# Patient Record
Sex: FEMALE | Race: WHITE | NOT HISPANIC OR LATINO | Employment: FULL TIME | URBAN - METROPOLITAN AREA
[De-identification: names, ages, dates, MRNs, and addresses within clinical notes are randomized per-mention and may not be internally consistent; named-entity substitution may affect disease eponyms.]

---

## 2017-10-23 ENCOUNTER — HOSPITAL ENCOUNTER (EMERGENCY)
Facility: CLINIC | Age: 37
Discharge: HOME | End: 2017-10-23
Attending: EMERGENCY MEDICINE | Admitting: EMERGENCY MEDICINE
Payer: COMMERCIAL

## 2017-10-23 ENCOUNTER — APPOINTMENT (OUTPATIENT)
Dept: GENERAL RADIOLOGY | Facility: CLINIC | Age: 37
End: 2017-10-23
Attending: EMERGENCY MEDICINE
Payer: COMMERCIAL

## 2017-10-23 VITALS
DIASTOLIC BLOOD PRESSURE: 98 MMHG | HEART RATE: 79 BPM | TEMPERATURE: 98.1 F | RESPIRATION RATE: 20 BRPM | OXYGEN SATURATION: 97 % | SYSTOLIC BLOOD PRESSURE: 131 MMHG

## 2017-10-23 DIAGNOSIS — R07.9 CHEST PAIN, UNSPECIFIED TYPE: ICD-10-CM

## 2017-10-23 DIAGNOSIS — I10 ESSENTIAL HYPERTENSION: ICD-10-CM

## 2017-10-23 DIAGNOSIS — R42 DIZZINESS: ICD-10-CM

## 2017-10-23 LAB
ANION GAP SERPL CALCULATED.3IONS-SCNC: 8 MMOL/L (ref 3–14)
BASOPHILS # BLD AUTO: 0.1 10E9/L (ref 0–0.2)
BASOPHILS NFR BLD AUTO: 1 %
BUN SERPL-MCNC: 17 MG/DL (ref 7–30)
CALCIUM SERPL-MCNC: 9.2 MG/DL (ref 8.5–10.1)
CHLORIDE SERPL-SCNC: 103 MMOL/L (ref 94–109)
CO2 SERPL-SCNC: 27 MMOL/L (ref 20–32)
CREAT SERPL-MCNC: 0.61 MG/DL (ref 0.52–1.04)
D DIMER PPP FEU-MCNC: <0.3 UG/ML FEU (ref 0–0.5)
DIFFERENTIAL METHOD BLD: NORMAL
EOSINOPHIL # BLD AUTO: 0.1 10E9/L (ref 0–0.7)
EOSINOPHIL NFR BLD AUTO: 1 %
ERYTHROCYTE [DISTWIDTH] IN BLOOD BY AUTOMATED COUNT: 12.5 % (ref 10–15)
GFR SERPL CREATININE-BSD FRML MDRD: >90 ML/MIN/1.7M2
GLUCOSE SERPL-MCNC: 92 MG/DL (ref 70–99)
HCT VFR BLD AUTO: 44 % (ref 35–47)
HGB BLD-MCNC: 14.7 G/DL (ref 11.7–15.7)
INTERPRETATION ECG - MUSE: NORMAL
LYMPHOCYTES # BLD AUTO: 3 10E9/L (ref 0.8–5.3)
LYMPHOCYTES NFR BLD AUTO: 31 %
MCH RBC QN AUTO: 30.1 PG (ref 26.5–33)
MCHC RBC AUTO-ENTMCNC: 33.4 G/DL (ref 31.5–36.5)
MCV RBC AUTO: 90 FL (ref 78–100)
MONOCYTES # BLD AUTO: 0.6 10E9/L (ref 0–1.3)
MONOCYTES NFR BLD AUTO: 6 %
NEUTROPHILS # BLD AUTO: 5.8 10E9/L (ref 1.6–8.3)
NEUTROPHILS NFR BLD AUTO: 61 %
NT-PROBNP SERPL-MCNC: 14 PG/ML (ref 0–450)
PLATELET # BLD AUTO: 355 10E9/L (ref 150–450)
POTASSIUM SERPL-SCNC: 4 MMOL/L (ref 3.4–5.3)
RBC # BLD AUTO: 4.88 10E12/L (ref 3.8–5.2)
SODIUM SERPL-SCNC: 138 MMOL/L (ref 133–144)
TROPONIN I SERPL-MCNC: <0.015 UG/L (ref 0–0.04)
WBC # BLD AUTO: 9.6 10E9/L (ref 4–11)

## 2017-10-23 PROCEDURE — 84484 ASSAY OF TROPONIN QUANT: CPT | Performed by: EMERGENCY MEDICINE

## 2017-10-23 PROCEDURE — 80048 BASIC METABOLIC PNL TOTAL CA: CPT | Performed by: EMERGENCY MEDICINE

## 2017-10-23 PROCEDURE — 96360 HYDRATION IV INFUSION INIT: CPT

## 2017-10-23 PROCEDURE — 25000132 ZZH RX MED GY IP 250 OP 250 PS 637: Performed by: EMERGENCY MEDICINE

## 2017-10-23 PROCEDURE — 25000128 H RX IP 250 OP 636: Performed by: EMERGENCY MEDICINE

## 2017-10-23 PROCEDURE — 71020 XR CHEST 2 VW: CPT

## 2017-10-23 PROCEDURE — 93005 ELECTROCARDIOGRAM TRACING: CPT

## 2017-10-23 PROCEDURE — 85025 COMPLETE CBC W/AUTO DIFF WBC: CPT | Performed by: EMERGENCY MEDICINE

## 2017-10-23 PROCEDURE — 96361 HYDRATE IV INFUSION ADD-ON: CPT

## 2017-10-23 PROCEDURE — 83880 ASSAY OF NATRIURETIC PEPTIDE: CPT | Performed by: EMERGENCY MEDICINE

## 2017-10-23 PROCEDURE — 85379 FIBRIN DEGRADATION QUANT: CPT | Performed by: EMERGENCY MEDICINE

## 2017-10-23 PROCEDURE — 99285 EMERGENCY DEPT VISIT HI MDM: CPT | Mod: 25

## 2017-10-23 RX ORDER — ASPIRIN 81 MG/1
324 TABLET, CHEWABLE ORAL ONCE
Status: COMPLETED | OUTPATIENT
Start: 2017-10-23 | End: 2017-10-23

## 2017-10-23 RX ORDER — NITROGLYCERIN 0.4 MG/1
0.4 TABLET SUBLINGUAL EVERY 5 MIN PRN
Status: DISCONTINUED | OUTPATIENT
Start: 2017-10-23 | End: 2017-10-23 | Stop reason: HOSPADM

## 2017-10-23 RX ORDER — SODIUM CHLORIDE 9 MG/ML
1000 INJECTION, SOLUTION INTRAVENOUS CONTINUOUS
Status: DISCONTINUED | OUTPATIENT
Start: 2017-10-23 | End: 2017-10-23 | Stop reason: HOSPADM

## 2017-10-23 RX ADMIN — SODIUM CHLORIDE 500 ML: 9 INJECTION, SOLUTION INTRAVENOUS at 15:09

## 2017-10-23 RX ADMIN — ASPIRIN 81 MG 324 MG: 81 TABLET ORAL at 15:07

## 2017-10-23 ASSESSMENT — ENCOUNTER SYMPTOMS
ABDOMINAL PAIN: 0
MYALGIAS: 0
DIZZINESS: 1

## 2017-10-23 NOTE — ED PROVIDER NOTES
History     Chief Complaint:  Chest Pain and Shortness of Breath    HPI   Tresa Riley is a 37 year old female who presents to the emergency department today for evaluation of chest pain and shortness of breath. The patient reports sitting at work around 1300 (2 hours prior to arrival) and experiencing sudden onset of chest pain and dizziness. She describes the chest pain as sharp in nature and has been present constantly since onset, though it is less severe currently. This dizziness is worse with movement. The patient denies any current shortness or breath, leg swelling and leg pain. Additionally, the patient states she's had her menstrual period for a month now, and has an appointment with her OB soon to have this evaluated. This has not changed. She denies abdominal pain or possibility of pregnancy.     Cardiac/PE/DVT Risk Factors:  History of hypertension - Positive   History of hyperlipidemia - Positive   History of diabetes - Negative   History of smoking - Negative   Personal history of PE/DVT - Negative   Family history of PE/DVT - Negative   Family history of heart complications - Negative   Recent travel - Negative   Recent surgery - Negative   Other immobilizations - Negative   Cancer - Negative      Allergies:  No Known Drug Allergies      Medications:    Antihypertensives  Hypercholesterolemia medications     Past Medical History:    No past medical history on file.    Past Surgical History:    No past surgical history on file.    Family History:    No family history on file.    Social History:  The patient was accompanied to the ED by a family member.  Smoking Status: Unknown  Smokeless Tobacco: Unknown  Alcohol Use: Unknown    Marital Status:        Review of Systems   Cardiovascular: Positive for chest pain. Negative for leg swelling.   Gastrointestinal: Negative for abdominal pain.   Musculoskeletal: Negative for myalgias (leg pain).   Neurological: Positive for dizziness.   All other  systems reviewed and are negative.    Physical Exam     Patient Vitals for the past 24 hrs:   BP Temp Temp src Pulse Heart Rate Resp SpO2   10/23/17 1600 (!) 138/99 - - - - - -   10/23/17 1545 (!) 148/108 - - - 72 - 99 %   10/23/17 1530 (!) 137/99 - - - 78 - 99 %   10/23/17 1500 (!) 157/104 - - - 82 - 97 %   10/23/17 1445 (!) 156/122 - - - 80 - 100 %   10/23/17 1439 (!) 148/115 - - 79 79 - 100 %   10/23/17 1402 - 98.1  F (36.7  C) Oral - - - 98 %   10/23/17 1358 (!) 131/96 - - 88 - 20 -      Physical Exam  General: Adult female supine in bed.   Eyes: PERRL, Conjunctive within normal limits  ENT: Moist mucous membranes, oropharynx clear.   CV: Normal S1S2, no murmur, rub or gallop. Regular rate and rhythm. Intact and equal radial pulses bilaterally  Resp: Clear to auscultation bilaterally, no wheezes, rales or rhonchi. Normal respiratory effort.  GI: Abdomen is soft, nontender and nondistended. No palpable masses. No rebound or guarding.  MSK: No edema. Nontender. Normal active range of motion. No calf asymmetry or tenderness to palpation. No chest wall tenderness to palpation.   Skin: Warm and dry. No rashes or lesions or ecchymoses on visible skin.  Neuro: Alert and oriented. Responds appropriately to all questions and commands. No focal findings appreciated. Normal muscle tone.  Psych: Normal mood and affect. Pleasant.  Emergency Department Course     ECG:  Indication: Chest pain and shortness of breath   Completed at 1354.  Read at 1457.   Normal sinus rhythm. Normal ECG.   Rate 85 bpm. TN interval 152. QRS duration 82. QT/QTc 386/459. P-R-T axes  41 30 29..     Imaging:  Radiology findings were communicated with the patient who voiced understanding of the findings.    XR Chest 2 Views:  IMPRESSION: No active infiltrate. No change.   Report per radiology      Laboratory:  Laboratory findings were communicated with the patient who voiced understanding of the findings.    CBC: WBC 9.6, HGB 14.7,   BMP: WNL.  (Creatinine 0.61)   Troponin (Collected 1452): <0.015   BNP: 14   D Dimer (Collected 1452): <0.3     Interventions:  1507 Aspirin 324mg PO   1509 NS Bolus 500mL IV      Emergency Department Course:  Nursing notes and vitals reviewed.  1458 I performed an exam of the patient as documented above.   EKG obtained in the ED, see results above.    IV was inserted and blood was drawn for laboratory testing, results above.   The patient was sent for a chest x-ray while in the emergency department, results above.    1637 I rechecked the patient and she feels comfortable going. She denies any new concerns and is overall feeling improved.   I personally reviewed the laboratory and imaging results with the Patient and answered all related questions prior to sign out.   Impression & Plan      Medical Decision Making:  Tresa Riley is a 37 year old female with history of hypertension, hypercholesterolemia who presents to the emergency department with concerns for chest pain that started while sitting at work. She had associated dizziness but was not orthostatic here. She had no neurologic symptoms or headache and stroke is not likely. This is somewhat atypical for ACS however she does have some risk factors and therefore troponins was obtained. EKG did not show any evidence of acute ischemia. Initial troponin was normal. D-dimer also obtained given her body habitus, however there is no elevation. She had no abnormalities on chest x-ray that were concerning. She has a low heart score and appropriate for discharge home. I did however schedule an outpatient stress test for her. Multiple etiologies considered including GERD, musculoskeletal pain, esophageal spasm, but these can be further evaluated as an outpatient. At this time no current evidence to suggest life threatening pathologies. The patient's care at this time was recommended secondary troponin and EKG. She declined this and acknowledged the risks associated with this.  All questions answered prior to discharge.      Diagnosis:    ICD-10-CM    1. Chest pain, unspecified type R07.9 Exercise Stress Echocardiogram   2. Dizziness R42    3. Essential hypertension I10 Exercise Stress Echocardiogram       Disposition:  Discharged to home.     Reza Rodriguez  10/23/2017   [facility] EMERGENCY DEPARTMENT  Scribe Disclosure:  I, Reza Rodriguez, am serving as a scribe at 2:39 PM on 10/23/2017 to document services personally performed by Candida Wadsworth MD based on my observations and the provider's statements to me.       Candida Wadsworth MD  10/27/17 0709

## 2017-10-23 NOTE — ED NOTES
Patient presents with chest pain and shortness of breath that started while she was sitting at her desk at work prior to arrival. She is alert and oriented, denies pain at this time, ABCs intact.

## 2017-10-23 NOTE — ED AVS SNAPSHOT
[facility] Emergency Department    [address]    [facility] [address]    Phone:  [phone]    Fax:  [phone]                                       Tresa Riley   MRN: [id number]    Department:  [facility] Emergency Department   Date of Visit:  10/23/2017           After Visit Summary Signature Page     I have received my discharge instructions, and my questions have been answered. I have discussed any challenges I see with this plan with the nurse or doctor.    ..........................................................................................................................................  Patient/Patient Representative Signature      ..........................................................................................................................................  Patient Representative Print Name and Relationship to Patient    ..................................................               ................................................  Date                                            Time    ..........................................................................................................................................  Reviewed by Signature/Title    ...................................................              ..............................................  Date                                                            Time

## 2017-10-23 NOTE — DISCHARGE INSTRUCTIONS
Discharge Instructions  Chest Pain    You have been seen today for chest pain or discomfort.  At this time, your provider has found no signs that your chest pain is due to a serious or life-threatening condition, (or you have declined more testing and/or admission to the hospital). However, sometimes there is a serious problem that does not show up right away. Your evaluation today may not be complete and you may need further testing and evaluation.     Generally, every Emergency Department visit should have a follow-up clinic visit with either a primary or a specialty clinic/provider. Please follow-up as instructed by your emergency provider today.  Return to the Emergency Department if:    Your chest pain changes, gets worse, starts to happen more often, or comes with less activity.    You are newly short of breath.    You get very weak or tired.    You pass out or faint.    You have any new symptoms, like fever, cough, numb legs, or you cough up blood.    You have anything else that worries you.    Until you follow-up with your regular provider, please do the following:    Take one aspirin daily unless you have an allergy or are told not to by your provider.    If a stress test appointment has been made, go to the appointment.    If you have questions, contact your regular provider.    Follow-up with your regular provider/clinic as directed; this is very important.    If you were given a prescription for medicine here today, be sure to read all of the information (including the package insert) that comes with your prescription.  This will include important information about the medicine, its side effects, and any warnings that you need to know about.  The pharmacist who fills the prescription can provide more information and answer questions you may have about the medicine.  If you have questions or concerns that the pharmacist cannot address, please call or return to the Emergency Department.       Remember that  you can always come back to the Emergency Department if you are not able to see your regular provider in the amount of time listed above, if you get any new symptoms, or if there is anything that worries you.    Discharge Instructions  Dizziness (Lightheaded)  Today you were seen for dizziness.  Dizziness can be caused by many things and it can be very difficult to determine the cause of dizziness.  At this time, your provider has found no signs that your dizziness is due to a serious or life-threatening condition. However, sometimes there is a serious problem that does not show up right away, and it is important for you to follow up with your regular provider as instructed.  Generally, every Emergency Department visit should have a follow-up clinic visit with either a primary or a specialty clinic/provider. Please follow-up as instructed by your emergency provider today.      Return to the Emergency Department if:      You pass out (fainting or falling out), especially during exercise.      You develop chest pain, chest pressure or difficulty breathing.    Your feel an irregular heartbeat.    You have excessive vaginal bleeding, or blood in your stool or vomit (throw up).    You have a high fever.    Your symptoms get worse or more frequent.    If when you begin to feel dizzy or lightheaded, it is important to sit down or lay down immediately to prevent injury from falling.  If you were given a prescription for medicine here today, be sure to read all of the information (including the package insert) that comes with your prescription.  This will include important information about the medicine, its side effects, and any warnings that you need to know about.  The pharmacist who fills the prescription can provide more information and answer questions you may have about the medicine.  If you have questions or concerns that the pharmacist cannot address, please call or return to the Emergency Department.   Remember  that you can always come back to the Emergency Department if you are not able to see your regular provider in the amount of time listed above, if you get any new symptoms, or if there is anything that worries you.

## 2017-10-23 NOTE — ED AVS SNAPSHOT
[facility] Emergency Department    [address]    [facility] [address]    Phone:  [phone]    Fax:  [phone]                                       Tresa Riley   MRN: [id number]    Department:  [facility] Emergency Department   Date of Visit:  10/23/2017           Patient Information     Date Of Birth          1980        Your diagnoses for this visit were:     Chest pain, unspecified type     Dizziness     Essential hypertension        You were seen by Candida Wadsworth MD.      Follow-up Information     Follow up with Primary clinic.    Why:  within 3 days        Follow up with [facility] Emergency Department.    Specialty:  EMERGENCY MEDICINE    Why:  As needed, If symptoms worsen    Contact information:    [address]  [facility] [address] [phone]        Discharge Instructions       Discharge Instructions  Chest Pain    You have been seen today for chest pain or discomfort.  At this time, your provider has found no signs that your chest pain is due to a serious or life-threatening condition, (or you have declined more testing and/or admission to the hospital). However, sometimes there is a serious problem that does not show up right away. Your evaluation today may not be complete and you may need further testing and evaluation.     Generally, every Emergency Department visit should have a follow-up clinic visit with either a primary or a specialty clinic/provider. Please follow-up as instructed by your emergency provider today.  Return to the Emergency Department if:    Your chest pain changes, gets worse, starts to happen more often, or comes with less activity.    You are newly short of breath.    You get very weak or tired.    You pass out or faint.    You have any new symptoms, like fever, cough, numb legs, or you cough up blood.    You have anything else that worries you.    Until you follow-up with your  regular provider, please do the following:    Take one aspirin daily unless you have an allergy or are told not to by your provider.    If a stress test appointment has been made, go to the appointment.    If you have questions, contact your regular provider.    Follow-up with your regular provider/clinic as directed; this is very important.    If you were given a prescription for medicine here today, be sure to read all of the information (including the package insert) that comes with your prescription.  This will include important information about the medicine, its side effects, and any warnings that you need to know about.  The pharmacist who fills the prescription can provide more information and answer questions you may have about the medicine.  If you have questions or concerns that the pharmacist cannot address, please call or return to the Emergency Department.       Remember that you can always come back to the Emergency Department if you are not able to see your regular provider in the amount of time listed above, if you get any new symptoms, or if there is anything that worries you.    Discharge Instructions  Dizziness (Lightheaded)  Today you were seen for dizziness.  Dizziness can be caused by many things and it can be very difficult to determine the cause of dizziness.  At this time, your provider has found no signs that your dizziness is due to a serious or life-threatening condition. However, sometimes there is a serious problem that does not show up right away, and it is important for you to follow up with your regular provider as instructed.  Generally, every Emergency Department visit should have a follow-up clinic visit with either a primary or a specialty clinic/provider. Please follow-up as instructed by your emergency provider today.      Return to the Emergency Department if:      You pass out (fainting or falling out), especially during exercise.      You develop chest pain, chest pressure  or difficulty breathing.    Your feel an irregular heartbeat.    You have excessive vaginal bleeding, or blood in your stool or vomit (throw up).    You have a high fever.    Your symptoms get worse or more frequent.    If when you begin to feel dizzy or lightheaded, it is important to sit down or lay down immediately to prevent injury from falling.  If you were given a prescription for medicine here today, be sure to read all of the information (including the package insert) that comes with your prescription.  This will include important information about the medicine, its side effects, and any warnings that you need to know about.  The pharmacist who fills the prescription can provide more information and answer questions you may have about the medicine.  If you have questions or concerns that the pharmacist cannot address, please call or return to the Emergency Department.   Remember that you can always come back to the Emergency Department if you are not able to see your regular provider in the amount of time listed above, if you get any new symptoms, or if there is anything that worries you.      24 Hour Appointment Hotline       To make an appointment at any [facility], call [phone] ([phone]). If you don't have a family doctor or clinic, we will help you find one. [place] [facility] clinics are conveniently located to serve the needs of you and your family.          ED Discharge Orders     Exercise Stress Echocardiogram       Administration of IV contrast will be tailored to this examination per the appropriate written protocol listed in the Echocardiography department Protocol Book, or by the supervising Cardiologist. This may result in an order change.    Use of contrast is at the discretion of the supervising Cardiologist.                     Review of your medicines      Notice     You have not been prescribed any medications.            Procedures and tests performed during your visit     Basic  metabolic panel    CBC with platelets differential    Cardiac Continuous Monitoring    D dimer quantitative    EKG 12 lead    EKG 12-lead, tracing only    ISTAT troponin nursing POCT    Nt probnp inpatient (BNP)    Peripheral IV: Standard    Pulse oximetry nursing    Troponin I    XR Chest 2 Views      Orders Needing Specimen Collection     None      Pending Results     Date and Time Order Name Status Description    10/23/2017 1500 XR Chest 2 Views Preliminary             Pending Culture Results     No orders found from 10/21/2017 to 10/24/2017.            Pending Results Instructions     If you had any lab results that were not finalized at the time of your Discharge, you can call the ED Lab Result RN at [phone]. You will be contacted by this team for any positive Lab results or changes in treatment. The nurses are available 7 days a week from 10A to 6:30P.  You can leave a message 24 hours per day and they will return your call.        Test Results From Your Hospital Stay        10/23/2017  3:46 PM      Component Results     Component Value Ref Range & Units Status    WBC 9.6 4.0 - 11.0 10e9/L Final    RBC Count 4.88 3.8 - 5.2 10e12/L Final    Hemoglobin 14.7 11.7 - 15.7 g/dL Final    Hematocrit 44.0 35.0 - 47.0 % Final    MCV 90 78 - 100 fl Final    MCH 30.1 26.5 - 33.0 pg Final    MCHC 33.4 31.5 - 36.5 g/dL Final    RDW 12.5 10.0 - 15.0 % Final    Platelet Count 355 150 - 450 10e9/L Final    % Neutrophils 61.0 % Final    % Lymphocytes 31.0 % Final    % Monocytes 6.0 % Final    % Eosinophils 1.0 % Final    % Basophils 1.0 % Final    Absolute Neutrophil 5.8 1.6 - 8.3 10e9/L Final    Absolute Lymphocytes 3.0 0.8 - 5.3 10e9/L Final    Absolute Monocytes 0.6 0.0 - 1.3 10e9/L Final    Absolute Eosinophils 0.1 0.0 - 0.7 10e9/L Final    Absolute Basophils 0.1 0.0 - 0.2 10e9/L Final    Diff Method Automated Method  Final         10/23/2017  3:32 PM      Component Results     Component Value Ref Range & Units Status     Sodium 138 133 - 144 mmol/L Final    Potassium 4.0 3.4 - 5.3 mmol/L Final    Chloride 103 94 - 109 mmol/L Final    Carbon Dioxide 27 20 - 32 mmol/L Final    Anion Gap 8 3 - 14 mmol/L Final    Glucose 92 70 - 99 mg/dL Final    Urea Nitrogen 17 7 - 30 mg/dL Final    Creatinine 0.61 0.52 - 1.04 mg/dL Final    GFR Estimate >90 >60 mL/min/1.7m2 Final    Non  GFR Calc    GFR Estimate If Black >90 >60 mL/min/1.7m2 Final    African American GFR Calc    Calcium 9.2 8.5 - 10.1 mg/dL Final         10/23/2017  3:32 PM      Component Results     Component Value Ref Range & Units Status    Troponin I ES <0.015 0.000 - 0.045 ug/L Final    The 99th percentile for upper reference range is 0.045 ug/L.  Troponin values   in the range of 0.045 - 0.120 ug/L may be associated with risks of adverse   clinical events.           10/23/2017  3:32 PM      Component Results     Component Value Ref Range & Units Status    N-Terminal Pro BNP Inpatient 14 0 - 450 pg/mL Final       Reference range shown and results flagged as abnormal are suggested inpatient   cut points for confirming diagnosis if CHF in an acute setting. Establishing a   baseline value for each individual patient is useful for follow-up. An   inpatient or emergency department NT-proPBNP <300 pg/mL effectively rules out   acute CHF, with 99% negative predictive value.  The outpatient non-acute reference range for ruling out CHF is:   0-125 pg/mL (age 18 to less than 75)   0-450 pg/mL (age 75 yrs and older)           10/23/2017  4:33 PM      Narrative     XR CHEST 2 VW   10/23/2017 4:22 PM     HISTORY: Chest pain    COMPARISON: Film dated 5/25/2011    FINDINGS: The heart is negative.  The lungs are clear. The pulmonary  vasculature is normal.  The bones and soft tissues are unremarkable.        Impression     IMPRESSION: No active infiltrate. No change.             10/23/2017  3:28 PM      Component Results     Component Value Ref Range & Units Status    D Dimer  <0.3 0.0 - 0.50 ug/ml FEU Final    This D-dimer assay is intended for use in conjunction with a clinical pretest   probability assessment model to exclude pulmonary embolism (PE) and deep   venous thrombosis (DVT) in outpatients suspected of PE or DVT. The cut-off   value is 0.5 ug/mL FEU.                  Clinical Quality Measure: Blood Pressure Screening     Your blood pressure was checked while you were in the emergency department today. The last reading we obtained was  BP: 124/90 . Please read the guidelines below about what these numbers mean and what you should do about them.  If your systolic blood pressure (the top number) is less than 120 and your diastolic blood pressure (the bottom number) is less than 80, then your blood pressure is normal. There is nothing more that you need to do about it.  If your systolic blood pressure (the top number) is 120-139 or your diastolic blood pressure (the bottom number) is 80-89, your blood pressure may be higher than it should be. You should have your blood pressure rechecked within a year by a primary care provider.  If your systolic blood pressure (the top number) is 140 or greater or your diastolic blood pressure (the bottom number) is 90 or greater, you may have high blood pressure. High blood pressure is treatable, but if left untreated over time it can put you at risk for heart attack, stroke, or kidney failure. You should have your blood pressure rechecked by a primary care provider within the next 4 weeks.  If your provider in the emergency department today gave you specific instructions to follow-up with your doctor or provider even sooner than that, you should follow that instruction and not wait for up to 4 weeks for your follow-up visit.        Thank you for choosing [facility]       Thank you for choosing [facility] for your care. Our goal is always to provide you with excellent care. Hearing back from our patients is one way we can continue to improve our  "services. Please take a few minutes to complete the written survey that you may receive in the mail after you visit with us. Thank you!        [employer] Information     [employer] lets you send messages to your doctor, view your test results, renew your prescriptions, schedule appointments and more. To sign up, go to [email or web address] . Click on \"Log in\" on the left side of the screen, which will take you to the Welcome page. Then click on \"Sign up Now\" on the right side of the page.     You will be asked to enter the access code listed below, as well as some personal information. Please follow the directions to create your username and password.     Your access code is: [email or web address]  Expires: 2018  4:42 PM     Your access code will  in 90 days. If you need help or a new code, please call your [facility] clinic or [phone].        Care EveryWhere ID     This is your Care EveryWhere ID. This could be used by other organizations to access your [facility] medical records  [id number]        Equal Access to Services     [facility] CenterEL[facility]IRA : Hadii ephraim Grey, waaxda natalia, qaybmarcela kaalakilah lynn, rain jiménez . So [facility] [phone].    ATENCIÓN: Si habla español, tiene a alvarez disposición servicios gratuitos de asistencia lingüística. Dede al [phone].    We comply with applicable federal civil rights laws and [place] laws. We do not discriminate on the basis of race, color, national origin, age, disability, sex, sexual orientation, or gender identity.            After Visit Summary       This is your record. Keep this with you and show to your community pharmacist(s) and doctor(s) at your next visit.                  "

## 2017-10-24 ENCOUNTER — HOSPITAL ENCOUNTER (OUTPATIENT)
Dept: CARDIOLOGY | Facility: CLINIC | Age: 37
Discharge: HOME | End: 2017-10-24
Attending: EMERGENCY MEDICINE | Admitting: EMERGENCY MEDICINE
Payer: COMMERCIAL

## 2017-10-24 DIAGNOSIS — R07.9 CHEST PAIN, UNSPECIFIED TYPE: ICD-10-CM

## 2017-10-24 DIAGNOSIS — I10 ESSENTIAL HYPERTENSION: ICD-10-CM

## 2017-10-24 PROCEDURE — 93350 STRESS TTE ONLY: CPT | Mod: 26 | Performed by: INTERNAL MEDICINE

## 2017-10-24 PROCEDURE — 93325 DOPPLER ECHO COLOR FLOW MAPG: CPT | Mod: 26 | Performed by: INTERNAL MEDICINE

## 2017-10-24 PROCEDURE — 25500064 ZZH RX 255 OP 636: Performed by: EMERGENCY MEDICINE

## 2017-10-24 PROCEDURE — 93321 DOPPLER ECHO F-UP/LMTD STD: CPT | Mod: 26 | Performed by: INTERNAL MEDICINE

## 2017-10-24 PROCEDURE — 93018 CV STRESS TEST I&R ONLY: CPT | Performed by: INTERNAL MEDICINE

## 2017-10-24 PROCEDURE — 40000264 ECHO STRESS TEST WITH LUMASON

## 2017-10-24 PROCEDURE — 93016 CV STRESS TEST SUPVJ ONLY: CPT | Performed by: INTERNAL MEDICINE

## 2017-10-24 RX ADMIN — SULFUR HEXAFLUORIDE 3 ML: KIT at 14:30

## 2018-10-09 ENCOUNTER — VIRTUAL VISIT (OUTPATIENT)
Dept: FAMILY MEDICINE | Facility: OTHER | Age: 38
End: 2018-10-09

## 2018-10-10 NOTE — PROGRESS NOTES
"Date:   Clinician: Lorenzo Waldron  Clinician NPI: [id number]  Patient: ceferino phelps  Patient : 1980  Patient Address: [address] [place] [address]  Patient Phone: [phone]  Visit Protocol: URI  Patient Summary:  ceferino is a 38 year old ( : 1980 ) female who initiated a Visit for cold, sinus infection, or influenza. When asked the question \"Please sign me up to receive news, health information and promotions from [employer] [employer].\", ceferino responded \"No\".    ceferino states her symptoms started gradually 3-6 days ago. After her symptoms started, they improved and then got worse again.   Her symptoms consist of a headache, nasal congestion, malaise, facial pain or pressure, myalgia, and chills. ceferino also feels feverish but was unable to measure her temperature.   Symptom details     Nasal secretions: The color of her mucus is yellow.    Facial pain or pressure: The facial pain or pressure feels worse when bending over or leaning forward.     Headache: She states the headache is severe (7-9 on a 10 point pain scale).      ceferino denies having sore throat, ear pain, dyspnea, wheezing, teeth pain, rhinitis, and cough. She also denies taking antibiotic medication for the symptoms and having recent facial or sinus surgery in the past 60 days.   She has not recently been exposed to someone with influenza. ceferino has been in close contact with the following high risk individuals: adults 65 or older.   ceferino had 3 sinus infections within the past year.   Weight: 216 lbs   ceferino does not smoke or use smokeless tobacco.   She denies pregnancy and denies breastfeeding. She has menstruated in the past month.   Additional information as reported by the patient (free text): feeling nauseous and tired and dizzy   MEDICATIONS: atorvastatin oral, amlodipine oral, ALLERGIES: NKDA  Clinician Response:  Dear ceferino,  Based on the information provided, you have viral sinusitis, also known " as a sinus infection. Sinus infections are caused by bacteria or a virus and symptoms are almost always identical. The difference between the 2 types of infections is timing.  Sinus infections start as viral infections and symptoms improve on their own in about 7 days. If symptoms have not improved after 7 days or have even worsened, a bacterial infection may have developed.  Medication information  Because you have a viral infection, antibiotics will not help you get better. Treating a viral infection with antibiotics could actually make you feel worse.  I am prescribing:     Fluticasone 50 mcg/actuation nasal spray. Inhale 2 sprays in each nostril 1 time per day; after 1 week, may adjust to 1 - 2 sprays in each nostril 1 time per day. This medication takes several days to start working, so keep taking it even if it doesn't help right away. There are no refills with this prescription.   Unless you are allergic to the over-the-counter medication(s) below, I recommend using:     A decongestant such as Sudafed PE or store brand.    A sinus irrigation kit such as Sinus Rinse, Neti Pot, SinuCleanse, or store brand. Be sure to use sterile or previously boiled water to prevent unwanted infections.     Over-the-counter medications do not require a prescription. Ask the pharmacist if you have any questions.  Self care  The following tips will keep you as comfortable as possible while you recover:     Rest    Drink plenty of water and other liquids    Take a hot shower to loosen congestion     When to seek care  Please be seen in a clinic or urgent care if new symptoms develop, or symptoms become worse.   Diagnosis: Viral sinusitis  Diagnosis ICD: J01.90  Prescription: fluticasone 50 mcg/actuation nasal spray,suspension 1 120 spray aerosol with adapter (grams), 30 days supply. Inhale 2 sprays in each nostril 1 time per day; after 1 week, may adjust to 1 - 2 sprays in each nostril 1 time per day.. Refills: 0, Refill as  needed: no, Allow substitutions: yes  Pharmacy: [id number] - [phone] - [id number], [place] [address]

## 2019-01-26 ENCOUNTER — OFFICE VISIT (OUTPATIENT)
Dept: FAMILY MEDICINE | Facility: CLINIC | Age: 39
End: 2019-01-26
Payer: COMMERCIAL

## 2019-01-26 VITALS
HEIGHT: 61 IN | WEIGHT: 225 LBS | SYSTOLIC BLOOD PRESSURE: 130 MMHG | BODY MASS INDEX: 42.48 KG/M2 | OXYGEN SATURATION: 99 % | DIASTOLIC BLOOD PRESSURE: 80 MMHG | RESPIRATION RATE: 14 BRPM | TEMPERATURE: 98.8 F | HEART RATE: 78 BPM

## 2019-01-26 DIAGNOSIS — E66.01 MORBID OBESITY (H): ICD-10-CM

## 2019-01-26 DIAGNOSIS — I10 ESSENTIAL HYPERTENSION: ICD-10-CM

## 2019-01-26 DIAGNOSIS — E78.5 HYPERLIPIDEMIA LDL GOAL <160: ICD-10-CM

## 2019-01-26 DIAGNOSIS — R39.89 URINARY PROBLEM: Primary | ICD-10-CM

## 2019-01-26 DIAGNOSIS — J02.9 SORE THROAT: ICD-10-CM

## 2019-01-26 LAB
ALBUMIN UR-MCNC: NEGATIVE MG/DL
AMORPH CRY #/AREA URNS HPF: ABNORMAL /HPF
ANION GAP SERPL CALCULATED.3IONS-SCNC: 5 MMOL/L (ref 3–14)
APPEARANCE UR: ABNORMAL
BACTERIA #/AREA URNS HPF: ABNORMAL /HPF
BILIRUB UR QL STRIP: NEGATIVE
BUN SERPL-MCNC: 14 MG/DL (ref 7–30)
CALCIUM SERPL-MCNC: 9.2 MG/DL (ref 8.5–10.1)
CHLORIDE SERPL-SCNC: 103 MMOL/L (ref 94–109)
CHOLEST SERPL-MCNC: 155 MG/DL
CO2 SERPL-SCNC: 29 MMOL/L (ref 20–32)
COLOR UR AUTO: YELLOW
CREAT SERPL-MCNC: 0.58 MG/DL (ref 0.52–1.04)
GFR SERPL CREATININE-BSD FRML MDRD: >90 ML/MIN/{1.73_M2}
GLUCOSE SERPL-MCNC: 85 MG/DL (ref 70–99)
GLUCOSE UR STRIP-MCNC: NEGATIVE MG/DL
HDLC SERPL-MCNC: 73 MG/DL
HGB UR QL STRIP: NEGATIVE
KETONES UR STRIP-MCNC: NEGATIVE MG/DL
LDLC SERPL CALC-MCNC: 66 MG/DL
LEUKOCYTE ESTERASE UR QL STRIP: ABNORMAL
NITRATE UR QL: NEGATIVE
NON-SQ EPI CELLS #/AREA URNS LPF: ABNORMAL /LPF
NONHDLC SERPL-MCNC: 82 MG/DL
PH UR STRIP: 7 PH (ref 5–7)
POTASSIUM SERPL-SCNC: 3.8 MMOL/L (ref 3.4–5.3)
RBC #/AREA URNS AUTO: ABNORMAL /HPF
SODIUM SERPL-SCNC: 137 MMOL/L (ref 133–144)
SOURCE: ABNORMAL
SP GR UR STRIP: 1.02 (ref 1–1.03)
SPECIMEN SOURCE: NORMAL
TRIGL SERPL-MCNC: 78 MG/DL
UROBILINOGEN UR STRIP-ACNC: 0.2 EU/DL (ref 0.2–1)
WBC #/AREA URNS AUTO: ABNORMAL /HPF
WET PREP SPEC: NORMAL

## 2019-01-26 PROCEDURE — 80048 BASIC METABOLIC PNL TOTAL CA: CPT | Performed by: FAMILY MEDICINE

## 2019-01-26 PROCEDURE — 80061 LIPID PANEL: CPT | Performed by: FAMILY MEDICINE

## 2019-01-26 PROCEDURE — 99214 OFFICE O/P EST MOD 30 MIN: CPT | Performed by: FAMILY MEDICINE

## 2019-01-26 PROCEDURE — 87086 URINE CULTURE/COLONY COUNT: CPT | Performed by: FAMILY MEDICINE

## 2019-01-26 PROCEDURE — 81001 URINALYSIS AUTO W/SCOPE: CPT | Performed by: FAMILY MEDICINE

## 2019-01-26 PROCEDURE — 36415 COLL VENOUS BLD VENIPUNCTURE: CPT | Performed by: FAMILY MEDICINE

## 2019-01-26 PROCEDURE — 87210 SMEAR WET MOUNT SALINE/INK: CPT | Performed by: FAMILY MEDICINE

## 2019-01-26 RX ORDER — ATORVASTATIN CALCIUM 20 MG/1
20 TABLET, FILM COATED ORAL
COMMUNITY
Start: 2018-08-07 | End: 2019-02-09

## 2019-01-26 RX ORDER — AMLODIPINE BESYLATE 5 MG/1
5 TABLET ORAL
COMMUNITY
Start: 2018-08-07 | End: 2019-06-11

## 2019-01-26 RX ORDER — MELATONIN 10 MG
TABLET, SUBLINGUAL SUBLINGUAL
COMMUNITY
End: 2019-05-28

## 2019-01-26 SDOH — HEALTH STABILITY: MENTAL HEALTH: HOW OFTEN DO YOU HAVE A DRINK CONTAINING ALCOHOL?: NEVER

## 2019-01-26 ASSESSMENT — PATIENT HEALTH QUESTIONNAIRE - PHQ9
SUM OF ALL RESPONSES TO PHQ QUESTIONS 1-9: 9
SUM OF ALL RESPONSES TO PHQ QUESTIONS 1-9: 9
10. IF YOU CHECKED OFF ANY PROBLEMS, HOW DIFFICULT HAVE THESE PROBLEMS MADE IT FOR YOU TO DO YOUR WORK, TAKE CARE OF THINGS AT HOME, OR GET ALONG WITH OTHER PEOPLE: SOMEWHAT DIFFICULT

## 2019-01-26 ASSESSMENT — MIFFLIN-ST. JEOR: SCORE: 1647.97

## 2019-01-26 NOTE — PROGRESS NOTES
SUBJECTIVE:   Tresa Chin is a 38 year old female who presents to clinic today for the following health issues:      History of Present Illness     Hyperlipidemia:     Low fat/chol diet rating::  Not monitoring fat    Taking Statins::  YES    Lipid Medications or Supplements::  None    Hypertension:     Outpatient blood pressures:  Are not being checked    Dietary sodium intake::  Not monitoring salt intake    Migraines:     Headache Symptoms are:  Worsening    Migraine frequency::  5 per week    Migraine Duration::  >3 hours    Ability to perform ADL's::  Yes    Migraine Rescue/Relief Medications::  Ibuprofen (Advil, Motrin) and Tylenol    Effectiveness of rescue/relief medications::  Intermittent relief    Migraine Preventative Medications::  None    Neurological symptoms::  Weakness    ER or UC Visits::  None    Diet:  Regular (no restrictions)  Taking medications regularly:  Yes  Additional concerns today:  Yes      Problem list and histories reviewed & adjusted, as indicated.  Additional history: as documented    RESPIRATORY SYMPTOMS      Duration: 1 day ago    Description  Sore throat.     Severity: mild    Accompanying signs and symptoms: hx of recurrent sinus infection that requires abx.    History (predisposing factors):  Frequent sinutisi.    Precipitating or alleviating factors: None    Therapies tried and outcome:  none    URINARY TRACT SYMPTOMS      Duration: 1 month ago.    Description  Pain in the Rt side, and burning when she urinates on and off.    Intensity:  mild    Accompanying signs and symptoms:  Fever/chills: no   Flank pain YES  Nausea and vomiting: no   Vaginal symptoms: none  Abdominal/Pelvic Pain: no     History  History of frequent UTI's: no   History of kidney stones: no   Sexually Active: no   Possibility of pregnancy: No    Precipitating or alleviating factors: None    Therapies tried and outcome: none   Outcome:       There is no problem list on file for this patient.     "History reviewed. No pertinent surgical history.    Social History     Tobacco Use     Smoking status: Never Smoker     Smokeless tobacco: Never Used   Substance Use Topics     Alcohol use: Yes     Frequency: Never     History reviewed. No pertinent family history.      Current Outpatient Medications   Medication Sig Dispense Refill     amLODIPine (NORVASC) 5 MG tablet Take 5 mg by mouth       atorvastatin (LIPITOR) 20 MG tablet Take 20 mg by mouth       Cholecalciferol (VITAMIN D3) 01740 units TABS        Multiple Vitamins-Minerals (MULTIVITAMIN ADULTS PO)        Allergies   Allergen Reactions     Latex      PN: Converted from LW Latex Sensitivity Flag     No Clinical Screening - See Comments      PN: LW Other1: -environmental allergies     Recent Labs   Lab Test 10/23/17  1452 05/25/11  1815   CR 0.61 0.66   GFRESTIMATED >90 >90   GFRESTBLACK >90 >90   POTASSIUM 4.0 3.9      BP Readings from Last 3 Encounters:   01/26/19 130/80   10/23/17 (!) 131/98    Wt Readings from Last 3 Encounters:   01/26/19 102.1 kg (225 lb)                    ROS:  CONSTITUTIONAL: NEGATIVE for fever, chills, change in weight  INTEGUMENTARY/SKIN: NEGATIVE for worrisome rashes, moles or lesions  ENT/MOUTH: as above.  RESP: NEGATIVE for significant cough or SOB  CV: NEGATIVE for chest pain, palpitations or peripheral edema  GI: lower abdominal pain.  MUSCULOSKELETAL: NEGATIVE for significant arthralgias or myalgia  NEURO: headaches daily.  ENDOCRINE: NEGATIVE for temperature intolerance, skin/hair changes  HEME/ALLERGY/IMMUNE: NEGATIVE for bleeding problems  PSYCHIATRIC: anxiety.    OBJECTIVE:     /80 (BP Location: Right arm, Patient Position: Chair, Cuff Size: Adult Large)   Pulse 78   Temp 98.8  F (37.1  C) (Oral)   Resp 14   Ht 1.549 m (5' 1\")   Wt 102.1 kg (225 lb)   SpO2 99%   BMI 42.51 kg/m    Body mass index is 42.51 kg/m .  Head: Normocephalic, atraumatic.  Eyes: Conjunctiva clear, non icteric. PERRLA.  Ears: External " ears nl, TM is nl   Nose: Septum midline, nasal mucosa congested. No discharge.  There is no tenderness over the maxillary sinuses, there is no tenderness over the frontal sinuses  Mouth / Throat: Normal dentition.  No oral lesions. Pharynx mild erythematous, tonsils no exudate/hypertrophy.  Neck: Supple, no enlarged LN, trachea midline.  LUNGS:  CTA B/L, no wheezing or crackles.  CVS : RRR, no murmur, no rub.  Abdomen: soft, mild tenderness in the lower abdomen.    Diagnostic Test Results:  Results for orders placed or performed in visit on 01/26/19 (from the past 24 hour(s))   UA reflex to Microscopic and Culture   Result Value Ref Range    Color Urine Yellow     Appearance Urine Cloudy     Glucose Urine Negative NEG^Negative mg/dL    Bilirubin Urine Negative NEG^Negative    Ketones Urine Negative NEG^Negative mg/dL    Specific Gravity Urine 1.020 1.003 - 1.035    Blood Urine Negative NEG^Negative    pH Urine 7.0 5.0 - 7.0 pH    Protein Albumin Urine Negative NEG^Negative mg/dL    Urobilinogen Urine 0.2 0.2 - 1.0 EU/dL    Nitrite Urine Negative NEG^Negative    Leukocyte Esterase Urine Moderate (A) NEG^Negative    Source Midstream Urine    Urine Microscopic   Result Value Ref Range    WBC Urine 5-10 (A) OTO5^0 - 5 /HPF    RBC Urine O - 2 OTO2^O - 2 /HPF    Squamous Epithelial /LPF Urine Many (A) FEW^Few /LPF    Bacteria Urine Many (A) NEG^Negative /HPF    Amorphous Crystals Many (A) NEG^Negative /HPF   Wet prep   Result Value Ref Range    Specimen Description Vagina     Wet Prep No Trichomonas seen     Wet Prep No clue cells seen     Wet Prep No yeast seen        ASSESSMENT/PLAN:             1. Urinary problem  Will follow up urine culture, so far there is no signs of UTI.  - UA reflex to Microscopic and Culture  - Wet prep  - Urine Microscopic  - Urine Culture Aerobic Bacterial    2. Essential hypertension  Controlled, continue on same meds.  - Basic metabolic panel    3. Hyperlipidemia LDL goal <160  Controlled,  continue on same meds.   - Lipid panel reflex to direct LDL Fasting    4. Morbid obesity (H)  Today I counseled the patient about diet, regular exercise and weight loss planning.    5. Nonspecific finding on examination of urine    - Urine Culture Aerobic Bacterial    Sore throat : mostly viral. Advised about rest, OTC medications for symptoms, drink warm liquids, and may use humidifier at night time to improve the cough.      Melissa Franco MD  [facility]

## 2019-01-27 LAB
BACTERIA SPEC CULT: NORMAL
SPECIMEN SOURCE: NORMAL

## 2019-01-27 ASSESSMENT — PATIENT HEALTH QUESTIONNAIRE - PHQ9: SUM OF ALL RESPONSES TO PHQ QUESTIONS 1-9: 9

## 2019-02-09 ENCOUNTER — ANCILLARY PROCEDURE (OUTPATIENT)
Dept: GENERAL RADIOLOGY | Facility: CLINIC | Age: 39
End: 2019-02-09
Attending: FAMILY MEDICINE
Payer: COMMERCIAL

## 2019-02-09 ENCOUNTER — OFFICE VISIT (OUTPATIENT)
Dept: FAMILY MEDICINE | Facility: CLINIC | Age: 39
End: 2019-02-09
Payer: COMMERCIAL

## 2019-02-09 VITALS
OXYGEN SATURATION: 97 % | SYSTOLIC BLOOD PRESSURE: 122 MMHG | DIASTOLIC BLOOD PRESSURE: 84 MMHG | BODY MASS INDEX: 41.91 KG/M2 | WEIGHT: 221.8 LBS | HEART RATE: 79 BPM | TEMPERATURE: 98.5 F

## 2019-02-09 DIAGNOSIS — F41.0 PANIC ATTACK: ICD-10-CM

## 2019-02-09 DIAGNOSIS — M54.50 BILATERAL LOW BACK PAIN WITHOUT SCIATICA, UNSPECIFIED CHRONICITY: Primary | ICD-10-CM

## 2019-02-09 DIAGNOSIS — M54.50 BILATERAL LOW BACK PAIN WITHOUT SCIATICA, UNSPECIFIED CHRONICITY: ICD-10-CM

## 2019-02-09 DIAGNOSIS — E66.01 MORBID OBESITY (H): ICD-10-CM

## 2019-02-09 PROCEDURE — 99214 OFFICE O/P EST MOD 30 MIN: CPT | Performed by: FAMILY MEDICINE

## 2019-02-09 PROCEDURE — 72100 X-RAY EXAM L-S SPINE 2/3 VWS: CPT

## 2019-02-09 NOTE — PROGRESS NOTES
SUBJECTIVE:   Tresa Chin is a 38 year old female who presents to clinic today for the following health issues:      Shortness of breath, dizzy, vision goes dark episodes:  Pt has been episodes of shortness of breath, it is sudden, she feels a black cloud over the eyes, she feels very dizzy and shaky, last for for 10 minute to 30 minutes.  She gets very scared, and feels like she is going through a heart attacks.    Amount of exercise or physical activity: None    Problems taking medications regularly: No    Medication side effects: none    Diet: regular (no restrictions)        Back Pain Follow Up      Description:   Location of pain:  Right low back worse but whole low back hurts  Character of pain: sharp, cramping and constant - sitting is worse  Pain radiation:  radiates into the right buttocks  Since last visit, pain is:  worsened  New numbness or weakness in legs, not attributed to pain:  no     Intensity: Currently 4/10, At its worst 9/10    History:   Pain interferes with job: YES  Therapies tried without relief: acetaminophen (Tylenol)  Therapies tried with relief: Ibuprofen and stretching  Pt remembered when she was in [place] when she fell down and landed on her back, since then she has been having low back pain, that is consistent.           Accompanying Signs & Symptoms:  Risk of Fracture:  None  Risk of Cauda Equina:  None  Risk of Infection:  None  Risk of Cancer:  None        Problem list and histories reviewed & adjusted, as indicated.  Additional history: as documented    Patient Active Problem List   Diagnosis     Morbid obesity (H)     Essential hypertension     Hyperlipidemia LDL goal <160     Anxiety state     Fatty liver     TB lung, latent     Hyperlipidemia     History reviewed. No pertinent surgical history.    Social History     Tobacco Use     Smoking status: Never Smoker     Smokeless tobacco: Never Used   Substance Use Topics     Alcohol use: Yes     Frequency: Never     History  reviewed. No pertinent family history.      Current Outpatient Medications   Medication Sig Dispense Refill     amLODIPine (NORVASC) 5 MG tablet Take 5 mg by mouth       Cholecalciferol (VITAMIN D3) 10198 units TABS        Multiple Vitamins-Minerals (MULTIVITAMIN ADULTS PO)        Allergies   Allergen Reactions     Latex      PN: Converted from LW Latex Sensitivity Flag     Recent Labs   Lab Test 01/26/19  1103 10/23/17  1452   LDL 66  --    HDL 73  --    TRIG 78  --    CR 0.58 0.61   GFRESTIMATED >90 >90   GFRESTBLACK >90 >90   POTASSIUM 3.8 4.0      BP Readings from Last 3 Encounters:   02/09/19 122/84   01/26/19 130/80   10/23/17 (!) 131/98    Wt Readings from Last 3 Encounters:   02/09/19 100.6 kg (221 lb 12.8 oz)   01/26/19 102.1 kg (225 lb)                    Reviewed and updated as needed this visit by clinical staff  Tobacco  Allergies  Meds  Med Hx  Surg Hx  Fam Hx  Soc Hx      Reviewed and updated as needed this visit by Provider         ROS:  CONSTITUTIONAL: NEGATIVE for fever, chills, change in weight  NEURO: NEGATIVE for weakness, dizziness or paresthesias    OBJECTIVE:     /84 (BP Location: Right arm, Patient Position: Chair, Cuff Size: Adult Large)   Pulse 79   Temp 98.5  F (36.9  C) (Oral)   Wt 100.6 kg (221 lb 12.8 oz)   SpO2 97%   BMI 41.91 kg/m    Body mass index is 41.91 kg/m .  GENERAL: healthy, alert and no distress  RESP: lungs clear to auscultation - no rales, rhonchi or wheezes  CV: regular rate and rhythm, normal S1 S2, no S3 or S4, no murmur, click or rub, no peripheral edema and peripheral pulses strong  NEURO: Patient appears to be in mild pain, no antalgic gait noted.   Lumbosacral spine area reveals positive lower local tenderness or mass.    ROM of the LS spine showed painful.extension nl flexion painful   Straight leg raise is negative at 60 degrees on bilateral.  L4 :toe walk nlpatellar reflux nl medial foot sensation nl  L5: dorsiflexion of the big toe nl,mid foot  sensation nl  S1: heel walk nl, Sheela reflex not done, lateral sensation of the foot nl     Peripheral pulses are palpable.           ASSESSMENT/PLAN:             1. Bilateral low back pain without sciatica, unspecified chronicity  Refer to PT, given the chronicity of the pain, and follow up in 1 month  - XR Lumbar Spine 2/3 Views; Future  - [facility] HAND, AND CHIROPRACTIC REFERRAL; Future    2. Panic attack  The reason for her symptoms above, discussed management pt does not wish to take any medicine given that her symptoms are very sporadic.    3. Morbid obesity (H)  Today I counseled the patient about diet, regular exercise and weight loss planning.  Also refer to   - BARIATRIC ADULT REFERRAL    Follow up in 1 month.    Melissa Franco MD  [facility]

## 2019-02-15 ENCOUNTER — HEALTH MAINTENANCE LETTER (OUTPATIENT)
Age: 39
End: 2019-02-15

## 2019-02-17 ENCOUNTER — HOSPITAL ENCOUNTER (EMERGENCY)
Facility: CLINIC | Age: 39
Discharge: HOME | End: 2019-02-17
Attending: EMERGENCY MEDICINE | Admitting: EMERGENCY MEDICINE
Payer: COMMERCIAL

## 2019-02-17 VITALS
TEMPERATURE: 97.8 F | SYSTOLIC BLOOD PRESSURE: 138 MMHG | DIASTOLIC BLOOD PRESSURE: 106 MMHG | OXYGEN SATURATION: 99 % | RESPIRATION RATE: 18 BRPM | BODY MASS INDEX: 41.57 KG/M2 | WEIGHT: 220 LBS

## 2019-02-17 DIAGNOSIS — M54.9 ACUTE BACK PAIN, UNSPECIFIED BACK LOCATION, UNSPECIFIED BACK PAIN LATERALITY: ICD-10-CM

## 2019-02-17 PROCEDURE — 25000132 ZZH RX MED GY IP 250 OP 250 PS 637: Performed by: EMERGENCY MEDICINE

## 2019-02-17 PROCEDURE — 99283 EMERGENCY DEPT VISIT LOW MDM: CPT

## 2019-02-17 RX ORDER — METHYLPREDNISOLONE 4 MG
4 TABLET, DOSE PACK ORAL SEE ADMIN INSTRUCTIONS
Qty: 21 TABLET | Refills: 0 | Status: SHIPPED | OUTPATIENT
Start: 2019-02-17 | End: 2019-04-25

## 2019-02-17 RX ORDER — CYCLOBENZAPRINE HCL 10 MG
10 TABLET ORAL 3 TIMES DAILY PRN
Qty: 20 TABLET | Refills: 0 | Status: SHIPPED | OUTPATIENT
Start: 2019-02-17 | End: 2019-05-28

## 2019-02-17 RX ORDER — DIAZEPAM 5 MG
5 TABLET ORAL ONCE
Status: COMPLETED | OUTPATIENT
Start: 2019-02-17 | End: 2019-02-17

## 2019-02-17 RX ORDER — OXYCODONE AND ACETAMINOPHEN 5; 325 MG/1; MG/1
1 TABLET ORAL ONCE
Status: COMPLETED | OUTPATIENT
Start: 2019-02-17 | End: 2019-02-17

## 2019-02-17 RX ADMIN — OXYCODONE HYDROCHLORIDE AND ACETAMINOPHEN 1 TABLET: 5; 325 TABLET ORAL at 02:54

## 2019-02-17 RX ADMIN — DIAZEPAM 5 MG: 5 TABLET ORAL at 02:54

## 2019-02-17 ASSESSMENT — ENCOUNTER SYMPTOMS
ROS GI COMMENTS: NEGATIVE FOR BOWEL INCONTINENCE.
BACK PAIN: 1
NUMBNESS: 0

## 2019-02-17 NOTE — ED PROVIDER NOTES
History     Chief Complaint:  Back Pain    The history is provided by the patient.      Tresa Chin is a 38 year old female with a history of hypertension, hyperlipidemia, and bilateral low back pain who presents to the emergency department with her brother for evaluation of back pain. For the past month, the patient has had ongoing lower back pain without previous trauma, fall, or heavy lifting. Tonight, however, this chronic back pain has been worse than normal, with pain being sharp and pinching in the right lower back and is radiating to her upper right thigh. Sitting makes the pain worse and she does get some relief from walking around and the use of ibuprofen, last dose around 4770-2768, but still is finding the pain too uncomfortable. This prompted her to seek evaluation in the emergency room today.     Here, the patient  Presents standing, swaying next to the bed, complaining of the right sided lower back pain. She has seen a chiropractor in the past for this pain. She denies any numbness or tingling. No bowel or bladder incontinence.      Allergies:  Latex  Environmental allergies     Medications:    Norvasc  Colace  Lipitor  Prilosec    Past Medical History:   Bilateral low back pain without sciatica, unspecified chronicity  Panic attack  Morbid obesity  Hypertension  Hyperlipidemia  Fatty liver  TB lung  Pre-diabetes  Absence of menstruation  Plantar fascitis  Pes planus  Neck pain  Anxiety state     Past Surgical History:    Ankle surgery    Family History:    Cancer  Arthritis  Chronic back pain  Depression  Diabetes  Heart Disease - Mother  Stroke  Breast cancer    Social History:  Negative for tobacco use.  Negative for drug use.  Positive for alcohol use.  Marital Status:        Review of Systems   Gastrointestinal:        Negative for bowel incontinence.    Genitourinary:        Negative for bladder incontinence.    Musculoskeletal: Positive for back pain.   Neurological: Negative for  numbness.   All other systems reviewed and are negative.    Physical Exam     Patient Vitals for the past 24 hrs:   BP Temp Temp src Heart Rate Resp SpO2 Weight   02/17/19 0233 (!) 138/106 97.8  F (36.6  C) Temporal 102 18 99 % 99.8 kg (220 lb)       Physical Exam  Constitutional:  Oriented to person, place, and time. Standing in minor distress secondary to pain.   HENT:   Head:    Normocephalic.   Mouth/Throat:   Oropharynx is clear and moist.   Eyes:    EOM are normal. Pupils are equal, round, and reactive to light.   Neck:    Neck supple.   Abdominal:   Soft. No distension. No tenderness. No rebound and no guarding. No pain with percussion of her flanks.  Musculoskeletal:  Normal range of motion. No midline spinal tenderness. Mild tenderness to the right sacral region. Negative straight leg raise. 2+ distal pulses.  Neurological:   Alert and oriented to person, place, and time.           5/5 strength in all 4 extremities.  Sensation intact to light touch in all 4 extremities.  Skin:    No rash noted. No pallor.  Emergency Department Course   Interventions:  0254 Percocet 5-325 mg per tablet, 1 tablet PO  0254 Valium 5 mg tablet PO    Emergency Department Course:  0242 Nursing notes and vitals reviewed.  I performed an exam of the patient as documented above.     Medicine administered as documented above.     0300 I rechecked the patient and discussed the results of her workup thus far.     Findings and plan explained to the Patient and brother. Patient discharged home with instructions regarding supportive care, medications, and reasons to return. The importance of close follow-up was reviewed. The patient was prescribed Cyclobenzaprine and Methylprednisolone.  Impression & Plan    Medical Decision Making:  Tresa Chni is a 38 year old female who presents for evaluation of back pain that started a month ago that got significantly worse tonight.  She has a history of back pain in the past.  Pain has improved  with interventions in the emergency department. The patient did not sustain any trauma, therefore x-rays are not necessary due to the low likelihood of fracture or subluxation.  No red flag symptoms to suggest CT and/or MRI is indicated at this point.  There is no clinical evidence of cauda equina syndrome, discitis, spinal/epidural space hematoma or epidural abscess or other worrisome etiology. The neurological exam is normal and the patient's symptoms seem consistent with musculoskeletal issues and significant muscle spasm.   The patient will be discharged with pain medications to use as directed. Ice or heat to the back and stretching exercises. No heavy lifting, bending or twisting. Return if increasing pain, numbness, weakness, or bowel or bladder dysfunction. She was advised to schedule follow-up with her primary doctor within 3 days to re-assess symptoms.  Critical Care time:  none    Diagnosis:    ICD-10-CM    1. Acute back pain, unspecified back location, unspecified back pain laterality M54.9        Disposition:  discharged to home with brother    Discharge Medications:     Medication List      Started    cyclobenzaprine 10 MG tablet  Commonly known as:  FLEXERIL  10 mg, Oral, 3 TIMES DAILY PRN     methylPREDNISolone 4 MG tablet therapy pack  Commonly known as:  MEDROL DOSEPAK  4 mg, Oral, SEE ADMIN INSTRUCTIONS          Scribe Disclosure:  I, Heike Villegas, am serving as a scribe on 2/17/2019 at 2:49 AM to personally document services performed by Juan Manuel Galan MD based on my observations and the provider's statements to me.     Heike Villegas  2/17/2019   [facility] EMERGENCY DEPARTMENT       Juan Manuel Galan MD  02/17/19 0452

## 2019-02-17 NOTE — ED AVS SNAPSHOT
[facility] Emergency Department  [address]  [facility] [address]  Phone:  [phone]  Fax:  [phone]                                    Tresa Chin   MRN: [id number]    Department:  [facility] Emergency Department   Date of Visit:  2/17/2019           After Visit Summary Signature Page    I have received my discharge instructions, and my questions have been answered. I have discussed any challenges I see with this plan with the nurse or doctor.    ..........................................................................................................................................  Patient/Patient Representative Signature      ..........................................................................................................................................  Patient Representative Print Name and Relationship to Patient    ..................................................               ................................................  Date                                   Time    ..........................................................................................................................................  Reviewed by Signature/Title    ...................................................              ..............................................  Date                                               Time          22EPIC Rev 08/18

## 2019-02-19 ENCOUNTER — THERAPY VISIT (OUTPATIENT)
Dept: PHYSICAL THERAPY | Facility: CLINIC | Age: 39
End: 2019-02-19
Payer: COMMERCIAL

## 2019-02-19 DIAGNOSIS — M54.50 BILATERAL LOW BACK PAIN WITHOUT SCIATICA, UNSPECIFIED CHRONICITY: ICD-10-CM

## 2019-02-19 DIAGNOSIS — M54.50 LUMBAGO: ICD-10-CM

## 2019-02-19 PROCEDURE — 97110 THERAPEUTIC EXERCISES: CPT | Mod: GP | Performed by: PHYSICAL THERAPIST

## 2019-02-19 PROCEDURE — 97161 PT EVAL LOW COMPLEX 20 MIN: CPT | Mod: GP | Performed by: PHYSICAL THERAPIST

## 2019-02-19 NOTE — PROGRESS NOTES
[facility] Initial Evaluation  Subjective:  The history is provided by the patient. No  was used.   Tresa Chin is a 38 year old female with a lumbar condition.  Condition occurred with:  Insidious onset.  Condition occurred: for unknown reasons.  This is a chronic and recurrent condition  Patient reports a flare-up of chronic, recurrent low back pain beginning about a month ago.  She was seen in the ED 2/17/2019.  Patient c/o pain with sitting, standing, bending, prolong walking, and dressing.    Patient reports pain:  Lumbar spine right.  Radiates to:  Gluteals right and thigh right (patient denies numbness/tingling).  Pain is described as sharp and aching (piching) and is constant and reported as 8/10.  Associated symptoms:  Loss of motion/stiffness and loss of strength. Pain is the same all the time.  Symptoms are exacerbated by bending, sitting, standing and walking and relieved by muscle relaxants, analgesics and activity/movement.  Since onset symptoms are gradually improving.  Special tests:  X-ray.  Previous treatment includes physical therapy (4-5 years ago).  There was significant improvement following previous treatment.  General health as reported by patient is good.  Pertinent medical history includes:  High blood pressure, overweight, sleep disorder/apnea and migraines/headaches.  Medical allergies: no.  Other surgeries include:  No.  Current medications:  Muscle relaxants, steroids, high blood pressure medication and pain medication.  Current occupation is .  Patient is currently not working due to present treatment problem.  Primary job tasks include:  Prolonged sitting.    Barriers include:  None as reported by the patient.    Red flags:  None as reported by the patient.                        Objective:  Standing Alignment:        Lumbar:  Normal (no shift)            Gait:    Gait Type:  Antalgic   Assistive Devices:   None          Transitional movements are very slow and labored        Lumbar/SI Evaluation  ROM:    AROM Lumbar:   Flexion:          Major Loss  Ext:                    Major Loss   Side Bend:        Left:     Right:   Rotation:           Left:     Right:   Side Glide:        Left:  Mod Loss    Right:  Mod Loss        Strength: Poor/Fair core strength (estimated)  Lumbar Myotomes:      L2-4 (Quads):  Left:  5    Right:  5  L4 (Ankle DF):  Left:  5    Right:  5  L5 (Great Toe Ext): Left: 5    Right: 5   S1 (Toe Raise):  Left: 5    Right: 5        Neural Tension/Mobility:      Left side:SLR  negative.     Right side:   SLR  negative.                                                        General     ROS    Assessment/Plan:    Patient is a 38 year old female with lumbar complaints.    Patient has the following significant findings with corresponding treatment plan.                Diagnosis 1:  Lumbar derangement  Pain -  self management, education and home program  Decreased ROM/flexibility - therapeutic exercise, therapeutic activity and home program  Decreased strength - therapeutic exercise, therapeutic activities and home program  Impaired gait - gait training and home program  Impaired muscle performance - neuro re-education and home program  Decreased function - therapeutic activities and home program  Impaired posture - neuro re-education and home program    Therapy Evaluation Codes:   1) History comprised of:   Personal factors that impact the plan of care:      None.    Comorbidity factors that impact the plan of care are:      High blood pressure, Migraines/headaches, Overweight and Sleep disorder/apnea.     Medications impacting care: High blood pressure, Muscle relaxant, Pain and Steroids.  2) Examination of Body Systems comprised of:   Body structures and functions that impact the plan of care:      Lumbar spine.   Activity limitations that impact the plan of care are:      Bending, Driving, Dressing,  Lifting, Sitting, Stairs, Standing, Walking, Working, Sleeping and Transfers.  3) Clinical presentation characteristics are:   Stable/Uncomplicated.  4) Decision-Making    Low complexity using standardized patient assessment instrument and/or measureable assessment of functional outcome.  Cumulative Therapy Evaluation is: Low complexity.    Previous and current functional limitations:  (See Goal Flow Sheet for this information)    Short term and Long term goals: (See Goal Flow Sheet for this information)     Communication ability:  Patient appears to be able to clearly communicate and understand verbal and written communication and follow directions correctly.  Treatment Explanation - The following has been discussed with the patient:   RX ordered/plan of care  Anticipated outcomes  Possible risks and side effects  This patient would benefit from PT intervention to resume normal activities.   Rehab potential is good.    Frequency:  2 X week, once daily  Duration:  for 2 weeks tapering to 1 X a week over 4 weeks  Discharge Plan:  Achieve all LTG.  Independent in home treatment program.  Reach maximal therapeutic benefit.    Please refer to the daily flowsheet for treatment today, total treatment time and time spent performing 1:1 timed codes.

## 2019-02-21 ENCOUNTER — THERAPY VISIT (OUTPATIENT)
Dept: PHYSICAL THERAPY | Facility: CLINIC | Age: 39
End: 2019-02-21
Payer: COMMERCIAL

## 2019-02-21 DIAGNOSIS — M54.50 LUMBAGO: ICD-10-CM

## 2019-02-21 PROCEDURE — 97010 HOT OR COLD PACKS THERAPY: CPT | Mod: GP | Performed by: PHYSICAL THERAPIST

## 2019-02-21 PROCEDURE — 97110 THERAPEUTIC EXERCISES: CPT | Mod: GP | Performed by: PHYSICAL THERAPIST

## 2019-02-26 ENCOUNTER — THERAPY VISIT (OUTPATIENT)
Dept: PHYSICAL THERAPY | Facility: CLINIC | Age: 39
End: 2019-02-26
Payer: COMMERCIAL

## 2019-02-26 DIAGNOSIS — M54.50 LUMBAGO: ICD-10-CM

## 2019-02-26 PROCEDURE — 97110 THERAPEUTIC EXERCISES: CPT | Mod: GP | Performed by: PHYSICAL THERAPIST

## 2019-02-26 PROCEDURE — 97112 NEUROMUSCULAR REEDUCATION: CPT | Mod: GP | Performed by: PHYSICAL THERAPIST

## 2019-03-04 ENCOUNTER — THERAPY VISIT (OUTPATIENT)
Dept: PHYSICAL THERAPY | Facility: CLINIC | Age: 39
End: 2019-03-04
Attending: FAMILY MEDICINE
Payer: COMMERCIAL

## 2019-03-04 DIAGNOSIS — M54.41 ACUTE RIGHT-SIDED LOW BACK PAIN WITH RIGHT-SIDED SCIATICA: ICD-10-CM

## 2019-03-04 PROCEDURE — 97110 THERAPEUTIC EXERCISES: CPT | Mod: GP | Performed by: PHYSICAL THERAPIST

## 2019-03-04 PROCEDURE — 97035 APP MDLTY 1+ULTRASOUND EA 15: CPT | Mod: GP | Performed by: PHYSICAL THERAPIST

## 2019-03-04 PROCEDURE — 97112 NEUROMUSCULAR REEDUCATION: CPT | Mod: GP | Performed by: PHYSICAL THERAPIST

## 2019-03-04 NOTE — PROGRESS NOTES
SUBJECTIVE  Subjective changes as noted by pt:   Pain was getting better until this afternoon at work with increased sitting. She also bent over to plug in a heater. Pain is now right greater than left and radiating into both groin. Getting sit/stand work station at work in near future.  Current pain level:  3/10   Changes in function:  Yes (See Goal flowsheet attached for changes in current functional level)     Adverse reaction to treatment or activity:  None    OBJECTIVE  Changes in objective findings:  Yes,  Trunk AROM flexion 50% with pain, extension 80%. Pressup 90%. Poor sitting posture without effort. Difficulty standing from sitting. Antalgic and slow gait. Poor supine to sit transfer.  Pain with lift of bridge and pelvic tilt. Fair glut contraction.      ASSESSMENT  Tresa continues to require intervention to meet STG and LTG's: PT  Patient has experienced an exacerbation of symptoms.  Response to therapy has shown an improvement in  radicular symptoms, ROM  and function  Progress made towards STG/LTG?  Yes (See Goal flowsheet attached for updates on achievement of STG and LTG)    PLAN  Continue current treatment plan until patient demonstrates readiness to progress to higher level exercises.  The following modalities have been added:  ultrasound    PTA/ATC plan:  N/A    Please refer to the daily flowsheet for treatment today, total treatment time and time spent performing 1:1 timed codes.

## 2019-03-08 ENCOUNTER — THERAPY VISIT (OUTPATIENT)
Dept: PHYSICAL THERAPY | Facility: CLINIC | Age: 39
End: 2019-03-08
Payer: COMMERCIAL

## 2019-03-08 DIAGNOSIS — M54.41 ACUTE RIGHT-SIDED LOW BACK PAIN WITH RIGHT-SIDED SCIATICA: ICD-10-CM

## 2019-03-08 PROCEDURE — 97035 APP MDLTY 1+ULTRASOUND EA 15: CPT | Mod: GP | Performed by: PHYSICAL THERAPIST

## 2019-03-08 PROCEDURE — 97110 THERAPEUTIC EXERCISES: CPT | Mod: GP | Performed by: PHYSICAL THERAPIST

## 2019-03-11 ENCOUNTER — THERAPY VISIT (OUTPATIENT)
Dept: PHYSICAL THERAPY | Facility: CLINIC | Age: 39
End: 2019-03-11
Attending: FAMILY MEDICINE
Payer: COMMERCIAL

## 2019-03-11 DIAGNOSIS — M54.41 ACUTE RIGHT-SIDED LOW BACK PAIN WITH RIGHT-SIDED SCIATICA: ICD-10-CM

## 2019-03-11 PROCEDURE — 97035 APP MDLTY 1+ULTRASOUND EA 15: CPT | Mod: GP | Performed by: PHYSICAL THERAPIST

## 2019-03-11 PROCEDURE — 97110 THERAPEUTIC EXERCISES: CPT | Mod: GP | Performed by: PHYSICAL THERAPIST

## 2019-03-11 NOTE — PROGRESS NOTES
SUBJECTIVE  Subjective changes as noted by pt:  LBP varies day to day, but improving overall. Woke with pain last night after sleeping 4 hours on right side. R > L LBP today.    Current pain level:  4/10   Changes in function:  Yes (See Goal flowsheet attached for changes in current functional level)     Adverse reaction to treatment or activity:  None    OBJECTIVE  Changes in objective findings:  Yes,  Trunk AROM flexion 70% with ERP, extension 50%, SB 70% R, 60% L.  Improved sitting posture and bed mobility. Pressups 100% after stretching. Able to perform lift on bridge with minimal pain. Still moderate muscle guarding behavior present.      ASSESSMENT  Murielen continues to require intervention to meet STG and LTG's: PT  Patient's symptoms are resolving.  Patient is ready to progress to more complex exercises.  Response to therapy has shown an improvement in  pain level, ROM , muscle control, posture and function  Progress made towards STG/LTG?  Yes (See Goal flowsheet attached for updates on achievement of STG and LTG)    PLAN  Current treatment program is being advanced to more complex exercises.    PTA/ATC plan:  N/A    Please refer to the daily flowsheet for treatment today, total treatment time and time spent performing 1:1 timed codes.

## 2019-04-25 ENCOUNTER — OFFICE VISIT (OUTPATIENT)
Dept: FAMILY MEDICINE | Facility: CLINIC | Age: 39
End: 2019-04-25
Payer: COMMERCIAL

## 2019-04-25 VITALS
HEART RATE: 97 BPM | TEMPERATURE: 99.1 F | SYSTOLIC BLOOD PRESSURE: 136 MMHG | RESPIRATION RATE: 12 BRPM | BODY MASS INDEX: 43.84 KG/M2 | OXYGEN SATURATION: 100 % | WEIGHT: 232 LBS | DIASTOLIC BLOOD PRESSURE: 87 MMHG

## 2019-04-25 DIAGNOSIS — R53.83 FATIGUE, UNSPECIFIED TYPE: ICD-10-CM

## 2019-04-25 DIAGNOSIS — I10 ESSENTIAL HYPERTENSION: Primary | ICD-10-CM

## 2019-04-25 PROCEDURE — 99214 OFFICE O/P EST MOD 30 MIN: CPT | Performed by: FAMILY MEDICINE

## 2019-04-25 NOTE — PROGRESS NOTES
SUBJECTIVE:   Tresa Chin is a 38 year old female who presents to clinic today for the following   health issues:      Hypertension Follow-up      Outpatient blood pressures are being checked at home.  Results are similar to office reading today (136/87).    Low Salt Diet: no added salt      Amount of exercise or physical activity: None    Problems taking medications regularly: No    Medication side effects: none    Diet: regular (no restrictions)            Additional history: pt is still feeling fatigue, and she has been gaining more weight recently.    Reviewed  and updated as needed this visit by clinical staff  Tobacco  Allergies  Meds  Med Hx  Surg Hx  Fam Hx  Soc Hx        Reviewed and updated as needed this visit by Provider         Patient Active Problem List   Diagnosis     Morbid obesity (H)     Essential hypertension     Hyperlipidemia LDL goal <160     Anxiety state     Fatty liver     TB lung, latent     Hyperlipidemia     Bilateral low back pain without sciatica, unspecified chronicity     Panic attack     Lumbago     History reviewed. No pertinent surgical history.    Social History     Tobacco Use     Smoking status: Never Smoker     Smokeless tobacco: Never Used   Substance Use Topics     Alcohol use: Yes     Frequency: Never     Comment: occ.     History reviewed. No pertinent family history.      Current Outpatient Medications   Medication Sig Dispense Refill     amLODIPine (NORVASC) 5 MG tablet Take 5 mg by mouth       Cholecalciferol (VITAMIN D3) 74176 units TABS        Multiple Vitamins-Minerals (MULTIVITAMIN ADULTS PO)          ROS:  CONSTITUTIONAL:wieght gain.  RESP: NEGATIVE for significant cough or SOB  MS : back pain.    OBJECTIVE:     /87 (BP Location: Right arm, Patient Position: Chair, Cuff Size: Adult Large)   Pulse 97   Temp 99.1  F (37.3  C) (Oral)   Resp 12   Wt 105.2 kg (232 lb)   SpO2 100%   BMI 43.84 kg/m    Body mass index is 43.84 kg/m .  GENERAL:  healthy, alert and no distress  NECK: no adenopathy, no asymmetry, masses, or scars and thyroid normal to palpation  RESP: lungs clear to auscultation - no rales, rhonchi or wheezes  CV: regular rate and rhythm, normal S1 S2, no S3 or S4, no murmur, click or rub, no peripheral edema and peripheral pulses strong        ASSESSMENT/PLAN:             1. Essential hypertension  Controlled, continue on norvasc.    2. Fatigue, unspecified type  Today I counseled the patient about diet, regular exercise and weight loss planning.  And recheck in 2 months, if symptoms persist, will start with blood test check including BMP, CBC and TSH.      FUTURE APPOINTMENTS:       - Follow-up visit in 2 months.    Melissa Franco MD  [facility]

## 2019-04-29 PROBLEM — M54.50 LUMBAGO: Status: RESOLVED | Noted: 2019-02-19 | Resolved: 2019-04-29

## 2019-04-29 NOTE — PROGRESS NOTES
DISCHARGE REPORT    Progress reporting period is from 2/19/19 to 3/11/19.     SUBJECTIVE  Subjective: LBP varies day to day, but improving overall.. Woke with pain last night after sleeping 4 hours on right side. R > L LBP today.    Current Pain level: 4/10   Initial Pain level: 8/10         Patient has failed to return to therapy so current objective findings are unknown.  The subjective and objective information are from the last SOAP note on this patient.    OBJECTIVE  Objective: Trunk AROM flexion 70% with ERP, extension 50%, SB 70% R, 60% L.  Improved sitting posture and bed mobility. Pressups 100% after stretching. Able to perform lift on bridge with minimal pain. Still moderate muscle guarding behavior present.       ASSESSMENT/PLAN  Updated problem list and treatment plan: Diagnosis 1: Lumbar derangement     Pain -  home program  Decreased ROM/flexibility - home program  Decreased strength - home program  Impaired muscle performance - home program  Decreased function - home program  Impaired posture - home program  STG/LTGs have been met or progress has been made towards goals:  Yes (See Goal flow sheet completed today.)  Assessment of Progress: The patient has not returned to therapy. Current status is unknown.  Self Management Plans:  Patient has been instructed in a home treatment program.    Tresa continues to require the following intervention to meet STG and LTG's: PT intervention is no longer required to meet STG/LTG.  The patient failed to complete scheduled/ordered appointments so current information is unknown.  We will discharge this patient from PT.    Recommendations:      Please refer to the daily flowsheet for treatment today, total treatment time and time spent performing 1:1 timed codes.

## 2019-05-28 ENCOUNTER — OFFICE VISIT (OUTPATIENT)
Dept: FAMILY MEDICINE | Facility: CLINIC | Age: 39
End: 2019-05-28
Payer: COMMERCIAL

## 2019-05-28 VITALS
BODY MASS INDEX: 44.37 KG/M2 | HEIGHT: 61 IN | SYSTOLIC BLOOD PRESSURE: 137 MMHG | OXYGEN SATURATION: 96 % | DIASTOLIC BLOOD PRESSURE: 89 MMHG | HEART RATE: 84 BPM | WEIGHT: 235 LBS | TEMPERATURE: 98.5 F

## 2019-05-28 DIAGNOSIS — T39.395A NSAID INDUCED GASTRITIS: ICD-10-CM

## 2019-05-28 DIAGNOSIS — R10.13 EPIGASTRIC DISCOMFORT: ICD-10-CM

## 2019-05-28 DIAGNOSIS — K29.60 NSAID INDUCED GASTRITIS: ICD-10-CM

## 2019-05-28 DIAGNOSIS — K29.00 ACUTE GASTRITIS, PRESENCE OF BLEEDING UNSPECIFIED, UNSPECIFIED GASTRITIS TYPE: Primary | ICD-10-CM

## 2019-05-28 PROCEDURE — 99214 OFFICE O/P EST MOD 30 MIN: CPT | Performed by: INTERNAL MEDICINE

## 2019-05-28 RX ORDER — OMEPRAZOLE 20 MG/1
20 TABLET, DELAYED RELEASE ORAL DAILY
Qty: 90 TABLET | Refills: 3 | Status: SHIPPED | OUTPATIENT
Start: 2019-05-28 | End: 2019-06-27

## 2019-05-28 RX ORDER — OMEPRAZOLE 20 MG/1
20 TABLET, DELAYED RELEASE ORAL DAILY
Qty: 90 TABLET | Refills: 3 | Status: SHIPPED | OUTPATIENT
Start: 2019-05-28 | End: 2019-05-28

## 2019-05-28 RX ORDER — IBUPROFEN 200 MG
600 TABLET ORAL EVERY 6 HOURS PRN
COMMUNITY
End: 2019-05-28 | Stop reason: SINTOL

## 2019-05-28 ASSESSMENT — MIFFLIN-ST. JEOR: SCORE: 1693.33

## 2019-05-28 NOTE — PROGRESS NOTES
"  GERD/Heartburn      Duration: 2 weeks    Description (location/character/radiation): pain under sternum, moves around to the left side as well.    Intensity:  moderate    Accompanying signs and symptoms:  food getting stuck: no   nausea/vomiting/blood: YES- nausea only  abdominal pain: YES  black/tarry or bloody stools: no :    History (similar episodes/previous evaluation): None    Precipitating or alleviating factors:  worse with citrus and sugars.  current NSAID/Aspirin use: YES- takes ibuprofen \"all the time\" for back pain. Takes 600  Mg for back pain 3 times per day when her back is bothering her. States she takes it \"a lot\".      Therapies tried and outcome: antacids (rekha seltzer chewables) and medication helpful for a little bit.           Current Medications:     Current Outpatient Medications   Medication Sig Dispense Refill     amLODIPine (NORVASC) 5 MG tablet Take 5 mg by mouth       omeprazole (PRILOSEC OTC) 20 MG EC tablet Take 1 tablet (20 mg) by mouth daily 90 tablet 3         Allergies:      Allergies   Allergen Reactions     Latex      PN: Converted from LW Latex Sensitivity Flag            Past Medical History:     Past Medical History:   Diagnosis Date     Back pain          Past Surgical History:   No past surgical history on file.      Family Medical History:   No family history on file.      Social History:     Social History     Socioeconomic History     Marital status:      Spouse name: Not on file     Number of children: Not on file     Years of education: Not on file     Highest education level: Not on file   Occupational History     Not on file   Social Needs     Financial resource strain: Not on file     Food insecurity:     Worry: Not on file     Inability: Not on file     Transportation needs:     Medical: Not on file     Non-medical: Not on file   Tobacco Use     Smoking status: Never Smoker     Smokeless tobacco: Never Used   Substance and Sexual Activity     Alcohol use: " "Yes     Frequency: Never     Comment: occ.     Drug use: No     Sexual activity: Never   Lifestyle     Physical activity:     Days per week: Not on file     Minutes per session: Not on file     Stress: Not on file   Relationships     Social connections:     Talks on phone: Not on file     Gets together: Not on file     Attends Confucianist service: Not on file     Active member of club or organization: Not on file     Attends meetings of clubs or organizations: Not on file     Relationship status: Not on file     Intimate partner violence:     Fear of current or ex partner: Not on file     Emotionally abused: Not on file     Physically abused: Not on file     Forced sexual activity: Not on file   Other Topics Concern     Not on file   Social History Narrative     Not on file           Review of System:     Constitutional: Negative for fever or chills  Skin: Negative for rashes  Ears/Nose/Throat: Negative for nasal congestion, sore throat  Respiratory: No shortness of breath, dyspnea on exertion, cough, or hemoptysis  Cardiovascular: Negative for chest pain  Gastrointestinal: positive NSAID induced gastritis symptoms with GERD and acid flux  Genitourinary: Negative for dysuria, hematuria  Musculoskeletal: Negative for myalgias  Neurologic: Negative for headaches  Psychiatric: Negative for depression, anxiety  Hematologic/Lymphatic/Immunologic: Negative  Endocrine: Negative  Behavioral: Negative for tobacco use       Physical Exam:   /89 (BP Location: Right arm, Cuff Size: Adult Large)   Pulse 84   Temp 98.5  F (36.9  C) (Oral)   Ht 1.549 m (5' 1\")   Wt 106.6 kg (235 lb)   SpO2 96%   Breastfeeding? No   BMI 44.40 kg/m      GENERAL: alert and no distress  EYES: eyes grossly normal to inspection, and conjunctivae and sclerae normal  HENT: Normocephalic atraumatic. Nose and mouth without ulcers or lesions  NECK: supple  RESP: lungs clear to auscultation   CV: regular rate and rhythm, normal S1 S2  LYMPH: no " peripheral edema   ABDOMEN: non-distended, epigastric discomfort present, no rebound or guarding. No signs of acute surgical abdomen.  MS: no gross musculoskeletal defects noted  SKIN: no suspicious lesions or rashes  NEURO: Alert & Oriented x 3.   PSYCH: mentation appears normal, affect normal        Diagnostic Test Results:       Lab Results   Component Value Date    WBC 9.6 10/23/2017     Lab Results   Component Value Date    RBC 4.88 10/23/2017     Lab Results   Component Value Date    HGB 14.7 10/23/2017     Lab Results   Component Value Date    HCT 44.0 10/23/2017     No components found for: MCT  Lab Results   Component Value Date    MCV 90 10/23/2017     Lab Results   Component Value Date    MCH 30.1 10/23/2017     Lab Results   Component Value Date    MCHC 33.4 10/23/2017     Lab Results   Component Value Date    RDW 12.5 10/23/2017     Lab Results   Component Value Date     10/23/2017         ASSESSMENT/PLAN:   (R10.13) Epigastric discomfort  (K29.60,  T39.395A) NSAID induced gastritis  (K29.00) Acute gastritis, presence of bleeding unspecified, unspecified gastritis type  (primary encounter diagnosis)  Comment: patient's current symptoms are concerning for acute NSAID induced gastritis.   Plan: I have ordered a diagnostic Upper EGD endoscopy procedure with GASTROENTEROLOGY ADULT REF PROCEDURE ONLY         [facility]  [phone]; No Provider Preference, omeprazole (PRILOSEC OTC) 20 MG EC         Tablet. I have also discontinued the patient's current ibuprofen NSAID pain medication.    Follow Up Plan:     Patient is instructed to return to Internal Medicine clinic for follow-up visit in 1 month.        Stephanie Waggoner MD  Internal Medicine  [facility]

## 2019-05-31 ENCOUNTER — HOSPITAL ENCOUNTER (OUTPATIENT)
Facility: CLINIC | Age: 39
Discharge: HOME | End: 2019-05-31
Attending: INTERNAL MEDICINE | Admitting: INTERNAL MEDICINE
Payer: COMMERCIAL

## 2019-05-31 VITALS
HEART RATE: 79 BPM | RESPIRATION RATE: 15 BRPM | DIASTOLIC BLOOD PRESSURE: 81 MMHG | SYSTOLIC BLOOD PRESSURE: 112 MMHG | OXYGEN SATURATION: 95 %

## 2019-05-31 LAB — UPPER GI ENDOSCOPY: NORMAL

## 2019-05-31 PROCEDURE — 43239 EGD BIOPSY SINGLE/MULTIPLE: CPT | Performed by: INTERNAL MEDICINE

## 2019-05-31 PROCEDURE — 25000128 H RX IP 250 OP 636: Performed by: INTERNAL MEDICINE

## 2019-05-31 PROCEDURE — 40000104 ZZH STATISTIC MODERATE SEDATION < 10 MIN: Performed by: INTERNAL MEDICINE

## 2019-05-31 PROCEDURE — 25000125 ZZHC RX 250: Performed by: INTERNAL MEDICINE

## 2019-05-31 PROCEDURE — 88305 TISSUE EXAM BY PATHOLOGIST: CPT | Performed by: INTERNAL MEDICINE

## 2019-05-31 PROCEDURE — 88305 TISSUE EXAM BY PATHOLOGIST: CPT | Mod: 26 | Performed by: INTERNAL MEDICINE

## 2019-05-31 RX ORDER — LIDOCAINE 40 MG/G
CREAM TOPICAL
Status: DISCONTINUED | OUTPATIENT
Start: 2019-05-31 | End: 2019-05-31 | Stop reason: HOSPADM

## 2019-05-31 RX ORDER — ONDANSETRON 2 MG/ML
4 INJECTION INTRAMUSCULAR; INTRAVENOUS
Status: DISCONTINUED | OUTPATIENT
Start: 2019-05-31 | End: 2019-05-31 | Stop reason: HOSPADM

## 2019-05-31 RX ORDER — NALOXONE HYDROCHLORIDE 0.4 MG/ML
.1-.4 INJECTION, SOLUTION INTRAMUSCULAR; INTRAVENOUS; SUBCUTANEOUS
Status: DISCONTINUED | OUTPATIENT
Start: 2019-05-31 | End: 2019-05-31 | Stop reason: HOSPADM

## 2019-05-31 RX ORDER — FLUMAZENIL 0.1 MG/ML
0.2 INJECTION, SOLUTION INTRAVENOUS
Status: DISCONTINUED | OUTPATIENT
Start: 2019-05-31 | End: 2019-05-31 | Stop reason: HOSPADM

## 2019-05-31 RX ORDER — DIPHENHYDRAMINE HYDROCHLORIDE 50 MG/ML
INJECTION INTRAMUSCULAR; INTRAVENOUS PRN
Status: DISCONTINUED | OUTPATIENT
Start: 2019-05-31 | End: 2019-05-31 | Stop reason: HOSPADM

## 2019-05-31 RX ORDER — ONDANSETRON 4 MG/1
4 TABLET, ORALLY DISINTEGRATING ORAL EVERY 6 HOURS PRN
Status: DISCONTINUED | OUTPATIENT
Start: 2019-05-31 | End: 2019-05-31 | Stop reason: HOSPADM

## 2019-05-31 RX ORDER — ONDANSETRON 2 MG/ML
4 INJECTION INTRAMUSCULAR; INTRAVENOUS EVERY 6 HOURS PRN
Status: DISCONTINUED | OUTPATIENT
Start: 2019-05-31 | End: 2019-05-31 | Stop reason: HOSPADM

## 2019-05-31 RX ORDER — FENTANYL CITRATE 50 UG/ML
INJECTION, SOLUTION INTRAMUSCULAR; INTRAVENOUS PRN
Status: DISCONTINUED | OUTPATIENT
Start: 2019-05-31 | End: 2019-05-31 | Stop reason: HOSPADM

## 2019-05-31 NOTE — DISCHARGE INSTRUCTIONS
"The patient has received a copy of the Provation  report the doctor has written and discharge instructions have been discussed with the patient and responsible adult.  All questions were addressed and answered prior to patient discharge.      Tips to Control Acid Reflux  To control acid reflux, you ll need to make some basic diet and lifestyle changes. The simple steps outlined below may be all you ll need to relieve discomfort.   Watch What You Eat      Avoid fatty foods and spicy foods.    Eat fewer acidic foods, such as citrus and tomato-based foods. These can increase symptoms.     Limit drinking alcohol, caffeine, and fizzy beverages. All increase acid reflux.    Try limiting chocolate, peppermint, and spearmint. These can worsen acid reflux in some people.    Watch When You Eat    Avoid lying down for 3 hours after eating.    Do not snack before going to bed.    Raise Your Head  Raising your head and upper body by 4\" to 6\" helps limit reflux when you re lying down. Put blocks under the head of the bed frame to raise it.                    [id number] [facility], [address] [place] [address], [place] [address]. All rights reserved. This information is not intended as a substitute for professional medical care. Always follow your healthcare professional's instructions.  "

## 2019-05-31 NOTE — PROGRESS NOTES
[facility]  Pre-Endoscopy History and Physical     Tresa Chin MRN# [id number]   YOB: 1980 Age: 38 year old   Today's Date: 05/31/2019    Date of Procedure: 5/31/2019  Primary care provider: Melissa Franco  Type of Endoscopy: esophagogastroduodenoscopy (upper GI endoscopy)  Reason for Procedure: Epigastric pain  Type of Anesthesia Anticipated: Moderate (conscious) sedation    HPI:    Tresa is a 38 year old female who will be undergoing the above procedure.      A history and physical has been performed. The patient's medications and allergies have been reviewed. The risks and benefits of the procedure and the sedation options and risks were discussed with the patient.  All questions were answered and informed consent was obtained.      Allergies   Allergen Reactions     Latex      PN: Converted from LW Latex Sensitivity Flag        Current Facility-Administered Medications   Medication     0.9% sodium chloride BOLUS     lidocaine (LMX4) kit     lidocaine 1 % 0.1-1 mL     ondansetron (ZOFRAN) injection 4 mg     sodium chloride (PF) 0.9% PF flush 3 mL     sodium chloride (PF) 0.9% PF flush 3 mL     sodium chloride (PF) 0.9% PF flush 3 mL       Patient Active Problem List   Diagnosis     Morbid obesity (H)     Essential hypertension     Hyperlipidemia LDL goal <160     Anxiety state     Fatty liver     TB lung, latent     Hyperlipidemia     Bilateral low back pain without sciatica, unspecified chronicity     Panic attack        Past Medical History:   Diagnosis Date     Back pain      Hypertension      Sleep apnea         History reviewed. No pertinent surgical history.    Social History     Tobacco Use     Smoking status: Never Smoker     Smokeless tobacco: Never Used   Substance Use Topics     Alcohol use: Yes     Frequency: Never     Comment: occ.       History reviewed. No pertinent family history.      PHYSICAL EXAM:   BP (!) 121/101   Resp 14   SpO2 97%  Estimated body mass index  "is 44.4 kg/m  as calculated from the following:    Height as of 5/28/19: 1.549 m (5' 1\").    Weight as of 5/28/19: 106.6 kg (235 lb).   RESP: lungs clear to auscultation - no rales, rhonchi or wheezes  CV: regular rates and rhythm    IMPRESSION   ASA Class 3 - Severe systemic disease, but not incapacitating  Mallampati Score: 3      Geremias Martinez MD                "

## 2019-06-03 LAB — COPATH REPORT: NORMAL

## 2019-06-06 ENCOUNTER — TELEPHONE (OUTPATIENT)
Dept: FAMILY MEDICINE | Facility: CLINIC | Age: 39
End: 2019-06-06

## 2019-06-06 NOTE — TELEPHONE ENCOUNTER
Pt called trying to schedule a follow up with Dr. Bright however as I talked with her more she said that she was seen by him previously and they did a biopsy and she says she hasn't heard back about it and doesn't see anything posted on [employer].    Please call back to follow up and advise if she needs to be scheduled or not  Call back at [phone]

## 2019-06-06 NOTE — TELEPHONE ENCOUNTER
"Called patient to help her schedule appt,   Patient states she is still feeling the same. Everything she eats makes her nauseous and it \"just hurts so bad\", rating pain 7/10, located upper to left side of abd whenever she eats. Has had vomiting, most recently this morning (only today). Emesis did not look like coffee ground. Also experiencing constipation, last good BM was yesterday, but needs to strain, was not black in color. Sometimes experiencing dizziness or lightheadedness but not very often. Patient states she is able to stay well hydrated. If she even feels hungry it is painful. However, if it has been several hours from a meal, like before bed, she is just experiencing heartburn.     Per LOV note: patient is to f/u with Internal Medicine provider in 1 month and \"return in about 1 week (around 6/4/19) for Routine Visit gastritis\".  Informed patient that Dr. Franco is full all next week. Dr. Waggoner has same-day appointments here in [place] on 6/11/19. However, since patient's pain persists at 7/10, she experienced vomiting this morning and continues with all other symptoms, plus dizziness, advised she will want to call Dr. Franco's office to see if she could be seen there as a same day for follow up, if nothing available, [facility] has a same-day provider, Micky Duron, with availability tomorrow afternoon. If condition worsens, advised to proceed to ED. Patient stated she does not want to go to ED or , but stated understanding. Routing to [place] team to see if she can be added to Dr. Franco's sched tomorrow.     Carlee HUDSON, Triage RN       Carlee HUDSON, Triage RN    "

## 2019-06-10 NOTE — TELEPHONE ENCOUNTER
I called pt and apologized that no one had returned her call last week.  pt is Still having heartburn and nausea but feeling a little bit better.  Pt has an appt on 6/27/19 with AA.  Pt states that she will wait to be seen until then.  She states that if she is feeling worse, she will call back and get an appt scheduled.  AA - please advise if you think she should be seen sooner than 6/27/19.  Shari Schoenberger, CMA

## 2019-06-10 NOTE — TELEPHONE ENCOUNTER
Just make sure she is taking Prilosec 20 mg daily, (on empty stomach please) and I will see her on 6/27  Melissa Franco MD  [facility]  [phone]

## 2019-06-10 NOTE — TELEPHONE ENCOUNTER
Pt returned call, given message below.     She reports that she is taking med on empty stomach.    Neel Ortiz   06/10/19 5:33 PM

## 2019-06-11 DIAGNOSIS — I10 ESSENTIAL HYPERTENSION: Primary | ICD-10-CM

## 2019-06-11 RX ORDER — AMLODIPINE BESYLATE 5 MG/1
5 TABLET ORAL DAILY
Qty: 90 TABLET | Refills: 3 | Status: SHIPPED | OUTPATIENT
Start: 2019-06-11 | End: 2020-02-19

## 2019-06-11 NOTE — TELEPHONE ENCOUNTER
Patient states she only has one pill left for tomorrow, can she get a refill. I verified dosing and mg with her, its 5mg and she take one a day.    amLODIPine (NORVASC) 5 MG tablet      Last Written Prescription Date:  historical  Last Fill Quantity: 0,   # refills: 0  Last Office Visit: 04/25/19  Future Office visit:       Routing refill request to provider for review/approval because:  Medication is reported/historical    Petty Landaverde

## 2019-06-27 ENCOUNTER — OFFICE VISIT (OUTPATIENT)
Dept: FAMILY MEDICINE | Facility: CLINIC | Age: 39
End: 2019-06-27
Payer: COMMERCIAL

## 2019-06-27 VITALS
HEIGHT: 61 IN | HEART RATE: 67 BPM | DIASTOLIC BLOOD PRESSURE: 86 MMHG | RESPIRATION RATE: 16 BRPM | SYSTOLIC BLOOD PRESSURE: 128 MMHG | TEMPERATURE: 98.3 F | OXYGEN SATURATION: 99 % | WEIGHT: 231 LBS | BODY MASS INDEX: 43.61 KG/M2

## 2019-06-27 DIAGNOSIS — T39.395A NSAID INDUCED GASTRITIS: ICD-10-CM

## 2019-06-27 DIAGNOSIS — K29.00 ACUTE GASTRITIS, PRESENCE OF BLEEDING UNSPECIFIED, UNSPECIFIED GASTRITIS TYPE: ICD-10-CM

## 2019-06-27 DIAGNOSIS — R53.83 FATIGUE, UNSPECIFIED TYPE: Primary | ICD-10-CM

## 2019-06-27 DIAGNOSIS — M54.50 BILATERAL LOW BACK PAIN WITHOUT SCIATICA, UNSPECIFIED CHRONICITY: ICD-10-CM

## 2019-06-27 DIAGNOSIS — K29.60 NSAID INDUCED GASTRITIS: ICD-10-CM

## 2019-06-27 DIAGNOSIS — I10 ESSENTIAL HYPERTENSION: ICD-10-CM

## 2019-06-27 PROCEDURE — 99214 OFFICE O/P EST MOD 30 MIN: CPT | Performed by: FAMILY MEDICINE

## 2019-06-27 RX ORDER — OMEPRAZOLE 20 MG/1
20 TABLET, DELAYED RELEASE ORAL DAILY
Qty: 90 TABLET | Refills: 0 | Status: SHIPPED | OUTPATIENT
Start: 2019-06-27 | End: 2020-04-22

## 2019-06-27 ASSESSMENT — MIFFLIN-ST. JEOR: SCORE: 1670.19

## 2019-06-27 NOTE — PROGRESS NOTES
Subjective     Tresa Chin is a 39 year old female who presents to clinic today for the following health issues:    History of Present Illness        Back Pain:  She presents for follow up of back pain. Patient's back pain is a recurring problem.  Location of back pain:  Right lower back, left lower back and right middle of back  Description of back pain: sharp  Back pain spreads: right buttocks    Since patient first noticed back pain, pain is: always present, but gets better and worse  Does back pain interfere with her job:  Yes      Hyperlipidemia:  She presents for follow up of hyperlipidemia.  She is not taking medication to lower cholesterol. She is not having myalgia or other side effects to statin medications.    Hypertension: She presents for follow up of hypertension.  She does check blood pressure  regularly outside of the clinic. Outpatient blood pressures have not been over 140/90. She does not follow a low salt diet.     She eats 2-3 servings of fruits and vegetables daily.She consumes 0 sweetened beverage(s) daily.She is missing 1 dose(s) of medications per week.     Pt has been following up on a diet, and she has lost about 4 pounds, but she is still feeling fatigue, sometimes she feels like she is not concentrating especially when someone is talking for a long time.      Patient Active Problem List   Diagnosis     Morbid obesity (H)     Essential hypertension     Hyperlipidemia LDL goal <160     Anxiety state     Fatty liver     TB lung, latent     Hyperlipidemia     Bilateral low back pain without sciatica, unspecified chronicity     Panic attack     Past Surgical History:   Procedure Laterality Date     ESOPHAGOSCOPY, GASTROSCOPY, DUODENOSCOPY (EGD), COMBINED N/A 5/31/2019    Procedure: ESOPHAGOGASTRODUODENOSCOPY (EGD)cold BX (fv);  Surgeon: Geremias Martinez MD;  Location:  GI       Social History     Tobacco Use     Smoking status: Never Smoker     Smokeless tobacco: Never Used  "  Substance Use Topics     Alcohol use: Yes     Frequency: Never     Comment: occ.     Family History   Problem Relation Age of Onset     Dementia Mother      Lung Cancer Father          Current Outpatient Medications   Medication Sig Dispense Refill     omeprazole (PRILOSEC OTC) 20 MG EC tablet Take 1 tablet (20 mg) by mouth daily 90 tablet 0     amLODIPine (NORVASC) 5 MG tablet Take 1 tablet (5 mg) by mouth daily 90 tablet 3       Reviewed and updated as needed this visit by Provider         Review of Systems   ROS COMP: CONSTITUTIONAL:fatigue.  RESP: NEGATIVE for significant cough or SOB  CV: NEGATIVE for chest pain, palpitations or peripheral edema      Objective    /86 (BP Location: Right arm, Patient Position: Chair, Cuff Size: Adult Large)   Pulse 67   Temp 98.3  F (36.8  C) (Oral)   Resp 16   Ht 1.549 m (5' 1\")   Wt 104.8 kg (231 lb)   SpO2 99%   BMI 43.65 kg/m    Body mass index is 43.65 kg/m .  Physical Exam   GENERAL: healthy, alert and no distress  RESP: lungs clear to auscultation - no rales, rhonchi or wheezes  CV: regular rate and rhythm, normal S1 S2, no S3 or S4, no murmur, click or rub, no peripheral edema and peripheral pulses strong            Assessment & Plan     1. Acute gastritis, presence of bleeding unspecified, unspecified gastritis type  Restart on prilosec once daily   - omeprazole (PRILOSEC OTC) 20 MG EC tablet; Take 1 tablet (20 mg) by mouth daily  Dispense: 90 tablet; Refill: 0    2. NSAID induced gastritis      3. Fatigue, unspecified type  I wonder if this is related to sleep apnea, pt does have hx of sleep apnea in the past, declined treatment at that time.  - SLEEP EVALUATION & MANAGEMENT REFERRAL - [facility]  [phone] (Age 18 and up); Future    4. Bilateral low back pain without sciatica, unspecified chronicity  Discussed pain management like topical cream and using tylenol.    5. Essential hypertension  Controlled, continue on " "same meds.       BMI:   Estimated body mass index is 43.65 kg/m  as calculated from the following:    Height as of this encounter: 1.549 m (5' 1\").    Weight as of this encounter: 104.8 kg (231 lb).               Return in about 6 months (around 12/27/2019).    Melissa Franco MD  [facility]    "

## 2019-11-04 ENCOUNTER — HEALTH MAINTENANCE LETTER (OUTPATIENT)
Age: 39
End: 2019-11-04

## 2019-11-25 ENCOUNTER — TELEPHONE (OUTPATIENT)
Dept: FAMILY MEDICINE | Facility: CLINIC | Age: 39
End: 2019-11-25

## 2019-11-26 NOTE — TELEPHONE ENCOUNTER
Summary:    Patient is due/failing the following:   PAP    Reviewed:  [x] CARE EVERYWHERE  [x] LAST OV NOTE INCLUDING ENDO  [x] FYI TAB  [x] MYCHART ACTIVE?  [x] LAST PANEL ENCOUNTER  [x] FUTURE APPTS  [x] IMMUNIZATIONS          Action needed:   Patient needs office visit for pap.    Type of outreach:    Phone, spoke to patient.  she states will schedule an appt through her mychart                                                                               Edwina Smalls/[facility]---[facility]

## 2020-02-19 ENCOUNTER — OFFICE VISIT (OUTPATIENT)
Dept: FAMILY MEDICINE | Facility: CLINIC | Age: 40
End: 2020-02-19
Payer: COMMERCIAL

## 2020-02-19 VITALS
HEART RATE: 96 BPM | HEIGHT: 61 IN | TEMPERATURE: 99.3 F | DIASTOLIC BLOOD PRESSURE: 90 MMHG | SYSTOLIC BLOOD PRESSURE: 120 MMHG | OXYGEN SATURATION: 99 % | WEIGHT: 252 LBS | RESPIRATION RATE: 16 BRPM | BODY MASS INDEX: 47.58 KG/M2

## 2020-02-19 DIAGNOSIS — Z00.00 ROUTINE GENERAL MEDICAL EXAMINATION AT A HEALTH CARE FACILITY: Primary | ICD-10-CM

## 2020-02-19 DIAGNOSIS — K21.9 GASTROESOPHAGEAL REFLUX DISEASE WITHOUT ESOPHAGITIS: ICD-10-CM

## 2020-02-19 DIAGNOSIS — J06.9 VIRAL UPPER RESPIRATORY TRACT INFECTION: ICD-10-CM

## 2020-02-19 DIAGNOSIS — G44.209 TENSION HEADACHE: ICD-10-CM

## 2020-02-19 DIAGNOSIS — J30.2 SEASONAL ALLERGIC RHINITIS, UNSPECIFIED TRIGGER: ICD-10-CM

## 2020-02-19 DIAGNOSIS — E66.01 MORBID OBESITY (H): ICD-10-CM

## 2020-02-19 DIAGNOSIS — G47.33 OSA (OBSTRUCTIVE SLEEP APNEA): ICD-10-CM

## 2020-02-19 DIAGNOSIS — I10 ESSENTIAL HYPERTENSION: ICD-10-CM

## 2020-02-19 LAB
CHOLEST SERPL-MCNC: 259 MG/DL
GLUCOSE SERPL-MCNC: 114 MG/DL (ref 70–99)
HBA1C MFR BLD: 5.8 % (ref 0–5.6)
HDLC SERPL-MCNC: 69 MG/DL
LDLC SERPL CALC-MCNC: 169 MG/DL
NONHDLC SERPL-MCNC: 190 MG/DL
TRIGL SERPL-MCNC: 104 MG/DL

## 2020-02-19 PROCEDURE — 36415 COLL VENOUS BLD VENIPUNCTURE: CPT | Performed by: FAMILY MEDICINE

## 2020-02-19 PROCEDURE — 86003 ALLG SPEC IGE CRUDE XTRC EA: CPT | Performed by: FAMILY MEDICINE

## 2020-02-19 PROCEDURE — 83036 HEMOGLOBIN GLYCOSYLATED A1C: CPT | Performed by: FAMILY MEDICINE

## 2020-02-19 PROCEDURE — 82947 ASSAY GLUCOSE BLOOD QUANT: CPT | Performed by: FAMILY MEDICINE

## 2020-02-19 PROCEDURE — 80061 LIPID PANEL: CPT | Performed by: FAMILY MEDICINE

## 2020-02-19 PROCEDURE — 99395 PREV VISIT EST AGE 18-39: CPT | Performed by: FAMILY MEDICINE

## 2020-02-19 PROCEDURE — 82785 ASSAY OF IGE: CPT | Performed by: FAMILY MEDICINE

## 2020-02-19 PROCEDURE — 99214 OFFICE O/P EST MOD 30 MIN: CPT | Mod: 25 | Performed by: FAMILY MEDICINE

## 2020-02-19 RX ORDER — FEXOFENADINE HCL 180 MG/1
180 TABLET ORAL DAILY
Qty: 90 TABLET | Refills: 3 | Status: ON HOLD | OUTPATIENT
Start: 2020-02-19 | End: 2020-07-22

## 2020-02-19 RX ORDER — AMLODIPINE BESYLATE 10 MG/1
10 TABLET ORAL DAILY
Qty: 90 TABLET | Refills: 3 | Status: SHIPPED | OUTPATIENT
Start: 2020-02-19 | End: 2021-04-19

## 2020-02-19 ASSESSMENT — ENCOUNTER SYMPTOMS
HEMATURIA: 0
FREQUENCY: 1
ARTHRALGIAS: 1
SHORTNESS OF BREATH: 0
HEARTBURN: 1
HEMATOCHEZIA: 0
CONSTIPATION: 0
DIARRHEA: 1
JOINT SWELLING: 0
COUGH: 1
PARESTHESIAS: 0
SORE THROAT: 1
MYALGIAS: 1
PALPITATIONS: 0
NAUSEA: 0
DIZZINESS: 1
CHILLS: 1
HEADACHES: 1
ABDOMINAL PAIN: 1
NERVOUS/ANXIOUS: 0
FEVER: 1
WEAKNESS: 1
DYSURIA: 0
EYE PAIN: 1

## 2020-02-19 ASSESSMENT — MIFFLIN-ST. JEOR: SCORE: 1765.44

## 2020-02-19 NOTE — PROGRESS NOTES
SUBJECTIVE:   CC: Tresa Chin is an 39 year old woman who presents for preventive health visit.     HPI        Headaches      Duration: chronic.    Description  Location: bilateral in the frontal area, bilateral in the temporal area   Also in the sinus area.  Character: dull pain  Frequency:  Almost daily.  Duration:  Usually worse in the morning and night time.    Intensity:  moderate    Accompanying signs and symptoms: congestion in the nose and blood when she blows her nose.    Precipitating or Alleviating factors:  Nausea/vomiting: occasionally  Dizziness: no  Weakness or numbness: no  Visual changes: none  Fever: no   Sinus or URI symptoms YES- nasal congestion, chronic.    History  Head trauma: no  Family history of migraines: YES  Previous tests for headaches: YES  Neurologist evaluations: no  Wake with headaches: YES  Daily pain medication use: no  Any changes in: none.    Precipitating or Alleviating factors (light/sound/sleep/caffeine): worse in the morning and night time.    Therapies tried and outcome: Tylenol    Outcome - effective  Frequent/daily pain medication use: YES      Hypertension Follow-up      Do you check your blood pressure regularly outside of the clinic? Yes     Are you following a low salt diet? No    Are your blood pressures ever more than 140 on the top number (systolic) OR more   than 90 on the bottom number (diastolic), for example 140/90? Yes    Anxiety Follow-Up    How are you doing with your anxiety since your last visit? Pt has been having panic attacks sometimes, usually when she drive, and if she is flying.    She gets panic attacks about 1 to 3 times.    When she gets panic attacks, she feels dizzy, shortness of breath, sense of gloom, lasts for up to 30 minutes.    Are you having other symptoms that might be associated with anxiety? Yes:  panic attacks.    Have you had a significant life event? OTHER: takes care of her mom who has alzheimer.     Are you feeling  depressed? Feeling bad about her body.    Do you have any concerns with your use of alcohol or other drugs? No    Social History     Tobacco Use     Smoking status: Never Smoker     Smokeless tobacco: Never Used   Substance Use Topics     Alcohol use: Yes     Frequency: Never     Comment: occ.     Drug use: No     No flowsheet data found.  PHQ 1/26/2019   PHQ-9 Total Score 9   Q9: Thoughts of better off dead/self-harm past 2 weeks Not at all     Last PHQ-9 1/26/2019   1.  Little interest or pleasure in doing things 3   2.  Feeling down, depressed, or hopeless 1   3.  Trouble falling or staying asleep, or sleeping too much 1   4.  Feeling tired or having little energy 1   5.  Poor appetite or overeating 1   6.  Feeling bad about yourself 1   7.  Trouble concentrating 1   8.  Moving slowly or restless 0   Q9: Thoughts of better off dead/self-harm past 2 weeks 0   PHQ-9 Total Score 9     No flowsheet data found.      Today's PHQ-2 Score:   PHQ-2 ( 1999 [employer]) 2/19/2020   Q1: Little interest or pleasure in doing things 0   Q2: Feeling down, depressed or hopeless 1   PHQ-2 Score 1   Q1: Little interest or pleasure in doing things Not at all   Q2: Feeling down, depressed or hopeless Several days   PHQ-2 Score 1       Abuse: Current or Past(Physical, Sexual or Emotional)- No  Do you feel safe in your environment? Yes        Social History     Tobacco Use     Smoking status: Never Smoker     Smokeless tobacco: Never Used   Substance Use Topics     Alcohol use: Yes     Frequency: Never     Comment: occ.     If you drink alcohol do you typically have >3 drinks per day or >7 drinks per week? No    Alcohol Use 2/19/2020   Prescreen: >3 drinks/day or >7 drinks/week? No         How many servings of fruits and vegetables do you eat daily?  2-3    On average, how many sweetened beverages do you drink each day (Examples: soda, juice, sweet tea, etc.  Do NOT count diet or artificially sweetened beverages)?   2    How many days per  week do you exercise enough to make your heart beat faster? 3 or less    How many minutes a day do you exercise enough to make your heart beat faster? 9 or less    How many days per week do you miss taking your medication? 0    RESPIRATORY SYMPTOMS      Duration: 4 days ago.    Description  nasal congestion, rhinorrhea, sore throat, cough, ear pain bilateral and headache    Severity: moderate    Accompanying signs and symptoms: worse at night, especially the cough.    History (predisposing factors):  none    Precipitating or alleviating factors: night time.    Therapies tried and outcome:  rest and fluids oral decongestant       Reviewed orders with patient.  Reviewed health maintenance and updated orders accordingly - Yes  Lab work is in process  Labs reviewed in EPIC  BP Readings from Last 3 Encounters:   02/19/20 (!) 120/90   06/27/19 128/86   05/31/19 112/81    Wt Readings from Last 3 Encounters:   02/19/20 114.3 kg (252 lb)   06/27/19 104.8 kg (231 lb)   05/28/19 106.6 kg (235 lb)                  Patient Active Problem List   Diagnosis     Morbid obesity (H)     Essential hypertension     Hyperlipidemia LDL goal <160     Anxiety state     Fatty liver     TB lung, latent     Bilateral low back pain without sciatica, unspecified chronicity     Panic attack     NSAID induced gastritis     Gastroesophageal reflux disease without esophagitis     Past Surgical History:   Procedure Laterality Date     ESOPHAGOSCOPY, GASTROSCOPY, DUODENOSCOPY (EGD), COMBINED N/A 5/31/2019    Procedure: ESOPHAGOGASTRODUODENOSCOPY (EGD)cold BX (fv);  Surgeon: Geremias Martinez MD;  Location:  GI       Social History     Tobacco Use     Smoking status: Never Smoker     Smokeless tobacco: Never Used   Substance Use Topics     Alcohol use: Yes     Frequency: Never     Comment: occ.     Family History   Problem Relation Age of Onset     Dementia Mother      Lung Cancer Father          Current Outpatient Medications   Medication  Sig Dispense Refill     amLODIPine 10 MG PO tablet Take 1 tablet (10 mg) by mouth daily 90 tablet 3     fexofenadine 180 MG PO tablet Take 1 tablet (180 mg) by mouth daily 90 tablet 3     omeprazole (PRILOSEC OTC) 20 MG EC tablet Take 1 tablet (20 mg) by mouth daily 90 tablet 0     Allergies   Allergen Reactions     Latex      PN: Converted from LW Latex Sensitivity Flag       Mammogram not appropriate for this patient based on age.    Pertinent mammograms are reviewed under the imaging tab.  History of abnormal Pap smear: NO - age 30-65 PAP every 5 years with negative HPV co-testing recommended     Reviewed and updated as needed this visit by clinical staff         Reviewed and updated as needed this visit by Provider        Past Medical History:   Diagnosis Date     Back pain      Hypertension      Sleep apnea       Past Surgical History:   Procedure Laterality Date     ESOPHAGOSCOPY, GASTROSCOPY, DUODENOSCOPY (EGD), COMBINED N/A 5/31/2019    Procedure: ESOPHAGOGASTRODUODENOSCOPY (EGD)cold BX (fv);  Surgeon: Geremias Martinez MD;  Location:  GI       Review of Systems  CONSTITUTIONAL: weight gain.  INTEGUMENTARU/SKIN: NEGATIVE for worrisome rashes, moles or lesions  EYES: NEGATIVE for vision changes or irritation  ENT: runny nose and congestion.  RESP: NEGATIVE for significant cough or SOB  CV: NEGATIVE for chest pain, palpitations or peripheral edema  GI: pain in the Lt flank, lower rib area.   female: absent menses since she gained weight. (pt is not sexually active)  MUSCULOSKELETAL: NEGATIVE for significant arthralgias or myalgia  NEURO: NEGATIVE for weakness, dizziness or paresthesias  PSYCHIATRIC: as above.     OBJECTIVE:   There were no vitals taken for this visit.  Physical Exam  GENERAL: alert and obese  EYES: Eyes grossly normal to inspection, PERRL and conjunctivae and sclerae normal  HENT: ear canals and TM's normal, nose and mouth without ulcers or lesions  NECK: no adenopathy, no  asymmetry, masses, or scars and thyroid normal to palpation  RESP: lungs clear to auscultation - no rales, rhonchi or wheezes  CV: regular rate and rhythm, normal S1 S2, no S3 or S4, no murmur, click or rub, no peripheral edema and peripheral pulses strong  ABDOMEN: obese, non tender, no masses.  MS: no gross musculoskeletal defects noted, no edema  SKIN: no suspicious lesions or rashes  NEURO: Normal strength and tone, mentation intact and speech normal  CN II to XII intact and motor and sensory intact.  PSYCH: mentation appears normal, affect normal/bright        ASSESSMENT/PLAN:   1. Routine general medical examination at a health care facility  Talked about diet and exericse  Also we booked the patient with [facility] in 1 month for pap smear.    2. Gastroesophageal reflux disease without esophagitis  Continue on prilosec (pt buys it over the counter)  - OFFICE/OUTPT VISIT,EST,LEVL IV    3. Essential hypertension  Not controlled, increase amlodipine to 10 mg daily, recheck in 1 month.  - amLODIPine 10 MG PO tablet; Take 1 tablet (10 mg) by mouth daily  Dispense: 90 tablet; Refill: 3  - OFFICE/OUTPT VISIT,EST,LEVL IV    4. Tension headache  I think the reason for her headache is mostly relate to underlying allergic sinusitis, start on   - fexofenadine 180 MG PO tablet; Take 1 tablet (180 mg) by mouth daily  Dispense: 90 tablet; Refill: 3  Recheck in 2 months.  - OFFICE/OUTPT VISIT,EST,LEVL IV    5. JONATHAN (obstructive sleep apnea)  Hx of sleep apnea in the past, will refer to   - SLEEP EVALUATION & MANAGEMENT REFERRAL - [facility]  [phone] (Age 18 and up); Future  - OFFICE/OUTPT VISIT,EST,LEVL IV    6. Morbid obesity (H)  Today I counseled the patient about diet, regular exercise and weight loss planning.  Pt is not interested in weight loss clinic, she will joint weight watchers and planning to start on exercise program.  - Lipid panel reflex to direct LDL Fasting  -  "Glucose  - Hemoglobin A1c  - OFFICE/OUTPT VISIT,ESTHER NEGRO IV    7. Seasonal allergic rhinitis, unspecified trigger  Pt is wondering about food allergies, will check below labs, also start on allegra once daily.  - Allergy adult food panel  - OFFICE/OUTPT VISIT,EST,LEVL IV    8. Viral upper respiratory tract infection  Advised about rest, OTC medications for symptoms, drink warm liquids, and may use humidifier at night time to improve the cough.    - OFFICE/OUTPT VISIT,EST,LEVL IV    COUNSELING:  Reviewed preventive health counseling, as reflected in patient instructions       Regular exercise       Healthy diet/nutrition     Estimated body mass index is 43.65 kg/m  as calculated from the following:    Height as of 6/27/19: 1.549 m (5' 1\").    Weight as of 6/27/19: 104.8 kg (231 lb).    Weight management plan: Discussed healthy diet and exercise guidelines     reports that she has never smoked. She has never used smokeless tobacco.      Counseling Resources:  ATP IV Guidelines  Pooled Cohorts Equation Calculator  Breast Cancer Risk Calculator  FRAX Risk Assessment  ICSI Preventive Guidelines  Dietary Guidelines for Americans, 2010  USDA's MyPlate  ASA Prophylaxis  Lung CA Screening    "

## 2020-02-21 LAB
ALMOND IGE QN: <0.1 KU(A)/L
CASHEW NUT IGE QN: <0.1 KU(A)/L
CODFISH IGE QN: <0.1 KU(A)/L
COW MILK IGE QN: <0.1 KU(A)/L
EGG WHITE IGE QN: <0.1 KU(A)/L
HAZELNUT IGE QN: <0.1 KU(A)/L
IGE SERPL-ACNC: 59 KIU/L (ref 0–114)
PEANUT IGE QN: <0.1 KU(A)/L
SALMON IGE QN: <0.1 KU(A)/L
SCALLOP IGE QN: <0.1 KU(A)/L
SESAME SEED IGE QN: <0.1 KU(A)/L
SHRIMP IGE QN: <0.1 KU(A)/L
SOYBEAN IGE QN: <0.1 KU(A)/L
TUNA IGE QN: <0.1 KU(A)/L
WALNUT IGE QN: <0.1 KU(A)/L
WHEAT IGE QN: <0.1 KU(A)/L

## 2020-03-13 ENCOUNTER — APPOINTMENT (OUTPATIENT)
Dept: CT IMAGING | Facility: CLINIC | Age: 40
End: 2020-03-13
Attending: EMERGENCY MEDICINE
Payer: COMMERCIAL

## 2020-03-13 ENCOUNTER — HOSPITAL ENCOUNTER (EMERGENCY)
Facility: CLINIC | Age: 40
Discharge: HOME | End: 2020-03-13
Attending: EMERGENCY MEDICINE | Admitting: EMERGENCY MEDICINE
Payer: COMMERCIAL

## 2020-03-13 VITALS
WEIGHT: 257.28 LBS | RESPIRATION RATE: 14 BRPM | TEMPERATURE: 98.1 F | BODY MASS INDEX: 48.61 KG/M2 | SYSTOLIC BLOOD PRESSURE: 146 MMHG | HEART RATE: 100 BPM | DIASTOLIC BLOOD PRESSURE: 115 MMHG | OXYGEN SATURATION: 97 %

## 2020-03-13 DIAGNOSIS — A49.9 UTI (URINARY TRACT INFECTION), BACTERIAL: ICD-10-CM

## 2020-03-13 DIAGNOSIS — N20.0 RENAL STONES: ICD-10-CM

## 2020-03-13 DIAGNOSIS — N39.0 UTI (URINARY TRACT INFECTION), BACTERIAL: ICD-10-CM

## 2020-03-13 DIAGNOSIS — J32.9 SINUSITIS, UNSPECIFIED CHRONICITY, UNSPECIFIED LOCATION: ICD-10-CM

## 2020-03-13 LAB
ALBUMIN SERPL-MCNC: 3.7 G/DL (ref 3.4–5)
ALBUMIN UR-MCNC: NEGATIVE MG/DL
ALP SERPL-CCNC: 63 U/L (ref 40–150)
ALT SERPL W P-5'-P-CCNC: 78 U/L (ref 0–50)
ANION GAP SERPL CALCULATED.3IONS-SCNC: 4 MMOL/L (ref 3–14)
APPEARANCE UR: ABNORMAL
AST SERPL W P-5'-P-CCNC: 33 U/L (ref 0–45)
BACTERIA #/AREA URNS HPF: ABNORMAL /HPF
BASOPHILS # BLD AUTO: 0 10E9/L (ref 0–0.2)
BASOPHILS NFR BLD AUTO: 0.4 %
BILIRUB SERPL-MCNC: 0.4 MG/DL (ref 0.2–1.3)
BILIRUB UR QL STRIP: NEGATIVE
BUN SERPL-MCNC: 13 MG/DL (ref 7–30)
CALCIUM SERPL-MCNC: 9 MG/DL (ref 8.5–10.1)
CHLORIDE SERPL-SCNC: 106 MMOL/L (ref 94–109)
CO2 SERPL-SCNC: 28 MMOL/L (ref 20–32)
COLOR UR AUTO: ABNORMAL
CREAT SERPL-MCNC: 0.44 MG/DL (ref 0.52–1.04)
DIFFERENTIAL METHOD BLD: NORMAL
EOSINOPHIL # BLD AUTO: 0.2 10E9/L (ref 0–0.7)
EOSINOPHIL NFR BLD AUTO: 1.8 %
ERYTHROCYTE [DISTWIDTH] IN BLOOD BY AUTOMATED COUNT: 13.2 % (ref 10–15)
GFR SERPL CREATININE-BSD FRML MDRD: >90 ML/MIN/{1.73_M2}
GLUCOSE SERPL-MCNC: 110 MG/DL (ref 70–99)
GLUCOSE UR STRIP-MCNC: NEGATIVE MG/DL
HCG UR QL: NEGATIVE
HCT VFR BLD AUTO: 45.9 % (ref 35–47)
HGB BLD-MCNC: 15 G/DL (ref 11.7–15.7)
HGB UR QL STRIP: ABNORMAL
IMM GRANULOCYTES # BLD: 0 10E9/L (ref 0–0.4)
IMM GRANULOCYTES NFR BLD: 0.3 %
KETONES UR STRIP-MCNC: NEGATIVE MG/DL
LEUKOCYTE ESTERASE UR QL STRIP: ABNORMAL
LYMPHOCYTES # BLD AUTO: 2.6 10E9/L (ref 0.8–5.3)
LYMPHOCYTES NFR BLD AUTO: 28.3 %
MCH RBC QN AUTO: 29.5 PG (ref 26.5–33)
MCHC RBC AUTO-ENTMCNC: 32.7 G/DL (ref 31.5–36.5)
MCV RBC AUTO: 90 FL (ref 78–100)
MONOCYTES # BLD AUTO: 0.8 10E9/L (ref 0–1.3)
MONOCYTES NFR BLD AUTO: 8.3 %
NEUTROPHILS # BLD AUTO: 5.5 10E9/L (ref 1.6–8.3)
NEUTROPHILS NFR BLD AUTO: 60.9 %
NITRATE UR QL: NEGATIVE
NRBC # BLD AUTO: 0 10*3/UL
NRBC BLD AUTO-RTO: 0 /100
PH UR STRIP: 6.5 PH (ref 5–7)
PLATELET # BLD AUTO: 301 10E9/L (ref 150–450)
POTASSIUM SERPL-SCNC: 3.8 MMOL/L (ref 3.4–5.3)
PROT SERPL-MCNC: 7.5 G/DL (ref 6.8–8.8)
RBC # BLD AUTO: 5.09 10E12/L (ref 3.8–5.2)
RBC #/AREA URNS AUTO: 54 /HPF (ref 0–2)
SODIUM SERPL-SCNC: 138 MMOL/L (ref 133–144)
SOURCE: ABNORMAL
SP GR UR STRIP: 1 (ref 1–1.03)
SQUAMOUS #/AREA URNS AUTO: 14 /HPF (ref 0–1)
UROBILINOGEN UR STRIP-MCNC: NORMAL MG/DL (ref 0–2)
WBC # BLD AUTO: 9 10E9/L (ref 4–11)
WBC #/AREA URNS AUTO: 9 /HPF (ref 0–5)

## 2020-03-13 PROCEDURE — 25800030 ZZH RX IP 258 OP 636: Performed by: EMERGENCY MEDICINE

## 2020-03-13 PROCEDURE — 96374 THER/PROPH/DIAG INJ IV PUSH: CPT

## 2020-03-13 PROCEDURE — 81025 URINE PREGNANCY TEST: CPT | Performed by: EMERGENCY MEDICINE

## 2020-03-13 PROCEDURE — 81001 URINALYSIS AUTO W/SCOPE: CPT | Performed by: EMERGENCY MEDICINE

## 2020-03-13 PROCEDURE — 87086 URINE CULTURE/COLONY COUNT: CPT | Performed by: EMERGENCY MEDICINE

## 2020-03-13 PROCEDURE — 99285 EMERGENCY DEPT VISIT HI MDM: CPT | Mod: 25

## 2020-03-13 PROCEDURE — 80053 COMPREHEN METABOLIC PANEL: CPT | Performed by: EMERGENCY MEDICINE

## 2020-03-13 PROCEDURE — 96361 HYDRATE IV INFUSION ADD-ON: CPT

## 2020-03-13 PROCEDURE — 85025 COMPLETE CBC W/AUTO DIFF WBC: CPT | Performed by: EMERGENCY MEDICINE

## 2020-03-13 PROCEDURE — 25000128 H RX IP 250 OP 636: Performed by: EMERGENCY MEDICINE

## 2020-03-13 PROCEDURE — 70450 CT HEAD/BRAIN W/O DYE: CPT

## 2020-03-13 PROCEDURE — 74176 CT ABD & PELVIS W/O CONTRAST: CPT

## 2020-03-13 RX ORDER — METOCLOPRAMIDE 10 MG/1
10 TABLET ORAL 3 TIMES DAILY PRN
Qty: 20 TABLET | Refills: 0 | Status: SHIPPED | OUTPATIENT
Start: 2020-03-13 | End: 2020-04-22

## 2020-03-13 RX ORDER — KETOROLAC TROMETHAMINE 30 MG/ML
30 INJECTION, SOLUTION INTRAMUSCULAR; INTRAVENOUS ONCE
Status: COMPLETED | OUTPATIENT
Start: 2020-03-13 | End: 2020-03-13

## 2020-03-13 RX ORDER — CEFDINIR 300 MG/1
300 CAPSULE ORAL 2 TIMES DAILY
Qty: 20 CAPSULE | Refills: 0 | Status: SHIPPED | OUTPATIENT
Start: 2020-03-13 | End: 2020-03-24

## 2020-03-13 RX ADMIN — SODIUM CHLORIDE 1000 ML: 9 INJECTION, SOLUTION INTRAVENOUS at 14:44

## 2020-03-13 RX ADMIN — KETOROLAC TROMETHAMINE 30 MG: 30 INJECTION, SOLUTION INTRAMUSCULAR at 14:49

## 2020-03-13 ASSESSMENT — ENCOUNTER SYMPTOMS
COUGH: 1
HEADACHES: 1
LIGHT-HEADEDNESS: 1
FREQUENCY: 1
DIARRHEA: 0
FEVER: 0
SORE THROAT: 1
HEMATURIA: 1
DYSURIA: 0
VOMITING: 0

## 2020-03-13 NOTE — DISCHARGE INSTRUCTIONS
Start antibiotic today  Continue sinus rinse  May use mucines without decongestant  Follow up with urology and ENT after antibiotics

## 2020-03-13 NOTE — ED TRIAGE NOTES
Patient reports blood in her urine for 2 days and a headache for 2+ weeks. Also reports sore throat and dizziness for a couple months since having a cold.

## 2020-03-13 NOTE — ED PROVIDER NOTES
"  History     Chief Complaint:  Hematuria; Headache     The history is provided by the patient.      Tresa Chin is a 39 year old female who presents alone for evaluation of hematuria and a headache. The patient states that she has had sinus infection symptoms. Including congestion, drainage and discharge for the past two months, noting she has had a sore throat, cough and headache during this time. She denies any fever during this time, however notes she has felt hot. She has treated with 200mg ibuprofen, Nettipot washes and fexofenadine she was prescribed by her primary care provider, however states that she has used this infrequently. She states that her cough is worse in the mornings and denies any vomiting or diarrhea over this time. She denies any known influenza contacts during this time as well.     Over the past two days, the patient's headache and cough have worsened and she has developed intermittent hematuria and urinary frequency. She states that she has also had some lightheadedness during this time, stating that she \"doesn't feel good.\" She has treated with 800mg ibuprofen twice over the past two days. She has also tried to drink fluids frequently today, noting that she has drank \"four bottles of water today.\" She denies dysuria, recent travel, fevers, tobacco use, history of asthma or any chance of pregnancy. She presents today primarily concerned for the hematuria, but also with her worsening cough and headache.    Allergies:  Latex     Medications:    Amlodipine  Fexofenadine    Past Medical History:    Back Pain  Hypertension  Sleep apnea    Past Surgical History:    EGD combined    Family History:    Dementia  Lung cancer    Social History:  The patient presents to the ED alone.  Smoking Status: Never Smoker  Smokeless Tobacco: Never Used  Alcohol Use: Yes  Drug Use: No  PCP: Melissa Franco     Review of Systems   Constitutional: Negative for fever.   HENT: Positive for congestion and sore " throat.    Respiratory: Positive for cough.    Gastrointestinal: Negative for diarrhea and vomiting.   Genitourinary: Positive for frequency and hematuria. Negative for dysuria.   Neurological: Positive for light-headedness and headaches.   All other systems reviewed and are negative.      Physical Exam     Patient Vitals for the past 24 hrs:   BP Temp Temp src Pulse Heart Rate Resp SpO2 Weight   03/13/20 1510 -- -- -- -- -- -- 99 % --   03/13/20 1500 (!) 153/108 -- -- 80 -- -- 96 % --   03/13/20 1340 (!) 139/100 98.1  F (36.7  C) Oral 81 81 16 -- 116.7 kg (257 lb 4.4 oz)       Physical Exam  Constitutional:       Appearance: She is well-developed.   HENT:      Head:      Comments: Mild frontal sinus TTP     Right Ear: Tympanic membrane and external ear normal.      Left Ear: Tympanic membrane and external ear normal.      Nose: Congestion present.      Mouth/Throat:      Mouth: Mucous membranes are moist.      Pharynx: Oropharynx is clear. No oropharyngeal exudate or posterior oropharyngeal erythema.   Eyes:      General: No scleral icterus.     Extraocular Movements: Extraocular movements intact.      Conjunctiva/sclera: Conjunctivae normal.      Pupils: Pupils are equal, round, and reactive to light.   Neck:      Musculoskeletal: Normal range of motion and neck supple. No neck rigidity.   Cardiovascular:      Rate and Rhythm: Normal rate and regular rhythm.      Heart sounds: Normal heart sounds. No murmur. No friction rub. No gallop.    Pulmonary:      Effort: Pulmonary effort is normal. No respiratory distress.      Breath sounds: Normal breath sounds. No wheezing or rales.   Abdominal:      General: Bowel sounds are normal. There is no distension.      Palpations: Abdomen is soft. There is no mass.      Tenderness: There is no abdominal tenderness.   Musculoskeletal: Normal range of motion.   Lymphadenopathy:      Cervical: No cervical adenopathy.   Skin:     General: Skin is warm and dry.      Capillary  Refill: Capillary refill takes less than 2 seconds.      Findings: No rash.   Neurological:      General: No focal deficit present.      Mental Status: She is alert.      Cranial Nerves: No cranial nerve deficit.      Motor: No weakness.      Deep Tendon Reflexes: Reflexes normal.      Comments: Gait normal. 5/5 strength x 4. Normal sensation to light touch.           Emergency Department Course     Imaging:  Radiology findings were communicated with the patient who voiced understanding of the findings.    CT Head without contrast:   Normal CT scan of the head.  Aplastic frontal sinuses. No change.  As per radiology.    CT Abdomen/Pelvis without IV contrast:   Nonobstructing stones in the left kidney.  As per radiology.    Laboratory:  Laboratory findings were communicated with the patient who voiced understanding of the findings.    CBC: WBC: 9.0, HGB: 15.0, PLT: 301    CMP: Glucose 110 (high), Creatinine 0.44 (low), ALT 78 (high) o/w WNL     UA with Microscopic: Blood Large, Leukocyte Estrace Small, WBC 9, RBC 54, Bacteria Many, Squamous Epithelial 14, o/w Negative    Interventions:  1444 NS 1L IV  1449 Toradol 30mg IV    Emergency Department Course:  Past medical records, nursing notes, and vitals reviewed.    1427 I performed an exam of the patient as documented above.     IV was inserted and blood was drawn for laboratory testing, results above.  The patient provided a urine sample here in the emergency department. This was sent for laboratory testing, findings above.  The patient was sent for a CT while in the emergency department, results above.     1640 I rechecked the patient and discussed the results of her workup thus far.     Findings and plan explained to the patient. Patient discharged home with instructions regarding supportive care, medications, and reasons to return. The importance of close follow-up was reviewed. The patient was prescribed Omnicef and Reglan.    I personally reviewed the laboratory  and imaging results with the patient and answered all related questions prior to discharge.     Impression & Plan     Medical Decision Making:  Tresa Chin is a 39 year old female who presents with above described symptoms. She has a nonfocal neurologic exam. I believe that she likely has a migraine on top of untreated sinusitis. Her labs are reassuring. She does have hematuria, but there may be signs of infection. It was unclear if this is infection versus something else, so a CT was obtained which showed renal stones on the left kidney, but no actual ureteral stone. I did discuss with her that we will go ahead and treat her for a UTI, but if the hematuria does not resolve she will need to follow up with urology. Urology referral given. I recommended that she see ENT for her ongoing sinus issue. She is still describing a lot of congestion, discharge and drainage despite using Flonase and sudafed, and I do believe her headache is likely due to that. We will have her start a course of antibiotics as it appears this has not been done yet, and have her continue with the sinus rinses and Flonase. She was instructed to follow up with ENT after she completes this course of antibiotics. Otherwise, patient is comfortable with the plan and understanding of the instructions.     Diagnosis:    ICD-10-CM    1. Sinusitis, unspecified chronicity, unspecified location  J32.9 Urine Culture Aerobic Bacterial   2. UTI (urinary tract infection), bacterial  N39.0     A49.9    3. Renal stones  N20.0        Disposition:  Discharged to home.    Discharge Medications:  New Prescriptions    CEFDINIR (OMNICEF) 300 MG CAPSULE    Take 1 capsule (300 mg) by mouth 2 times daily for 10 days    METOCLOPRAMIDE (REGLAN) 10 MG TABLET    Take 1 tablet (10 mg) by mouth 3 times daily as needed (nausea)       Scribe Disclosure:  Rommel RODRIGUEZ, am serving as a scribe at 2:27 PM on 3/13/2020 to document services personally performed by [place]  MD Kamran based on my observations and the provider's statements to me.      Kamran Beckham MD  03/13/20 4220

## 2020-03-13 NOTE — ED AVS SNAPSHOT
[facility] Emergency Department  [address]  [facility] [address]  Phone:  [phone]  Fax:  [phone]                                    Tresa Chin   MRN: [id number]    Department:  [facility] Emergency Department   Date of Visit:  3/13/2020           After Visit Summary Signature Page    I have received my discharge instructions, and my questions have been answered. I have discussed any challenges I see with this plan with the nurse or doctor.    ..........................................................................................................................................  Patient/Patient Representative Signature      ..........................................................................................................................................  Patient Representative Print Name and Relationship to Patient    ..................................................               ................................................  Date                                   Time    ..........................................................................................................................................  Reviewed by Signature/Title    ...................................................              ..............................................  Date                                               Time          22EPIC Rev 08/18

## 2020-03-14 LAB
BACTERIA SPEC CULT: NORMAL
Lab: NORMAL
SPECIMEN SOURCE: NORMAL

## 2020-03-14 NOTE — RESULT ENCOUNTER NOTE
Emergency Dept/Urgent Care discharge antibiotic (if prescribed): Cefdinir (Omnicef) 300 mg capsule, 1 capsule (300 mg) by mouth 2 times daily for 7 days  Date of Rx (if applicable):  3/13/20  No changes in treatment per Urine culture protocol.

## 2020-03-17 ENCOUNTER — E-VISIT (OUTPATIENT)
Dept: FAMILY MEDICINE | Facility: CLINIC | Age: 40
End: 2020-03-17
Payer: COMMERCIAL

## 2020-03-17 ENCOUNTER — TELEPHONE (OUTPATIENT)
Dept: FAMILY MEDICINE | Facility: CLINIC | Age: 40
End: 2020-03-17

## 2020-03-17 DIAGNOSIS — J40 BRONCHITIS: Primary | ICD-10-CM

## 2020-03-17 PROCEDURE — 99207 ZZC NON-BILLABLE SERV PER CHARTING: CPT | Performed by: FAMILY MEDICINE

## 2020-03-17 NOTE — PATIENT INSTRUCTIONS
Viral Upper Respiratory Illness (Adult)    You have a viral upper respiratory illness (URI), which is another term for the common cold. This illness is contagious during the first few days. It is spread through the air by coughing and sneezing. It may also be spread by direct contact (touching the sick person and then touching your own eyes, nose, or mouth). Frequent handwashing will decrease risk of spread. Most viral illnesses go away within 7 to 10 days with rest and simple home remedies. Sometimes the illness may last for several weeks. Antibiotics will not kill a virus, and they are generally not prescribed for this condition.  Home care    If symptoms are severe, rest at home for the first 2 to 3 days. When you resume activity, don't let yourself get too tired.    Don't smoke. If you need help stopping, talk with your healthcare provider.    Avoid being exposed to cigarette smoke (yours or others ).    You may use acetaminophen or ibuprofen to control pain and fever, unless another medicine was prescribed. If you have chronic liver or kidney disease, have ever had a stomach ulcer or gastrointestinal bleeding, or are taking blood-thinning medicines, talk with your healthcare provider before using these medicines. Aspirin should never be given to anyone under 18 years of age who is ill with a viral infection or fever. It may cause severe liver or brain damage.    Your appetite may be poor, so a light diet is fine. Stay well hydrated by drinking 6 to 8 glasses of fluids per day (water, soft drinks, juices, tea, or soup). Extra fluids will help loosen secretions in the nose and lungs.    Over-the-counter cold medicines will not shorten the length of time you re sick, but they may be helpful for the following symptoms: cough, sore throat, and nasal and sinus congestion. If you take prescription medicines, ask your healthcare provider or pharmacist which over-the-counter medicines are safe to use. (Note: Don't  use decongestants if you have high blood pressure.)  Follow-up care  Follow up with your healthcare provider, or as advised.  When to seek medical advice  Call your healthcare provider right away if any of these occur:    Cough with lots of colored sputum (mucus)    Severe headache; face, neck, or ear pain    Difficulty swallowing due to throat pain    Fever of 100.4 F (38 C) or higher, or as directed by your healthcare provider  Call 911  Call 911 if any of these occur:    Chest pain, shortness of breath, wheezing, or difficulty breathing    Coughing up blood    Very severe pain with swallowing, especially if it goes along with a muffled voice   Date Last Reviewed: 6/1/2018 2000-2019 [employer]. [address] [place] [address], [place] [address]. All rights reserved. This information is not intended as a substitute for professional medical care. Always follow your healthcare professional's instructions.

## 2020-03-17 NOTE — TELEPHONE ENCOUNTER
Provider E-Visit time total (minutes): will switch to phone visit.  Melissa Franco MD  [facility]  [phone]

## 2020-03-18 ENCOUNTER — VIRTUAL VISIT (OUTPATIENT)
Dept: FAMILY MEDICINE | Facility: CLINIC | Age: 40
End: 2020-03-18
Payer: COMMERCIAL

## 2020-03-18 DIAGNOSIS — J40 BRONCHITIS: Primary | ICD-10-CM

## 2020-03-18 PROCEDURE — 99442 ZZC PHYSICIAN TELEPHONE EVALUATION 11-20 MIN: CPT | Performed by: FAMILY MEDICINE

## 2020-03-18 RX ORDER — ALBUTEROL SULFATE 90 UG/1
2 AEROSOL, METERED RESPIRATORY (INHALATION) EVERY 6 HOURS
Qty: 18 G | Refills: 3 | Status: ON HOLD | OUTPATIENT
Start: 2020-03-18 | End: 2020-07-22

## 2020-03-18 RX ORDER — BENZONATATE 200 MG/1
200 CAPSULE ORAL 3 TIMES DAILY PRN
Qty: 30 CAPSULE | Refills: 0 | Status: SHIPPED | OUTPATIENT
Start: 2020-03-18 | End: 2020-04-22

## 2020-03-23 ENCOUNTER — TELEPHONE (OUTPATIENT)
Dept: FAMILY MEDICINE | Facility: CLINIC | Age: 40
End: 2020-03-23

## 2020-03-23 DIAGNOSIS — J40 BRONCHITIS: Primary | ICD-10-CM

## 2020-03-23 NOTE — TELEPHONE ENCOUNTER
Then patient needs to go to Urgent care.  Melissa Franco MD  [facility]  [phone]

## 2020-03-23 NOTE — TELEPHONE ENCOUNTER
Patient did a virtual visit last week.  Was told to call back if not feeling any better.    She is not feeling better.    Please contact the patient.

## 2020-03-23 NOTE — TELEPHONE ENCOUNTER
Called patient back.  Cough is same.  Worse with any activity even walking around house.  Shortness of breath at times like when talking for any length of time.  No fever.  Brother was negative for COVID-19.  States inhaler is not working and Tessalon not working at all.  When lays down cough and wheezing get worse.  Chest still hurts- a little better.  Sinus pressure is slightly better.  Sometimes chest feels heavy.  Please advise.  Call her back at [phone].  Brittany Farrell RN

## 2020-03-23 NOTE — TELEPHONE ENCOUNTER
Current protocol indicates patient needs to be seen by evisit or through [email or web address] to be seen at urgent care. Please advise.   Sarahi Lo, JOHNN, RN, PHN

## 2020-03-24 RX ORDER — AZITHROMYCIN 250 MG/1
TABLET, FILM COATED ORAL
Qty: 6 TABLET | Refills: 0 | Status: SHIPPED | OUTPATIENT
Start: 2020-03-24 | End: 2020-04-22

## 2020-03-24 NOTE — TELEPHONE ENCOUNTER
Patient notified of message below from Dr. Franco     Patient agrees to  zithromax and continue to monitor for fever / shortness of breath - if these occur patient is to call and advise     If symptoms have not improved in 5 days patient advised to return call to advise     Advised patient to stay home, until symptoms have resolved for atleast 72 hours     Mima Wells, Registered Nurse   [facility]

## 2020-03-24 NOTE — TELEPHONE ENCOUNTER
Bronchitis is mostly the diagnosis, given that her symptoms are not improving in 7 days,  I will give z pack for 5 days, call in 5 days if no improvement, call sooner if there is fever.  Keep isolation until symptoms are improving.  Melissa Franco MD  [facility]  [phone]

## 2020-04-22 ENCOUNTER — VIRTUAL VISIT (OUTPATIENT)
Dept: FAMILY MEDICINE | Facility: CLINIC | Age: 40
End: 2020-04-22
Payer: COMMERCIAL

## 2020-04-22 DIAGNOSIS — N20.0 NEPHROLITHIASIS: ICD-10-CM

## 2020-04-22 DIAGNOSIS — K29.00 ACUTE GASTRITIS, PRESENCE OF BLEEDING UNSPECIFIED, UNSPECIFIED GASTRITIS TYPE: ICD-10-CM

## 2020-04-22 DIAGNOSIS — I10 ESSENTIAL HYPERTENSION: ICD-10-CM

## 2020-04-22 DIAGNOSIS — J32.4 CHRONIC PANSINUSITIS: Primary | ICD-10-CM

## 2020-04-22 PROCEDURE — 99214 OFFICE O/P EST MOD 30 MIN: CPT | Mod: GT | Performed by: FAMILY MEDICINE

## 2020-04-22 RX ORDER — OMEPRAZOLE 20 MG/1
20 TABLET, DELAYED RELEASE ORAL DAILY
Qty: 90 TABLET | Refills: 0 | Status: SHIPPED | OUTPATIENT
Start: 2020-04-22 | End: 2020-07-13

## 2020-04-22 NOTE — PROGRESS NOTES
"Tresa Chin is a 39 year old female who is being evaluated via a billable video visit.      The patient has been notified of following:     \"This video visit will be conducted via a call between you and your physician/provider. We have found that certain health care needs can be provided without the need for an in-person physical exam.  This service lets us provide the care you need with a video conversation.  If a prescription is necessary we can send it directly to your pharmacy.  If lab work is needed we can place an order for that and you can then stop by our lab to have the test done at a later time.    Video visits are billed at different rates depending on your insurance coverage.  Please reach out to your insurance provider with any questions.    If during the course of the call the physician/provider feels a video visit is not appropriate, you will not be charged for this service.\"    Patient has given verbal consent for Video visit? Yes    How would you like to obtain your AVS? Lewis    Patient would like the video invitation sent by: Text to cell phone: [phone]    Will anyone else be joining your video visit? No        Subjective     Tresa Chin is a 39 year old female who presents to clinic today for the following health issues:    HPI  Concern - Cough  Onset: follow up      Progression of Symptoms:  improving  Last 3 days ago, she has been mild sore throat, that has improved, and also she has been having pressure in the sinuses, and wonder what can she do about I, it is causing her headaches.    In regards to her cough, it has improve, but she still have mild cough in the morning.  Pt was put on zithromax for 5 days, and also doxycycline for sinusitis/bronchitis.         Video Start Time: 7:10    Hypertension Follow-up      Do you check your blood pressure regularly outside of the clinic? Yes     Are you following a low salt diet? No    Are your blood pressures ever more than 140 on the " "top number (systolic) OR more   than 90 on the bottom number (diastolic), for example 140/90? Yes   Now it Is hovering about 130/85, and rarely it goes above 95 for diastolic.    In regards to the kidney stone, she does not have blood in the urine, but she has mild pain in the left flank, on and off.        Patient Active Problem List   Diagnosis     Morbid obesity (H)     Essential hypertension     Hyperlipidemia LDL goal <160     Anxiety state     Fatty liver     TB lung, latent     Bilateral low back pain without sciatica, unspecified chronicity     Panic attack     NSAID induced gastritis     Gastroesophageal reflux disease without esophagitis     Past Surgical History:   Procedure Laterality Date     ESOPHAGOSCOPY, GASTROSCOPY, DUODENOSCOPY (EGD), COMBINED N/A 5/31/2019    Procedure: ESOPHAGOGASTRODUODENOSCOPY (EGD)cold BX (fv);  Surgeon: Geremias Martinez MD;  Location:  GI       Social History     Tobacco Use     Smoking status: Never Smoker     Smokeless tobacco: Never Used   Substance Use Topics     Alcohol use: Yes     Frequency: Never     Comment: occ.     Family History   Problem Relation Age of Onset     Dementia Mother      Lung Cancer Father            Reviewed and updated as needed this visit by Provider         Review of Systems   ROS COMP: CONSTITUTIONAL: NEGATIVE for fever, chills, change in weight  CV: NEGATIVE for chest pain, palpitations or peripheral edema      Objective    There were no vitals taken for this visit.  Estimated body mass index is 48.61 kg/m  as calculated from the following:    Height as of 2/19/20: 1.549 m (5' 1\").    Weight as of 3/13/20: 116.7 kg (257 lb 4.4 oz).  Physical Exam     GENERAL: healthy, alert and no distress  EYES: Eyes grossly normal to inspection, conjunctivae and sclerae normal  RESP: no audible wheeze, cough, or visible cyanosis.  No visible retractions or increased work of breathing.  Able to speak fully in complete sentences.  NEURO: Cranial " nerves grossly intact, mentation intact and speech normal  PSYCH: mentation appears normal, affect normal/bright, judgement and insight intact, normal speech and appearance well-groomed              Assessment & Plan     1. Acute gastritis, presence of bleeding unspecified, unspecified gastritis type  Continue on medicine, as her symptoms are recurrent.  - omeprazole (PRILOSEC OTC) 20 MG EC tablet; Take 1 tablet (20 mg) by mouth daily  Dispense: 90 tablet; Refill: 0    2. Chronic pansinusitis  Symptoms are not improving, ENT referral suggested, but pt declined at this time, will start on long time abx for 1 month.  pt to call if no improvement in 1 week.  - amoxicillin-clavulanate (AUGMENTIN) 875-125 MG tablet; Take 1 tablet by mouth 2 times daily  Dispense: 60 tablet; Refill: 0    3. Essential hypertension  Controlled. Continue on same meds.    4. Nephrolithiasis  Mild symptoms, will continue on monitoring.             Follow up in 1 month.    Melissa Franco MD  [facility]      Video-Visit Details    Type of service:  Video Visit    Video End Time (time video stopped): 7:40    Originating Location (pt. Location): Home    Distant Location (provider location):  Home.    Mode of Communication:  Video Conference via [employer]    No follow-ups on file.       Melissa Franco MD

## 2020-05-14 ENCOUNTER — TELEPHONE (OUTPATIENT)
Dept: FAMILY MEDICINE | Facility: CLINIC | Age: 40
End: 2020-05-14

## 2020-05-14 NOTE — TELEPHONE ENCOUNTER
Pt notified will monitor for now and if symptoms do not clear will f/u if needed.     Dominga Weiner RN

## 2020-05-14 NOTE — TELEPHONE ENCOUNTER
Pt has had these episodes before, they are mostly related to Benign vertigo.  I advise to move slowly and move the head slowly too (especialy on standing or moving the head from one side to another)  Also if no improvement, I recommend PT.  Melissa Franco MD  [facility]  [phone]

## 2020-05-14 NOTE — TELEPHONE ENCOUNTER
Symptoms  Describe your symptoms: Dizzy both when walking and sitting down and a little shaky. Pt stated she is thirstier than normal, her throat is dry and she's been drinking a lot of water. Pt also stated she is nauseous.  Any pain: No  How long have you been having symptoms: 1  days  Have you been seen for this:  No  Appointment offered?: No  Triage offered?: No  Home remedies tried: n/a  Requested Pharmacy: n/a  Okay to leave a detailed message? Yes at Cell number on file:    Telephone Information:   Mobile [phone]       Erna Ram,

## 2020-05-14 NOTE — TELEPHONE ENCOUNTER
Patient called back.  Spoke with patient.  She states that she has been feeling dizzy since yesterday.  She states that it is worse when she is standing up and walking and when she sits down it helps.  She also says she feels shakey and nauseous.  She states that she is drinking lots of water because her throat is dry.  She is eating food but when she eats it makes her more nauseous.  She has no fever.  She complains of a slight headache, more on the right side, rates the pain between 3-4.  Feels more tired lately.  States that her BP and blood sugars are ok.    Advised that patient will probably need to have a video or phone visit.  She requested to have message sent to Dr. Franco to review and advise.    Judith Ayers RN

## 2020-06-15 ENCOUNTER — ANCILLARY PROCEDURE (OUTPATIENT)
Dept: GENERAL RADIOLOGY | Facility: CLINIC | Age: 40
End: 2020-06-15
Attending: FAMILY MEDICINE
Payer: COMMERCIAL

## 2020-06-15 ENCOUNTER — OFFICE VISIT (OUTPATIENT)
Dept: ORTHOPEDICS | Facility: CLINIC | Age: 40
End: 2020-06-15
Payer: COMMERCIAL

## 2020-06-15 VITALS
HEIGHT: 61 IN | WEIGHT: 257 LBS | SYSTOLIC BLOOD PRESSURE: 130 MMHG | DIASTOLIC BLOOD PRESSURE: 80 MMHG | BODY MASS INDEX: 48.52 KG/M2

## 2020-06-15 DIAGNOSIS — S93.422A SPRAIN OF DELTOID LIGAMENT OF LEFT ANKLE, INITIAL ENCOUNTER: ICD-10-CM

## 2020-06-15 DIAGNOSIS — M25.572 ACUTE LEFT ANKLE PAIN: Primary | ICD-10-CM

## 2020-06-15 DIAGNOSIS — M25.572 ACUTE LEFT ANKLE PAIN: ICD-10-CM

## 2020-06-15 PROCEDURE — 99203 OFFICE O/P NEW LOW 30 MIN: CPT | Performed by: FAMILY MEDICINE

## 2020-06-15 PROCEDURE — 73610 X-RAY EXAM OF ANKLE: CPT | Mod: LT

## 2020-06-15 ASSESSMENT — MIFFLIN-ST. JEOR: SCORE: 1788.12

## 2020-06-15 NOTE — LETTER
"    6/15/2020         RE: Tresa Chin  [address]  [place] [address]        Dear Colleague,    Thank you for referring your patient, Tresa Chin, to the [facility]. Please see a copy of my visit note below.    ASSESSMENT & PLAN    1. Acute left ankle pain    2. Sprain of deltoid ligament of left ankle, initial encounter      Seen for acute left ankle pain related to deltoid sprain  Physical therapy: [place] [facility] - [phone]  Wear the boot with walking. Do not need to sleep in the boot.  Can wean out of the boot in 2-3 weeks.  Rx for Even-Up     Follow-up in 2 weeks    -----    SUBJECTIVE  Tresa Chin is a/an 39 year old female who is seen as a self referral for evaluation of left ankle pain. The patient is seen by themselves.    Onset: 6/13/20. Patient describes injury as she was walking down a hill and twisted her ankle - describes an inversion ankle injury.  Location of Pain: left medial ankle  Rating of Pain at worst: 10/10  Rating of Pain Currently: 0/10 at rest  Worsened by: inversion, walking, standing  Better with: rest, sitting  Treatments tried: rest/activity avoidance, ice and ibuprofen  Associated symptoms: mild swelling  Orthopedic history: NO  Relevant surgical history: No  Patient Social History: works at [employer]    Patient's past medical, surgical, social, and family histories were reviewed today and no pertinent history related to patient's presenting problem.    REVIEW OF SYSTEMS:  10 point ROS is negative other than symptoms noted above in HPI, Past Medical History or as stated below  Constitutional: NEGATIVE for fever, chills, change in weight  Skin: NEGATIVE for worrisome rashes, moles or lesions  GI/: NEGATIVE for bowel or bladder changes  Neuro: NEGATIVE for weakness, dizziness or paresthesias    OBJECTIVE:  /80   Ht 1.549 m (5' 1\")   Wt 116.6 kg (257 lb)   BMI 48.56 kg/m     General: healthy, " morbidly obese, alert and in no distress  HEENT: no scleral icterus or conjunctival erythema  Skin: no suspicious lesions or rash. No jaundice.  CV:  no pedal edema  Resp: normal respiratory effort without conversational dyspnea   Psych: normal mood and affect  Gait: antalgic gait, appropriate coordination and balance  Neuro: Normal light sensory exam of lower extremity  MSK:  LEFT ANKLE  Inspection:    Medial ankle swelling without any ecchymosis is observed  Palpation:    Tender about the medial malleolus and deltoid ligament. Remainder of bony and ligamentous landmarks are nontender.  Range of Motion:     Plantarflexion limited by pain / dorsiflexion full / inversion limited by pain / eversion limited by pain  Strength:    Grossly intact  Special Tests:    negative anterior drawer, negative talar tilt, negative valgus stress, negative forced external rotation/eversion, negative Ervin sign    Independent visualization of the below image:  Recent Results (from the past 24 hour(s))   XR Ankle Left G/E 3 Views    Narrative    ANKLE LEFT THREE OR MORE VIEWS Mishel 15, 2020 2:59 PM     HISTORY: Acute left ankle pain.      Impression    IMPRESSION: Pes planus. Mild calcaneal spurring. No fracture  identified.      Yazan Solorzano DO [facility]      Again, thank you for allowing me to participate in the care of your patient.        Sincerely,        Yazan Solorzano DO

## 2020-06-15 NOTE — PATIENT INSTRUCTIONS
1. Acute left ankle pain    2. Sprain of deltoid ligament of left ankle, initial encounter      Physical therapy: [place] [facility] - [phone]  Wear the boot with walking. Do not need to sleep in the boot.  Can wean out of the boot in 2-3 weeks.  Rx for Even-Up     Follow-up in 2 weeks

## 2020-06-15 NOTE — PROGRESS NOTES
"ASSESSMENT & PLAN    1. Acute left ankle pain    2. Sprain of deltoid ligament of left ankle, initial encounter      Seen for acute left ankle pain related to deltoid sprain  Physical therapy: [place] [facility] - [phone]  Wear the boot with walking. Do not need to sleep in the boot.  Can wean out of the boot in 2-3 weeks.  Rx for Even-Up     Follow-up in 2 weeks    -----    SUBJECTIVE  Tresa Chin is a/an 39 year old female who is seen as a self referral for evaluation of left ankle pain. The patient is seen by themselves.    Onset: 6/13/20. Patient describes injury as she was walking down a hill and twisted her ankle - describes an inversion ankle injury.  Location of Pain: left medial ankle  Rating of Pain at worst: 10/10  Rating of Pain Currently: 0/10 at rest  Worsened by: inversion, walking, standing  Better with: rest, sitting  Treatments tried: rest/activity avoidance, ice and ibuprofen  Associated symptoms: mild swelling  Orthopedic history: NO  Relevant surgical history: No  Patient Social History: works at [employer]    Patient's past medical, surgical, social, and family histories were reviewed today and no pertinent history related to patient's presenting problem.    REVIEW OF SYSTEMS:  10 point ROS is negative other than symptoms noted above in HPI, Past Medical History or as stated below  Constitutional: NEGATIVE for fever, chills, change in weight  Skin: NEGATIVE for worrisome rashes, moles or lesions  GI/: NEGATIVE for bowel or bladder changes  Neuro: NEGATIVE for weakness, dizziness or paresthesias    OBJECTIVE:  /80   Ht 1.549 m (5' 1\")   Wt 116.6 kg (257 lb)   BMI 48.56 kg/m     General: healthy, morbidly obese, alert and in no distress  HEENT: no scleral icterus or conjunctival erythema  Skin: no suspicious lesions or rash. No jaundice.  CV:  no pedal edema  Resp: normal respiratory effort without conversational dyspnea   Psych: normal mood and " affect  Gait: antalgic gait, appropriate coordination and balance  Neuro: Normal light sensory exam of lower extremity  MSK:  LEFT ANKLE  Inspection:    Medial ankle swelling without any ecchymosis is observed  Palpation:    Tender about the medial malleolus and deltoid ligament. Remainder of bony and ligamentous landmarks are nontender.  Range of Motion:     Plantarflexion limited by pain / dorsiflexion full / inversion limited by pain / eversion limited by pain  Strength:    Grossly intact  Special Tests:    negative anterior drawer, negative talar tilt, negative valgus stress, negative forced external rotation/eversion, negative Ervin sign    Independent visualization of the below image:  Recent Results (from the past 24 hour(s))   XR Ankle Left G/E 3 Views    Narrative    ANKLE LEFT THREE OR MORE VIEWS Mishel 15, 2020 2:59 PM     HISTORY: Acute left ankle pain.      Impression    IMPRESSION: Pes planus. Mild calcaneal spurring. No fracture  identified.      Yazan Solorzano DO [facility]

## 2020-06-29 ENCOUNTER — OFFICE VISIT (OUTPATIENT)
Dept: ORTHOPEDICS | Facility: CLINIC | Age: 40
End: 2020-06-29
Payer: COMMERCIAL

## 2020-06-29 VITALS
WEIGHT: 257 LBS | HEIGHT: 61 IN | SYSTOLIC BLOOD PRESSURE: 142 MMHG | BODY MASS INDEX: 48.52 KG/M2 | DIASTOLIC BLOOD PRESSURE: 90 MMHG

## 2020-06-29 DIAGNOSIS — I10 ESSENTIAL HYPERTENSION: ICD-10-CM

## 2020-06-29 DIAGNOSIS — S93.422D SPRAIN OF DELTOID LIGAMENT OF LEFT ANKLE, SUBSEQUENT ENCOUNTER: Primary | ICD-10-CM

## 2020-06-29 DIAGNOSIS — K76.0 FATTY LIVER: ICD-10-CM

## 2020-06-29 DIAGNOSIS — E66.01 MORBID OBESITY (H): ICD-10-CM

## 2020-06-29 PROCEDURE — 99213 OFFICE O/P EST LOW 20 MIN: CPT | Performed by: FAMILY MEDICINE

## 2020-06-29 ASSESSMENT — MIFFLIN-ST. JEOR: SCORE: 1783.12

## 2020-06-29 NOTE — LETTER
"    6/29/2020         RE: Tresa Chin  [address]  [place] [address]        Dear Colleague,    Thank you for referring your patient, Tresa Chin, to the [facility]. Please see a copy of my visit note below.    ASSESSMENT & PLAN    1. Sprain of deltoid ligament of left ankle, subsequent encounter    2. Morbid obesity (H)    3. Essential hypertension    4. Fatty liver      Call to schedule Physical therapy: [place] [facility] - [phone]  Call the DME office to get the Even Up  [phone]  Recommend weight loss and changing your nutrition - eliminate all refined sugars and processed foods as a staring point.    Follow-up after 4-6 therapy sessions    -----    SUBJECTIVE:  Tresa Chin is a 40 year old female who is seen in follow-up for left ankle pain due sprain of deltoid ligament. DOI: 6/13/20 - approximately 2.2 weeks out from injury. They were last seen 6/15/2020.      Since their last visit reports that pain has improved while wearing the CAM boot. She notes no pain in the ankle while wearing the boot. She reports that when she takes the boot off at home and tries to walk, pain increases. She notes pain is a 6/10 with full weight bearing without the boot. They have tried rest/activity avoidance, elevation, ice, Tylenol and ibuprofen.      The patient is seen by themselves.    Patient's past medical, surgical, social, and family histories were reviewed today and no pertinent history related to patient's presenting problem.    REVIEW OF SYSTEMS:  Constitutional: NEGATIVE for fever, chills, change in weight  Skin: NEGATIVE for worrisome rashes, moles or lesions  GI/: NEGATIVE for bowel or bladder changes  Neuro: NEGATIVE for weakness, dizziness or paresthesias    OBJECTIVE:  BP (!) 142/90   Ht 1.549 m (5' 1\")   Wt 116.6 kg (257 lb)   BMI 48.56 kg/m     General: morbidly obese, alert and in no distress  HEENT: no scleral icterus or " conjunctival erythema  Skin: no suspicious lesions or rash. No jaundice.  CV: regular rhythm by palpation, no pedal edema  Resp: normal respiratory effort without conversational dyspnea   Psych: normal mood and affect  Gait: antalgic gait out of the boot, appropriate coordination and balance  Neuro: normal light touch sensory exam of the extremities.    MSK:  LEFT ANKLE  Inspection:    Medial ankle swelling, resting pes planus  Palpation:    Mildly tender about the medial malleolus and deltoid ligament - much improved from previous.   Range of Motion:     Plantarflexion full / dorsiflexion full / inversion limited by pain / eversion limited by pain  Strength:    Grossly intact - painful with resisted eversion and inversion but has power      Yazan Solorzano DO [facility]          Again, thank you for allowing me to participate in the care of your patient.        Sincerely,        Yazan Solorzano DO

## 2020-06-29 NOTE — PROGRESS NOTES
"ASSESSMENT & PLAN    1. Sprain of deltoid ligament of left ankle, subsequent encounter    2. Morbid obesity (H)    3. Essential hypertension    4. Fatty liver      Call to schedule Physical therapy: [place] - [phone]  Call the DME office to get the Even Up  [phone]  Recommend weight loss and changing your nutrition - eliminate all refined sugars and processed foods as a staring point.    Follow-up after 4-6 therapy sessions    -----    SUBJECTIVE:  Tresa Chin is a 40 year old female who is seen in follow-up for left ankle pain due sprain of deltoid ligament. DOI: 6/13/20 - approximately 2.2 weeks out from injury. They were last seen 6/15/2020.      Since their last visit reports that pain has improved while wearing the CAM boot. She notes no pain in the ankle while wearing the boot. She reports that when she takes the boot off at home and tries to walk, pain increases. She notes pain is a 6/10 with full weight bearing without the boot. They have tried rest/activity avoidance, elevation, ice, Tylenol and ibuprofen.      The patient is seen by themselves.    Patient's past medical, surgical, social, and family histories were reviewed today and no pertinent history related to patient's presenting problem.    REVIEW OF SYSTEMS:  Constitutional: NEGATIVE for fever, chills, change in weight  Skin: NEGATIVE for worrisome rashes, moles or lesions  GI/: NEGATIVE for bowel or bladder changes  Neuro: NEGATIVE for weakness, dizziness or paresthesias    OBJECTIVE:  BP (!) 142/90   Ht 1.549 m (5' 1\")   Wt 116.6 kg (257 lb)   BMI 48.56 kg/m     General: morbidly obese, alert and in no distress  HEENT: no scleral icterus or conjunctival erythema  Skin: no suspicious lesions or rash. No jaundice.  CV: regular rhythm by palpation, no pedal edema  Resp: normal respiratory effort without conversational dyspnea   Psych: normal mood and affect  Gait: antalgic gait out of the boot, appropriate " coordination and balance  Neuro: normal light touch sensory exam of the extremities.    MSK:  LEFT ANKLE  Inspection:    Medial ankle swelling, resting pes planus  Palpation:    Mildly tender about the medial malleolus and deltoid ligament - much improved from previous.   Range of Motion:     Plantarflexion full / dorsiflexion full / inversion limited by pain / eversion limited by pain  Strength:    Grossly intact - painful with resisted eversion and inversion but has power      Yazan Solorzano, DO [facility]

## 2020-06-29 NOTE — PATIENT INSTRUCTIONS
1. Sprain of deltoid ligament of left ankle, subsequent encounter    2. Morbid obesity (H)    3. Essential hypertension    4. Fatty liver      Call to schedule Physical therapy: [place] [facility] - [phone]  Call the DME office to get the Even Up  [phone]  Recommend weight loss and changing your nutrition - eliminate all refined sugars and processed foods as a staring point.    Follow-up after 4-6 therapy sessions

## 2020-07-03 ENCOUNTER — THERAPY VISIT (OUTPATIENT)
Dept: PHYSICAL THERAPY | Facility: CLINIC | Age: 40
End: 2020-07-03
Payer: COMMERCIAL

## 2020-07-03 DIAGNOSIS — S93.422A SPRAIN OF DELTOID LIGAMENT OF LEFT ANKLE, INITIAL ENCOUNTER: ICD-10-CM

## 2020-07-03 DIAGNOSIS — M25.572 ACUTE LEFT ANKLE PAIN: ICD-10-CM

## 2020-07-03 PROCEDURE — 97110 THERAPEUTIC EXERCISES: CPT | Mod: GP | Performed by: PHYSICAL THERAPIST

## 2020-07-03 PROCEDURE — 97161 PT EVAL LOW COMPLEX 20 MIN: CPT | Mod: GP | Performed by: PHYSICAL THERAPIST

## 2020-07-03 NOTE — PROGRESS NOTES
[facility] Initial Evaluation  Subjective:    Therapist Generated HPI Evaluation         Type of problem:  Left ankle.      Condition occurred with:  A twist.    Site of Pain: medial ankle.    Pain radiates to:  No radiation.     Associated symptoms:  Loss of motion/stiffness and loss of strength. Symptoms are exacerbated by weight bearing, ascending stairs, descending stairs and bending/squatting  Relieved by: elevation, ice.                              Objective:  Standing Alignment:                Ankle/Foot:  Pes planus L and pes planus R    Gait:    Gait Type:  Antalgic   Assistive Devices:  CAM            Ankle/Foot Evaluation  ROM:    AROM:    Dorsiflexion:  Left:   10  Right:   10  Plantarflexion:  Left:  45    Right:  55  Inversion:  Left:  15     Right:  40  Eversion:  10     Right:  20        Strength:    Dorsiflexion:  Left: 3+/5      Pain:++   Right: 5/5   Pain:  Plantarflexion: Left: 3/5    Pain:++   Right: 5/5  Pain:  Inversion:Left: 3/5   Pain:+++     Right: 5/5   Pain:+  Eversion:Left: 4/5   Pain:+  Right: 5/5  Pain:                      PALPATION:   Left ankle tenderness present at:  posterior tibialis and deltoid ligament  Left ankle tenderness not present at:   gastroc/soleus; achilles tendon; anterior tibialis or plantar fascia  Right ankle tenderness present at:   posterior tibialis  Right ankle tenderness not present at:  gastroc/soleus; achilles tendon; anterior tibialis; deltoid ligament or plantar fascia  EDEMA: normal          MOBILITY TESTING: normal              FUNCTIONAL TESTS: Functional test ankle: unable to assess due to pain with WB.                                                              General     ROS    Assessment/Plan:    Patient is a 40 year old female with left side ankle complaints.    Patient has the following significant findings with corresponding treatment plan.                Diagnosis 1:  Left Ankle Deltoid Strain, Posterior Tibialis Tendon  Dysfunction  Pain -  hot/cold therapy, US, electric stimulation, manual therapy, self management, education and home program  Decreased ROM/flexibility - manual therapy and therapeutic exercise  Decreased strength - therapeutic exercise and therapeutic activities  Impaired balance - neuro re-education and therapeutic activities  Decreased proprioception - neuro re-education and therapeutic activities  Impaired gait - gait training  Impaired muscle performance - neuro re-education  Decreased function - therapeutic activities    Therapy Evaluation Codes:   1) History comprised of:   Personal factors that impact the plan of care:      None.    Comorbidity factors that impact the plan of care are:      High blood pressure and Overweight.     Medications impacting care: High blood pressure and Pain.  2) Examination of Body Systems comprised of:   Body structures and functions that impact the plan of care:      Ankle.   Activity limitations that impact the plan of care are:      Bathing, Cooking, Dressing, Squatting/kneeling, Stairs, Standing, Walking and Working.  3) Clinical presentation characteristics are:   Stable/Uncomplicated.  4) Decision-Making    Low complexity using standardized patient assessment instrument and/or measureable assessment of functional outcome.  Cumulative Therapy Evaluation is: Low complexity.    Previous and current functional limitations:  (See Goal Flow Sheet for this information)    Short term and Long term goals: (See Goal Flow Sheet for this information)     Communication ability:  Patient appears to be able to clearly communicate and understand verbal and written communication and follow directions correctly.  Treatment Explanation - The following has been discussed with the patient:   RX ordered/plan of care  Anticipated outcomes  Possible risks and side effects  This patient would benefit from PT intervention to resume normal activities.   Rehab potential is good.    Frequency:  1 X  week, once daily  Duration:  for 6 weeks  Discharge Plan:  Achieve all LTG.  Independent in home treatment program.  Reach maximal therapeutic benefit.    Please refer to the daily flowsheet for treatment today, total treatment time and time spent performing 1:1 timed codes.

## 2020-07-10 ENCOUNTER — THERAPY VISIT (OUTPATIENT)
Dept: PHYSICAL THERAPY | Facility: CLINIC | Age: 40
End: 2020-07-10
Payer: COMMERCIAL

## 2020-07-10 DIAGNOSIS — R60.9 EDEMA: ICD-10-CM

## 2020-07-10 DIAGNOSIS — M25.572 ACUTE LEFT ANKLE PAIN: Primary | ICD-10-CM

## 2020-07-10 DIAGNOSIS — S93.422A SPRAIN OF DELTOID LIGAMENT OF LEFT ANKLE, INITIAL ENCOUNTER: ICD-10-CM

## 2020-07-10 PROCEDURE — 97110 THERAPEUTIC EXERCISES: CPT | Mod: GP | Performed by: PHYSICAL THERAPIST

## 2020-07-10 PROCEDURE — 97016 VASOPNEUMATIC DEVICE THERAPY: CPT | Mod: GP | Performed by: PHYSICAL THERAPIST

## 2020-07-10 PROCEDURE — 97140 MANUAL THERAPY 1/> REGIONS: CPT | Mod: GP | Performed by: PHYSICAL THERAPIST

## 2020-07-13 ENCOUNTER — MYC MEDICAL ADVICE (OUTPATIENT)
Dept: FAMILY MEDICINE | Facility: CLINIC | Age: 40
End: 2020-07-13

## 2020-07-13 ENCOUNTER — OFFICE VISIT (OUTPATIENT)
Dept: FAMILY MEDICINE | Facility: CLINIC | Age: 40
End: 2020-07-13
Payer: COMMERCIAL

## 2020-07-13 VITALS — SYSTOLIC BLOOD PRESSURE: 98 MMHG | DIASTOLIC BLOOD PRESSURE: 78 MMHG

## 2020-07-13 DIAGNOSIS — F32.0 MILD MAJOR DEPRESSION (H): ICD-10-CM

## 2020-07-13 DIAGNOSIS — K21.9 GASTROESOPHAGEAL REFLUX DISEASE WITHOUT ESOPHAGITIS: ICD-10-CM

## 2020-07-13 DIAGNOSIS — F41.1 ANXIETY STATE: ICD-10-CM

## 2020-07-13 DIAGNOSIS — F41.0 PANIC ATTACK: Primary | ICD-10-CM

## 2020-07-13 DIAGNOSIS — G47.9 SLEEPING DIFFICULTIES: ICD-10-CM

## 2020-07-13 PROCEDURE — 99214 OFFICE O/P EST MOD 30 MIN: CPT | Performed by: FAMILY MEDICINE

## 2020-07-13 RX ORDER — HYDROXYZINE HYDROCHLORIDE 25 MG/1
25 TABLET, FILM COATED ORAL 3 TIMES DAILY PRN
Qty: 60 TABLET | Refills: 0 | Status: SHIPPED | OUTPATIENT
Start: 2020-07-13 | End: 2020-08-11

## 2020-07-13 RX ORDER — OMEPRAZOLE 20 MG/1
20 TABLET, DELAYED RELEASE ORAL DAILY
Qty: 90 TABLET | Refills: 0 | Status: SHIPPED | OUTPATIENT
Start: 2020-07-13 | End: 2020-11-05

## 2020-07-13 NOTE — PATIENT INSTRUCTIONS
Patient Education     Overview of Sleep Disorders  Facts about sleep disorders  Loss of sleep can cause problems at home or on the job. It can lead to serious or even fatal accidents. The National Sleep Foundation notes that:    Between 50 and 70 million [place] adults have some type of sleep or wakefulness disorder.    Sleep problems get worse as you get older.    Poor sleep cost billions of dollars a year. This is from healthcare expenses and lost productivity.    Drowsy drivers cause about 40,000 vehicle crashes in the [place] every year. This includes more than 1,500 deaths.  Types of sleep disorders  There are many types of sleep disorders. They can affect health and quality of life. The disorders include:    Insomnia    Sleep apnea    Sleepwalking    Bedwetting    Nightmares    Night terror    Restless legs syndrome    Snoring    Narcolepsy  Why is sleep important?  Sleep is not just resting or taking a break from busy routines. Sleep is a key part of good health. Getting enough sleep may help the body recover from illness and injury. Not getting enough sleep over a period of time is linked to health problems. They include obesity, diabetes, and heart disease.  The mental benefits of sleep are also important. Sleep problems can make daily life feel more stressful and less productive. Some people with chronic trouble sleeping (insomnia) are more likely to have mental health problems. Sleep problems are also tied to depression. In a research survey, people who had trouble getting enough sleep had trouble doing tasks that use memory and learning.  How much sleep do you need?  Sleep needs vary from person to person. But most healthy adults need about 7 to 9 hours of sleep a night. You may need more or better sleep if you:    Have trouble staying alert during quiet activities    Are irritable with coworkers, family, or friends    Have trouble focusing or remembering facts    Have trouble falling asleep or staying  asleep, or wake up early and can't get back to sleep  Getting treatment for a sleep disorder  For those who suffer from sleep disorders, help is available from many sources. Sleep problems can be treated or managed by different kinds of healthcare providers. You may be treated by a healthcare provider who specializes in any of these:    Internal medicine    Gerontology    Pediatrics    Family practice    Pulmonary medicine    Neurology    Psychiatry    Otolaryngology  You can also find a healthcare provider who is certified in sleep medicine by the American Board of Sleep Medicine. Talk with your healthcare provider about finding a sleep disorder program.    9065-6033 [employer]. [address] [place] [address]. All rights reserved. This information is not intended as a substitute for professional medical care. Always follow your healthcare professional's instructions.           Patient Education     Anxiety Reaction  Anxiety is the feeling we all get when we think something bad might happen. It is a normal response to stress and usually causes only a mild reaction. When anxiety becomes more severe, it can interfere with daily life. In some cases, you may not even be aware of what it is you re anxious about. There may also be a genetic link or it may be a learned behavior in the home.  Both psychological and physical triggers cause stress reaction. It's often a response to fear or emotional stress, real or imagined. This stress may come from home, family, work, or social relationships.  During an anxiety reaction, you may feel:    Helpless    Nervous    Depressed    Irritable  Your body may show signs of anxiety in many ways. You may experience:    Dry mouth    Shakiness    Dizziness    Weakness    Trouble breathing    Breathing fast (hyperventilating)    Chest pressure    Sweating    Headache    Nausea    Diarrhea    Tiredness    Inability to sleep    Sexual problems  Home care    Try to  locate the sources of stress in your life. They may not be obvious. These may include:  ? Daily hassles of life (such as traffic jams, missed appointments, or car troubles)  ? Major life changes, both good (new baby or job promotion) and bad (loss of job or loss of loved one)  ? Overload: feeling that you have too many responsibilities and can't take care of all of them at once  ? Feeling helpless or feeling that your problems are beyond what you re able to solve    Notice how your body reacts to stress. Learn to listen to your body signals. This will help you take action before the stress becomes severe.    When you can, do something about the source of your stress. (Avoid hassles, limit the amount of change that happens in your life at one time and take a break when you feel overloaded).    Unfortunately, many stressful situations can't be avoided. It is necessary to learn how to better manage stress. There are many proven methods that will reduce your anxiety. These include simple things like exercise, good nutrition, and adequate rest. Also, there are certain techniques that are helpful:  ? Relaxation  ? Breathing exercises  ? Visualization  ? Biofeedback  ? Meditation  For more information about this, consult your healthcare provider or go to a local bookstore and review the many books and tapes available on this subject.  Follow-up care  If you feel that your anxiety is not responding to self-help measures, contact your healthcare provider or make an appointment with a counselor. You may need short-term psychological counseling and temporary medicine to help you manage stress.  Call 911  Call 911 if any of these happen:    Trouble breathing    Confusion    Drowsiness or trouble wakening    Fainting or loss of consciousness    Rapid heart rate    Seizure    New chest pain that becomes more severe, lasts longer, or spreads into your shoulder, arm, neck, jaw, or back  When to seek medical advice  Call your  healthcare provider right away if any of these happen:    Your symptoms get worse    Severe headache not relieved by rest and mild pain reliever  Date Last Reviewed: 10/1/2017    3321-6555 [employer]. [address] [place] [address], [place] [address]. All rights reserved. This information is not intended as a substitute for professional medical care. Always follow your healthcare professional's instructions.

## 2020-07-13 NOTE — PROGRESS NOTES
Subjective     Tresa Chin is a 40 year old female who presents to clinic today for the following health issues:    HPI       Anxiety Follow-Up    How are you doing with your anxiety since your last visit? Worsened and panic attacks    Are you having other symptoms that might be associated with anxiety? Yes:  stress    Have you had a significant life event? No     Are you feeling depressed? Not sure    Do you have any concerns with your use of alcohol or other drugs? No    Social History     Tobacco Use     Smoking status: Never Smoker     Smokeless tobacco: Never Used   Substance Use Topics     Alcohol use: Yes     Frequency: Never     Comment: occ.     Drug use: No     No flowsheet data found.  PHQ 1/26/2019   PHQ-9 Total Score 9   Q9: Thoughts of better off dead/self-harm past 2 weeks Not at all     Last PHQ-9 1/26/2019   1.  Little interest or pleasure in doing things 3   2.  Feeling down, depressed, or hopeless 1   3.  Trouble falling or staying asleep, or sleeping too much 1   4.  Feeling tired or having little energy 1   5.  Poor appetite or overeating 1   6.  Feeling bad about yourself 1   7.  Trouble concentrating 1   8.  Moving slowly or restless 0   Q9: Thoughts of better off dead/self-harm past 2 weeks 0   PHQ-9 Total Score 9     No flowsheet data found.      How many servings of fruits and vegetables do you eat daily?  2-3    On average, how many sweetened beverages do you drink each day (Examples: soda, juice, sweet tea, etc.  Do NOT count diet or artificially sweetened beverages)?   0    How many days per week do you exercise enough to make your heart beat faster? 3 or less    How many minutes a day do you exercise enough to make your heart beat faster? 9 or less    How many days per week do you miss taking your medication? 0    Patient here today with worsening anxiety. Has had this before and discussed with PCP- did well with breathing exercises but that is not helping at this time.   Per  patient was driving about 2 weeks ago and had a panic attack since then has been having it more frequently.   Has been under a lot of stress both at home and work.   Notes that when she is having a panic attacks things turn dark and suddenly can't breath.  Her last panic attack lasted from 6- 11 pm.   Notes her anxiety is worse when she does not get enough sleep.   Over the last 3 years has only been sleeping about 2-4 hrs a night.   She has a sleep study scheduled for next week.       Wt Readings from Last 4 Encounters:   07/13/20 (P) 114.3 kg (252 lb)   06/29/20 116.6 kg (257 lb)   06/15/20 116.6 kg (257 lb)   03/13/20 116.7 kg (257 lb 4.4 oz)         Patient Active Problem List   Diagnosis     Morbid obesity (H)     Essential hypertension     Hyperlipidemia LDL goal <160     Anxiety state     Fatty liver     TB lung, latent     Bilateral low back pain without sciatica, unspecified chronicity     Panic attack     NSAID induced gastritis     Gastroesophageal reflux disease without esophagitis     Nephrolithiasis     Acute left ankle pain     Sprain of deltoid ligament of left ankle, initial encounter     Edema     Past Surgical History:   Procedure Laterality Date     ESOPHAGOSCOPY, GASTROSCOPY, DUODENOSCOPY (EGD), COMBINED N/A 5/31/2019    Procedure: ESOPHAGOGASTRODUODENOSCOPY (EGD)cold BX (fv);  Surgeon: Geremias Martinez MD;  Location:  GI       Social History     Tobacco Use     Smoking status: Never Smoker     Smokeless tobacco: Never Used   Substance Use Topics     Alcohol use: Yes     Frequency: Never     Comment: occ.     Family History   Problem Relation Age of Onset     Dementia Mother      Lung Cancer Father            Reviewed and updated as needed this visit by Provider  Meds         Review of Systems   Constitutional, HEENT, cardiovascular, pulmonary, GI, , musculoskeletal, neuro, skin, endocrine and psych systems are negative, except as otherwise noted.      Objective    BP 98/78 (BP  Location: Right arm, Patient Position: Sitting, Cuff Size: Adult Large)   Pulse (P) 72   Temp (P) 98.7  F (37.1  C) (Oral)   Resp (P) 16   Wt (P) 114.3 kg (252 lb)   BMI (P) 47.61 kg/m    Body mass index is 47.61 kg/m  (pended).  Physical Exam   GENERAL: healthy, alert and no distress  PSYCH: mentation appears normal, tearful, fatigued, judgement and insight intact and normal speech and volume     Diagnostic Test Results:  Labs reviewed in Epic  none         Assessment & Plan     1. Panic attack  - needs improvement. Will start on trial of hydroxyzine. Will also refer for therapy.   - MENTAL HEALTH REFERRAL  - Adult; Outpatient Treatment; Individual/Couples/Family/Group Therapy/Health Psychology; [facility] [phone]; We will contact you to schedule the appointment or please call with any questions  - hydrOXYzine (ATARAX) 25 MG tablet; Take 1 tablet (25 mg) by mouth 3 times daily as needed for anxiety  Dispense: 60 tablet; Refill: 0    2. Anxiety state  - see #1  - MENTAL HEALTH REFERRAL  - Adult; Outpatient Treatment; Individual/Couples/Family/Group Therapy/Health Psychology; [facility] [phone]; We will contact you to schedule the appointment or please call with any questions  - hydrOXYzine (ATARAX) 25 MG tablet; Take 1 tablet (25 mg) by mouth 3 times daily as needed for anxiety  Dispense: 60 tablet; Refill: 0    3. Mild major depression (H)  - needs improvement. She wants to wait on daily medication for now.   - MENTAL HEALTH REFERRAL  - Adult; Outpatient Treatment; Individual/Couples/Family/Group Therapy/Health Psychology; [facility] [phone]; We will contact you to schedule the appointment or please call with any questions    4. Sleeping difficulties  - patient to keep scheduled sleep study     5. Gastroesophageal reflux disease without esophagitis  -   - omeprazole (PRILOSEC OTC) 20 MG EC tablet; Take 1 tablet (20 mg) by  mouth daily  Dispense: 90 tablet; Refill: 0       See Patient Instructions    Return in about 1 month (around 8/13/2020) for anxiety/depression - F2F.    Kalyn Miller MD  [facility]

## 2020-07-14 ASSESSMENT — ANXIETY QUESTIONNAIRES
6. BECOMING EASILY ANNOYED OR IRRITABLE: SEVERAL DAYS
1. FEELING NERVOUS, ANXIOUS, OR ON EDGE: SEVERAL DAYS
GAD7 TOTAL SCORE: 7
5. BEING SO RESTLESS THAT IT IS HARD TO SIT STILL: SEVERAL DAYS
2. NOT BEING ABLE TO STOP OR CONTROL WORRYING: SEVERAL DAYS
7. FEELING AFRAID AS IF SOMETHING AWFUL MIGHT HAPPEN: SEVERAL DAYS
IF YOU CHECKED OFF ANY PROBLEMS ON THIS QUESTIONNAIRE, HOW DIFFICULT HAVE THESE PROBLEMS MADE IT FOR YOU TO DO YOUR WORK, TAKE CARE OF THINGS AT HOME, OR GET ALONG WITH OTHER PEOPLE: VERY DIFFICULT
3. WORRYING TOO MUCH ABOUT DIFFERENT THINGS: SEVERAL DAYS

## 2020-07-14 ASSESSMENT — PATIENT HEALTH QUESTIONNAIRE - PHQ9
5. POOR APPETITE OR OVEREATING: SEVERAL DAYS
SUM OF ALL RESPONSES TO PHQ QUESTIONS 1-9: 9

## 2020-07-15 ASSESSMENT — ANXIETY QUESTIONNAIRES: GAD7 TOTAL SCORE: 7

## 2020-07-16 ENCOUNTER — TELEPHONE (OUTPATIENT)
Dept: FAMILY MEDICINE | Facility: CLINIC | Age: 40
End: 2020-07-16

## 2020-07-16 NOTE — TELEPHONE ENCOUNTER
Unfortunately benzos' will cause the same symptoms. She can limit use of hydroxyzine to use when at home and she can do a 1/4-1/2 tablet as a trial during the day and take full tablet at night to help her relax/sleep.   Mental health dept attempted to reach her and has not been to. She will need to establish with them to help her learn other ways to manage her anxiety.     JIMENEZ

## 2020-07-16 NOTE — TELEPHONE ENCOUNTER
Ok to take 1/2- 1 tablet as needed. Initial symptoms will resolve with time.   Glad she at least got some sleep and it helped with panic attack.       JIMENEZ

## 2020-07-16 NOTE — TELEPHONE ENCOUNTER
Message left for patient to return call to clinic and ask to speak to available triage nurse     Mima Wells, Registered Nurse   [facility]

## 2020-07-16 NOTE — TELEPHONE ENCOUNTER
"Pt calls with update,     ~took first pill of hydroxyzine last night around 8:00  ~informs pharmacy did not advise of possible side effects  ~c/o drowsiness after taking hydroxyzine last night  ~also Felt dizziness and nausea, dry mouth this morning  ~all symptoms are improving, slept well   ~helped panic attack    ROUTED TO [facility], pt wants to know if this would be typical side effects, \"not sure if would want to take again\", wonders if could try half tablet? [facility] please advise, INFORM pt when final, also inform of appointment message below      Tresa,     Your appointment with Dr. Miller is on 8/18/2020 at 8:40 am.    Tanya King, RN, BSN  Message handled by CLINIC NURSE.    "

## 2020-07-16 NOTE — TELEPHONE ENCOUNTER
Patient states that the symptoms of fatigue, can't concentrate at work, nausea, patient states that the symptoms are lingering all day. Patient states that she is wondering if she is supposed to take this as needed for panic attacks and she is having these kind of symptoms that it will not work to take during the day.    Please advise.    Elizabeth VALERIO RN, BSN

## 2020-07-17 ENCOUNTER — THERAPY VISIT (OUTPATIENT)
Dept: PHYSICAL THERAPY | Facility: CLINIC | Age: 40
End: 2020-07-17
Payer: COMMERCIAL

## 2020-07-17 DIAGNOSIS — S93.422A SPRAIN OF DELTOID LIGAMENT OF LEFT ANKLE, INITIAL ENCOUNTER: ICD-10-CM

## 2020-07-17 DIAGNOSIS — M25.572 ACUTE LEFT ANKLE PAIN: ICD-10-CM

## 2020-07-17 DIAGNOSIS — R60.0 LOCALIZED EDEMA: ICD-10-CM

## 2020-07-17 PROCEDURE — 97110 THERAPEUTIC EXERCISES: CPT | Mod: GP | Performed by: PHYSICAL THERAPY ASSISTANT

## 2020-07-17 PROCEDURE — 97140 MANUAL THERAPY 1/> REGIONS: CPT | Mod: GP | Performed by: PHYSICAL THERAPY ASSISTANT

## 2020-07-17 PROCEDURE — 97010 HOT OR COLD PACKS THERAPY: CPT | Mod: GP | Performed by: PHYSICAL THERAPY ASSISTANT

## 2020-07-17 PROCEDURE — 97016 VASOPNEUMATIC DEVICE THERAPY: CPT | Mod: GP | Performed by: PHYSICAL THERAPY ASSISTANT

## 2020-07-17 NOTE — TELEPHONE ENCOUNTER
Message #2 left for patient to return call to clinic and ask to speak to available triage nurse     Mima Wells, Registered Nurse   [facility]

## 2020-07-20 NOTE — TELEPHONE ENCOUNTER
Patient Contact    Attempt # 3  Was call answered?  No.  Unable to leave message.    Sent patient [employer] message with message from provider, closed encounter    Sigifredo Strickland RN

## 2020-07-21 ENCOUNTER — HOSPITAL ENCOUNTER (OUTPATIENT)
Facility: CLINIC | Age: 40
Setting detail: OBSERVATION
Discharge: HOME | End: 2020-07-23
Attending: PHYSICIAN ASSISTANT | Admitting: STUDENT IN AN ORGANIZED HEALTH CARE EDUCATION/TRAINING PROGRAM
Payer: COMMERCIAL

## 2020-07-21 ENCOUNTER — APPOINTMENT (OUTPATIENT)
Dept: CT IMAGING | Facility: CLINIC | Age: 40
End: 2020-07-21
Attending: PHYSICIAN ASSISTANT
Payer: COMMERCIAL

## 2020-07-21 VITALS — BODY MASS INDEX: 47.2 KG/M2 | WEIGHT: 250 LBS | HEIGHT: 61 IN

## 2020-07-21 DIAGNOSIS — N20.0 KIDNEY STONE: ICD-10-CM

## 2020-07-21 DIAGNOSIS — N20.1 URETEROLITHIASIS: ICD-10-CM

## 2020-07-21 DIAGNOSIS — N39.0 URINARY TRACT INFECTION WITHOUT HEMATURIA, SITE UNSPECIFIED: Primary | ICD-10-CM

## 2020-07-21 LAB
ANION GAP SERPL CALCULATED.3IONS-SCNC: 6 MMOL/L (ref 3–14)
BASOPHILS # BLD AUTO: 0.1 10E9/L (ref 0–0.2)
BASOPHILS NFR BLD AUTO: 0.6 %
BUN SERPL-MCNC: 16 MG/DL (ref 7–30)
CALCIUM SERPL-MCNC: 8.5 MG/DL (ref 8.5–10.1)
CHLORIDE SERPL-SCNC: 107 MMOL/L (ref 94–109)
CO2 SERPL-SCNC: 26 MMOL/L (ref 20–32)
CREAT SERPL-MCNC: 0.71 MG/DL (ref 0.52–1.04)
DIFFERENTIAL METHOD BLD: ABNORMAL
EOSINOPHIL # BLD AUTO: 0.2 10E9/L (ref 0–0.7)
EOSINOPHIL NFR BLD AUTO: 1 %
ERYTHROCYTE [DISTWIDTH] IN BLOOD BY AUTOMATED COUNT: 12.8 % (ref 10–15)
GFR SERPL CREATININE-BSD FRML MDRD: >90 ML/MIN/{1.73_M2}
GLUCOSE SERPL-MCNC: 108 MG/DL (ref 70–99)
HCT VFR BLD AUTO: 45.1 % (ref 35–47)
HGB BLD-MCNC: 14.5 G/DL (ref 11.7–15.7)
IMM GRANULOCYTES # BLD: 0.1 10E9/L (ref 0–0.4)
IMM GRANULOCYTES NFR BLD: 0.5 %
LYMPHOCYTES # BLD AUTO: 2.7 10E9/L (ref 0.8–5.3)
LYMPHOCYTES NFR BLD AUTO: 19 %
MCH RBC QN AUTO: 29.4 PG (ref 26.5–33)
MCHC RBC AUTO-ENTMCNC: 32.2 G/DL (ref 31.5–36.5)
MCV RBC AUTO: 91 FL (ref 78–100)
MONOCYTES # BLD AUTO: 1.2 10E9/L (ref 0–1.3)
MONOCYTES NFR BLD AUTO: 8 %
NEUTROPHILS # BLD AUTO: 10.2 10E9/L (ref 1.6–8.3)
NEUTROPHILS NFR BLD AUTO: 70.9 %
NRBC # BLD AUTO: 0 10*3/UL
NRBC BLD AUTO-RTO: 0 /100
PLATELET # BLD AUTO: 380 10E9/L (ref 150–450)
POTASSIUM SERPL-SCNC: 3.8 MMOL/L (ref 3.4–5.3)
RBC # BLD AUTO: 4.94 10E12/L (ref 3.8–5.2)
SODIUM SERPL-SCNC: 139 MMOL/L (ref 133–144)
WBC # BLD AUTO: 14.4 10E9/L (ref 4–11)

## 2020-07-21 PROCEDURE — 85025 COMPLETE CBC W/AUTO DIFF WBC: CPT | Performed by: PHYSICIAN ASSISTANT

## 2020-07-21 PROCEDURE — 74176 CT ABD & PELVIS W/O CONTRAST: CPT

## 2020-07-21 PROCEDURE — C9803 HOPD COVID-19 SPEC COLLECT: HCPCS

## 2020-07-21 PROCEDURE — 80048 BASIC METABOLIC PNL TOTAL CA: CPT | Performed by: PHYSICIAN ASSISTANT

## 2020-07-21 PROCEDURE — 99285 EMERGENCY DEPT VISIT HI MDM: CPT | Mod: 25

## 2020-07-21 PROCEDURE — 96361 HYDRATE IV INFUSION ADD-ON: CPT

## 2020-07-21 PROCEDURE — 25000128 H RX IP 250 OP 636: Performed by: PHYSICIAN ASSISTANT

## 2020-07-21 PROCEDURE — 96375 TX/PRO/DX INJ NEW DRUG ADDON: CPT

## 2020-07-21 PROCEDURE — 25800030 ZZH RX IP 258 OP 636: Performed by: PHYSICIAN ASSISTANT

## 2020-07-21 RX ORDER — SODIUM CHLORIDE 9 MG/ML
INJECTION, SOLUTION INTRAVENOUS CONTINUOUS
Status: DISCONTINUED | OUTPATIENT
Start: 2020-07-22 | End: 2020-07-22

## 2020-07-21 RX ORDER — HYDROMORPHONE HYDROCHLORIDE 1 MG/ML
0.5 INJECTION, SOLUTION INTRAMUSCULAR; INTRAVENOUS; SUBCUTANEOUS
Status: DISCONTINUED | OUTPATIENT
Start: 2020-07-21 | End: 2020-07-22

## 2020-07-21 RX ORDER — ONDANSETRON 2 MG/ML
4 INJECTION INTRAMUSCULAR; INTRAVENOUS EVERY 30 MIN PRN
Status: DISCONTINUED | OUTPATIENT
Start: 2020-07-21 | End: 2020-07-22

## 2020-07-21 RX ADMIN — SODIUM CHLORIDE 1000 ML: 9 INJECTION, SOLUTION INTRAVENOUS at 23:25

## 2020-07-21 RX ADMIN — HYDROMORPHONE HYDROCHLORIDE 0.5 MG: 1 INJECTION, SOLUTION INTRAMUSCULAR; INTRAVENOUS; SUBCUTANEOUS at 23:26

## 2020-07-21 RX ADMIN — ONDANSETRON 4 MG: 2 INJECTION INTRAMUSCULAR; INTRAVENOUS at 23:26

## 2020-07-21 ASSESSMENT — ENCOUNTER SYMPTOMS
CHILLS: 0
DIFFICULTY URINATING: 0
BLOOD IN STOOL: 0
DIARRHEA: 0
FLANK PAIN: 1
VOMITING: 0
DYSURIA: 0
FREQUENCY: 0
NAUSEA: 1
COUGH: 0
HEMATURIA: 0
FEVER: 0
SHORTNESS OF BREATH: 0

## 2020-07-21 ASSESSMENT — MIFFLIN-ST. JEOR: SCORE: 1751.37

## 2020-07-21 NOTE — PROGRESS NOTES
"Tresa Chin is a 40 year old female who is being evaluated via a billable video visit.      The patient has been notified of following:     \"This video visit will be conducted via a call between you and your physician/provider. We have found that certain health care needs can be provided without the need for an in-person physical exam.  This service lets us provide the care you need with a video conversation.  If a prescription is necessary we can send it directly to your pharmacy.  If lab work is needed we can place an order for that and you can then stop by our lab to have the test done at a later time.    Video visits are billed at different rates depending on your insurance coverage.  Please reach out to your insurance provider with any questions.    If during the course of the call the physician/provider feels a video visit is not appropriate, you will not be charged for this service.\"    Patient has given verbal consent for Video visit? Yes  How would you like to obtain your AVS? MyChart  If you are dropped from the video visit, the video invite should be resent to: Send to e-mail at: [email or web address]  Will anyone else be joining your video visit? No        Unable to complete video visit today. Patient unable to login. Appointment was switched to telephone encounter, but all calls went straight to [employer]. Tried calling patient 6 times in total. Will need to be rescheduled as cannot get in contact with patient.   Sarita Lomax PA-C      "

## 2020-07-22 ENCOUNTER — ANESTHESIA (OUTPATIENT)
Dept: SURGERY | Facility: CLINIC | Age: 40
End: 2020-07-22
Payer: COMMERCIAL

## 2020-07-22 ENCOUNTER — TELEPHONE (OUTPATIENT)
Dept: SLEEP MEDICINE | Facility: CLINIC | Age: 40
End: 2020-07-22

## 2020-07-22 ENCOUNTER — VIRTUAL VISIT (OUTPATIENT)
Dept: SLEEP MEDICINE | Facility: CLINIC | Age: 40
End: 2020-07-22
Attending: FAMILY MEDICINE
Payer: COMMERCIAL

## 2020-07-22 ENCOUNTER — APPOINTMENT (OUTPATIENT)
Dept: GENERAL RADIOLOGY | Facility: CLINIC | Age: 40
End: 2020-07-22
Attending: STUDENT IN AN ORGANIZED HEALTH CARE EDUCATION/TRAINING PROGRAM
Payer: COMMERCIAL

## 2020-07-22 ENCOUNTER — ANESTHESIA EVENT (OUTPATIENT)
Dept: SURGERY | Facility: CLINIC | Age: 40
End: 2020-07-22
Payer: COMMERCIAL

## 2020-07-22 DIAGNOSIS — G47.33 OSA (OBSTRUCTIVE SLEEP APNEA): ICD-10-CM

## 2020-07-22 DIAGNOSIS — N20.1 LEFT URETERAL STONE: Primary | ICD-10-CM

## 2020-07-22 PROBLEM — N20.0 KIDNEY STONE: Status: ACTIVE | Noted: 2020-07-21

## 2020-07-22 LAB
ALBUMIN UR-MCNC: NEGATIVE MG/DL
APPEARANCE UR: CLEAR
BACTERIA #/AREA URNS HPF: ABNORMAL /HPF
BILIRUB UR QL STRIP: NEGATIVE
COLOR UR AUTO: ABNORMAL
GLUCOSE UR STRIP-MCNC: NEGATIVE MG/DL
HGB UR QL STRIP: NEGATIVE
KETONES UR STRIP-MCNC: NEGATIVE MG/DL
LACTATE BLD-SCNC: 2.1 MMOL/L (ref 0.7–2)
LACTATE BLD-SCNC: 3.2 MMOL/L (ref 0.7–2)
LEUKOCYTE ESTERASE UR QL STRIP: ABNORMAL
MUCOUS THREADS #/AREA URNS LPF: PRESENT /LPF
NITRATE UR QL: NEGATIVE
PH UR STRIP: 6.5 PH (ref 5–7)
RBC #/AREA URNS AUTO: 5 /HPF (ref 0–2)
SARS-COV-2 RNA SPEC QL NAA+PROBE: NOT DETECTED
SOURCE: ABNORMAL
SP GR UR STRIP: 1.02 (ref 1–1.03)
SPECIMEN SOURCE: NORMAL
SQUAMOUS #/AREA URNS AUTO: 4 /HPF (ref 0–1)
UROBILINOGEN UR STRIP-MCNC: NORMAL MG/DL (ref 0–2)
WBC #/AREA URNS AUTO: 6 /HPF (ref 0–5)

## 2020-07-22 PROCEDURE — 37000008 ZZH ANESTHESIA TECHNICAL FEE, 1ST 30 MIN: Performed by: STUDENT IN AN ORGANIZED HEALTH CARE EDUCATION/TRAINING PROGRAM

## 2020-07-22 PROCEDURE — 36000050 ZZH SURGERY LEVEL 2 1ST 30 MIN: Performed by: STUDENT IN AN ORGANIZED HEALTH CARE EDUCATION/TRAINING PROGRAM

## 2020-07-22 PROCEDURE — U0003 INFECTIOUS AGENT DETECTION BY NUCLEIC ACID (DNA OR RNA); SEVERE ACUTE RESPIRATORY SYNDROME CORONAVIRUS 2 (SARS-COV-2) (CORONAVIRUS DISEASE [COVID-19]), AMPLIFIED PROBE TECHNIQUE, MAKING USE OF HIGH THROUGHPUT TECHNOLOGIES AS DESCRIBED BY CMS-2020-01-R: HCPCS | Performed by: PHYSICIAN ASSISTANT

## 2020-07-22 PROCEDURE — 83605 ASSAY OF LACTIC ACID: CPT | Performed by: HOSPITALIST

## 2020-07-22 PROCEDURE — 40000277 XR SURGERY CARM FLUORO LESS THAN 5 MIN W STILLS: Mod: TC

## 2020-07-22 PROCEDURE — 71000012 ZZH RECOVERY PHASE 1 LEVEL 1 FIRST HR: Performed by: STUDENT IN AN ORGANIZED HEALTH CARE EDUCATION/TRAINING PROGRAM

## 2020-07-22 PROCEDURE — 87086 URINE CULTURE/COLONY COUNT: CPT | Performed by: PHYSICIAN ASSISTANT

## 2020-07-22 PROCEDURE — 27210794 ZZH OR GENERAL SUPPLY STERILE: Performed by: STUDENT IN AN ORGANIZED HEALTH CARE EDUCATION/TRAINING PROGRAM

## 2020-07-22 PROCEDURE — 25000132 ZZH RX MED GY IP 250 OP 250 PS 637: Performed by: INTERNAL MEDICINE

## 2020-07-22 PROCEDURE — 40000306 ZZH STATISTIC PRE PROC ASSESS II: Performed by: STUDENT IN AN ORGANIZED HEALTH CARE EDUCATION/TRAINING PROGRAM

## 2020-07-22 PROCEDURE — 74420 UROGRAPHY RTRGR +-KUB: CPT | Mod: 26 | Performed by: STUDENT IN AN ORGANIZED HEALTH CARE EDUCATION/TRAINING PROGRAM

## 2020-07-22 PROCEDURE — 40000275 ZZH STATISTIC RCP TIME EA 10 MIN

## 2020-07-22 PROCEDURE — 25800030 ZZH RX IP 258 OP 636: Performed by: INTERNAL MEDICINE

## 2020-07-22 PROCEDURE — 36415 COLL VENOUS BLD VENIPUNCTURE: CPT | Performed by: INTERNAL MEDICINE

## 2020-07-22 PROCEDURE — 36000052 ZZH SURGERY LEVEL 2 EA 15 ADDTL MIN: Performed by: STUDENT IN AN ORGANIZED HEALTH CARE EDUCATION/TRAINING PROGRAM

## 2020-07-22 PROCEDURE — C2617 STENT, NON-COR, TEM W/O DEL: HCPCS | Performed by: STUDENT IN AN ORGANIZED HEALTH CARE EDUCATION/TRAINING PROGRAM

## 2020-07-22 PROCEDURE — 83605 ASSAY OF LACTIC ACID: CPT | Performed by: INTERNAL MEDICINE

## 2020-07-22 PROCEDURE — 99220 ZZC INITIAL OBSERVATION CARE,LEVL III: CPT | Performed by: INTERNAL MEDICINE

## 2020-07-22 PROCEDURE — 94640 AIRWAY INHALATION TREATMENT: CPT

## 2020-07-22 PROCEDURE — 25800030 ZZH RX IP 258 OP 636: Performed by: NURSE ANESTHETIST, CERTIFIED REGISTERED

## 2020-07-22 PROCEDURE — 96376 TX/PRO/DX INJ SAME DRUG ADON: CPT

## 2020-07-22 PROCEDURE — 81001 URINALYSIS AUTO W/SCOPE: CPT | Performed by: PHYSICIAN ASSISTANT

## 2020-07-22 PROCEDURE — 37000009 ZZH ANESTHESIA TECHNICAL FEE, EACH ADDTL 15 MIN: Performed by: STUDENT IN AN ORGANIZED HEALTH CARE EDUCATION/TRAINING PROGRAM

## 2020-07-22 PROCEDURE — 25500064 ZZH RX 255 OP 636: Performed by: STUDENT IN AN ORGANIZED HEALTH CARE EDUCATION/TRAINING PROGRAM

## 2020-07-22 PROCEDURE — 25000128 H RX IP 250 OP 636: Performed by: NURSE ANESTHETIST, CERTIFIED REGISTERED

## 2020-07-22 PROCEDURE — G0378 HOSPITAL OBSERVATION PER HR: HCPCS

## 2020-07-22 PROCEDURE — 52332 CYSTOSCOPY AND TREATMENT: CPT | Mod: LT | Performed by: STUDENT IN AN ORGANIZED HEALTH CARE EDUCATION/TRAINING PROGRAM

## 2020-07-22 PROCEDURE — 36415 COLL VENOUS BLD VENIPUNCTURE: CPT | Performed by: HOSPITALIST

## 2020-07-22 PROCEDURE — C1769 GUIDE WIRE: HCPCS | Performed by: STUDENT IN AN ORGANIZED HEALTH CARE EDUCATION/TRAINING PROGRAM

## 2020-07-22 PROCEDURE — 99203 OFFICE O/P NEW LOW 30 MIN: CPT | Mod: 25 | Performed by: STUDENT IN AN ORGANIZED HEALTH CARE EDUCATION/TRAINING PROGRAM

## 2020-07-22 PROCEDURE — 96365 THER/PROPH/DIAG IV INF INIT: CPT | Mod: 59

## 2020-07-22 PROCEDURE — 25000125 ZZHC RX 250: Performed by: STUDENT IN AN ORGANIZED HEALTH CARE EDUCATION/TRAINING PROGRAM

## 2020-07-22 PROCEDURE — 25000128 H RX IP 250 OP 636: Performed by: PHYSICIAN ASSISTANT

## 2020-07-22 PROCEDURE — 25000125 ZZHC RX 250: Performed by: NURSE ANESTHETIST, CERTIFIED REGISTERED

## 2020-07-22 PROCEDURE — 25800025 ZZH RX 258: Performed by: STUDENT IN AN ORGANIZED HEALTH CARE EDUCATION/TRAINING PROGRAM

## 2020-07-22 DEVICE — STENT URETERAL POLARIS ULTRA 6FRX24CM M0061921320
Type: IMPLANTABLE DEVICE | Site: URETHRA | Status: NON-FUNCTIONAL
Removed: 2020-08-04

## 2020-07-22 RX ORDER — FENTANYL CITRATE 50 UG/ML
INJECTION, SOLUTION INTRAMUSCULAR; INTRAVENOUS PRN
Status: DISCONTINUED | OUTPATIENT
Start: 2020-07-22 | End: 2020-07-22

## 2020-07-22 RX ORDER — HYDROMORPHONE HYDROCHLORIDE 1 MG/ML
.3-.5 INJECTION, SOLUTION INTRAMUSCULAR; INTRAVENOUS; SUBCUTANEOUS EVERY 10 MIN PRN
Status: DISCONTINUED | OUTPATIENT
Start: 2020-07-22 | End: 2020-07-22 | Stop reason: HOSPADM

## 2020-07-22 RX ORDER — CEFAZOLIN SODIUM 1 G/50ML
1 INJECTION, SOLUTION INTRAVENOUS SEE ADMIN INSTRUCTIONS
Status: DISCONTINUED | OUTPATIENT
Start: 2020-07-22 | End: 2020-07-23 | Stop reason: HOSPADM

## 2020-07-22 RX ORDER — HYDRALAZINE HYDROCHLORIDE 20 MG/ML
2.5-5 INJECTION INTRAMUSCULAR; INTRAVENOUS EVERY 10 MIN PRN
Status: DISCONTINUED | OUTPATIENT
Start: 2020-07-22 | End: 2020-07-22 | Stop reason: HOSPADM

## 2020-07-22 RX ORDER — ACETAMINOPHEN 325 MG/1
650 TABLET ORAL EVERY 4 HOURS PRN
Status: DISCONTINUED | OUTPATIENT
Start: 2020-07-22 | End: 2020-07-23 | Stop reason: HOSPADM

## 2020-07-22 RX ORDER — HYDROMORPHONE HYDROCHLORIDE 1 MG/ML
0.5 INJECTION, SOLUTION INTRAMUSCULAR; INTRAVENOUS; SUBCUTANEOUS
Status: DISCONTINUED | OUTPATIENT
Start: 2020-07-22 | End: 2020-07-23 | Stop reason: HOSPADM

## 2020-07-22 RX ORDER — METOCLOPRAMIDE HYDROCHLORIDE 5 MG/ML
10 INJECTION INTRAMUSCULAR; INTRAVENOUS EVERY 6 HOURS PRN
Status: DISCONTINUED | OUTPATIENT
Start: 2020-07-22 | End: 2020-07-22 | Stop reason: HOSPADM

## 2020-07-22 RX ORDER — ONDANSETRON 4 MG/1
4 TABLET, ORALLY DISINTEGRATING ORAL EVERY 6 HOURS PRN
Status: DISCONTINUED | OUTPATIENT
Start: 2020-07-22 | End: 2020-07-23 | Stop reason: HOSPADM

## 2020-07-22 RX ORDER — ONDANSETRON 2 MG/ML
4 INJECTION INTRAMUSCULAR; INTRAVENOUS EVERY 30 MIN PRN
Status: DISCONTINUED | OUTPATIENT
Start: 2020-07-22 | End: 2020-07-22 | Stop reason: HOSPADM

## 2020-07-22 RX ORDER — KETOROLAC TROMETHAMINE 15 MG/ML
15 INJECTION, SOLUTION INTRAMUSCULAR; INTRAVENOUS ONCE
Status: DISCONTINUED | OUTPATIENT
Start: 2020-07-22 | End: 2020-07-22

## 2020-07-22 RX ORDER — CEFAZOLIN SODIUM 2 G/100ML
2 INJECTION, SOLUTION INTRAVENOUS
Status: DISCONTINUED | OUTPATIENT
Start: 2020-07-22 | End: 2020-07-23 | Stop reason: HOSPADM

## 2020-07-22 RX ORDER — METOPROLOL TARTRATE 1 MG/ML
1-2 INJECTION, SOLUTION INTRAVENOUS EVERY 5 MIN PRN
Status: DISCONTINUED | OUTPATIENT
Start: 2020-07-22 | End: 2020-07-22 | Stop reason: HOSPADM

## 2020-07-22 RX ORDER — NALOXONE HYDROCHLORIDE 0.4 MG/ML
.1-.4 INJECTION, SOLUTION INTRAMUSCULAR; INTRAVENOUS; SUBCUTANEOUS
Status: DISCONTINUED | OUTPATIENT
Start: 2020-07-22 | End: 2020-07-23 | Stop reason: HOSPADM

## 2020-07-22 RX ORDER — LIDOCAINE 40 MG/G
CREAM TOPICAL
Status: DISCONTINUED | OUTPATIENT
Start: 2020-07-22 | End: 2020-07-23 | Stop reason: HOSPADM

## 2020-07-22 RX ORDER — PROPOFOL 10 MG/ML
INJECTION, EMULSION INTRAVENOUS PRN
Status: DISCONTINUED | OUTPATIENT
Start: 2020-07-22 | End: 2020-07-22

## 2020-07-22 RX ORDER — IBUPROFEN 400 MG/1
400 TABLET, FILM COATED ORAL EVERY 6 HOURS PRN
Status: DISCONTINUED | OUTPATIENT
Start: 2020-07-22 | End: 2020-07-23 | Stop reason: HOSPADM

## 2020-07-22 RX ORDER — HYDROMORPHONE HYDROCHLORIDE 1 MG/ML
0.5 INJECTION, SOLUTION INTRAMUSCULAR; INTRAVENOUS; SUBCUTANEOUS
Status: COMPLETED | OUTPATIENT
Start: 2020-07-22 | End: 2020-07-22

## 2020-07-22 RX ORDER — ACETAMINOPHEN 650 MG/1
650 SUPPOSITORY RECTAL EVERY 4 HOURS PRN
Status: DISCONTINUED | OUTPATIENT
Start: 2020-07-22 | End: 2020-07-23 | Stop reason: HOSPADM

## 2020-07-22 RX ORDER — DEXAMETHASONE SODIUM PHOSPHATE 4 MG/ML
4 INJECTION, SOLUTION INTRA-ARTICULAR; INTRALESIONAL; INTRAMUSCULAR; INTRAVENOUS; SOFT TISSUE EVERY 10 MIN PRN
Status: DISCONTINUED | OUTPATIENT
Start: 2020-07-22 | End: 2020-07-22 | Stop reason: HOSPADM

## 2020-07-22 RX ORDER — CEFTRIAXONE 1 G/1
1 INJECTION, POWDER, FOR SOLUTION INTRAMUSCULAR; INTRAVENOUS EVERY 24 HOURS
Status: DISCONTINUED | OUTPATIENT
Start: 2020-07-23 | End: 2020-07-23 | Stop reason: HOSPADM

## 2020-07-22 RX ORDER — SODIUM CHLORIDE 9 MG/ML
INJECTION, SOLUTION INTRAVENOUS CONTINUOUS
Status: DISCONTINUED | OUTPATIENT
Start: 2020-07-22 | End: 2020-07-23 | Stop reason: HOSPADM

## 2020-07-22 RX ORDER — SODIUM CHLORIDE, SODIUM LACTATE, POTASSIUM CHLORIDE, CALCIUM CHLORIDE 600; 310; 30; 20 MG/100ML; MG/100ML; MG/100ML; MG/100ML
INJECTION, SOLUTION INTRAVENOUS CONTINUOUS
Status: DISCONTINUED | OUTPATIENT
Start: 2020-07-22 | End: 2020-07-22 | Stop reason: HOSPADM

## 2020-07-22 RX ORDER — AMLODIPINE BESYLATE 5 MG/1
10 TABLET ORAL DAILY
Status: DISCONTINUED | OUTPATIENT
Start: 2020-07-22 | End: 2020-07-23 | Stop reason: HOSPADM

## 2020-07-22 RX ORDER — DEXAMETHASONE SODIUM PHOSPHATE 4 MG/ML
INJECTION, SOLUTION INTRA-ARTICULAR; INTRALESIONAL; INTRAMUSCULAR; INTRAVENOUS; SOFT TISSUE PRN
Status: DISCONTINUED | OUTPATIENT
Start: 2020-07-22 | End: 2020-07-22

## 2020-07-22 RX ORDER — DIMENHYDRINATE 50 MG/ML
25 INJECTION, SOLUTION INTRAMUSCULAR; INTRAVENOUS
Status: DISCONTINUED | OUTPATIENT
Start: 2020-07-22 | End: 2020-07-22 | Stop reason: HOSPADM

## 2020-07-22 RX ORDER — ONDANSETRON 4 MG/1
4 TABLET, ORALLY DISINTEGRATING ORAL EVERY 8 HOURS PRN
Qty: 10 TABLET | Refills: 0 | Status: SHIPPED | OUTPATIENT
Start: 2020-07-22 | End: 2020-07-23

## 2020-07-22 RX ORDER — ONDANSETRON 2 MG/ML
4 INJECTION INTRAMUSCULAR; INTRAVENOUS EVERY 6 HOURS PRN
Status: DISCONTINUED | OUTPATIENT
Start: 2020-07-22 | End: 2020-07-23 | Stop reason: HOSPADM

## 2020-07-22 RX ORDER — LIDOCAINE HYDROCHLORIDE 10 MG/ML
INJECTION, SOLUTION INFILTRATION; PERINEURAL PRN
Status: DISCONTINUED | OUTPATIENT
Start: 2020-07-22 | End: 2020-07-22

## 2020-07-22 RX ORDER — METOCLOPRAMIDE 10 MG/1
10 TABLET ORAL EVERY 6 HOURS PRN
Status: DISCONTINUED | OUTPATIENT
Start: 2020-07-22 | End: 2020-07-22 | Stop reason: HOSPADM

## 2020-07-22 RX ORDER — FENTANYL CITRATE 50 UG/ML
25-50 INJECTION, SOLUTION INTRAMUSCULAR; INTRAVENOUS
Status: DISCONTINUED | OUTPATIENT
Start: 2020-07-22 | End: 2020-07-22 | Stop reason: HOSPADM

## 2020-07-22 RX ORDER — NALOXONE HYDROCHLORIDE 0.4 MG/ML
.1-.4 INJECTION, SOLUTION INTRAMUSCULAR; INTRAVENOUS; SUBCUTANEOUS
Status: DISCONTINUED | OUTPATIENT
Start: 2020-07-22 | End: 2020-07-22 | Stop reason: HOSPADM

## 2020-07-22 RX ORDER — KETOROLAC TROMETHAMINE 30 MG/ML
30 INJECTION, SOLUTION INTRAMUSCULAR; INTRAVENOUS EVERY 6 HOURS PRN
Status: DISCONTINUED | OUTPATIENT
Start: 2020-07-22 | End: 2020-07-22 | Stop reason: HOSPADM

## 2020-07-22 RX ORDER — NEOSTIGMINE METHYLSULFATE 1 MG/ML
VIAL (ML) INJECTION PRN
Status: DISCONTINUED | OUTPATIENT
Start: 2020-07-22 | End: 2020-07-22

## 2020-07-22 RX ORDER — TAMSULOSIN HYDROCHLORIDE 0.4 MG/1
0.4 CAPSULE ORAL DAILY
Qty: 7 CAPSULE | Refills: 0 | Status: SHIPPED | OUTPATIENT
Start: 2020-07-22 | End: 2020-07-23

## 2020-07-22 RX ORDER — CEFTRIAXONE 1 G/1
1 INJECTION, POWDER, FOR SOLUTION INTRAMUSCULAR; INTRAVENOUS ONCE
Status: COMPLETED | OUTPATIENT
Start: 2020-07-22 | End: 2020-07-22

## 2020-07-22 RX ORDER — ONDANSETRON 4 MG/1
4 TABLET, ORALLY DISINTEGRATING ORAL EVERY 30 MIN PRN
Status: DISCONTINUED | OUTPATIENT
Start: 2020-07-22 | End: 2020-07-22 | Stop reason: HOSPADM

## 2020-07-22 RX ORDER — ALBUTEROL SULFATE 90 UG/1
2 AEROSOL, METERED RESPIRATORY (INHALATION) EVERY 6 HOURS
Status: DISCONTINUED | OUTPATIENT
Start: 2020-07-22 | End: 2020-07-23 | Stop reason: HOSPADM

## 2020-07-22 RX ORDER — ONDANSETRON 2 MG/ML
INJECTION INTRAMUSCULAR; INTRAVENOUS PRN
Status: DISCONTINUED | OUTPATIENT
Start: 2020-07-22 | End: 2020-07-22

## 2020-07-22 RX ORDER — HYDROXYZINE HYDROCHLORIDE 25 MG/1
25 TABLET, FILM COATED ORAL 3 TIMES DAILY PRN
Status: DISCONTINUED | OUTPATIENT
Start: 2020-07-22 | End: 2020-07-23 | Stop reason: HOSPADM

## 2020-07-22 RX ORDER — SODIUM CHLORIDE, SODIUM LACTATE, POTASSIUM CHLORIDE, CALCIUM CHLORIDE 600; 310; 30; 20 MG/100ML; MG/100ML; MG/100ML; MG/100ML
INJECTION, SOLUTION INTRAVENOUS CONTINUOUS PRN
Status: DISCONTINUED | OUTPATIENT
Start: 2020-07-22 | End: 2020-07-22

## 2020-07-22 RX ORDER — OXYCODONE HYDROCHLORIDE 5 MG/1
5 TABLET ORAL EVERY 6 HOURS PRN
Qty: 12 TABLET | Refills: 0 | Status: ON HOLD | OUTPATIENT
Start: 2020-07-22 | End: 2020-08-04

## 2020-07-22 RX ORDER — OXYCODONE HYDROCHLORIDE 5 MG/1
5 TABLET ORAL EVERY 4 HOURS PRN
Status: DISCONTINUED | OUTPATIENT
Start: 2020-07-22 | End: 2020-07-23 | Stop reason: HOSPADM

## 2020-07-22 RX ORDER — GLYCOPYRROLATE 0.2 MG/ML
INJECTION, SOLUTION INTRAMUSCULAR; INTRAVENOUS PRN
Status: DISCONTINUED | OUTPATIENT
Start: 2020-07-22 | End: 2020-07-22

## 2020-07-22 RX ORDER — ALBUTEROL SULFATE 90 UG/1
2 AEROSOL, METERED RESPIRATORY (INHALATION) EVERY 6 HOURS PRN
COMMUNITY
End: 2021-04-23

## 2020-07-22 RX ORDER — MEPERIDINE HYDROCHLORIDE 25 MG/ML
12.5 INJECTION INTRAMUSCULAR; INTRAVENOUS; SUBCUTANEOUS
Status: DISCONTINUED | OUTPATIENT
Start: 2020-07-22 | End: 2020-07-22 | Stop reason: HOSPADM

## 2020-07-22 RX ADMIN — HYDROMORPHONE HYDROCHLORIDE 0.5 MG: 1 INJECTION, SOLUTION INTRAMUSCULAR; INTRAVENOUS; SUBCUTANEOUS at 02:00

## 2020-07-22 RX ADMIN — PHENYLEPHRINE HYDROCHLORIDE 150 MCG: 10 INJECTION INTRAVENOUS at 12:58

## 2020-07-22 RX ADMIN — FENTANYL CITRATE 100 MCG: 50 INJECTION, SOLUTION INTRAMUSCULAR; INTRAVENOUS at 12:48

## 2020-07-22 RX ADMIN — ALBUTEROL SULFATE 2 PUFF: 90 INHALANT RESPIRATORY (INHALATION) at 19:53

## 2020-07-22 RX ADMIN — GLYCOPYRROLATE 0.8 MG: 0.2 INJECTION, SOLUTION INTRAMUSCULAR; INTRAVENOUS at 13:20

## 2020-07-22 RX ADMIN — LIDOCAINE HYDROCHLORIDE 50 MG: 10 INJECTION, SOLUTION INFILTRATION; PERINEURAL at 12:48

## 2020-07-22 RX ADMIN — SODIUM CHLORIDE: 9 INJECTION, SOLUTION INTRAVENOUS at 02:41

## 2020-07-22 RX ADMIN — SODIUM CHLORIDE, POTASSIUM CHLORIDE, SODIUM LACTATE AND CALCIUM CHLORIDE: 600; 310; 30; 20 INJECTION, SOLUTION INTRAVENOUS at 12:23

## 2020-07-22 RX ADMIN — SODIUM CHLORIDE: 9 INJECTION, SOLUTION INTRAVENOUS at 23:34

## 2020-07-22 RX ADMIN — ONDANSETRON HYDROCHLORIDE 4 MG: 2 INJECTION, SOLUTION INTRAVENOUS at 13:27

## 2020-07-22 RX ADMIN — MIDAZOLAM 2 MG: 1 INJECTION INTRAMUSCULAR; INTRAVENOUS at 12:35

## 2020-07-22 RX ADMIN — ROCURONIUM BROMIDE 25 MG: 10 INJECTION INTRAVENOUS at 12:52

## 2020-07-22 RX ADMIN — CEFTRIAXONE SODIUM 1 G: 1 INJECTION, POWDER, FOR SOLUTION INTRAMUSCULAR; INTRAVENOUS at 02:01

## 2020-07-22 RX ADMIN — GLYCOPYRROLATE 0.2 MG: 0.2 INJECTION, SOLUTION INTRAMUSCULAR; INTRAVENOUS at 12:48

## 2020-07-22 RX ADMIN — IBUPROFEN 400 MG: 400 TABLET ORAL at 19:27

## 2020-07-22 RX ADMIN — Medication 1 MG: at 21:15

## 2020-07-22 RX ADMIN — OMEPRAZOLE 20 MG: 20 CAPSULE, DELAYED RELEASE ORAL at 17:36

## 2020-07-22 RX ADMIN — ACETAMINOPHEN 650 MG: 325 TABLET, FILM COATED ORAL at 19:27

## 2020-07-22 RX ADMIN — SODIUM CHLORIDE 1000 ML: 9 INJECTION, SOLUTION INTRAVENOUS at 20:46

## 2020-07-22 RX ADMIN — DEXAMETHASONE SODIUM PHOSPHATE 4 MG: 4 INJECTION, SOLUTION INTRA-ARTICULAR; INTRALESIONAL; INTRAMUSCULAR; INTRAVENOUS; SOFT TISSUE at 12:48

## 2020-07-22 RX ADMIN — OXYCODONE HYDROCHLORIDE 5 MG: 5 TABLET ORAL at 21:15

## 2020-07-22 RX ADMIN — SODIUM CHLORIDE: 9 INJECTION, SOLUTION INTRAVENOUS at 16:25

## 2020-07-22 RX ADMIN — Medication 100 MG: at 12:48

## 2020-07-22 RX ADMIN — Medication 5 MG: at 13:20

## 2020-07-22 RX ADMIN — PROPOFOL 160 MG: 10 INJECTION, EMULSION INTRAVENOUS at 12:48

## 2020-07-22 ASSESSMENT — MIFFLIN-ST. JEOR: SCORE: 1789.38

## 2020-07-22 NOTE — ANESTHESIA CARE TRANSFER NOTE
Patient: Tresa Chin    Procedure(s):  CYSTOSCOPY, WITH LEFT URETERAL STENT INSERTION    Diagnosis: Kidney stone [N20.0]  Diagnosis Additional Information: No value filed.    Anesthesia Type:   General     Note:  Airway :Face Mask  Patient transferred to:PACU  Handoff Report: Identifed the Patient, Identified the Reponsible Provider, Reviewed the pertinent medical history, Discussed the surgical course, Reviewed Intra-OP anesthesia mangement and issues during anesthesia, Set expectations for post-procedure period and Allowed opportunity for questions and acknowledgement of understanding      Vitals: (Last set prior to Anesthesia Care Transfer)    CRNA VITALS  7/22/2020 1313 - 7/22/2020 1348      7/22/2020             Pulse:  94    SpO2:  99 %                Electronically Signed By: BRIGIDO Brown CRNA  July 22, 2020  1:48 PM

## 2020-07-22 NOTE — ED NOTES
[facility]  ED Nurse Handoff Report    Tresa Chin is a 40 year old female   ED Chief complaint: Abdominal Pain  . ED Diagnosis:   Final diagnoses:   Kidney stone     Allergies:   Allergies   Allergen Reactions     Latex      PN: Converted from LW Latex Sensitivity Flag       Code Status: Full Code  Activity level - Baseline/Home:  Independent. Activity Level - Current:   Stand by Assist. Lift room needed: No. Bariatric: No   Needed: No   Isolation: No. Infection: Not Applicable.     Vital Signs:   Vitals:    07/21/20 2222 07/21/20 2245   BP: (!) 156/114 (!) 137/112   Pulse: 105 90   Resp: 18    Temp: 98.1  F (36.7  C)    TempSrc: Temporal    SpO2: 100% 99%       Cardiac Rhythm:  ,      Pain level: 0-10 Pain Scale: 10  Patient confused: No. Patient Falls Risk: Yes.   Elimination Status: Has voided   Patient Report - Initial Complaint: Abdominal pain/ Flank pain.. Focused Assessment:    22:43 Gastrointestinal EO       Details:   Gastrointestinal - GI WDL: -WDL except; nausea and vomiting  Nausea/Vomiting Signs/Symptoms: nausea intermittent (Pt c/o intermittent nausea tonight with left flank pain. Pt denies any abdominal pain. No emesis. )       22:43 Genitourinary EO      Details:   Genitourinary - Genitourinary WDL: -WDL except   Genitourinary -  Signs and Symptoms: flank pain (left flank pain began tonight around 1700 and progressively worsened. Pt had kidney stone 2 months ago and states this feels similar. No urinary sx)          Tests Performed: See MAR. Abnormal Results:   Labs Ordered and Resulted from Time of ED Arrival Up to the Time of Departure from the ED   CBC WITH PLATELETS DIFFERENTIAL - Abnormal; Notable for the following components:       Result Value    WBC 14.4 (*)     Absolute Neutrophil 10.2 (*)     All other components within normal limits   BASIC METABOLIC PANEL - Abnormal; Notable for the following components:    Glucose 108 (*)     All other components within  normal limits   ROUTINE UA WITH MICROSCOPIC - Abnormal; Notable for the following components:    Leukocyte Esterase Urine Moderate (*)     WBC Urine 6 (*)     RBC Urine 5 (*)     Bacteria Urine Few (*)     Squamous Epithelial /HPF Urine 4 (*)     Mucous Urine Present (*)     All other components within normal limits   PERIPHERAL IV CATHETER   PULSE OXIMETRY NURSING   STRAIN URINE   URINE CULTURE AEROBIC BACTERIAL     CT Abdomen Pelvis w/o Contrast   Final Result   IMPRESSION:    1.  6 mm obstructing stone in the mid to distal left ureter resulting in mild to moderate left-sided hydronephrosis.      2.  No other urinary calculi.           .   Treatments provided: See MAR  Family Comments: N/A  OBS brochure/video discussed/provided to patient:  Yes  ED Medications:   Medications   0.9% sodium chloride BOLUS (1,000 mLs Intravenous New Bag 7/21/20 2325)     Followed by   sodium chloride 0.9% infusion (has no administration in time range)   HYDROmorphone (PF) (DILAUDID) injection 0.5 mg (0.5 mg Intravenous Given 7/21/20 2326)   ondansetron (ZOFRAN) injection 4 mg (4 mg Intravenous Given 7/21/20 2326)     Drips infusing:  No  For the majority of the shift, the patient's behavior Green. Interventions performed were N/A.    Sepsis treatment initiated: No       ED Nurse Name/Phone Number: Nba Jackson RN,   1:22 AM  RECEIVING UNIT ED HANDOFF REVIEW    Above ED Nurse Handoff Report was reviewed: Yes  Reviewed by: Nehal Rm RN on July 22, 2020 at 1:47 AM

## 2020-07-22 NOTE — H&P
"    [facility] History and Physical    Tresa Chin MRN# [id number]   Age: 40 year old YOB: 1980     Date of Admission:  7/21/2020    Home clinic: [facility]  Primary care provider: Melissa Franco          Assessment and Plan:   Assessment:   Tresa Chin is a 40-year-old woman who came to attention on the evening of 7/21/2020 due to rather abrupt onset of pain that gradually became nearly unbearable.  She recognized this as potentially similar to previous episodes of nephrolithiasis though she never has had a stone that did not pass on its own.    In the emergency department, Ms. Chin was significantly uncomfortable but hemodynamically stable.  Oxygen saturation was nearly 100% breathing room air and she was afebrile.  Creatinine 0.7, white cell count 14.4 with left shift, urinalysis was mildly abnormal with leukocyte esterase and 6 white cells per high-powered field but I note this was a midstream collection.  She was sent for CT scan that confirmed the presence of a left mid to distal ureteral stone with associated mild to moderate hydronephrosis.    At the time of my evaluation, Ms. Chin is alert and comfortable.  She denies complications related to surgery though she admits that she was 12 years old when this occurred.  She does not know of other people in the family having difficulty with surgery or bleeding.  She generally is well, noting that she is a lifelong non-smoker nondrinker.  She is on blood pressure medication but otherwise does not have known chronic illness.    Dx:  1.  Obstructing ureterolithiasis, left.  A \"6 mm obstructing stones noted in the mid to distal left ureter resulting in mild to moderate left-sided hydronephrosis\" is identified on CT scan.  2.  Possible urinary infection.  Patient has not had fevers, sweats or chills associated with this illness.  She is hemodynamically stable and I note that the sample was obtained on a \"clean catch " "specimen\".  Empirically given a dose of ceftriaxone in the emergency department.  3.  Preop evaluation: Patient tolerated surgery as a young child in the past without significant problems.  She has no known cardiac, pulmonary, renal, hematologic issues identified.  No contraindication to proceeding with surgery.  4.  History of hypertension.  Managed on amlodipine alone.      Plan:   1.  Admit to observation.  2.  COVID swab is pending in anticipation of surgery.  3.  Pain control, empiric antibiotics, IV fluids.  Resumed amlodipine with parameters to hold.  4.  Urology consultation.  Patient is n.p.o. for anticipated surgery in the morning.             Chief Complaint:   Flank pain     History is obtained from the patient, EMR and ED physician.     Ms. Chin has been dealing with a sprained left ankle over the course the last month or so.  She otherwise though has been well.  Today, she was feeling well until the evening when she noted left-sided flank/abdominal pain.  The pain progressed and she states that it is worse than she ever experienced with previous kidney stones.    The patient became nauseated but did not vomit.  She denies fevers, sweats and chills.  No trouble with urinating.        Past Medical History:     Past Medical History:   Diagnosis Date     Back pain      Hypertension      Sleep apnea              Past Surgical History:   Remote ankle surgery.  No complications noted.         Social History:     Social History     Tobacco Use     Smoking status: Never Smoker     Smokeless tobacco: Never Used   Substance Use Topics     Alcohol use: Yes     Frequency: Never     Comment: occ.             Family History:     Family History   Problem Relation Age of Onset     Dementia Mother      Lung Cancer Father      Family history notable for the absence of complications related to anesthetic.         Allergies:     Allergies   Allergen Reactions     Latex      PN: Converted from LW Latex Sensitivity Flag    "          Medications:     Medications Prior to Admission   Medication Sig Dispense Refill Last Dose     albuterol (PROAIR HFA/PROVENTIL HFA/VENTOLIN HFA) 108 (90 Base) MCG/ACT inhaler Inhale 2 puffs into the lungs every 6 hours 18 g 3      amLODIPine 10 MG PO tablet Take 1 tablet (10 mg) by mouth daily 90 tablet 3      [] amoxicillin-clavulanate (AUGMENTIN) 875-125 MG tablet Take 1 tablet by mouth 2 times daily 60 tablet 0      fexofenadine 180 MG PO tablet Take 1 tablet (180 mg) by mouth daily 90 tablet 3      hydrOXYzine (ATARAX) 25 MG tablet Take 1 tablet (25 mg) by mouth 3 times daily as needed for anxiety 60 tablet 0      omeprazole (PRILOSEC OTC) 20 MG EC tablet Take 1 tablet (20 mg) by mouth daily 90 tablet 0      order for DME Equipment being ordered: Even Up (Patient not taking: Reported on 2020) 1 Device 0      order for DME Equipment being ordered: jaskaran Vivar MD (Patient not taking: Reported on 2020) 1 Device 0              Review of Systems:   A comprehensive review of systems was performed and found to be negative except as described in this note           Physical Exam:   Vitals were reviewed  Temp: 98.1  F (36.7  C) Temp src: Oral BP: 118/80 Pulse: 90 Heart Rate: 92 Resp: 20 SpO2: 99 % O2 Device: None (Room air)    Constitutional: Awake, alert, cooperative, no apparent distress, and appears stated age.  Moderately obese.  Eyes: Lids and lashes normal, pupils equal, round and reactive to light, extra ocular muscles intact, sclera clear, conjunctiva normal.  ENT: Normocephalic, without obvious abnormality, atraumatic.  Mild posterior oropharyngeal crowding.  Neck: Supple, symmetrical, trachea midline, no adenopathy, thyroid symmetric, not enlarged and no tenderness, skin normal.  Hematologic / Lymphatic: No cervical lymphadenopathy and no supraclavicular lymphadenopathy.  Lungs: No increased work of breathing, good air exchange, clear to auscultation bilaterally, no crackles or  wheezing.  Cardiovascular: Regular rate and rhythm, normal S1 and S2, no S3 or S4, and no murmur noted.  Abdomen: No scars, normal bowel sounds, soft, non-distended, mildly tender in the left but without rebound or guarding.  No masses palpated, no hepatosplenomegaly, though I did not palpate very deeply due to the patient's complaint of pain.  Musculoskeletal: No redness, warmth, or swelling of the joints.  Tone is normal.  Left ankle tenderness noted.  No demonstrable musculoskeletal abnormality in this area however.  Neurologic: Awake, alert, oriented to name, place and time.  Cranial nerves II-XII are grossly intact.    Neuropsychiatric: Normal affect, mood, orientation, memory and insight.  Skin: No rashes, erythema, pallor, petechia or purpura.          Data:     Results for orders placed or performed during the hospital encounter of 07/21/20 (from the past 24 hour(s))   CBC with platelets differential   Result Value Ref Range    WBC 14.4 (H) 4.0 - 11.0 10e9/L    RBC Count 4.94 3.8 - 5.2 10e12/L    Hemoglobin 14.5 11.7 - 15.7 g/dL    Hematocrit 45.1 35.0 - 47.0 %    MCV 91 78 - 100 fl    MCH 29.4 26.5 - 33.0 pg    MCHC 32.2 31.5 - 36.5 g/dL    RDW 12.8 10.0 - 15.0 %    Platelet Count 380 150 - 450 10e9/L    Diff Method Automated Method     % Neutrophils 70.9 %    % Lymphocytes 19.0 %    % Monocytes 8.0 %    % Eosinophils 1.0 %    % Basophils 0.6 %    % Immature Granulocytes 0.5 %    Nucleated RBCs 0 0 /100    Absolute Neutrophil 10.2 (H) 1.6 - 8.3 10e9/L    Absolute Lymphocytes 2.7 0.8 - 5.3 10e9/L    Absolute Monocytes 1.2 0.0 - 1.3 10e9/L    Absolute Eosinophils 0.2 0.0 - 0.7 10e9/L    Absolute Basophils 0.1 0.0 - 0.2 10e9/L    Abs Immature Granulocytes 0.1 0 - 0.4 10e9/L    Absolute Nucleated RBC 0.0    Basic metabolic panel   Result Value Ref Range    Sodium 139 133 - 144 mmol/L    Potassium 3.8 3.4 - 5.3 mmol/L    Chloride 107 94 - 109 mmol/L    Carbon Dioxide 26 20 - 32 mmol/L    Anion Gap 6 3 - 14  mmol/L    Glucose 108 (H) 70 - 99 mg/dL    Urea Nitrogen 16 7 - 30 mg/dL    Creatinine 0.71 0.52 - 1.04 mg/dL    GFR Estimate >90 >60 mL/min/[1.73_m2]    GFR Estimate If Black >90 >60 mL/min/[1.73_m2]    Calcium 8.5 8.5 - 10.1 mg/dL   CT Abdomen Pelvis w/o Contrast    Narrative    EXAM: CT ABDOMEN PELVIS W/O CONTRAST  LOCATION: [facility]  DATE/TIME: 7/21/2020 11:44 PM    INDICATION: Flank pain.    COMPARISON: 3/13/2020.    TECHNIQUE: CT scan of the abdomen and pelvis was performed without IV contrast. Multiplanar reformats were obtained. Dose reduction techniques were used.    CONTRAST: None.    FINDINGS:   LOWER CHEST: Normal.    HEPATOBILIARY: Normal.    PANCREAS: Normal.    SPLEEN: Normal.    ADRENAL GLANDS: Normal.    KIDNEYS/BLADDER: No intrarenal calculi or focal renal lesions. Mild to moderate left-sided hydronephrosis is present which is secondary to an obstructing 6 mm stone in the mid to distal left ureter. The stone is approximately 5 cm above the UVJ. No   right-sided ureteral stones or hydronephrosis. Bladder is grossly negative.    BOWEL: Normal.    LYMPH NODES: Normal.    VASCULATURE: Unremarkable.    PELVIC ORGANS: Normal.    MUSCULOSKELETAL: Normal.      Impression    IMPRESSION:   1.  6 mm obstructing stone in the mid to distal left ureter resulting in mild to moderate left-sided hydronephrosis.    2.  No other urinary calculi.     UA with Microscopic   Result Value Ref Range    Color Urine Straw     Appearance Urine Clear     Glucose Urine Negative NEG^Negative mg/dL    Bilirubin Urine Negative NEG^Negative    Ketones Urine Negative NEG^Negative mg/dL    Specific Gravity Urine 1.016 1.003 - 1.035    Blood Urine Negative NEG^Negative    pH Urine 6.5 5.0 - 7.0 pH    Protein Albumin Urine Negative NEG^Negative mg/dL    Urobilinogen mg/dL Normal 0.0 - 2.0 mg/dL    Nitrite Urine Negative NEG^Negative    Leukocyte Esterase Urine Moderate (A) NEG^Negative    Source Midstream Urine     WBC  Urine 6 (H) 0 - 5 /HPF    RBC Urine 5 (H) 0 - 2 /HPF    Bacteria Urine Few (A) NEG^Negative /HPF    Squamous Epithelial /HPF Urine 4 (H) 0 - 1 /HPF    Mucous Urine Present (A) NEG^Negative /LPF       All imaging studies reviewed by me.      Attestation:  I have reviewed today's vital signs, notes, medications, labs and imaging.     Arthur Zuluaga MD

## 2020-07-22 NOTE — ED PROVIDER NOTES
History     Chief Complaint:  Flank Pain      The history is provided by the patient.      Tresa Chin is a 40 year old female with a history of nephrolithiasis, hypertension, hyperlipidemia, and obesity who presents for evaluation of left flank pain. The patient reports left flank pain that began around 6PM as mild and has gradually gotten worse and now hurts with deep breathing. The pain has been constant and sharp since onset and nothing makes it worse or better. She took 600 mg ibuprofen three hours ago without improvement. She reports associated nausea but denies vomiting. She denies fever, chills, chest pain, and shortness of breath. She denies diarrhea, black/bloody stools, and urinary symptoms. She denies cough and rash. She denies history of PE/DVT. She denies recent surgery, hormone use, fall, and trauma. She states that this pain is more severe than her past kidney stones.     Allergies:  Latex    Medications:    Albuterol inhaler  Amlodipine   Fexofenadine   Hydroxyzine   Omeprazole     Past Medical History:    Amenorrhea   Anxiety  Back pain  Fatty liver  GERD  Hyperlipidemia   Hypertension  Morbid obesity   Nephrolithiasis   NSAID induced gastritis  Pre-diabetes   Sleep apnea  TB lung, latent    Past Surgical History:    Esophagoscopy, gastroscopy, duodenoscopy, combined     Family History:    Dementia  Lung cancer    Social History:  Marital Status:   [4]  PCP: Melissa Franco D  Negative for tobacco use.  Negative for smokeless tobacco use.  Positive for alcohol use.   Negative for drug use.      Review of Systems   Constitutional: Negative for chills and fever.   Respiratory: Negative for cough and shortness of breath.    Cardiovascular: Negative for chest pain.   Gastrointestinal: Positive for nausea. Negative for blood in stool, diarrhea and vomiting.   Genitourinary: Positive for flank pain. Negative for decreased urine volume, difficulty urinating, dysuria, enuresis, frequency,  hematuria and urgency.   Skin: Negative for rash.   All other systems reviewed and are negative.      Physical Exam     Patient Vitals for the past 24 hrs:   BP Temp Temp src Pulse Resp SpO2   07/21/20 2245 (!) 137/112 -- -- 90 -- 99 %   07/21/20 2222 (!) 156/114 98.1  F (36.7  C) Temporal 105 18 100 %       Physical Exam  Constitutional: Pleasant. Cooperative.   Eyes: Pupils equally round and reactive  HENT: Head is normal in appearance. Oropharynx is normal with moist mucus membranes.  Cardiovascular: Regular rate and rhythm and without murmurs.  Respiratory: Normal respiratory effort, lungs are clear bilaterally.  GI: Abdomen is soft, non-tender, non-distended. No guarding, rebound, or rigidity.  Musculoskeletal: No asymmetry of the lower extremities, no tenderness to palpation. No CVA tenderness.  Skin: Normal, without rash.  Neurologic: Cranial nerves grossly intact, normal cognition, no focal deficits. Alert and oriented x 3.   Psychiatric: Normal affect.  Nursing notes and vital signs reviewed.    Emergency Department Course     Imaging:  Radiology findings were communicated with the patient who voiced understanding of the findings.    CT Abdomen Pelvis w/o IV contrast:   1.  6 mm obstructing stone in the mid to distal left ureter resulting in mild to moderate left-sided hydronephrosis.   2.  No other urinary calculi, as per radiology.     Laboratory:  Laboratory findings were communicated with the patient who voiced understanding of the findings.    Labs Ordered and Resulted from Time of ED Arrival Up to the Time of Departure from the ED   CBC WITH PLATELETS DIFFERENTIAL - Abnormal; Notable for the following components:       Result Value    WBC 14.4 (*)     Absolute Neutrophil 10.2 (*)     All other components within normal limits   BASIC METABOLIC PANEL - Abnormal; Notable for the following components:    Glucose 108 (*)     All other components within normal limits   ROUTINE UA WITH MICROSCOPIC -  Abnormal; Notable for the following components:    Leukocyte Esterase Urine Moderate (*)     WBC Urine 6 (*)     RBC Urine 5 (*)     Bacteria Urine Few (*)     Squamous Epithelial /HPF Urine 4 (*)     Mucous Urine Present (*)     All other components within normal limits   COVID-19 VIRUS (CORONAVIRUS) BY PCR   PERIPHERAL IV CATHETER   PULSE OXIMETRY NURSING   STRAIN URINE   URINE CULTURE AEROBIC BACTERIAL       Interventions:  Medications   0.9% sodium chloride BOLUS (0 mLs Intravenous ED Infusing on Admission/transfer 7/22/20 0124)     Followed by   sodium chloride 0.9% infusion (has no administration in time range)   HYDROmorphone (PF) (DILAUDID) injection 0.5 mg (0.5 mg Intravenous Given 7/21/20 2326)   ondansetron (ZOFRAN) injection 4 mg (4 mg Intravenous Given 7/21/20 2326)   ketorolac (TORADOL) injection 15 mg (15 mg Intravenous Not Given 7/22/20 0152)   cefTRIAXone (ROCEPHIN) 1 g vial to attach to  mL bag for ADULTS or NS 50 mL bag for PEDS (1 g Intravenous New Bag 7/22/20 0201)   HYDROmorphone (PF) (DILAUDID) injection 0.5 mg (0.5 mg Intravenous Given 7/22/20 0200)     Emergency Department Course:  Past medical records, nursing notes, and vitals reviewed.    2230 I performed an exam of the patient as documented above.     IV was inserted and blood was drawn for laboratory testing, results above.  The patient was sent for an abdomen/pelvis CT while in the emergency department, results above.     Patient admitted.    Impression & Plan     Medical Decision Making:  Tresa Chin is a 40 year old female who presented to the ER for evaluation of left flank pain.  Patient has history of kidney stones. See HPI as above for additional details. Vitals and physical exam as above.  Differential diagnosis includes nephrolithiasis/renal colic, pyelonephritis, appendicitis, AAA, biliary colic, bowel obstruction, colitis, renal infarction, retroperitoneal disease, and gynecologic pathology such as ectopic  pregnancy, ovarian torsion, cyst rupture. Patient's current presentation is felt to be most consistent with renal colic. CT confirms a 6 mm ureteral stone at the mid to distal left ureter.  Renal function is normal.  UA shows 6 WBC, but also 4 squam epi. In the setting of known stone, will initiate abx as above. Urine culture pending. Patient has marked pain not controlled with above interventions. In this setting, as well as concern for possible UTI, admission offered. Patient in agreement for admission. Discussed case with hospitalist, who graciously agreed to admit the patient. Patient admitted in stable condition.    Diagnosis:    ICD-10-CM    1. Kidney stone  N20.0        Disposition:  Admitted    Scribe Disclosure:  IH[employer]ony, am serving as a scribe at 10:30 PM on 7/21/2020 to document services personally performed by Juan Manuel Zambrano PA-C based on my observations and the provider's statements to me.     [facility] EMERGENCY DEPARTMENT    This record was created at least in part using electronic voice recognition software, so please excuse any typographical errors.       Juan Manuel Zambrano PA-C  07/22/20 0236

## 2020-07-22 NOTE — PHARMACY-ADMISSION MEDICATION HISTORY
Admission medication history interview status for this patient is complete. See [facility] admission navigator for allergy information, prior to admission medications and immunization status.     Medication history interview done via telephone during Covid-19 pandemic, indicate source(s): Patient  Medication history resources (including written lists, pill bottles, clinic record):None  Primary Pharmacy: [facility] in [place]    Changes made to PTA medication list:  Added: None  Deleted: Augmentin, Fexofenadine  Changed: Albuterol (q6h --> q6h PRN)    Actions taken by pharmacist (provider contacted, etc):None     Additional medication history information:  Patient reported she has taken 1 dose of hydroxyzine, but likely will not take it again due to the adverse effects she experienced.     Medication reconciliation/reorder completed by provider prior to medication history?  Yes   (Y/N)     For patients on insulin therapy: No  (Y/N)      Prior to Admission medications    Medication Sig Last Dose Taking? Auth Provider   albuterol (PROAIR HFA/PROVENTIL HFA/VENTOLIN HFA) 108 (90 Base) MCG/ACT inhaler Inhale 2 puffs into the lungs every 6 hours as needed for shortness of breath / dyspnea or wheezing Past Week at Unknown time Yes Unknown, Entered By History   amLODIPine 10 MG PO tablet Take 1 tablet (10 mg) by mouth daily 7/21/2020 at 1100 Yes Melissa Franco MD   hydrOXYzine (ATARAX) 25 MG tablet Take 1 tablet (25 mg) by mouth 3 times daily as needed for anxiety Past Week at Unknown time Yes Kalyn Miller MD   omeprazole (PRILOSEC OTC) 20 MG EC tablet Take 1 tablet (20 mg) by mouth daily 7/21/2020 at 1100 Yes Kalyn Miller MD   ondansetron (ZOFRAN ODT) 4 MG ODT tab Take 1 tablet (4 mg) by mouth every 8 hours as needed for nausea or vomiting  Yes Juan Manuel Zambrano PA-C   oxyCODONE (ROXICODONE) 5 MG tablet Take 1 tablet (5 mg) by mouth every 6 hours as needed for pain  Yes Juan Manuel Zambrano PA-C   tamsulosin  (FLOMAX) 0.4 MG capsule Take 1 capsule (0.4 mg) by mouth daily for 7 doses  Yes Juan Manuel Zambrano PA-C

## 2020-07-22 NOTE — TELEPHONE ENCOUNTER
I tried to call the patient to reschedule but her phone kept cutting out. I let her know that she will need to call us back to reschedule. Once the appointment is made she needs to make sure she has cell phone service.     Petty Canseco

## 2020-07-22 NOTE — PLAN OF CARE
Vital Signs: VSS. Afebrile.  Pain/Comfort: Denies pain  Assessment: WDL. Went down for stent placement around 1200, return to floor at 1430.  Diet: NPO this morning, now starting clears.  Output: Voiding well, clear yellow urine  Activity/Ambulation: up to bathroom with stand by assist. Uses boot to left leg.  Social: Sister here to visit.  Plan: Will stay over night

## 2020-07-22 NOTE — DISCHARGE INSTRUCTIONS
POSTOPERATIVE INSTRUCTIONS    Diagnosis-------------------------------   Left ureteral stone    Procedure-------------------------------  Procedure(s) (LRB):  CYSTOSCOPY, WITH LEFT URETERAL STENT INSERTION (Left)      Findings--------------------------------  Left ureteral stent placed    Home-going instructions-----------------         Activity Limitation:     - No driving or operating heavy machinery while on narcotic pain medication.     FOLLOW THESE INSTRUCTIONS AS INDICATED BELOW:  - Observe operative area for signs of excessive bleeding.  - You may shower.  - Increase fluid intake to promote clear urine.  - Resume usual diet as tolerated    What to expect while recovering-----------  - You may experience some intermittent bleeding that makes your urine pink or cherry colored. This is normal.  - However, if you are unable to urinate, passing large amount of clots, have mayito blood in your urine, or have a temperature >101 degrees, call the urology nurse on call, or present to your nearest emergency department.  - You are encouraged to walk daily, and have no activity restrictions.   - A URETERAL STENT has been placed that allows urine to flow unobstructed from your kidney into your bladder.  The stent has a curl in the kidney and a curl in the bladder.  The curl in the bladder can cause some urgency and frequency of urination as well as some mild blood in the urine.  The curl in the kidney can cause some mild flank discomfort.  This may be more noticeable when you urinate.  A URETERAL STENT is meant to be left in temporarily.  It must be removed or changed no later than 3 months after its insertion.  If it's not removed it can result in stone overgrowth on the stent that can cause pain, infection, and can be very difficult to remove.      Discharge Medications/instructions:     - Flomax (tamsulosin) to be taken daily until stent is removed    - Antibiotics will only be prescribed if needed    - Take  Tylenol 1000mg every 6 hours for pain    - Take Ibuprofen 600mg every 6 hours as needed for additional pain control    - Take Oxycodone 5mg every 4-6 hours only for breakthrough pain    - Take Colace while taking Oxycodone to prevent constipation      Questions/concerns-----------------------  [facility]: [phone]    Future appointments  Follow up in 1-2 weeks for left ureteroscopy and laser lithotripsy, we will schedule you      Anurag Gomez MD

## 2020-07-22 NOTE — CONSULTS
[facility] Urology Consultation    Tresa Chin MRN# [id number]   Age: 40 year old YOB: 1980     Date of Admission:  7/21/2020    Reason for consult: Left ureteral stone       Requesting physician: Arthur Zuluaga MD                   Assessment and Plan:   Assessment:   Left ureteral stone in setting of mild leukocytosis, ongoing poorly controlled pain, concern for infected urine on UA, mild lactate elevation      Plan:   Cystoscopy, left ureteral stent placement today  Continue abx; IVF  Likely home tomorrow     Anurag Gomez MD   [facility]  [phone] clinic phone            Chief Complaint:   Left renal colic     History is obtained from the patient         History of Present Illness:   This patient is a 40 year old female with a significant past medical history of HTN, HLD, Body mass index is 48.82 kg/m ., history of nephrolithiasis who presents with the following condition requiring a hospital admission:    Urolithiasis  Patient complains of left flank pain. Onset of symptoms was gradual starting yesterday around 6pm ago with gradually worsening course since that time. Patient describes the pain as sharp, continuous and rated as severe. The patient has had nausea but no vomiting. There has been no fever or chills. The patient IS NOT complaining of dysuria or frequency. Risk factors for urolithiasis: history of stone disease (passed spontaneously several years ago) and poor fluid intake.            Past Medical History:   I have reviewed this patient's past medical history  Past Medical History:   Diagnosis Date     Back pain      Hypertension      Sleep apnea              Past Surgical History:     I have reviewed this patient's past surgical history. No relevant history of  surgery  Past Surgical History:   Procedure Laterality Date     ESOPHAGOSCOPY, GASTROSCOPY, DUODENOSCOPY (EGD), COMBINED N/A 5/31/2019    Procedure: ESOPHAGOGASTRODUODENOSCOPY (EGD)cold BX (fv);   Surgeon: Geremias Martinez MD;  Location: [facility]             Social History:     I have reviewed this patient's social history  Social History     Socioeconomic History     Marital status:      Spouse name: Not on file     Number of children: Not on file     Years of education: Not on file     Highest education level: Not on file   Occupational History     Not on file   Social Needs     Financial resource strain: Not on file     Food insecurity     Worry: Not on file     Inability: Not on file     Transportation needs     Medical: Not on file     Non-medical: Not on file   Tobacco Use     Smoking status: Never Smoker     Smokeless tobacco: Never Used   Substance and Sexual Activity     Alcohol use: Yes     Frequency: Never     Comment: occ.     Drug use: No     Sexual activity: Never   Lifestyle     Physical activity     Days per week: Not on file     Minutes per session: Not on file     Stress: Not on file   Relationships     Social connections     Talks on phone: Not on file     Gets together: Not on file     Attends Synagogue service: Not on file     Active member of club or organization: Not on file     Attends meetings of clubs or organizations: Not on file     Relationship status: Not on file     Intimate partner violence     Fear of current or ex partner: Not on file     Emotionally abused: Not on file     Physically abused: Not on file     Forced sexual activity: Not on file   Other Topics Concern     Parent/sibling w/ CABG, MI or angioplasty before 65F 55M? Not Asked   Social History Narrative     Not on file             Family History:     I have reviewed this patient's family history  Family History   Problem Relation Age of Onset     Dementia Mother      Lung Cancer Father      Family history reviewed          Immunizations:     Immunization History   Administered Date(s) Administered     HepB 06/20/2000, 01/16/2001     HepB-Adult 07/18/2000     Influenza Vaccine IM > 6 months Valent  IIV4 10/22/2014, 2015     Influenza Vaccine IM Ages 6-35 Months 4 Valent (PF) 10/21/2010     MMR 2000, 2000     TD (ADULT, 7+) 2000     TDAP Vaccine (Adacel) 10/21/2010             Allergies:     All allergies reviewed and addressed  Allergies   Allergen Reactions     Latex      PN: Converted from LW Latex Sensitivity Flag             Medications:     Medications Prior to Admission   Medication Sig Dispense Refill Last Dose     albuterol (PROAIR HFA/PROVENTIL HFA/VENTOLIN HFA) 108 (90 Base) MCG/ACT inhaler Inhale 2 puffs into the lungs every 6 hours 18 g 3      amLODIPine 10 MG PO tablet Take 1 tablet (10 mg) by mouth daily 90 tablet 3      [] amoxicillin-clavulanate (AUGMENTIN) 875-125 MG tablet Take 1 tablet by mouth 2 times daily 60 tablet 0      fexofenadine 180 MG PO tablet Take 1 tablet (180 mg) by mouth daily 90 tablet 3      hydrOXYzine (ATARAX) 25 MG tablet Take 1 tablet (25 mg) by mouth 3 times daily as needed for anxiety 60 tablet 0      omeprazole (PRILOSEC OTC) 20 MG EC tablet Take 1 tablet (20 mg) by mouth daily 90 tablet 0      order for DME Equipment being ordered: Even Up (Patient not taking: Reported on 2020) 1 Device 0      order for DME Equipment being ordered: jaskaran Vivar MD (Patient not taking: Reported on 2020) 1 Device 0              Review of Systems:   The Review of Systems is negative other than noted in the HPI          Physical Exam:   Vitals were reviewed  Constitutional:   awake and morbidly obese     Eyes:   No scleral icterus     ENT:   normocepalic, without obvious abnormality     Neck:   skin normal     Back:   + left CVAT     Lungs:   No increased WOB on RA     Cardiovascular:   extrem wwp, no edema     Abdomen:   Obese, soft, nt nd     Genitourinary:   Deferred to OR     Skin:   no bruising or bleeding and normal skin color, texture, turgor             Data:   All laboratory and imaging data in the past 24 hours reviewed  Results for  orders placed or performed during the hospital encounter of 07/21/20 (from the past 24 hour(s))   CBC with platelets differential   Result Value Ref Range    WBC 14.4 (H) 4.0 - 11.0 10e9/L    RBC Count 4.94 3.8 - 5.2 10e12/L    Hemoglobin 14.5 11.7 - 15.7 g/dL    Hematocrit 45.1 35.0 - 47.0 %    MCV 91 78 - 100 fl    MCH 29.4 26.5 - 33.0 pg    MCHC 32.2 31.5 - 36.5 g/dL    RDW 12.8 10.0 - 15.0 %    Platelet Count 380 150 - 450 10e9/L    Diff Method Automated Method     % Neutrophils 70.9 %    % Lymphocytes 19.0 %    % Monocytes 8.0 %    % Eosinophils 1.0 %    % Basophils 0.6 %    % Immature Granulocytes 0.5 %    Nucleated RBCs 0 0 /100    Absolute Neutrophil 10.2 (H) 1.6 - 8.3 10e9/L    Absolute Lymphocytes 2.7 0.8 - 5.3 10e9/L    Absolute Monocytes 1.2 0.0 - 1.3 10e9/L    Absolute Eosinophils 0.2 0.0 - 0.7 10e9/L    Absolute Basophils 0.1 0.0 - 0.2 10e9/L    Abs Immature Granulocytes 0.1 0 - 0.4 10e9/L    Absolute Nucleated RBC 0.0    Basic metabolic panel   Result Value Ref Range    Sodium 139 133 - 144 mmol/L    Potassium 3.8 3.4 - 5.3 mmol/L    Chloride 107 94 - 109 mmol/L    Carbon Dioxide 26 20 - 32 mmol/L    Anion Gap 6 3 - 14 mmol/L    Glucose 108 (H) 70 - 99 mg/dL    Urea Nitrogen 16 7 - 30 mg/dL    Creatinine 0.71 0.52 - 1.04 mg/dL    GFR Estimate >90 >60 mL/min/[1.73_m2]    GFR Estimate If Black >90 >60 mL/min/[1.73_m2]    Calcium 8.5 8.5 - 10.1 mg/dL   CT Abdomen Pelvis w/o Contrast    Narrative    EXAM: CT ABDOMEN PELVIS W/O CONTRAST  LOCATION: [facility]  DATE/TIME: 7/21/2020 11:44 PM    INDICATION: Flank pain.    COMPARISON: 3/13/2020.    TECHNIQUE: CT scan of the abdomen and pelvis was performed without IV contrast. Multiplanar reformats were obtained. Dose reduction techniques were used.    CONTRAST: None.    FINDINGS:   LOWER CHEST: Normal.    HEPATOBILIARY: Normal.    PANCREAS: Normal.    SPLEEN: Normal.    ADRENAL GLANDS: Normal.    KIDNEYS/BLADDER: No intrarenal calculi or focal  renal lesions. Mild to moderate left-sided hydronephrosis is present which is secondary to an obstructing 6 mm stone in the mid to distal left ureter. The stone is approximately 5 cm above the UVJ. No   right-sided ureteral stones or hydronephrosis. Bladder is grossly negative.    BOWEL: Normal.    LYMPH NODES: Normal.    VASCULATURE: Unremarkable.    PELVIC ORGANS: Normal.    MUSCULOSKELETAL: Normal.      Impression    IMPRESSION:   1.  6 mm obstructing stone in the mid to distal left ureter resulting in mild to moderate left-sided hydronephrosis.    2.  No other urinary calculi.     UA with Microscopic   Result Value Ref Range    Color Urine Straw     Appearance Urine Clear     Glucose Urine Negative NEG^Negative mg/dL    Bilirubin Urine Negative NEG^Negative    Ketones Urine Negative NEG^Negative mg/dL    Specific Gravity Urine 1.016 1.003 - 1.035    Blood Urine Negative NEG^Negative    pH Urine 6.5 5.0 - 7.0 pH    Protein Albumin Urine Negative NEG^Negative mg/dL    Urobilinogen mg/dL Normal 0.0 - 2.0 mg/dL    Nitrite Urine Negative NEG^Negative    Leukocyte Esterase Urine Moderate (A) NEG^Negative    Source Midstream Urine     WBC Urine 6 (H) 0 - 5 /HPF    RBC Urine 5 (H) 0 - 2 /HPF    Bacteria Urine Few (A) NEG^Negative /HPF    Squamous Epithelial /HPF Urine 4 (H) 0 - 1 /HPF    Mucous Urine Present (A) NEG^Negative /LPF   Urine Culture    Specimen: Midstream Urine   Result Value Ref Range    Specimen Description Midstream Urine     Special Requests Specimen received in preservative     Culture Micro PENDING    Lactic acid level STAT   Result Value Ref Range    Lactate for Sepsis Protocol 2.1 (H) 0.7 - 2.0 mmol/L     All imaging studies reviewed by me.     Attestation:  I have reviewed today's vital signs, notes, medications, labs and imaging.    Anurag Gomez MD

## 2020-07-22 NOTE — BRIEF OP NOTE
[facility]    Brief Operative Note    Pre-operative diagnosis: Kidney stone [N20.0]  Post-operative diagnosis Same as pre-operative diagnosis    Procedure: Procedure(s):  CYSTOSCOPY, WITH LEFT URETERAL STENT INSERTION  Surgeon: Surgeon(s) and Role:     * Anurag Gomez MD - Primary  Anesthesia: General   Estimated blood loss: None  Drains: 6 Fr x 24 cm left ureterals stent  Specimens: * No specimens in log *  Findings:   No bladder tumor or stones. Mild hydroureteronephrosis, stone not easily seen on fluoro. Uncomplicated stent placement, no purulence noted.    .  Complications: None.  Implants:   Implant Name Type Inv. Item Serial No.  Lot No. LRB No. Used Action   STENT URETERAL POLARIS ULTRA [id number] Stent STENT URETERAL POLARIS ULTRA [id number]  [employer] [id number] Left 1 Implanted       Postop plan: remain in hospital for observation, discharge tomorrow if remains afebrile and clinically stable  Will need to follow up in 1-2 weeks for ureteroscopy for stone treatment

## 2020-07-22 NOTE — ANESTHESIA PREPROCEDURE EVALUATION
Anesthesia Pre-Procedure Evaluation    Patient: Tresa Chin   MRN: [id number] : 1980          Preoperative Diagnosis: Kidney stone [N20.0]    Procedure(s):  CYSTOSCOPY, WITH LEFT URETERAL STENT INSERTION    Past Medical History:   Diagnosis Date     Back pain      Hypertension      Sleep apnea      Past Surgical History:   Procedure Laterality Date     ESOPHAGOSCOPY, GASTROSCOPY, DUODENOSCOPY (EGD), COMBINED N/A 2019    Procedure: ESOPHAGOGASTRODUODENOSCOPY (EGD)cold BX (fv);  Surgeon: Geremias Martinez MD;  Location:  GI     Anesthesia Evaluation     .             ROS/MED HX    ENT/Pulmonary:     (+)sleep apnea, uses CPAP , . .    Neurologic:  - neg neurologic ROS     Cardiovascular:     (+) hypertension----. : . . . :. .       METS/Exercise Tolerance:     Hematologic:  - neg hematologic  ROS       Musculoskeletal:  - neg musculoskeletal ROS       GI/Hepatic:     (+) GERD Asymptomatic on medication, liver disease,       Renal/Genitourinary:     (+) chronic renal disease, Nephrolithiasis ,       Endo:     (+) Obesity, .      Psychiatric:  - neg psychiatric ROS       Infectious Disease:  - neg infectious disease ROS       Malignancy:      - no malignancy   Other:    (+) No chance of pregnancy C-spine cleared: N/A, no H/O Chronic Pain,no other significant disability                         Physical Exam  Normal systems: cardiovascular, pulmonary and dental    Airway   Mallampati: II  TM distance: >3 FB  Neck ROM: full    Dental     Cardiovascular       Pulmonary             Lab Results   Component Value Date    WBC 14.4 (H) 2020    HGB 14.5 2020    HCT 45.1 2020     2020     2020    POTASSIUM 3.8 2020    CHLORIDE 107 2020    CO2 26 2020    BUN 16 2020    CR 0.71 2020     (H) 2020    NAOMI 8.5 2020    ALBUMIN 3.7 2020    PROTTOTAL 7.5 2020    ALT 78 (H) 2020    AST 33 2020     "ALKPHOS 63 03/13/2020    BILITOTAL 0.4 03/13/2020    HCG Negative 03/13/2020       Preop Vitals  BP Readings from Last 3 Encounters:   07/22/20 113/69   07/13/20 98/78   06/29/20 (!) 142/90    Pulse Readings from Last 3 Encounters:   07/21/20 90   07/13/20 (P) 72   03/13/20 100      Resp Readings from Last 3 Encounters:   07/22/20 18   07/13/20 (P) 16   03/13/20 14    SpO2 Readings from Last 3 Encounters:   07/22/20 97%   03/13/20 97%   02/19/20 99%      Temp Readings from Last 1 Encounters:   07/22/20 98.4  F (36.9  C) (Oral)    Ht Readings from Last 1 Encounters:   07/22/20 1.549 m (5' 1\")      Wt Readings from Last 1 Encounters:   07/22/20 117.2 kg (258 lb 6.1 oz)    Estimated body mass index is 48.82 kg/m  as calculated from the following:    Height as of this encounter: 1.549 m (5' 1\").    Weight as of this encounter: 117.2 kg (258 lb 6.1 oz).       Anesthesia Plan      History & Physical Review  History and physical reviewed and following examination; no interval change.    ASA Status:  3 .    NPO Status:  > 8 hours    Plan for General with Propofol and Intravenous induction. Maintenance will be Balanced.    PONV prophylaxis:  Ondansetron (or other 5HT-3) and Dexamethasone or Solumedrol         Postoperative Care  Postoperative pain management:  IV analgesics.      Consents  Anesthetic plan, risks, benefits and alternatives discussed with:  Patient..                 Guy Mike MD                    .  "

## 2020-07-22 NOTE — ED PROVIDER NOTES
Emergency Department Attending Supervision Note  7/22/2020  1:30 AM      I evaluated this patient in conjunction with Juan Manuel Zambrano PA-C       Briefly, the patient presented after approximately 5 hours of gradually worsening left flank pain with associated nausea. She states that her current pain is more severe than the pain with previous kidney stones. No fever.    On my exam,   General: Appears uncomfortable sitting in bed  Eyes:  Conjunctivae and sclerae are normal  ENT:    Moist mucous membranes  Neck:  Normal range of motion  CV:  Regular rate     Skin warm and well perfused   Resp:  Non labored breathing on room air  GI:  Abdomen is soft, there is no rigidity    No distension    No abdominal tenderness    Holding her left side of abdomen  MS:  Normal muscular tone  Skin:  No rash or acute skin lesions noted  Neuro:   Awake, alert.      Speech is normal and fluent.    Face is symmetric.     Moves all extremities equally  Psych: Normal affect.  Appropriate interactions.    Results:  CT Abdomen Pelvis w/o Contrast   Final Result   IMPRESSION:    1.  6 mm obstructing stone in the mid to distal left ureter resulting in mild to moderate left-sided hydronephrosis.      2.  No other urinary calculi.           Labs Ordered and Resulted from Time of ED Arrival Up to the Time of Departure from the ED   CBC WITH PLATELETS DIFFERENTIAL - Abnormal; Notable for the following components:       Result Value    WBC 14.4 (*)     Absolute Neutrophil 10.2 (*)     All other components within normal limits   BASIC METABOLIC PANEL - Abnormal; Notable for the following components:    Glucose 108 (*)     All other components within normal limits   ROUTINE UA WITH MICROSCOPIC - Abnormal; Notable for the following components:    Leukocyte Esterase Urine Moderate (*)     WBC Urine 6 (*)     RBC Urine 5 (*)     Bacteria Urine Few (*)     Squamous Epithelial /HPF Urine 4 (*)     Mucous Urine Present (*)     All other components within  normal limits   PERIPHERAL IV CATHETER   PULSE OXIMETRY NURSING   STRAIN URINE   URINE CULTURE AEROBIC BACTERIAL       ED course:    My impression is left sided ureteral stone. Admitting for pain control. Doubt infected stone but urine is abnormal (more likely contaminated urine) with elevated WBC. Given ceftriaxone. Creatinine wnl.     Diagnosis    ICD-10-CM    1. Kidney stone  N20.0 UA with Microscopic     Scribe Disclosure:  Cristin RODRIGUEZ, am serving as a scribe on 7/22/2020 at 1:31 AM to personally document services performed by Jill Falcon MD based on my observations and the provider's statements to me.            Jill Falcon MD  07/22/20 0206

## 2020-07-22 NOTE — ANESTHESIA POSTPROCEDURE EVALUATION
Patient: Tresa Chin    Procedure(s):  CYSTOSCOPY, WITH LEFT URETERAL STENT INSERTION    Diagnosis:Kidney stone [N20.0]  Diagnosis Additional Information: No value filed.    Anesthesia Type:  General    Note:  Anesthesia Post Evaluation    Patient location during evaluation: PACU  Patient participation: Able to fully participate in evaluation  Level of consciousness: awake and alert  Pain management: adequate  Airway patency: patent  Cardiovascular status: acceptable  Respiratory status: acceptable  Hydration status: acceptable  PONV: controlled     Anesthetic complications: None          Last vitals:  Vitals:    07/22/20 1350 07/22/20 1355 07/22/20 1400   BP: 127/87 (!) 130/91 127/88   Pulse: 80 80 74   Resp: 17 20 20   Temp:      SpO2: 100% 99% 100%         Electronically Signed By: Guy Mike MD  July 22, 2020  2:18 PM

## 2020-07-22 NOTE — PROVIDER NOTIFICATION
Dr. Zuluaga web based paged with lactic acid result of 2.1. Heart rat 93-96. RR 20-24 . SOB with ambulation.

## 2020-07-22 NOTE — PROGRESS NOTES
Brief Progress Note:    Admitting H&P from my colleague Dr. Zuluaga reviewed.  Patient seen and evaluated today.  Agree with assessment and plan as detailed in his note.    Patient admitted with obstructive ureterolithiasis on the left side with a 6 mm obstructing stone noted in the left ureter with mild to moderate left-sided hydronephrosis.  Her UA was indicative of probable infection with positive leukocyte esterase, pyuria and bacteriuria.  She has been started on ceftriaxone therapy.  This a.m., prior to the OR patient states she did feel improved.    Vitals:    07/22/20 1355 07/22/20 1400 07/22/20 1415 07/22/20 1450   BP: (!) 130/91 127/88 (!) 136/96 117/77   BP Location:    Left arm   Pulse: 80 74     Resp: 20 20 24   Temp:   98.8  F (37.1  C) 98.8  F (37.1  C)   TempSrc:   Temporal Oral   SpO2: 99% 100%  92%   Weight:       Height:         On exam, patient appears in no acute distress.  She answers questions appropriately.  Heart is regular without murmur.  Lungs are clear bilaterally.  Abdomen is soft.  Bowel sounds present.  Extremities warm and well-perfused.  Neuro exam is nonfocal.    -Patient underwent cystoscopy with left ureteral stent placement today.  Per urology recommendations, observing for this evening possibly discharging tomorrow if patient remains afebrile and does well.  Will need to follow-up in 1 to 2 weeks for ureteroscopy for stone treatment.  -We will continue ceftriaxone therapy.  Likely do a short course on discharge of oral antibiotics.    Montrell Jorge MD

## 2020-07-22 NOTE — PLAN OF CARE
Rated pain as 1 on left side of abdomen. No nausea. NPO.Triggered Lactic Acid due to Heart rate and RR 20-24. Afebrile. Void X 2 . Urine is clear yellow- strained without stone noted. No dysuria. IV infusing at 100 ml/hr. Assist of one and gait belt with ambulating. Walks with a splint/walking boot on  lower leg. Voiced difficulty sleeping due to feeling anxious. Slight SOB noted with exertion. Plan for Urology consult.

## 2020-07-22 NOTE — ED TRIAGE NOTES
Patient comes in for evaluation of left sided abdominal pain which she states feels like a previous kidney stone. ABCs intact.

## 2020-07-23 VITALS
DIASTOLIC BLOOD PRESSURE: 85 MMHG | RESPIRATION RATE: 22 BRPM | WEIGHT: 258.38 LBS | HEART RATE: 91 BPM | TEMPERATURE: 98.3 F | OXYGEN SATURATION: 98 % | SYSTOLIC BLOOD PRESSURE: 122 MMHG | BODY MASS INDEX: 48.78 KG/M2 | HEIGHT: 61 IN

## 2020-07-23 DIAGNOSIS — Z11.59 ENCOUNTER FOR SCREENING FOR OTHER VIRAL DISEASES: Primary | ICD-10-CM

## 2020-07-23 PROBLEM — N20.1 LEFT URETERAL STONE: Status: ACTIVE | Noted: 2020-07-23

## 2020-07-23 LAB
BACTERIA SPEC CULT: NORMAL
LACTATE BLD-SCNC: 1.2 MMOL/L (ref 0.7–2)
Lab: NORMAL
SPECIMEN SOURCE: NORMAL

## 2020-07-23 PROCEDURE — 36415 COLL VENOUS BLD VENIPUNCTURE: CPT | Performed by: INTERNAL MEDICINE

## 2020-07-23 PROCEDURE — 83605 ASSAY OF LACTIC ACID: CPT | Performed by: INTERNAL MEDICINE

## 2020-07-23 PROCEDURE — 25000128 H RX IP 250 OP 636: Performed by: INTERNAL MEDICINE

## 2020-07-23 PROCEDURE — 99207 ZZC CDG-CODE CATEGORY CHANGED: CPT | Performed by: HOSPITALIST

## 2020-07-23 PROCEDURE — 25000132 ZZH RX MED GY IP 250 OP 250 PS 637: Performed by: INTERNAL MEDICINE

## 2020-07-23 PROCEDURE — 40000275 ZZH STATISTIC RCP TIME EA 10 MIN

## 2020-07-23 PROCEDURE — 96376 TX/PRO/DX INJ SAME DRUG ADON: CPT

## 2020-07-23 PROCEDURE — G0378 HOSPITAL OBSERVATION PER HR: HCPCS

## 2020-07-23 PROCEDURE — 99217 ZZC OBSERVATION CARE DISCHARGE: CPT | Performed by: HOSPITALIST

## 2020-07-23 RX ORDER — CEFDINIR 300 MG/1
300 CAPSULE ORAL 2 TIMES DAILY
Qty: 10 CAPSULE | Refills: 0 | Status: SHIPPED | OUTPATIENT
Start: 2020-07-23 | End: 2020-08-11

## 2020-07-23 RX ORDER — ONDANSETRON 4 MG/1
4 TABLET, ORALLY DISINTEGRATING ORAL EVERY 8 HOURS PRN
Qty: 10 TABLET | Refills: 0 | Status: ON HOLD | OUTPATIENT
Start: 2020-07-23 | End: 2020-08-04

## 2020-07-23 RX ORDER — TAMSULOSIN HYDROCHLORIDE 0.4 MG/1
0.4 CAPSULE ORAL DAILY
Qty: 7 CAPSULE | Refills: 0 | Status: ON HOLD | OUTPATIENT
Start: 2020-07-23 | End: 2020-08-04

## 2020-07-23 RX ADMIN — CEFTRIAXONE 1 G: 1 INJECTION, POWDER, FOR SOLUTION INTRAMUSCULAR; INTRAVENOUS at 01:22

## 2020-07-23 RX ADMIN — ACETAMINOPHEN 650 MG: 325 TABLET, FILM COATED ORAL at 10:04

## 2020-07-23 RX ADMIN — OMEPRAZOLE 20 MG: 20 CAPSULE, DELAYED RELEASE ORAL at 08:25

## 2020-07-23 RX ADMIN — OXYCODONE HYDROCHLORIDE 5 MG: 5 TABLET ORAL at 01:22

## 2020-07-23 RX ADMIN — OXYCODONE HYDROCHLORIDE 5 MG: 5 TABLET ORAL at 10:39

## 2020-07-23 RX ADMIN — ALBUTEROL SULFATE 2 PUFF: 90 INHALANT RESPIRATORY (INHALATION) at 01:22

## 2020-07-23 RX ADMIN — ACETAMINOPHEN 650 MG: 325 TABLET, FILM COATED ORAL at 01:22

## 2020-07-23 RX ADMIN — ALBUTEROL SULFATE 1 PUFF: 90 INHALANT RESPIRATORY (INHALATION) at 08:17

## 2020-07-23 RX ADMIN — AMLODIPINE BESYLATE 10 MG: 5 TABLET ORAL at 08:25

## 2020-07-23 NOTE — PLAN OF CARE
Pain controled with Tylenol and Oxycodone. Kept cold pack to left side of abdomen. B/P stabilizing Midnight Lactic Acid within normal range. Void X3. Urine started a clear pink and gradually changed to burgundy hued Slept well. Did require 2 LPM O2. Snoring. Ambulated in [place] with stand by assist and used leg brace. IV fluids running. Monitor and strain urine out put. Encourage PO fluids. Medicate for pain as needed.

## 2020-07-23 NOTE — PLAN OF CARE
Vital Signs: Afebrile.   Pain/Comfort: Received Tylenol and Ibuprofen for c/o L flank pain without relief; received Oxycodone with relief.   Assessment: Flank pain. Urinary frequency. Abdominal discomfort while voiding.   Diet: Tolerated regular diet.   Output: Intact.   Activity/Ambulation: Ambulated hallway time one; increased pain and nausea.  Social: Sister present at the bedside earlier this shift; supportive.   Plan: IV fluid bolus infusing. Lactic acid recheck around 0000 per orders. IV maintenance fluids and antibiotics. Monitor and provide for needs.

## 2020-07-23 NOTE — PROGRESS NOTES
Sepsis Evaluation Progress Note    I was called to see Tresa Chin due to abnormal vital signs triggering the Sepsis SIRS screening alert. She is known to have an infection.     Physical Exam   Vital Signs:  Temp: 98.7  F (37.1  C) Temp src: Oral BP: 105/72 Pulse: 74 Heart Rate: 102 Resp: 17 SpO2: 95 % O2 Device: None (Room air) Oxygen Delivery: 2 LPM    Lab:  Lactate for Sepsis Protocol   Date Value Ref Range Status   07/22/2020 3.2 (H) 0.7 - 2.0 mmol/L Final     Comment:     Significant value called to and read back by  SOURAV QUAN (PEDS) ON 07.22.2020 AT 2023, SP         The patient is at baseline mental status.     The rest of their physical exam is significant for Normal    Assessment & Plan   NO EVIDENCE OF SEPSIS at this time.  Vital sign, physical exam, and lab findings are likely due to mild sepsis.    Disposition: The patient will remain on the current unit. We will continue to monitor this patient closely..  Lj Beth, DO    Sepsis Criteria   Sepsis: 2+ SIRS criteria due to infection  Severe Sepsis: Sepsis AND 1+ new sign of acute organ dysfunction (Note: lactate >2 is organ dysfunction)  Septic Shock: Sepsis AND hypotension despite volume resuscitation with 30 ml/kg crystalloid

## 2020-07-23 NOTE — PLAN OF CARE
VSS.  Up independently in room.  Showered.  Pain controlled with po tylenol and oxycodone.  Voiding pink urine, no clots or stone.  Tolerating regular diet.  Discharged home to self care.  Discharge instructions given; all questions answered.  Aware of follow up recommendations.

## 2020-07-23 NOTE — OP NOTE
[facility]  Operative Note    Pre-operative diagnosis: Kidney stone [N20.0]   Post-operative diagnosis Left ureteral stone   Procedure: Procedure(s):  CYSTOSCOPY, WITH LEFT URETERAL STENT INSERTION   Surgeon: Anurag Gomez MD   Assistants(s): none   Anesthesia: General    Estimated blood loss: None    Total IV fluids: (See anesthesia record)   Blood transfusion: No transfusion was given during surgery   Total urine output: (See anesthesia record)   Drains: 6 Fr x 24 cm left ureteral stent ([employer] Polaris Ultra)   Specimens: None   Implants: None   Findings: No bladder tumor or stones. Mild hydroureteronephrosis, stone not easily seen on fluoro. Uncomplicated stent placement, no purulence noted.   Complications: None.   Condition: Stable       Description of procedure:  This is a 40-year-old female with a history of morbid obesity hypertension hyperlipidemia remote history of nephrolithiasis which passed spontaneously who presents to the hospital with acute onset left renal colic starting last night.  She was found to have an obstructing left mid to distal ureteral stone.  Given her ongoing poorly controlled pain, concern for infected urine on urinalysis, mild leukocytosis and mild lactate elevation the decision was made to take her to the operating room for placement of a stent with plan for interval ureteroscopy for treatment of the stone after resolution of infection.  Informed consent was obtained.    The patient was brought to the operating room.  She was transferred to the operating room table general anesthesia was induced and she is placed in dorsal lithotomy position with all pressure points padded.  She was prepped with Betadine and draped in the usual sterile fashion.  A timeout was performed.  She had already received a weight and renal appropriate dose of antibiotics in the emergency department.  A 22 [place]ian Storz cystoscope was assembled and passed through her urethra into  her bladder.  Cystoscopy revealed no bladder tumors no bladder stones or other lesions.  Bilateral orthotopic ureteral orifices were noted.   images were performed which did not show any obvious ureteral stones however the images were degraded due to body habitus.  The left ureteral orifice was cannulated with a 5 Salvadorean tiger tail catheter and a gentle left retrograde pyelogram was performed which revealed mild hydroureteronephrosis proximal to the presumed mid to distal ureteral stone.  A 0.035 inch stiff shaft Glidewire was passed up to the left renal pelvis under fluoroscopic guidance. The tiger tail catheter was removed and a 6 Salvadorean by 24 cm [employer] Polaris ultra left ureteral stent was placed in the usual fashion using cystoscopic and fluoroscopic guidance over the wire. The wire was removed with good proximal and distal coils noted.  The bladder was drained and the cystoscope was removed the patient was cleaned up woken from anesthesia and brought to PACU in stable condition        Anurag Gomez MD   [facility]  [phone] clinic phone

## 2020-07-23 NOTE — PROVIDER NOTIFICATION
Provider notified of elevated Lactic Acid. Plan for IVF bolus and Lactic acid redraw following intervention. Will continue to monitor.

## 2020-07-23 NOTE — DISCHARGE SUMMARY
"[facility]    Discharge Summary  Hospitalist    Date of Admission:  7/21/2020  Date of Discharge:  7/23/2020  Discharging Provider: Montrell Jorge MD  Date of Service (when I saw the patient): 07/23/20    Discharge Diagnoses   #Kidney stone status post cystoscopy with left ureteral stent placement  #Urinary tract infection  #History of JONATHAN    History of Present Illness   \"Tresa Chin is a 40-year-old woman who came to attention on the evening of 7/21/2020 due to rather abrupt onset of pain that gradually became nearly unbearable.  She recognized this as potentially similar to previous episodes of nephrolithiasis though she never has had a stone that did not pass on its own.     In the emergency department, Ms. Chin was significantly uncomfortable but hemodynamically stable.  Oxygen saturation was nearly 100% breathing room air and she was afebrile.  Creatinine 0.7, white cell count 14.4 with left shift, urinalysis was mildly abnormal with leukocyte esterase and 6 white cells per high-powered field but I note this was a midstream collection.  She was sent for CT scan that confirmed the presence of a left mid to distal ureteral stone with associated mild to moderate hydronephrosis.\"    Hospital Course   Tresa Chin was started on ceftriaxone and admitted for observation.  She was given PRN pain medications and IV fluids.  She was seen by urology.  She did undergo cystoscopy with left ureter stent placement on 7/22.  Procedure was without complication.  She felt well on the day of discharge.  Pain was under good control.  Urinating adequately.  She was off supplemental oxygen post-operatively.  She was seen by urology on the day of discharge.  Her UA was positive for leukocyte esterase, pyuria and bacteriuria.  Her urine culture did grow 50,000 colonies of normal genital drea.  Given her symptoms, she will complete a short course of oral antibiotics for possible UTI.  She will follow-up with " urology in 1 to 2 weeks for a ureteroscopy for definitive stone management.    Montrell Jorge MD    Code Status   Full Code       Primary Care Physician   Melissa Franco    Physical Exam   Temp: 98.3  F (36.8  C) Temp src: Oral BP: 122/85 Pulse: 91 Heart Rate: 90 Resp: 22 SpO2: 98 % O2 Device: None (Room air) Oxygen Delivery: 2 LPM  Vitals:    07/22/20 0235   Weight: 117.2 kg (258 lb 6.1 oz)     Vital Signs with Ranges  Temp:  [96  F (35.6  C)-98.8  F (37.1  C)] 98.3  F (36.8  C)  Pulse:  [74-92] 91  Heart Rate:  [] 90  Resp:  [14-28] 22  BP: (101-137)/(69-96) 122/85  Cuff Mean (mmHg):  [81-98] 98  SpO2:  [85 %-100 %] 98 %  I/O last 3 completed shifts:  In: 3578.34 [P.O.:400; I.V.:3178.34]  Out: 2300 [Urine:2300]    Gen: Nontoxic, NAD  HEENT: normocephalic, MMM, no oral lesions  CV: reg, no murmur  Resp: Clear, no wheezes.  Abd: Soft, NT, ND  Ext: No edema  Neuro: nonfocal, no tremor    Discharge Disposition   Discharged to home  Condition at discharge: Stable    Consultations This Hospital Stay   UROLOGY IP CONSULT    Time Spent on this Encounter   I, Montrell Jorge MD, personally saw the patient today and spent greater than 30 minutes discharging this patient.    Discharge Orders      Reason for your hospital stay    You were hospitalized for kidney stone.  You were seen by urology.  You did undergo cystoscopy with stent placement.  You will need to follow-up with urology in the next 1 to 2 weeks for repeat procedure.     Activity    Your activity upon discharge: activity as tolerated     Follow-up and recommended labs and tests     Follow-up with urology in 1 to 2 weeks for ureteroscopy for stone treatment     Full Code     Diet    Follow this diet upon discharge: Orders Placed This Encounter      Advance Diet as Tolerated: Regular Diet Adult     Discharge Medications   Current Discharge Medication List      START taking these medications    Details   cefdinir (OMNICEF) 300 MG capsule Take 1 capsule (300 mg) by mouth  2 times daily for 5 days  Qty: 10 capsule, Refills: 0    Associated Diagnoses: Urinary tract infection without hematuria, site unspecified      ondansetron (ZOFRAN ODT) 4 MG ODT tab Take 1 tablet (4 mg) by mouth every 8 hours as needed for nausea or vomiting  Qty: 10 tablet, Refills: 0      oxyCODONE (ROXICODONE) 5 MG tablet Take 1 tablet (5 mg) by mouth every 6 hours as needed for pain  Qty: 12 tablet, Refills: 0      tamsulosin (FLOMAX) 0.4 MG capsule Take 1 capsule (0.4 mg) by mouth daily for 7 doses  Qty: 7 capsule, Refills: 0         CONTINUE these medications which have NOT CHANGED    Details   albuterol (PROAIR HFA/PROVENTIL HFA/VENTOLIN HFA) 108 (90 Base) MCG/ACT inhaler Inhale 2 puffs into the lungs every 6 hours as needed for shortness of breath / dyspnea or wheezing    Comments: Pharmacy may dispense brand covered by insurance (Proair, or proventil or ventolin or generic albuterol inhaler)      amLODIPine 10 MG PO tablet Take 1 tablet (10 mg) by mouth daily  Qty: 90 tablet, Refills: 3    Associated Diagnoses: Essential hypertension      hydrOXYzine (ATARAX) 25 MG tablet Take 1 tablet (25 mg) by mouth 3 times daily as needed for anxiety  Qty: 60 tablet, Refills: 0    Associated Diagnoses: Panic attack; Anxiety state      omeprazole (PRILOSEC OTC) 20 MG EC tablet Take 1 tablet (20 mg) by mouth daily  Qty: 90 tablet, Refills: 0    Associated Diagnoses: Gastroesophageal reflux disease without esophagitis           Allergies   Allergies   Allergen Reactions     Latex      PN: Converted from  Latex Sensitivity Flag     Data   Most Recent 3 CBC's:  Recent Labs   Lab Test 07/21/20 2241 03/13/20  1443 10/23/17  1452   WBC 14.4* 9.0 9.6   HGB 14.5 15.0 14.7   MCV 91 90 90    301 355      Most Recent 3 BMP's:  Recent Labs   Lab Test 07/21/20  2241 03/13/20  1443 02/19/20  0957 01/26/19  1103    138  --  137   POTASSIUM 3.8 3.8  --  3.8   CHLORIDE 107 106  --  103   CO2 26 28  --  29   BUN 16 13  --   14   CR 0.71 0.44*  --  0.58   ANIONGAP 6 4  --  5   NAOMI 8.5 9.0  --  9.2   * 110* 114* 85     Most Recent 2 LFT's:  Recent Labs   Lab Test 03/13/20  1443   AST 33   ALT 78*   ALKPHOS 63   BILITOTAL 0.4     Most Recent INR's and Anticoagulation Dosing History:  Anticoagulation Dose History     There is no flowsheet data to display.        Most Recent 3 Troponin's:  Recent Labs   Lab Test 10/23/17  1452   TROPI <0.015     Most Recent Cholesterol Panel:  Recent Labs   Lab Test 02/19/20  0957   CHOL 259*   *   HDL 69   TRIG 104     Most Recent 6 Bacteria Isolates From Any Culture (See EPIC Reports for Culture Details):  Recent Labs   Lab Test 07/22/20  0045 03/13/20  1434 01/26/19  1129   CULT 10,000 to 50,000 colonies/mL  mixed urogenital drea  Susceptibility testing not routinely done   >100,000 colonies/mL  mixed urogenital dera  Susceptibility testing not routinely done   10,000 to 50,000 colonies/mL  mixed urogenital drea  Susceptibility testing not routinely done       Most Recent TSH, T4 and A1c Labs:  Recent Labs   Lab Test 02/19/20  0957   A1C 5.8*

## 2020-07-27 ENCOUNTER — TELEPHONE (OUTPATIENT)
Dept: FAMILY MEDICINE | Facility: CLINIC | Age: 40
End: 2020-07-27

## 2020-07-27 NOTE — TELEPHONE ENCOUNTER
ED / Discharge Outreach Protocol    Patient Contact    Attempt # 1    Was call answered?  No.  Left message on voicemail with information to call me back.    Brittany Farrell RN

## 2020-07-29 NOTE — TELEPHONE ENCOUNTER
ED / Discharge Outreach Protocol    Patient Contact    Attempt # 2    Was call answered?  No.  L/M to call.  Brittany Farrell RN

## 2020-07-30 NOTE — TELEPHONE ENCOUNTER
ED / Discharge Outreach Protocol    Patient Contact    Attempt # 3    Was call answered?  No.  Encounter closed.  Brittany Farrell RN

## 2020-08-01 DIAGNOSIS — Z11.59 ENCOUNTER FOR SCREENING FOR OTHER VIRAL DISEASES: ICD-10-CM

## 2020-08-01 PROCEDURE — U0003 INFECTIOUS AGENT DETECTION BY NUCLEIC ACID (DNA OR RNA); SEVERE ACUTE RESPIRATORY SYNDROME CORONAVIRUS 2 (SARS-COV-2) (CORONAVIRUS DISEASE [COVID-19]), AMPLIFIED PROBE TECHNIQUE, MAKING USE OF HIGH THROUGHPUT TECHNOLOGIES AS DESCRIBED BY CMS-2020-01-R: HCPCS | Performed by: STUDENT IN AN ORGANIZED HEALTH CARE EDUCATION/TRAINING PROGRAM

## 2020-08-02 LAB
SARS-COV-2 RNA SPEC QL NAA+PROBE: NOT DETECTED
SPECIMEN SOURCE: NORMAL

## 2020-08-04 ENCOUNTER — APPOINTMENT (OUTPATIENT)
Dept: GENERAL RADIOLOGY | Facility: CLINIC | Age: 40
End: 2020-08-04
Attending: STUDENT IN AN ORGANIZED HEALTH CARE EDUCATION/TRAINING PROGRAM
Payer: COMMERCIAL

## 2020-08-04 ENCOUNTER — ANESTHESIA (OUTPATIENT)
Dept: SURGERY | Facility: CLINIC | Age: 40
End: 2020-08-04
Payer: COMMERCIAL

## 2020-08-04 ENCOUNTER — HOSPITAL ENCOUNTER (OUTPATIENT)
Facility: CLINIC | Age: 40
Discharge: HOME | End: 2020-08-04
Attending: STUDENT IN AN ORGANIZED HEALTH CARE EDUCATION/TRAINING PROGRAM | Admitting: STUDENT IN AN ORGANIZED HEALTH CARE EDUCATION/TRAINING PROGRAM
Payer: COMMERCIAL

## 2020-08-04 ENCOUNTER — ANESTHESIA EVENT (OUTPATIENT)
Dept: SURGERY | Facility: CLINIC | Age: 40
End: 2020-08-04
Payer: COMMERCIAL

## 2020-08-04 VITALS
SYSTOLIC BLOOD PRESSURE: 145 MMHG | HEART RATE: 89 BPM | RESPIRATION RATE: 14 BRPM | TEMPERATURE: 97.5 F | DIASTOLIC BLOOD PRESSURE: 96 MMHG | OXYGEN SATURATION: 94 %

## 2020-08-04 DIAGNOSIS — N20.1 LEFT URETERAL STONE: ICD-10-CM

## 2020-08-04 DIAGNOSIS — N20.1 LEFT URETERAL STONE: Primary | ICD-10-CM

## 2020-08-04 DIAGNOSIS — N20.1 URETEROLITHIASIS: ICD-10-CM

## 2020-08-04 LAB — HCG UR QL: NEGATIVE

## 2020-08-04 PROCEDURE — 37000009 ZZH ANESTHESIA TECHNICAL FEE, EACH ADDTL 15 MIN: Performed by: STUDENT IN AN ORGANIZED HEALTH CARE EDUCATION/TRAINING PROGRAM

## 2020-08-04 PROCEDURE — 81025 URINE PREGNANCY TEST: CPT | Performed by: ANESTHESIOLOGY

## 2020-08-04 PROCEDURE — 25800030 ZZH RX IP 258 OP 636: Performed by: NURSE ANESTHETIST, CERTIFIED REGISTERED

## 2020-08-04 PROCEDURE — 25800025 ZZH RX 258: Performed by: STUDENT IN AN ORGANIZED HEALTH CARE EDUCATION/TRAINING PROGRAM

## 2020-08-04 PROCEDURE — 25000128 H RX IP 250 OP 636: Performed by: STUDENT IN AN ORGANIZED HEALTH CARE EDUCATION/TRAINING PROGRAM

## 2020-08-04 PROCEDURE — 71000027 ZZH RECOVERY PHASE 2 EACH 15 MINS: Performed by: STUDENT IN AN ORGANIZED HEALTH CARE EDUCATION/TRAINING PROGRAM

## 2020-08-04 PROCEDURE — 25000128 H RX IP 250 OP 636: Performed by: NURSE ANESTHETIST, CERTIFIED REGISTERED

## 2020-08-04 PROCEDURE — 25000125 ZZHC RX 250: Performed by: STUDENT IN AN ORGANIZED HEALTH CARE EDUCATION/TRAINING PROGRAM

## 2020-08-04 PROCEDURE — 88300 SURGICAL PATH GROSS: CPT | Mod: 26 | Performed by: STUDENT IN AN ORGANIZED HEALTH CARE EDUCATION/TRAINING PROGRAM

## 2020-08-04 PROCEDURE — 71000013 ZZH RECOVERY PHASE 1 LEVEL 1 EA ADDTL HR: Performed by: STUDENT IN AN ORGANIZED HEALTH CARE EDUCATION/TRAINING PROGRAM

## 2020-08-04 PROCEDURE — C1769 GUIDE WIRE: HCPCS | Performed by: STUDENT IN AN ORGANIZED HEALTH CARE EDUCATION/TRAINING PROGRAM

## 2020-08-04 PROCEDURE — 52356 CYSTO/URETERO W/LITHOTRIPSY: CPT | Mod: LT | Performed by: STUDENT IN AN ORGANIZED HEALTH CARE EDUCATION/TRAINING PROGRAM

## 2020-08-04 PROCEDURE — 25000132 ZZH RX MED GY IP 250 OP 250 PS 637: Performed by: ANESTHESIOLOGY

## 2020-08-04 PROCEDURE — 40000277 XR SURGERY CARM FLUORO LESS THAN 5 MIN W STILLS: Mod: TC

## 2020-08-04 PROCEDURE — 25000125 ZZHC RX 250: Performed by: NURSE ANESTHETIST, CERTIFIED REGISTERED

## 2020-08-04 PROCEDURE — 25000132 ZZH RX MED GY IP 250 OP 250 PS 637: Performed by: STUDENT IN AN ORGANIZED HEALTH CARE EDUCATION/TRAINING PROGRAM

## 2020-08-04 PROCEDURE — 40000306 ZZH STATISTIC PRE PROC ASSESS II: Performed by: STUDENT IN AN ORGANIZED HEALTH CARE EDUCATION/TRAINING PROGRAM

## 2020-08-04 PROCEDURE — 71000012 ZZH RECOVERY PHASE 1 LEVEL 1 FIRST HR: Performed by: STUDENT IN AN ORGANIZED HEALTH CARE EDUCATION/TRAINING PROGRAM

## 2020-08-04 PROCEDURE — 25000128 H RX IP 250 OP 636: Performed by: ANESTHESIOLOGY

## 2020-08-04 PROCEDURE — 36000065 ZZH SURGERY LEVEL 4 W FLUORO 1ST 30 MIN: Performed by: STUDENT IN AN ORGANIZED HEALTH CARE EDUCATION/TRAINING PROGRAM

## 2020-08-04 PROCEDURE — 27211024 ZZHC OR SUPPLY OTHER OPNP: Performed by: STUDENT IN AN ORGANIZED HEALTH CARE EDUCATION/TRAINING PROGRAM

## 2020-08-04 PROCEDURE — 36000063 ZZH SURGERY LEVEL 4 EA 15 ADDTL MIN: Performed by: STUDENT IN AN ORGANIZED HEALTH CARE EDUCATION/TRAINING PROGRAM

## 2020-08-04 PROCEDURE — 37000008 ZZH ANESTHESIA TECHNICAL FEE, 1ST 30 MIN: Performed by: STUDENT IN AN ORGANIZED HEALTH CARE EDUCATION/TRAINING PROGRAM

## 2020-08-04 PROCEDURE — 88300 SURGICAL PATH GROSS: CPT | Performed by: STUDENT IN AN ORGANIZED HEALTH CARE EDUCATION/TRAINING PROGRAM

## 2020-08-04 PROCEDURE — C2617 STENT, NON-COR, TEM W/O DEL: HCPCS | Performed by: STUDENT IN AN ORGANIZED HEALTH CARE EDUCATION/TRAINING PROGRAM

## 2020-08-04 PROCEDURE — 25500064 ZZH RX 255 OP 636: Performed by: STUDENT IN AN ORGANIZED HEALTH CARE EDUCATION/TRAINING PROGRAM

## 2020-08-04 PROCEDURE — 74420 UROGRAPHY RTRGR +-KUB: CPT | Mod: 26 | Performed by: STUDENT IN AN ORGANIZED HEALTH CARE EDUCATION/TRAINING PROGRAM

## 2020-08-04 PROCEDURE — 82365 CALCULUS SPECTROSCOPY: CPT | Performed by: STUDENT IN AN ORGANIZED HEALTH CARE EDUCATION/TRAINING PROGRAM

## 2020-08-04 PROCEDURE — 27210794 ZZH OR GENERAL SUPPLY STERILE: Performed by: STUDENT IN AN ORGANIZED HEALTH CARE EDUCATION/TRAINING PROGRAM

## 2020-08-04 DEVICE — STENT URETERAL POLARIS ULTRA 6FRX24CM M0061921320: Type: IMPLANTABLE DEVICE | Site: URETHRA | Status: FUNCTIONAL

## 2020-08-04 RX ORDER — CEFAZOLIN SODIUM 1 G/3ML
1 INJECTION, POWDER, FOR SOLUTION INTRAMUSCULAR; INTRAVENOUS SEE ADMIN INSTRUCTIONS
Status: DISCONTINUED | OUTPATIENT
Start: 2020-08-04 | End: 2020-08-04 | Stop reason: HOSPADM

## 2020-08-04 RX ORDER — ACETAMINOPHEN 325 MG/1
975 TABLET ORAL ONCE
Status: COMPLETED | OUTPATIENT
Start: 2020-08-04 | End: 2020-08-04

## 2020-08-04 RX ORDER — NALOXONE HYDROCHLORIDE 0.4 MG/ML
.1-.4 INJECTION, SOLUTION INTRAMUSCULAR; INTRAVENOUS; SUBCUTANEOUS
Status: DISCONTINUED | OUTPATIENT
Start: 2020-08-04 | End: 2020-08-04 | Stop reason: HOSPADM

## 2020-08-04 RX ORDER — OXYCODONE HYDROCHLORIDE 5 MG/1
5 TABLET ORAL EVERY 6 HOURS PRN
Qty: 6 TABLET | Refills: 0 | Status: SHIPPED | OUTPATIENT
Start: 2020-08-04 | End: 2020-08-11

## 2020-08-04 RX ORDER — ONDANSETRON 4 MG/1
4 TABLET, ORALLY DISINTEGRATING ORAL EVERY 30 MIN PRN
Status: DISCONTINUED | OUTPATIENT
Start: 2020-08-04 | End: 2020-08-04 | Stop reason: HOSPADM

## 2020-08-04 RX ORDER — KETOROLAC TROMETHAMINE 30 MG/ML
INJECTION, SOLUTION INTRAMUSCULAR; INTRAVENOUS PRN
Status: DISCONTINUED | OUTPATIENT
Start: 2020-08-04 | End: 2020-08-04

## 2020-08-04 RX ORDER — FENTANYL CITRATE 50 UG/ML
25-50 INJECTION, SOLUTION INTRAMUSCULAR; INTRAVENOUS
Status: DISCONTINUED | OUTPATIENT
Start: 2020-08-04 | End: 2020-08-04 | Stop reason: HOSPADM

## 2020-08-04 RX ORDER — ATROPA BELLADONNA AND OPIUM 16.2; 3 MG/1; MG/1
SUPPOSITORY RECTAL PRN
Status: DISCONTINUED | OUTPATIENT
Start: 2020-08-04 | End: 2020-08-04 | Stop reason: HOSPADM

## 2020-08-04 RX ORDER — KETOROLAC TROMETHAMINE 30 MG/ML
30 INJECTION, SOLUTION INTRAMUSCULAR; INTRAVENOUS ONCE
Status: COMPLETED | OUTPATIENT
Start: 2020-08-04 | End: 2020-08-04

## 2020-08-04 RX ORDER — ONDANSETRON 2 MG/ML
INJECTION INTRAMUSCULAR; INTRAVENOUS PRN
Status: DISCONTINUED | OUTPATIENT
Start: 2020-08-04 | End: 2020-08-04

## 2020-08-04 RX ORDER — PROPOFOL 10 MG/ML
INJECTION, EMULSION INTRAVENOUS PRN
Status: DISCONTINUED | OUTPATIENT
Start: 2020-08-04 | End: 2020-08-04

## 2020-08-04 RX ORDER — LIDOCAINE 40 MG/G
CREAM TOPICAL
Status: DISCONTINUED | OUTPATIENT
Start: 2020-08-04 | End: 2020-08-04 | Stop reason: HOSPADM

## 2020-08-04 RX ORDER — TAMSULOSIN HYDROCHLORIDE 0.4 MG/1
0.4 CAPSULE ORAL DAILY
Qty: 7 CAPSULE | Refills: 0 | Status: SHIPPED | OUTPATIENT
Start: 2020-08-04 | End: 2020-08-11

## 2020-08-04 RX ORDER — DEXAMETHASONE SODIUM PHOSPHATE 4 MG/ML
INJECTION, SOLUTION INTRA-ARTICULAR; INTRALESIONAL; INTRAMUSCULAR; INTRAVENOUS; SOFT TISSUE PRN
Status: DISCONTINUED | OUTPATIENT
Start: 2020-08-04 | End: 2020-08-04

## 2020-08-04 RX ORDER — ACETAMINOPHEN 325 MG/1
975 TABLET ORAL ONCE
Status: DISCONTINUED | OUTPATIENT
Start: 2020-08-04 | End: 2020-08-04 | Stop reason: HOSPADM

## 2020-08-04 RX ORDER — ONDANSETRON 2 MG/ML
4 INJECTION INTRAMUSCULAR; INTRAVENOUS EVERY 30 MIN PRN
Status: DISCONTINUED | OUTPATIENT
Start: 2020-08-04 | End: 2020-08-04 | Stop reason: HOSPADM

## 2020-08-04 RX ORDER — SODIUM CHLORIDE, SODIUM LACTATE, POTASSIUM CHLORIDE, CALCIUM CHLORIDE 600; 310; 30; 20 MG/100ML; MG/100ML; MG/100ML; MG/100ML
INJECTION, SOLUTION INTRAVENOUS CONTINUOUS PRN
Status: DISCONTINUED | OUTPATIENT
Start: 2020-08-04 | End: 2020-08-04

## 2020-08-04 RX ORDER — ONDANSETRON 4 MG/1
4 TABLET, ORALLY DISINTEGRATING ORAL EVERY 8 HOURS PRN
Qty: 10 TABLET | Refills: 0 | Status: SHIPPED | OUTPATIENT
Start: 2020-08-04 | End: 2020-09-08

## 2020-08-04 RX ORDER — SODIUM CHLORIDE, SODIUM LACTATE, POTASSIUM CHLORIDE, CALCIUM CHLORIDE 600; 310; 30; 20 MG/100ML; MG/100ML; MG/100ML; MG/100ML
INJECTION, SOLUTION INTRAVENOUS CONTINUOUS
Status: DISCONTINUED | OUTPATIENT
Start: 2020-08-04 | End: 2020-08-04 | Stop reason: HOSPADM

## 2020-08-04 RX ORDER — GLYCOPYRROLATE 0.2 MG/ML
INJECTION, SOLUTION INTRAMUSCULAR; INTRAVENOUS PRN
Status: DISCONTINUED | OUTPATIENT
Start: 2020-08-04 | End: 2020-08-04

## 2020-08-04 RX ORDER — AMOXICILLIN 250 MG
1-2 CAPSULE ORAL 2 TIMES DAILY
Qty: 30 TABLET | Refills: 0 | Status: SHIPPED | OUTPATIENT
Start: 2020-08-04 | End: 2020-09-08

## 2020-08-04 RX ORDER — ACETAMINOPHEN 325 MG/1
650 TABLET ORAL EVERY 4 HOURS PRN
Qty: 50 TABLET | Refills: 0 | Status: SHIPPED | OUTPATIENT
Start: 2020-08-04 | End: 2021-02-18

## 2020-08-04 RX ORDER — LIDOCAINE HYDROCHLORIDE 10 MG/ML
INJECTION, SOLUTION INFILTRATION; PERINEURAL PRN
Status: DISCONTINUED | OUTPATIENT
Start: 2020-08-04 | End: 2020-08-04

## 2020-08-04 RX ORDER — FENTANYL CITRATE 50 UG/ML
INJECTION, SOLUTION INTRAMUSCULAR; INTRAVENOUS PRN
Status: DISCONTINUED | OUTPATIENT
Start: 2020-08-04 | End: 2020-08-04

## 2020-08-04 RX ORDER — CEFAZOLIN SODIUM 2 G/100ML
2 INJECTION, SOLUTION INTRAVENOUS
Status: COMPLETED | OUTPATIENT
Start: 2020-08-04 | End: 2020-08-04

## 2020-08-04 RX ADMIN — FENTANYL CITRATE 100 MCG: 50 INJECTION, SOLUTION INTRAMUSCULAR; INTRAVENOUS at 10:26

## 2020-08-04 RX ADMIN — SODIUM CHLORIDE, POTASSIUM CHLORIDE, SODIUM LACTATE AND CALCIUM CHLORIDE: 600; 310; 30; 20 INJECTION, SOLUTION INTRAVENOUS at 09:48

## 2020-08-04 RX ADMIN — MIDAZOLAM 2 MG: 1 INJECTION INTRAMUSCULAR; INTRAVENOUS at 10:18

## 2020-08-04 RX ADMIN — PROPOFOL 200 MG: 10 INJECTION, EMULSION INTRAVENOUS at 10:26

## 2020-08-04 RX ADMIN — GLYCOPYRROLATE 0.2 MG: 0.2 INJECTION, SOLUTION INTRAMUSCULAR; INTRAVENOUS at 10:26

## 2020-08-04 RX ADMIN — ONDANSETRON HYDROCHLORIDE 4 MG: 2 INJECTION, SOLUTION INTRAVENOUS at 11:12

## 2020-08-04 RX ADMIN — LIDOCAINE HYDROCHLORIDE 30 MG: 10 INJECTION, SOLUTION INFILTRATION; PERINEURAL at 10:26

## 2020-08-04 RX ADMIN — KETOROLAC TROMETHAMINE 15 MG: 30 INJECTION, SOLUTION INTRAMUSCULAR at 11:12

## 2020-08-04 RX ADMIN — ACETAMINOPHEN 975 MG: 325 TABLET, FILM COATED ORAL at 12:43

## 2020-08-04 RX ADMIN — CEFAZOLIN SODIUM 2 G: 2 INJECTION, SOLUTION INTRAVENOUS at 10:30

## 2020-08-04 RX ADMIN — DEXAMETHASONE SODIUM PHOSPHATE 4 MG: 4 INJECTION, SOLUTION INTRA-ARTICULAR; INTRALESIONAL; INTRAMUSCULAR; INTRAVENOUS; SOFT TISSUE at 10:26

## 2020-08-04 RX ADMIN — KETOROLAC TROMETHAMINE 30 MG: 30 INJECTION, SOLUTION INTRAMUSCULAR at 12:42

## 2020-08-04 RX ADMIN — ACETAMINOPHEN 975 MG: 325 TABLET, FILM COATED ORAL at 10:13

## 2020-08-04 SDOH — HEALTH STABILITY: MENTAL HEALTH: CURRENT SMOKER: 0

## 2020-08-04 NOTE — ANESTHESIA POSTPROCEDURE EVALUATION
Patient: Tresa Chin    Procedure(s):  Cystoscopy, removal of left ureteral stent, left ureteroscopy with holmium laser lithotripsy and stone basketing, placement of left ureteral stent    Diagnosis:Left ureteral stone [N20.1]  Diagnosis Additional Information: No value filed.    Anesthesia Type:  No value filed.    Note:  Anesthesia Post Evaluation    Patient location during evaluation: PACU  Patient participation: Able to fully participate in evaluation  Level of consciousness: awake and alert  Pain management: adequate  multimodal analgesia used between 6 hours prior to anesthesia start to PACU dischargeAirway patency: patent  Cardiovascular status: acceptable  Respiratory status: acceptable  two or more mitigation strategies used for obstructive sleep apneaHydration status: acceptable  PONV: none     Anesthetic complications: None          Last vitals:  Vitals:    08/04/20 1155 08/04/20 1200 08/04/20 1215   BP: (!) 129/94 (!) 129/94 (!) 128/93   Pulse: 73     Resp: 12 12 9   Temp:      SpO2: 97% 96% 98%         Electronically Signed By: Alejandro Pate MD  August 4, 2020  12:31 PM

## 2020-08-04 NOTE — ANESTHESIA CARE TRANSFER NOTE
Patient: Tresa Chin    Procedure(s):  Cystoscopy, removal of left ureteral stent, left ureteroscopy with holmium laser lithotripsy and stone basketing, placement of left ureteral stent    Diagnosis: Left ureteral stone [N20.1]  Diagnosis Additional Information: No value filed.    Anesthesia Type:   No value filed.     Note:  Airway :Room Air  Patient transferred to:PACU  Comments: VSS, resting comfortably, report to RN, no anesthetic complications. Handoff Report: Identifed the Patient, Identified the Reponsible Provider, Reviewed the pertinent medical history, Discussed the surgical course, Reviewed Intra-OP anesthesia mangement and issues during anesthesia and Allowed opportunity for questions and acknowledgement of understanding      Vitals: (Last set prior to Anesthesia Care Transfer)    CRNA VITALS  8/4/2020 1042 - 8/4/2020 1117      8/4/2020             Pulse:  95    SpO2:  100 %    Resp Rate (observed):  13                Electronically Signed By: BRIGIDO Hope CRNA  August 4, 2020  11:17 AM

## 2020-08-04 NOTE — ANESTHESIA PREPROCEDURE EVALUATION
Anesthesia Pre-Procedure Evaluation    Patient: Tresa Chin   MRN: [id number] : 1980          Preoperative Diagnosis: Left ureteral stone [N20.1]    Procedure(s):  Cystoscopy, removal of left ureteral stent, left ureteroscopy with holmium laser lithotripsy and stone basketing possible placement of left ureteral stent    Past Medical History:   Diagnosis Date     Back pain      Hypertension      Sleep apnea      Past Surgical History:   Procedure Laterality Date     COMBINED CYSTOSCOPY, INSERT STENT URETER(S) Left 2020    Procedure: Cystoscopy with left ureteral stent insertion;  Surgeon: Anurag Gomez MD;  Location:  OR     ESOPHAGOSCOPY, GASTROSCOPY, DUODENOSCOPY (EGD), COMBINED N/A 2019    Procedure: ESOPHAGOGASTRODUODENOSCOPY (EGD)cold BX (fv);  Surgeon: Geremias Martinez MD;  Location:  GI     GENITOURINARY SURGERY       Anesthesia Evaluation     . Pt has had prior anesthetic. Type: General    No history of anesthetic complications          ROS/MED HX    ENT/Pulmonary:     (+)sleep apnea, uses CPAP , . .    Neurologic:  - neg neurologic ROS     Cardiovascular:     (+) hypertension----. : . . . :. .       METS/Exercise Tolerance:     Hematologic:  - neg hematologic  ROS       Musculoskeletal:   (+) arthritis,  -       GI/Hepatic:     (+) GERD Asymptomatic on medication, liver disease,       Renal/Genitourinary:     (+) chronic renal disease, Nephrolithiasis ,       Endo:     (+) type II DM Obesity, .      Psychiatric:  - neg psychiatric ROS       Infectious Disease:  - neg infectious disease ROS       Malignancy:         Other:    - neg other ROS                      Physical Exam  Normal systems: cardiovascular, pulmonary and dental    Airway   Mallampati: II  TM distance: >3 FB  Neck ROM: full    Dental     Cardiovascular       Pulmonary             Lab Results   Component Value Date    WBC 14.4 (H) 2020    HGB 14.5 2020    HCT 45.1 2020      "07/21/2020     07/21/2020    POTASSIUM 3.8 07/21/2020    CHLORIDE 107 07/21/2020    CO2 26 07/21/2020    BUN 16 07/21/2020    CR 0.71 07/21/2020     (H) 07/21/2020    NAOMI 8.5 07/21/2020    ALBUMIN 3.7 03/13/2020    PROTTOTAL 7.5 03/13/2020    ALT 78 (H) 03/13/2020    AST 33 03/13/2020    ALKPHOS 63 03/13/2020    BILITOTAL 0.4 03/13/2020    HCG Negative 08/04/2020       Preop Vitals  BP Readings from Last 3 Encounters:   07/23/20 122/85   07/13/20 98/78   06/29/20 (!) 142/90    Pulse Readings from Last 3 Encounters:   07/23/20 91   07/13/20 (P) 72   03/13/20 100      Resp Readings from Last 3 Encounters:   07/23/20 22   07/13/20 (P) 16   03/13/20 14    SpO2 Readings from Last 3 Encounters:   07/23/20 98%   03/13/20 97%   02/19/20 99%      Temp Readings from Last 1 Encounters:   07/23/20 98.3  F (36.8  C) (Oral)    Ht Readings from Last 1 Encounters:   07/22/20 1.549 m (5' 1\")      Wt Readings from Last 1 Encounters:   07/22/20 117.2 kg (258 lb 6.1 oz)    Estimated body mass index is 48.82 kg/m  as calculated from the following:    Height as of 7/22/20: 1.549 m (5' 1\").    Weight as of 7/22/20: 117.2 kg (258 lb 6.1 oz).       Anesthesia Plan      History & Physical Review  History and physical reviewed and following examination; no interval change.    ASA Status:  3 .    NPO Status:  > 8 hours    Plan for General with Intravenous induction. Maintenance will be Balanced.    PONV prophylaxis:  Ondansetron (or other 5HT-3) and Dexamethasone or Solumedrol    The patient is not a current smoker      Postoperative Care  Postoperative pain management:  IV analgesics, Oral pain medications and Multi-modal analgesia.      Consents  Anesthetic plan, risks, benefits and alternatives discussed with:  Patient..                 Alejandro Pate MD                    .  "

## 2020-08-04 NOTE — BRIEF OP NOTE
[facility]    Brief Operative Note    Pre-operative diagnosis: Left ureteral stone [N20.1]  Post-operative diagnosis Same as pre-operative diagnosis    Procedure: Procedure(s):  Cystoscopy, removal of left ureteral stent, left ureteroscopy with holmium laser lithotripsy and stone basketing, placement of left ureteral stent  Surgeon: Surgeon(s) and Role:     * Anurag Gomez MD - Primary  Anesthesia: General   Estimated blood loss: None  Drains: 6 Fr x 24 cm left ureteral stent  Specimens:   ID Type Source Tests Collected by Time Destination   1 : Left Ureteral Stone Calculus/Stone Ureter, Left STONE ANALYSIS Anurag Gomez MD 8/4/2020 10:54 AM      Findings:   Stone impacted at the level of the pelvic brim, fragmented with laser and basketed out.  Complications: None.  Implants:   Implant Name Type Inv. Item Serial No.  Lot No. LRB No. Used Action   STENT URETERAL POLARIS ULTRA [id number] Stent STENT URETERAL POLARIS ULTRA [id number]  [employer] [id number] Left 1 Explanted   STENT URETERAL POLARIS ULTRA [id number] Stent STENT URETERAL POLARIS ULTRA [id number]  [employer] [id number] Left 1 Implanted

## 2020-08-04 NOTE — DISCHARGE INSTRUCTIONS
HAD IV IBUPROFEN AT 1230 PM. NEXT IBUPROFEN POSSIBLE IS  PM  HAD ORAL TYLENOL AT 1230 PM. NEXT POSSIBLE DOSE  PM          POSTOPERATIVE INSTRUCTIONS    Diagnosis-------------------------------   Left ureteral stone    Procedure-------------------------------  Procedure(s) (LRB):  Cystoscopy, removal of left ureteral stent, left ureteroscopy with holmium laser lithotripsy and stone basketing, placement of left ureteral stent (Left)      Findings--------------------------------  Left ureteral stone, laser lithotripsy and stone basketing, all stone fragments removed, left stent placed    Home-going instructions-----------------         Activity Limitation:     - No driving or operating heavy machinery while on narcotic pain medication.     FOLLOW THESE INSTRUCTIONS AS INDICATED BELOW:  - Observe operative area for signs of excessive bleeding.  - You may shower.  - Increase fluid intake to promote clear urine.  - Resume usual diet as tolerated    What to expect while recovering-----------  - You may experience some intermittent bleeding that makes your urine pink or cherry colored. This is normal.  - However, if you are unable to urinate, passing large amount of clots, have mayito blood in your urine, or have a temperature >101 degrees, call the urology nurse on call, or present to your nearest emergency department.  - You are encouraged to walk daily, and have no activity restrictions.   - A URETERAL STENT has been placed that allows urine to flow unobstructed from your kidney into your bladder.  The stent has a curl in the kidney and a curl in the bladder.  The curl in the bladder can cause some urgency and frequency of urination as well as some mild blood in the urine.  The curl in the kidney can cause some mild flank discomfort.  This may be more noticeable when you urinate.  A URETERAL STENT is meant to be left in temporarily.  It must be removed or changed no later than 3 months after it's insertion.   If it's not removed it can result in stone overgrowth on the stent that can cause pain, infection, and can be very difficult to remove.      Discharge Medications/instructions:     - Flomax (tamsulosin) to be taken daily until stent is removed    - Antibiotics will only be prescribed if needed    - Take Tylenol 1000mg every 6 hours for pain    - Take Ibuprofen 600mg every 6 hours as needed for additional pain control    - Take Oxycodone 5mg every 6 hours only for break through pain    - Take Colace while taking Oxycodone to prevent constipation      Questions/concerns------------------------  [facility]: [phone]    Future appointments  Follow up in 1 week for cystoscopy and stent removal in clinic    Anurag Gomez MD       CYSTOSCOPY DISCHARGE INSTRUCTIONS  [facility]  [facility]  [phone]    YOU MAY GO BACK TO YOUR NORMAL DIET AND ACTIVITY, UNLESS YOUR DOCTOR TELLS YOU NOT TO.    FOR THE NEXT TWO DAYS, YOU MAY NOTICE:    SOME BLOOD IN YOUR URINE.  SOME BURNING WHEN YOU URINATE.  AN URGE TO URINATE MORE OFTEN.  BLADDER SPASMS.    THESE ARE NORMAL AFTER THE PROCEDURE.  THEY SHOULD GO AWAY AFTER A DAY OR TWO.  TO RELIEVE THESE PROBLEMS:     DRINK 6 TO 8 LARGE GLASSES OF WATER EACH DAY (INCLUDES DRINKS AT MEALS).  THIS WILL HELP CLEAR THE URINE.    TAKE WARM BATHS TO RELIEVE PAIN AND BLADDER SPASMS.  DO NOT ADD ANYTHING TO THE BATH WATER.    YOUR DOCTOR MAY PRESCRIBE PAIN MEDICINE.  YOU MAY ALSO TAKE TYLENOL (ACETAMINOPHEN) FOR PAIN.    CALL YOUR SURGEON IF YOU HAVE:    A FEVER OVER 101 DEGREES.  CHECK YOUR TEMPERATURE UNDER YOUR TONGUE.    CHILLS.    FAILURE TO URINATE (NO URINE COMES OUT WHEN YOU TRY TO USE THE TOILET).  TRY SOAKING IN A BATHTUB FULL OF WARM WATER.  IF STILL NO URINE, CALL YOUR DOCTOR.    A LOT OF BLOOD IN THE URINE, OR BLOOD CLOTS LARGER THAN A NICKEL.      PAIN IN THE BACK OR BELLY AREA (ABDOMEN).    PAIN OR SPASMS THAT ARE  NOT RELIEVED BY WARM TUB BATHS AND PAIN MEDICINE.      SEVERE PAIN, BURNING OR OTHER PROBLEMS WHILE PASSING URINE.    PAIN THAT GETS WORSE AFTER TWO DAYS.       STENT INFORMATION/DISCHARGE INSTRUCTIONS  [facility]  [facility]  [phone]    During surgery, a stent may be placed in the ureter.  The ureter is the tube that drains urine from the kidney to the bladder.  The stent is placed to dilate (open) the ureter so stone fragments can pass easily through the ureter or to decrease ureteral swelling after surgery or to relieve an obstruction.      The stent is made of silicone.  The upper end of the stent curls in the kidney while the lower end rests in the bladder.    While the stent is in place you may experience the following symptoms:  Blood and/or small blood clots in the urine  Bladder spasms (frequency and urgency of urination)  Discomfort or aching in the back or side where the stent is  Burning or discomfort at the end of urine stream    To decrease these symptoms you should:  Take antispasmodic medication as prescribed (Detrol, Ditropan, etc.)  Drink plenty of fluids but avoid caffeine and citrus (include cranberry)  If you are having discomfort in back or side, decrease activity    Please call your physician or the physician on call if you experience:  Fever greater than 101 degrees  Severe pain not relieved by pain medication or rest    Please make an appointment for the removal of the stent according to your physician's instructions.         GENERAL ANESTHESIA OR SEDATION ADULT DISCHARGE INSTRUCTIONS   SPECIAL PRECAUTIONS FOR 24 HOURS AFTER SURGERY    IT IS NOT UNUSUAL TO FEEL LIGHT-HEADED OR FAINT, UP TO 24 HOURS AFTER SURGERY OR WHILE TAKING PAIN MEDICATION.  IF YOU HAVE THESE SYMPTOMS; SIT FOR A FEW MINUTES BEFORE STANDING AND HAVE SOMEONE ASSIST YOU WHEN YOU GET UP TO WALK OR USE THE BATHROOM.    YOU SHOULD REST AND RELAX FOR THE NEXT 24 HOURS AND YOU MUST MAKE  ARRANGEMENTS TO HAVE SOMEONE STAY WITH YOU FOR AT LEAST 24 HOURS AFTER YOUR DISCHARGE.  AVOID HAZARDOUS AND STRENUOUS ACTIVITIES.  DO NOT MAKE IMPORTANT DECISIONS FOR 24 HOURS.    DO NOT DRIVE ANY VEHICLE OR OPERATE MECHANICAL EQUIPMENT FOR 24 HOURS FOLLOWING THE END OF YOUR SURGERY.  EVEN THOUGH YOU MAY FEEL NORMAL, YOUR REACTIONS MAY BE AFFECTED BY THE MEDICATION YOU HAVE RECEIVED.    DO NOT DRINK ALCOHOLIC BEVERAGES FOR 24 HOURS FOLLOWING YOUR SURGERY.    DRINK CLEAR LIQUIDS (APPLE JUICE, GINGER ALE, 7-UP, BROTH, ETC.).  PROGRESS TO YOUR REGULAR DIET AS YOU FEEL ABLE.    YOU MAY HAVE A DRY MOUTH, A SORE THROAT, MUSCLES ACHES OR TROUBLE SLEEPING.  THESE SHOULD GO AWAY AFTER 24 HOURS.    CALL YOUR DOCTOR FOR ANY OF THE FOLLOWING:  SIGNS OF INFECTION (FEVER, GROWING TENDERNESS AT THE SURGERY SITE, A LARGE AMOUNT OF DRAINAGE OR BLEEDING, SEVERE PAIN, FOUL-SMELLING DRAINAGE, REDNESS OR SWELLING.    IT HAS BEEN OVER 8 TO 10 HOURS SINCE SURGERY AND YOU ARE STILL NOT ABLE TO URINATE (PASS WATER).

## 2020-08-04 NOTE — OP NOTE
[facility]  Operative Note    Pre-operative diagnosis: Left ureteral stone [N20.1]   Post-operative diagnosis left ureteral stone   Procedure: Procedure(s):  Cystoscopy, removal of left ureteral stent, left ureteroscopy with holmium laser lithotripsy and stone basketing, placement of left ureteral stent   Fluoroscopic interpretation <1 hour physician time     Surgeon: Anurag Gomez MD   Assistants(s): none   Anesthesia: General    Estimated blood loss: None    Total IV fluids: (See anesthesia record)   Blood transfusion: No transfusion was given during surgery   Total urine output: Not recorded   Drains: Left ureteral stent 6 Fr x 24 cm     Specimens: None   Implants: None   Findings: Stone impacted at the level of the pelvic brim, fragmented with laser and basketed out.   Complications: None.   Condition: Stable       Description of procedure:  Patient is a 40-year-old female with history of morbid obesity, hypertension, hyperlipidemia, remote history of nephrolithiasis in the past who underwent left ureteral stent placement for a left obstructing ureteral stone on July 22, 2020.  She returns today for definitive stone treatment with ureteroscopy.  Risks and benefits were discussed and informed consent was obtained.    The patient was brought to the operating room.  She was transferred the operating room table, general anesthesia was induced and she was placed in the dorsal lithotomy position with all pressure points padded.  She was prepped with Betadine and draped in the usual sterile fashion.  She received a preoperative dose of Ancef.  A timeout was performed.  A 22 Icelandic Storz cystoscope was assembled and passed through her urethra into her bladder.  The left ureteral stent was seen to be emanating from the left ureteral orifice, and the coil was grasped with the alligator grasper and brought out through the urethra.  This was cannulated with a 0.035 inch stiff shaft Glidewire which was  passed up to the renal pelvis under fluoroscopic direction.  The existing stent was removed intact and discarded.  There was no encrustation.  The wire was clipped to the drape as a safety.  Semirigid ureteroscope was assembled and then passed up the left ureter.  A 6 cm left ureteral calculus was encountered at the pelvic brim.  It appeared to be impacted into the ureteral wall.  A 200  m holmium laser fiber was used along with a Lumenis 100 watt laser to fragment the stone at settings of 0.8 J / 8 Hz.  Fragments were then basketed out of the ureter with the [facility] Halo basket and left in the bladder temporarily.  The ureteroscope was passed all the way up to the renal pelvis and no further ureteral stones were seen. Pullout ureteroscopy revealed no ureteral injuries. Semirigid ureteroscope was removed, and the cystoscope was inserted into the bladder.  The stones were washed out of the bladder and collected and sent for stone analysis. A 5 Fr Tigertail catheter was passed over the wire and a retrograde pyelogram was performed to identify the renal pelvis for stent placement. The catheter was removed and the glidewire placed back up into the renal pelvis. A 6 Lithuanian by 24 cm left ureteral stent was placed in the usual fashion under cystoscopic and fluoroscopic guidance.  The wire was removed. The bladder was drained and the scope was removed.  A B&O suppository was placed per rectum.  The patient was cleaned up, awoken from anesthesia, and transferred to the PACU in stable condition    - Return to clinic in ~1 week for cystoscopy and left ureteral stent removal    Anurag Gomez MD   [facility]  [phone] clinic phone

## 2020-08-05 LAB — COPATH REPORT: NORMAL

## 2020-08-08 LAB
APPEARANCE STONE: NORMAL
COMPN STONE: NORMAL
NUMBER STONE: NORMAL
SIZE STONE: NORMAL MM
WT STONE: 22 MG

## 2020-08-11 ENCOUNTER — VIRTUAL VISIT (OUTPATIENT)
Dept: INTERNAL MEDICINE | Facility: CLINIC | Age: 40
End: 2020-08-11
Payer: COMMERCIAL

## 2020-08-11 ENCOUNTER — MYC MEDICAL ADVICE (OUTPATIENT)
Dept: INTERNAL MEDICINE | Facility: CLINIC | Age: 40
End: 2020-08-11

## 2020-08-11 DIAGNOSIS — N20.0 KIDNEY STONE: Primary | ICD-10-CM

## 2020-08-11 PROCEDURE — 99207 ZZC NO BILLABLE SERVICE THIS VISIT: CPT | Performed by: INTERNAL MEDICINE

## 2020-08-11 NOTE — PROGRESS NOTES
"Tresa Chin is a 40 year old female who is being evaluated via a billable video visit.      The patient has been notified of following:     \"This video visit will be conducted via a call between you and your physician/provider. We have found that certain health care needs can be provided without the need for an in-person physical exam.  This service lets us provide the care you need with a video conversation.  If a prescription is necessary we can send it directly to your pharmacy.  If lab work is needed we can place an order for that and you can then stop by our lab to have the test done at a later time.    Video visits are billed at different rates depending on your insurance coverage.  Please reach out to your insurance provider with any questions.    If during the course of the call the physician/provider feels a video visit is not appropriate, you will not be charged for this service.\"    Patient has given verbal consent for Video visit? Yes  How would you like to obtain your AVS? Mail a copy  If you are dropped from the video visit, the video invite should be resent to: Text to cell phone: [phone]  Will anyone else be joining your video visit? No        Tresa Chin is a 40 year old female who presents today via video visit for the following health issues:  Patient is being seen for an ER follow up.    12:03 -- 12:10    ED/UC Followup:    Facility:  [facility]  Date of visit: 08/04/2020   Reason for visit: Left ureteral stone     Current Status: Patient states she feels better.       This is a VIDEO ( using Doximity)  encounter with the patient.       Location of the provider : home   Location of the patient : home        Dr Paiz's note        ASSESSMENT & PLAN:                                                      She thought this appointment was for sleep apnea.   This appointment was made in ER for kidney stone f/u   She is already in the urology care and she doesn't need to discuss " anything else.     There will be no charge.    (N20.0) Kidney stone  (primary encounter diagnosis)  Comment: better   Plan: f/u w urology        Chief complaint:                                                      ER f/u   She thought this appointment     SUBJECTIVE:                                                    History of present illness:    She has L ureteral stent after the kidney stone  -- no fever,   -- she still has blood in urine and she feels the pain/pressure where the stent is         Review of Systems:                                                      ROS: negative for fever, chills, cough, wheezes, chest pain, shortness of breath, vomiting, abdominal pain, leg swelling         OBJECTIVE:           An actual physical exam can't be done during phone visit   A limited exam can sometimes be performed by video visit       PMHx: reviewed  Past Medical History:   Diagnosis Date     Back pain      Hypertension      Sleep apnea       PSHx: reviewed  Past Surgical History:   Procedure Laterality Date     COMBINED CYSTOSCOPY, INSERT STENT URETER(S) Left 7/22/2020    Procedure: Cystoscopy with left ureteral stent insertion;  Surgeon: Anurag Gomez MD;  Location:  OR     COMBINED CYSTOSCOPY, RETROGRADES, URETEROSCOPY, LASER HOLMIUM LITHOTRIPSY URETER(S), INSERT STENT Left 8/4/2020    Procedure: Cystoscopy, removal of left ureteral stent, left ureteroscopy with holmium laser lithotripsy, stone basketing and placement of left ureteral stent;  Surgeon: Anurag Gomez MD;  Location:  OR     ESOPHAGOSCOPY, GASTROSCOPY, DUODENOSCOPY (EGD), COMBINED N/A 5/31/2019    Procedure: ESOPHAGOGASTRODUODENOSCOPY (EGD)cold BX (fv);  Surgeon: Geremias Martinez MD;  Location:  GI     GENITOURINARY SURGERY          Meds: reviewed  Current Outpatient Medications   Medication Sig Dispense Refill     acetaminophen (TYLENOL) 325 MG tablet Take 2 tablets (650 mg) by mouth every 4 hours as needed for mild pain  50 tablet 0     albuterol (PROAIR HFA/PROVENTIL HFA/VENTOLIN HFA) 108 (90 Base) MCG/ACT inhaler Inhale 2 puffs into the lungs every 6 hours as needed for shortness of breath / dyspnea or wheezing       amLODIPine 10 MG PO tablet Take 1 tablet (10 mg) by mouth daily 90 tablet 3     omeprazole (PRILOSEC OTC) 20 MG EC tablet Take 1 tablet (20 mg) by mouth daily 90 tablet 0     ondansetron (ZOFRAN ODT) 4 MG ODT tab Take 1 tablet (4 mg) by mouth every 8 hours as needed for nausea or vomiting 10 tablet 0     senna-docusate (SENOKOT-S/PERICOLACE) 8.6-50 MG tablet Take 1-2 tablets by mouth 2 times daily 30 tablet 0       Soc Hx: reviewed  Fam Hx: reviewed          Elyse Paiz MD  Internal Medicine

## 2020-08-12 ENCOUNTER — THERAPY VISIT (OUTPATIENT)
Dept: PHYSICAL THERAPY | Facility: CLINIC | Age: 40
End: 2020-08-12
Payer: COMMERCIAL

## 2020-08-12 DIAGNOSIS — S93.422A SPRAIN OF DELTOID LIGAMENT OF LEFT ANKLE, INITIAL ENCOUNTER: ICD-10-CM

## 2020-08-12 DIAGNOSIS — M25.572 ACUTE LEFT ANKLE PAIN: ICD-10-CM

## 2020-08-12 DIAGNOSIS — R60.0 LOCALIZED EDEMA: ICD-10-CM

## 2020-08-12 PROCEDURE — 97010 HOT OR COLD PACKS THERAPY: CPT | Mod: GP | Performed by: PHYSICAL THERAPIST

## 2020-08-12 PROCEDURE — 97110 THERAPEUTIC EXERCISES: CPT | Mod: GP | Performed by: PHYSICAL THERAPIST

## 2020-08-14 ENCOUNTER — OFFICE VISIT (OUTPATIENT)
Dept: UROLOGY | Facility: CLINIC | Age: 40
End: 2020-08-14
Payer: COMMERCIAL

## 2020-08-14 VITALS
SYSTOLIC BLOOD PRESSURE: 108 MMHG | DIASTOLIC BLOOD PRESSURE: 84 MMHG | HEIGHT: 61 IN | BODY MASS INDEX: 47.2 KG/M2 | WEIGHT: 250 LBS

## 2020-08-14 DIAGNOSIS — N20.1 LEFT URETERAL STONE: Primary | ICD-10-CM

## 2020-08-14 DIAGNOSIS — Z79.2 PROPHYLACTIC ANTIBIOTIC: ICD-10-CM

## 2020-08-14 PROCEDURE — 52310 CYSTOSCOPY AND TREATMENT: CPT | Performed by: STUDENT IN AN ORGANIZED HEALTH CARE EDUCATION/TRAINING PROGRAM

## 2020-08-14 RX ORDER — LIDOCAINE HYDROCHLORIDE 20 MG/ML
JELLY TOPICAL ONCE
Status: DISCONTINUED | OUTPATIENT
Start: 2020-08-14 | End: 2020-08-14

## 2020-08-14 RX ORDER — CIPROFLOXACIN 500 MG/1
500 TABLET, FILM COATED ORAL ONCE
Qty: 1 TABLET | Refills: 0 | Status: SHIPPED | OUTPATIENT
Start: 2020-08-14 | End: 2020-08-14

## 2020-08-14 RX ORDER — LIDOCAINE HYDROCHLORIDE 20 MG/ML
JELLY TOPICAL ONCE
Status: COMPLETED | OUTPATIENT
Start: 2020-08-14 | End: 2020-08-14

## 2020-08-14 RX ADMIN — LIDOCAINE HYDROCHLORIDE: 20 JELLY TOPICAL at 14:56

## 2020-08-14 ASSESSMENT — PAIN SCALES - GENERAL: PAINLEVEL: MILD PAIN (2)

## 2020-08-14 ASSESSMENT — MIFFLIN-ST. JEOR: SCORE: 1751.37

## 2020-08-14 NOTE — PROGRESS NOTES
PRE-PROCEDURE DIAGNOSIS: left ureteral stone  POST-PROCEDURE DIAGNOSIS: left ureteral stone  PROCEDURE: Cystoscopy, removal of left ureteral stent  HISTORY: Tresa Chin is a 40 year old female with history of left ureteral stone s/p left ureteroscopy, laser lithotripsy and stone basketing 8/4/2020 here for stent removal. Has been doing well after the surgery except for pain with urination related to the stent  REVIEW OF OFFICE STUDIES (e.g., uroflow or PVR): n/a  DESCRIPTION OF PROCEDURE: After informed consent was obtained, the patient was brought to the procedure room where she was placed in the lithotomy position with all pressure points well padded.  The vulva was prepped and draped in sterile fashion. A flexible cystoscope was introduced through a well-lubricated urethra. The stent was noted to emanate from the left ureteral orifice. The stent was grasped with an alligator grasper and removed intact and discarded. The patient tolerated the procedure well    ASSESSMENT AND PLAN: 40 year old female with history of left ureteral stone s/p left ureteroscopy, laser lithotripsy and stone basketing 8/4/2020 here for stent removal.  - left ureteral stent removed intact and discarded  - follow up in 4-6 weeks with Litholink and renal ultrasound prior    Anurag Gomez MD   [facility]  [phone] clinic phone      
No Exposure/No Disease

## 2020-08-14 NOTE — PATIENT INSTRUCTIONS
"Get a kidney ultrasound and do a 24 hour urine collection before seeing me back in 4-6 weeks to discuss stone prevention      AFTER YOUR CYSTOSCOPY  ?  ?  You have just completed a cystoscopy, or \"cysto\", which allowed your physician to learn more about your bladder (or to remove a stent placed after surgery). We suggest that you continue to avoid caffeine, fruit juice, and alcohol for the next 24 hours, however, you are encouraged to return to your normal activities.  ?  ?  A few things that are considered normal after your cystoscopy:  ?  * small amount of bleeding (or spotting) that clears within the next 24 hours  ?  * slight burning sensation with urination  ?  * sensation of needing to void (urinate) more frequently  ?  * the feeling of \"air\" in your urine  ?  * mild discomfort that is relieved with Tylenol    * bladder spasms  ?  ?  ?  Please contact our office promptly if you:  ?  * develop a fever above 101 degrees  ?  * are unable to urinate  ?  * develop bright red blood that does not stop  ?  * experience severe pain or swelling  ?  ?  ?  And of course, please contact our office with any concerns or questions [phone]  ?      AFTER YOUR CYSTOSCOPY        You have just completed a cystoscopy, or \"cysto\", which allowed your physician to learn more about your bladder (or to remove a stent placed after surgery). We suggest that you continue to avoid caffeine, fruit juice, and alcohol for the next 24 hours, however, you are encouraged to return to your normal activities.         A few things that are considered normal after your cystoscopy:     * Small amount of bleeding (or spotting) that clears within the next 24 hours     * Slight burning sensation with urination     * Sensation to of needing to avoid more frequently     * The feeling of \"air\" in your urine     * Mild discomfort that is relieved with Tylenol        Please contact our office promptly if you:     * Develop a fever above 101 degrees     * " Are unable to urinate     * Develop bright red blood that does not stop     * Severe pain or swelling         Please contact our office with any concerns or questions @[facility].

## 2020-08-14 NOTE — NURSING NOTE
Chief Complaint   Patient presents with     Ureteral Stone     Stent removal     Prior to the start of the procedure and with procedural staff participation, I verbally confirmed the patient s identity using two indicators, relevant allergies, that the procedure was appropriate and matched the consent or emergent situation, and that the correct equipment/implants were available. Immediately prior to starting the procedure I conducted the Time Out with the procedural staff and re-confirmed the patient s name, procedure, and site/side. I have wiped the patient off with the povidone-Iodine solution, draped them, and used Lidocaine hydrochloride jelly. (The Joint Commission universal protocol was followed.)  Yes    Sedation (Moderate or Deep): None    5mL 2% lidocaine hydrochloride Urojet instilled into urethra.    NDC# [address]-8  Lot #: [id number]  Expiration Date:  03/22    Stefani Cortez, EMT

## 2020-08-14 NOTE — LETTER
8/14/2020       RE: Tresa Chin  [address]  [place] [address]     Dear Colleague,    Thank you for referring your patient, Tresa Chin, to the [facility] [place] at [facility]. Please see a copy of my visit note below.    PRE-PROCEDURE DIAGNOSIS: left ureteral stone  POST-PROCEDURE DIAGNOSIS: left ureteral stone  PROCEDURE: Cystoscopy, removal of left ureteral stent  HISTORY: Tresa Chin is a 40 year old female with history of left ureteral stone s/p left ureteroscopy, laser lithotripsy and stone basketing 8/4/2020 here for stent removal. Has been doing well after the surgery except for pain with urination related to the stent  REVIEW OF OFFICE STUDIES (e.g., uroflow or PVR): n/a  DESCRIPTION OF PROCEDURE: After informed consent was obtained, the patient was brought to the procedure room where she was placed in the lithotomy position with all pressure points well padded.  The vulva was prepped and draped in sterile fashion. A flexible cystoscope was introduced through a well-lubricated urethra. The stent was noted to emanate from the left ureteral orifice. The stent was grasped with an alligator grasper and removed intact and discarded. The patient tolerated the procedure well    ASSESSMENT AND PLAN: 40 year old female with history of left ureteral stone s/p left ureteroscopy, laser lithotripsy and stone basketing 8/4/2020 here for stent removal.  - left ureteral stent removed intact and discarded  - follow up in 4-6 weeks with Litholink and renal ultrasound prior    Anurag Gomez MD   [facility]  [phone] clinic phone

## 2020-08-18 ENCOUNTER — VIRTUAL VISIT (OUTPATIENT)
Dept: SLEEP MEDICINE | Facility: CLINIC | Age: 40
End: 2020-08-18
Payer: COMMERCIAL

## 2020-08-18 ENCOUNTER — THERAPY VISIT (OUTPATIENT)
Dept: PHYSICAL THERAPY | Facility: CLINIC | Age: 40
End: 2020-08-18
Payer: COMMERCIAL

## 2020-08-18 VITALS — WEIGHT: 250 LBS | BODY MASS INDEX: 47.2 KG/M2 | HEIGHT: 61 IN

## 2020-08-18 DIAGNOSIS — R60.0 LOCALIZED EDEMA: ICD-10-CM

## 2020-08-18 DIAGNOSIS — S93.422A SPRAIN OF DELTOID LIGAMENT OF LEFT ANKLE, INITIAL ENCOUNTER: ICD-10-CM

## 2020-08-18 DIAGNOSIS — M25.572 ACUTE LEFT ANKLE PAIN: ICD-10-CM

## 2020-08-18 DIAGNOSIS — E66.01 MORBID OBESITY (H): ICD-10-CM

## 2020-08-18 DIAGNOSIS — R06.89 HYPOVENTILATION: ICD-10-CM

## 2020-08-18 DIAGNOSIS — R09.02 HYPOXEMIA: Primary | ICD-10-CM

## 2020-08-18 PROCEDURE — 99204 OFFICE O/P NEW MOD 45 MIN: CPT | Mod: 95 | Performed by: INTERNAL MEDICINE

## 2020-08-18 PROCEDURE — 97110 THERAPEUTIC EXERCISES: CPT | Mod: GP | Performed by: PHYSICAL THERAPY ASSISTANT

## 2020-08-18 PROCEDURE — 97010 HOT OR COLD PACKS THERAPY: CPT | Mod: GP | Performed by: PHYSICAL THERAPY ASSISTANT

## 2020-08-18 ASSESSMENT — MIFFLIN-ST. JEOR: SCORE: 1751.37

## 2020-08-18 NOTE — PROGRESS NOTES
"Tresa Chin is a 40 year old female who is being evaluated via a billable video visit.      The patient has been notified of following:     \"This video visit will be conducted via a call between you and your physician/provider. We have found that certain health care needs can be provided without the need for an in-person physical exam.  This service lets us provide the care you need with a video conversation.  If a prescription is necessary we can send it directly to your pharmacy.  If lab work is needed we can place an order for that and you can then stop by our lab to have the test done at a later time.    Video visits are billed at different rates depending on your insurance coverage.  Please reach out to your insurance provider with any questions.    If during the course of the call the physician/provider feels a video visit is not appropriate, you will not be charged for this service.\"    Patient has given verbal consent for Video visit? Yes  How would you like to obtain your AVS? MyChart  If you are dropped from the video visit, the video invite should be resent to: Text to cell phone: Mobile  Will anyone else be joining your video visit? No        Video-Visit Details    Type of service:  Video Visit    Video Start Time: 10:30 AM  Video End Time: 11:25 AM    [place] Location (pt. Location): [place]    [place]  [facility] [place] Visit: [facility]   Outpatient Sleep Medicine Consultation  August 18, 2020      Name: Tresa Chin MRN# [id number]   Age: 40 year old YOB: 1980     Date of Consultation: August 18, 2020  Consultation is requested by: Melissa Franco MD  [id number] [facility] [place] [address] Melissa Franco  Primary care provider: Melissa Franco         Reason for Sleep Consult:     Tresa Chin is a 40 year old female for complaints of nonrestorative sleep, daytime " sleepiness, snoring and gasping for greater than 5 years         Assessment and Plan:     Summary Sleep Diagnoses:      Clinical signs and symptoms of sleep apnea     Insomnia with electronic device use and prominent features of psychophysiological insomnia in the setting of chronic anxiety and untreated sleep apnea [ LUIS M  22 ]    Bruxism    Comorbid Diagnoses:      Severe obesity BMI 47    Mood disturbance-chronic anxiety with panic attacks night and day    Hypertension    GERD    Back pain      Summary Recommendations:      Bariatric referral-counseling lifelong weight management including medical and surgical and pharmacological options    Venous blood gases-lab test for evaluation of possible breathing failure     Polysomnography with CO2 monitoring-evaluation for sleep apnea and sleep-related breathing problems    Return to clinic after testing complete    Please review example of cognitive therapy for insomnia and consider long-term management of insomnia and anxiety with cognitive behavioral approach     Summary Counseling:  See instructions    Counseling included a comprehensive review of diagnostic and therapeutic strategies as well as risks of inadequate therapy.  Educational materials provided in instructions.             History of Present Illness:     Tresa Chin is a 40 year old female is very concerned about her severe obesity which she feels is foundational to many of her problems.  She relates however chronic problems with anxiety and panic attacks with intrusive health and insecurity ruminations particularly at night producing insomnia.  She also has a 90% possibility of obstructive sleep apnea based on body habitus, daytime sleepiness, observed apneas, loud snoring and a 25% possibility of hypoventilation in the setting of low resting saturation and morbid obesity with morning headaches.  She is very open to referral for bariatric management, cognitive behavioral therapy for insomnia and  diagnosis and management of sleep apnea.  We discussed potential treatments recognizing that CPAP would be the most effective therapy particular if there is a degree of breathing failure that may require a bilevel support mode.  Oral device therapy, surgical management would have lower probability of benefit in this setting and neurostimulator therapy would not be appropriate.      SLEEP-WAKE SCHEDULE:     Bedtime 10 pm with TV latency 15min with 3x/week prolonged awakenings >1 hour or all night  Sleep very disturbed with brief awakenings 3-5x  Awakening 5:30 am with or without alarm  Occ napping  No crashes/ near miss accident       SCALES      SLEEP COMPLAINTS:  Cardio-respiratory    Snoring- 7x/week all night with apneas  Dyspnea- gasping and dyspnea lasting up  15 min  Morning headaches or confusion-most mornings wit neck pain  Coexisting Lung disease: denies    Coexisting Heart disease: denies    Does patient have a bed partner: denies              RLS Screen: When you try to relax in the evening or sleep at  night, do you ever have unpleasant, restless feelings in your  legs that can be relieved by walking or movement? denies    Periodic limb movement: denies    Narcolepsy:  denies sudden urges of sleep attacks    Cataplexy:  denies     Sleep paralysis:  denies      Hallucinations:   denies       Sleep Behaviors:    Leg symptoms/movements: denies    Motor restlessness: marked movement and restlessness    Night terrors: denies    Bruxism: nightly    Automatic behaviors: none    Other subjective complaints:    Anxiety or rumination frequently with intrusive thoughts preoccupation    Pain and discomfort at  night: denies    Waking up with heart pounding or racing: denies    GERD or aspiration:denies         Parasomnia:   NREM - denies recurrent persistent confusional arousal, night eating, sleep walking or sleep terrors   REM  - denies dream enactment; injuries                Medications:     Current Outpatient  Medications   Medication Sig     acetaminophen (TYLENOL) 325 MG tablet Take 2 tablets (650 mg) by mouth every 4 hours as needed for mild pain     albuterol (PROAIR HFA/PROVENTIL HFA/VENTOLIN HFA) 108 (90 Base) MCG/ACT inhaler Inhale 2 puffs into the lungs every 6 hours as needed for shortness of breath / dyspnea or wheezing     amLODIPine 10 MG PO tablet Take 1 tablet (10 mg) by mouth daily     omeprazole (PRILOSEC OTC) 20 MG EC tablet Take 1 tablet (20 mg) by mouth daily     ondansetron (ZOFRAN ODT) 4 MG ODT tab Take 1 tablet (4 mg) by mouth every 8 hours as needed for nausea or vomiting     senna-docusate (SENOKOT-S/PERICOLACE) 8.6-50 MG tablet Take 1-2 tablets by mouth 2 times daily (Patient not taking: Reported on 8/14/2020)     No current facility-administered medications for this visit.         Allergies   Allergen Reactions     Latex      PN: Converted from LW Latex Sensitivity Flag            Past Medical History:     Does not need 02 supplement at night   Past Medical History:   Diagnosis Date     Back pain      Hypertension      Sleep apnea              Past Surgical History:    Previous upper airway surgery none   Past Surgical History:   Procedure Laterality Date     COMBINED CYSTOSCOPY, INSERT STENT URETER(S) Left 7/22/2020    Procedure: Cystoscopy with left ureteral stent insertion;  Surgeon: Anurag Gomez MD;  Location:  OR     COMBINED CYSTOSCOPY, RETROGRADES, URETEROSCOPY, LASER HOLMIUM LITHOTRIPSY URETER(S), INSERT STENT Left 8/4/2020    Procedure: Cystoscopy, removal of left ureteral stent, left ureteroscopy with holmium laser lithotripsy, stone basketing and placement of left ureteral stent;  Surgeon: Anurag Gomez MD;  Location:  OR     CYSTOSCOPY       ESOPHAGOSCOPY, GASTROSCOPY, DUODENOSCOPY (EGD), COMBINED N/A 5/31/2019    Procedure: ESOPHAGOGASTRODUODENOSCOPY (EGD)cold BX (fv);  Surgeon: Geremias Martinez MD;  Location:  GI     GENITOURINARY SURGERY               Social History:     Social History     Tobacco Use     Smoking status: Never Smoker     Smokeless tobacco: Never Used   Substance Use Topics     Alcohol use: Yes     Frequency: Never     Comment: occ.         Caffeine-occasional         Family History:     Family History   Problem Relation Age of Onset     Dementia Mother      Lung Cancer Father         Sleep Family Hx:      JONATHAN - brother-possible           Review of Systems:     A complete 10 point review of systems was negative other than HPI or as commented below:   Kidney stones  GERD on PPI  Automatic behavior  Inattentiveness  Clicking with jaw movement            Physical Examination:     Objective   No asterixis  Mallampati 4  Normal mandibular profile and mobility  Reported vitals:  There were no vitals taken for this visit.   healthy, alert and no distress  Psych: Alert and oriented times 3; coherent speech, normal   rate and volume, able to articulate logical thoughts, able   to abstract reason, no tangential thoughts, no hallucinations   or delusions  Patient affect is normal.             Data: All pertinent previous laboratory data reviewed     No results found for: PH, PHARTERIAL, PO2, NE0UPRCBBKA, SAT, PCO2, HCO3, BASEEXCESS, ISABELLA, BEB  No results found for: TSH  Lab Results   Component Value Date     (H) 07/21/2020     (H) 03/13/2020     Lab Results   Component Value Date    HGB 14.5 07/21/2020    HGB 15.0 03/13/2020     Lab Results   Component Value Date    BUN 16 07/21/2020    BUN 13 03/13/2020    CR 0.71 07/21/2020    CR 0.44 (L) 03/13/2020     Lab Results   Component Value Date    AST 33 03/13/2020    ALT 78 (H) 03/13/2020    ALKPHOS 63 03/13/2020    BILITOTAL 0.4 03/13/2020     No results found for: UAMP, UBARB, BENZODIAZEUR, UCANN, UCOC, OPIT, UPCP    Echocardiology: Stress  Exercise was stopped due to fatigue.  A low workload was achieved.  The patient exhibited no chest pain during exercise.  There was a normal BP response to  exercise.  Target Heart Rate was achieved.  The Duke treadmill score was low risk ( >5 Duke score).  No arrhythmia noted.  There was no chest pain or significant ST changes with exercise.  This was a normal stress echocardiogram with no evidence of stress-induced  ischemia.  The visual ejection fraction is estimated at 65-70%.  Normal resting wall motion and no stress-induced wall motion abnormality.     Baseline  Normal baseline electrocardiogram.  Normal left ventricular function and wall motion at rest.  The visual ejection fraction is estimated at 60-65%.    Copy to: Melissa Franco MD 8/18/2020     Total time spent with patient: 45 min >50% counseling

## 2020-08-18 NOTE — PATIENT INSTRUCTIONS
MY TREATMENT INFORMATION FOR SLEEP APNEA-  Tresa Chin    DOCTOR : GRZEGORZ NOEL MD  Recommended plan this visit:    Bariatric referral-counseling lifelong weight management including medical and surgical and pharmacological options    Venous blood gases-lab test for evaluation of possible breathing failure     Polysomnography with CO2 monitoring-evaluation for sleep apnea and sleep-related breathing problems    Return to clinic after testing complete    Please review example of cognitive therapy for insomnia and consider long-term management of insomnia and anxiety with cognitive behavioral approach      Example of approaches for insomnia and possibly anxiety long-term:    Cognitive Behavioral Therapy for Insomnia (CBT-I)    Developing Healthy Sleep Thoughts    Insomnia is often is triggered by stressful events such as a change in employment, a separation, medical illness, or loss.  How you handle your sleep problem, mainly your thoughts and behaviors, determines in large part whether your sleeplessness is short -term or develops into chronic insomnia well after the stressful event is over.     This step of your program involves learning a set of skills to change negative and worrisome thoughts about sleep.   Insomnia is more likely to persist if you interpret the onset as a threat or loss of control.  Worry, fears and untrue beliefs about insomnia can become a vicious cycle.  Negative sleep thoughts activate the physical and emotional systems of your body.  This strengthens wakefulness and weakens your sleep system.  This in turn produces increased stress and pressure to sleep.  We call this unhelpful sleep effort.     Changing How You Think About Insomnia    We now know that our thoughts and attitudes affect our stress response.  Negative sleep thoughts can worsen your insomnia. They can lead to greater fear or anxiety about sleep, which in turn can aggravate your sleep problem. Thinking more positively and  realistically about your sleep promotes its health and healing.     Myths about Sleep    ? People need 8 hours of sleep     This is untrue.  Sleep needs vary from person to person.  Most people need between 6-8 hours to feel alert during the day.  People who sleep 7 hours live longer than those who sleep 8 hours.  Research also suggests people who sleep 5 hours live longer than those who sleep 9 hours    ? Insomnia is the same as sleep deprivation    Sleep deprivation is lack of sleep due to not allowing enough time for sleep.  This is typically not true for insomnia where people have enough time for sleep and even extend their sleep time trying to get more sleep.  Most people with insomnia get about the same amount of sleep as normal sleepers.  The difference is that with insomnia the sleep is fragmented and less consolidated.    You are Getting More Sleep than You Think    Research using objective sleep tests reveal that people with insomnia get an average of one hour more sleep than they think. This is due in part because the brain misperceives Stage 1 and 2 sleep as being awake.  In addition, stress and arousal while awake in bed changes the brain's perception of time awake.    Examples of Unhealthy Sleep Thoughts    Negative sleep thoughts can have a profound impact on your ability to get a good night's sleep. Below are some examples of negative thoughts associated with insomnia that may sound familiar to you:    ? I must get 8 hours of sleep to function during the day.  ? I won't get to sleep tonight.  ? Insomnia is going to cause health problems.  ? I am dreading going to bed.  ? I woke up early again.  I know I won't get back to sleep.  ? The reason I feel terrible today is because of my insomnia.  ? I've totally lost control of my sleep.  ? I can't sleep without a sleeping pill.    Negative thoughts usually occur automatically and feel like a knee-jerk reaction.  They are often untrue or distorted,  especially late in the evening as you become increasingly tired and others are asleep.      Changing Unhealthy Sleep Thoughts    Now that you understand the impact that negative thoughts can have on your sleep, you are ready to change these unhelpful thoughts.  The strategy is powerful and simple:  By recognizing and replacing your negative thoughts about sleep with more accurate, positive thoughts, you will be less anxious and frustrated about your sleep. A more realistic and positive attitude about sleep will allow you to relax and sleep more easily through the night.           There are several important steps involved in changing your unhelpful and negative thoughts about sleep:            Examples of Healthy Sleep Thoughts    ? My work will not suffer much if I have a poor night's sleep.    ? I'm probably getting more sleep than I think.    ? Other things than my sleep affect my daytime functioning.    ? Because I didn't sleep well last night, I am more likely to sleep well tonight because of increased sleep drive that leads to deeper sleep.    ? Sleep requirements vary from one person to another.    ? If I don't sleep well, most of the time the worst that can happen is that my mood might not be as bright the next day.    ? If I awaken after about 5 hours of sleep, I have gotten the core sleep I need for the day.    ? I'm more likely to fall asleep the longer I've been awake    ? I'm more likely to fall asleep as my body temperature begins to decrease through the night.    ? My body's wakefulness system takes charge during the day to promote daytime functioning.    ? These sleep skills have worked for others, and they can work for me.    Other Recommendations for Changing Beliefs and Attitudes   About Your Sleep    ? Keep Realistic Expectations     There is a widespread belief that 8 hours of sleep is necessary to feel refreshed and function well during the day. Many believe that good sleep means never waking  up at night.  Others come to expect that they should always wake up in the morning feeling full of energy.  Concerns may arise when your actual sleep falls short of these expectations. Try to avoid placing undue pressure on yourself to achieve certain sleep levels.  It only increases anxiety about sleeping.  Focus on quality sleep not quantity of sleep.    ? Revise Your Thoughts about the Causes of Insomnia      There is a natural tendency to attribute our sleep problems completely to external factors such as a chemical imbalance, pain, aging or things over which we may have little control.  Although these factors may contribute to your insomnia, research shows CBT-I is beneficial even if they are present.    ? Don't Blame Insomnia for All Daytime Impairments      Many individuals blame insomnia for every symptom or concern they experience during the day from fatigue to lack of concentration. Though poor sleep may produce some of these symptoms, it us usually untrue that all daytime impairment is attributable to insomnia.  It is more often that other factors such as stress and co-occurring medical problems contribute more to how you feel during the day.      ? Don't Catastrophize      Catastrophic thinking means making a sleep mountain out of a molehill.  Some people believe poor sleep will have catastrophic consequences to their physical health, mental health and appearance. Others see insomnia as a complete loss of control.  These perceived consequences of insomnia often prompt people to seek medication or other treatment.  Keep in mind insomnia can be very unpleasant but for the most part is not dangerous.    ? Don't Focus on Sleep     Some people reduce their activity level because of poor sleep.  Although sleep is a necessary part of life, don't make it the focus of your thoughts and concern. Trust that if you engage in health sleep habits, your body will give you the sleep it needs.  Make sure you continue  "your normal activities despite your insomnia.    ? Never Try to Sleep      Of all the habits the most important one is this:  Never try to force yourself to sleep.  Doing so usually backfires and makes things worse. Instead, focus on keeping to your prescribed sleep schedule, getting up at the same time every day and using the bed only for sleep.                Frequently asked questions:  1. What is Obstructive Sleep Apnea (JONATHAN)? JONATHAN is the most common type of sleep apnea. Apnea means, \"without breath.\"  Apnea is most often caused by narrowing or collapse of the upper airway as muscles relax during sleep.   Almost everyone has occasional apneas. Most people with sleep apnea have had brief interruptions at night frequently for many years.  The severity of sleep apnea is related to how frequent and severe the events are.   2. What are the consequences of JONATHAN? Symptoms include: feeling sleepy during the day, snoring loudly, gasping or stopping of breathing, trouble sleeping, and occasionally morning headaches or heartburn at night.  Sleepiness can be serious and even increase the risk of falling asleep while driving. Other health consequences may include development of high blood pressure and other cardiovascular disease in persons who are susceptible. Untreated JONATHAN  can contribute to heart disease, stroke and diabetes.   3. What are the treatment options? In most situations, sleep apnea is a lifelong disease that must be managed with daily therapy. Medications are not effective for sleep apnea and surgery is generally not considered until other therapies have been tried. Your treatment is your choice . Continuous Positive Airway (CPAP) works right away and is the therapy that is effective in nearly everyone. An oral device to hold your jaw forward is usually the next most reliable option. Other options include postioning devices (to keep you off your back), weight loss, and surgery including a tongue pacing device. " There is more detail about some of these options below.    Important tips for using CPAP and similar devices   Know your equipment:  CPAP is continuous positive airway pressure that prevents obstructive sleep apnea by keeping the throat from collapsing while you are sleeping. In most cases, the device is  smart  and can slowly self-adjusts if your throat collapses and keeps a record every day of how well you are treated-this information is available to you and your care team.  BPAP is bilevel positive airway pressure that keeps your throat open and also assists each breath with a pressure boost to maintain adequate breathing.  Special kinds of BPAP are used in patients who have inadequate breathing from lung or heart disease. In most cases, the device is  smart  and can slowly self-adjusts to assist breathing. Like CPAP, the device keeps a record of how well you are treated.  Your mask is your connection to the device. You get to choose what feels most comfortable and the staff will help to make sure if fits. Here: are some examples of the different masks that are available:       Key points to remember on your journey with sleep apnea:  1. Sleep study.  PAP devices often need to be adjusted during a sleep study to show that they are effective and adjusted right.  2. Good tips to remember: Try wearing just the mask during a quiet time during the day so your body adapts to wearing it. A humidifier is recommended for comfort in most cases to prevent drying of your nose and throat. Allergy medication from your provider may help you if you are having nasal congestion.  3. Getting settled-in. It takes more than one night for most of us to get used to wearing a mask. Try wearing just the mask during a quiet time during the day so your body adapts to wearing it. A humidifier is recommended for comfort in most cases. Our team will work with you carefully on the first day and will be in contact within 4 days and again at 2  and 4 weeks for advice and remote device adjustments. Your therapy is evaluated by the device each day.   4. Use it every night. The more you are able to sleep naturally for 7-8 hours, the more likely you will have good sleep and to prevent health risks or symptoms from sleep apnea. Even if you use it 4 hours it helps. Occasionally all of us are unable to use a medical therapy, in sleep apnea, it is not dangerous to miss one night.   5. Communicate. Call our skilled team on the number provided on the first day if your visit for problems that make it difficult to wear the device. Over 2 out of 3 patients can learn to wear the device long-term with help from our team. Remember to call our team or your sleep providers if you are unable to wear the device as we may have other solutions for those who cannot adapt to mask CPAP therapy. It is recommended that you sleep your sleep provider within the first 3 months and yearly after that if you are not having problems.   6. Use it for your health. We encourage use of CPAP masks during daytime quiet periods to allow your face and brain to adapt to the sensation of CPAP so that it will be a more natural sensation to awaken to at night or during naps. This can be very useful during the first few weeks or months of adapting to CPAP though it does not help medically to wear CPAP during wakefulness and  should not be used as a strategy just to meet guidelines.  7. Take care of your equipment. Make sure you clean your mask and tubing using directions every day and that your filter and mask are replaced as recommended or if they are not working.     BESIDES CPAP, WHAT OTHER THERAPIES ARE THERE?    Positioning Device  Positioning devices are generally used when sleep apnea is mild and only occurs on your back.This example shows a pillow that straps around the waist. It may be appropriate for those whose sleep study shows milder sleep apnea that occurs primarily when lying flat on  one's back. Preliminary studies have shown benefit but effectiveness at home may need to be verified by a home sleep test. These devices are generally not covered by medical insurance.  Examples of devices that maintain sleeping on the back to prevent snoring and mild sleep apnea.    Belt type body positioner  Http://[employer]/    Electronic reminder  Http://nightshifttherapy.com/  Http://www.[employer].com.au/      Oral Appliance  What is oral appliance therapy?  An oral appliance device fits on your teeth at night like a retainer used after having braces. The device is made by a specialized dentist and requires several visits over 1-2 months before a manufactured device is made to fit your teeth and is adjusted to prevent your sleep apnea. Once an oral device is working properly, snoring should be improved. A home sleep test may be recommended at that time if to determine whether the sleep apnea is adequately treated.       Some things to remember:  -Oral devices are often, but not always, covered by your medical insurance. Be sure to check with your insurance provider.   -If you are referred for oral therapy, you will be given a list of specialized dentists to consider or you may choose to visit the Web site of the American Academy of Dental Sleep Medicine  -Oral devices are less likely to work if you have severe sleep apnea or are extremely overweight.     More detailed information  An oral appliance is a small acrylic device that fits over the upper and lower teeth  (similar to a retainer or a mouth guard). This device slightly moves jaw forward, which moves the base of the tongue forward, opens the airway, improves breathing for effective treat snoring and obstructive sleep apnea in perhaps 7 out of 10 people .  The best working devices are custom-made by a dental device  after a mold is made of the teeth 1, 2, 3.  When is an oral appliance indicated?  Oral appliance therapy is recommended as a  first-line treatment for patients with primary snoring, mild sleep apnea, and for patients with moderate sleep apnea who prefer appliance therapy to use of CPAP4, 5. Severity of sleep apnea is determined by sleep testing and is based on the number of respiratory events per hour of sleep.   How successful is oral appliance therapy?  The success rate of oral appliance therapy in patients with mild sleep apnea is 75-80% while in patients with moderate sleep apnea it is 50-70%. The chance of success in patients with severe sleep apnea is 40-50%. The research also shows that oral appliances have a beneficial effect on the cardiovascular health of JONATHAN patients at the same magnitude as CPAP therapy7.  Oral appliances should be a second-line treatment in cases of severe sleep apnea, but if not completely successful then a combination therapy utilizing CPAP plus oral appliance therapy may be effective. Oral appliances tend to be effective in a broad range of patients although studies show that the patients who have the highest success are females, younger patients, those with milder disease, and less severe obesity. 3, 6.   Finding a dentist that practices dental sleep medicine  Specific training is available through the American Academy of Dental Sleep Medicine for dentists interested in working in the field of sleep. To find a dentist who is educated in the field of sleep and the use of oral appliances, near you, visit the Web site of the American Academy of Dental Sleep Medicine.    References  1. Osvaldo et al. Objectively measured vs self-reported compliance during oral appliance therapy for sleep-disordered breathing. Chest 2013; 144(5): 7479-5535.  2. Bong et al. Objective measurement of compliance during oral appliance therapy for sleep-disordered breathing. Thorax 2013; 68(1): 91-96.  3. Armando et al. Mandibular advancement devices in 620 men and women with JONATHAN and snoring: tolerability and predictors  of treatment success. Chest 2004; 125: 4302-0652.  4. Rika et al. Oral appliances for snoring and JONATHAN: a review. Sleep 2006; 29: 244-262.  5. Lyubov et al. Oral appliance treatment for JONATHAN: an update. J Clin Sleep Med 2014; 10(2): 215-227.  6. Benny et al. Predictors of OSAH treatment outcome. J Dent Res 2007; 86: 0076-4045.      Weight Loss:    Weight loss is a long-term strategy that may improve sleep apnea in some patients.    Weight management is a personal decision and the decision should be based on your interest and the potential benefits.  If you are interested in exploring weight loss strategies, the following discussion covers the impact on weight loss on sleep apnea and the approaches that may be successful.    Being overweight does not necessarily mean you will have health consequences.  Those who have BMI over 35 or over 27 with existing medical conditions carries greater risk.   Weight loss decreases severity of sleep apnea in most people with obesity. For those with mild obesity who have developed snoring with weight gain, even 15-30 pound weight loss can improve and occasionally eliminate sleep apnea.  Structured and life-long dietary and health habits are necessary to lose weight and keep healthier weight levels.     Though there may be significant health benefits from weight loss, long-term weight loss is very difficult to achieve- studies show success with dietary management in less than 10% of people. In addition, substantial weight loss may require years of dietary control and may be difficult if patients have severe obesity. In these cases, surgical management may be considered.  Finally, older individuals who have tolerated obesity without health complications may be less likely to benefit from weight loss strategies.        Your BMI is Body mass index is 47.24 kg/m .  Weight management is a personal decision.  If you are interested in exploring weight loss strategies, the  following discussion covers the approaches that may be successful. Body mass index (BMI) is one way to tell whether you are at a healthy weight, overweight, or obese. It measures your weight in relation to your height.  A BMI of 18.5 to 24.9 is in the healthy range. A person with a BMI of 25 to 29.9 is considered overweight, and someone with a BMI of 30 or greater is considered obese. More than two-thirds of American adults are considered overweight or obese.  Being overweight or obese increases the risk for further weight gain. Excess weight may lead to heart disease and diabetes.  Creating and following plans for healthy eating and physical activity may help you improve your health.  Weight control is part of healthy lifestyle and includes exercise, emotional health, and healthy eating habits. Careful eating habits lifelong are the mainstay of weight control. Though there are significant health benefits from weight loss, long-term weight loss with diet alone may be very difficult to achieve- studies show long-term success with dietary management in less than 10% of people. Attaining a healthy weight may be especially difficult to achieve in those with severe obesity. In some cases, medications, devices and surgical management might be considered.  What can you do?  If you are overweight or obese and are interested in methods for weight loss, you should discuss this with your provider.     Consider reducing daily calorie intake by 500 calories.     Keep a food journal.     Avoiding skipping meals, consider cutting portions instead.    Diet combined with exercise helps maintain muscle while optimizing fat loss. Strength training is particularly important for building and maintaining muscle mass. Exercise helps reduce stress, increase energy, and improves fitness. Increasing exercise without diet control, however, may not burn enough calories to loose weight.       Start walking three days a week 10-20 minutes at a  time    Work towards walking thirty minutes five days a week     Eventually, increase the speed of your walking for 1-2 minutes at time    In addition, we recommend that you review healthy lifestyles and methods for weight loss available through the National Institutes of Health patient information sites:  http://win.niddk.nih.gov/publications/index.htm    And look into health and wellness programs that may be available through your health insurance provider, employer, local community center, or [employer].          Surgery:    Surgery for obstructive sleep apnea is considered generally only when other therapies fail to work. Surgery may be discussed with you if you are having a difficult time tolerating CPAP and or when there is an abnormal structure that requires surgical correction.  Nose and throat surgeries often enlarge the airway to prevent collapse.  Most of these surgeries create pain for 1-2 weeks and up to half of the most common surgeries are not effective throughout life.  You should carefully discuss the benefits and drawbacks to surgery with your sleep provider and surgeon to determine if it is the best solution for you.   More information  Surgery for JONATHAN is directed at areas that are responsible for narrowing or complete obstruction of the airway during sleep.  There are a wide range of procedures available to enlarge and/or stabilize the airway to prevent blockage of breathing in the three major areas where it can occur: the palate, tongue, and nasal regions.  Successful surgical treatment depends on the accurate identification of the factors responsible for obstructive sleep apnea in each person.  A personalized approach is required because there is no single treatment that works well for everyone.  Because of anatomic variation, consultation with an examination by a sleep surgeon is a critical first step in determining what surgical options are best for each patient.  In some cases, examination  during sedation may be recommended in order to guide the selection of procedures.  Patients will be counseled about risks and benefits as well as the typical recovery course after surgery. Surgery is typically not a cure for a person s JONATHAN.  However, surgery will often significantly improve one s JONATHAN severity (termed  success rate ).  Even in the absence of a cure, surgery will decrease the cardiovascular risk associated with OSA7; improve overall quality of life8 (sleepiness, functionality, sleep quality, etc).      Palate Procedures:  Patients with JONATHAN often have narrowing of their airway in the region of their tonsils and uvula.  The goals of palate procedures are to widen the airway in this region as well as to help the tissues resist collapse.  Modern palate procedure techniques focus on tissue conservation and soft tissue rearrangement, rather than tissue removal.  Often the uvula is preserved in this procedure. Residual sleep apnea is common in patient after pharyngoplasty with an average reduction in sleep apnea events of 33%2.      Tongue Procedures:  ExamWhile patients are awake, the muscles that surround the throat are active and keep this region open for breathing. These muscles relax during sleep, allowing the tongue and other structures to collapse and block breathing.  There are several different tongue procedures available.  Selection of a tongue base procedure depends on characteristics seen on physical exam.  Generally, procedures are aimed at removing bulky tissues in this area or preventing the back of the tongue from falling back during sleep.  Success rates for tongue surgery range from 50-62%3.    Hypoglossal Nerve Stimulation:  Hypoglossal nerve stimulation has recently received approval from the [place] Food and Drug Administration for the treatment of obstructive sleep apnea.  This is based on research showing that the system was safe and effective in treating sleep apnea6.  Results  showed that the median AHI score decreased 68%, from 29.3 to 9.0. This therapy uses an implant system that senses breathing patterns and delivers mild stimulation to airway muscles, which keeps the airway open during sleep.  The system consists of three fully implanted components: a small generator (similar in size to a pacemaker), a breathing sensor, and a stimulation lead.  Using a small handheld remote, a patient turns the therapy on before bed and off upon awakening.    Candidates for this device must be greater than 22 years of age, have moderate to severe JONATHAN (AHI between 20-65), BMI less than 32, have tried CPAP/oral appliance without success, and have appropriate upper airway anatomy (determined by a sleep endoscopy performed by Dr. Bright).    Hypoglossal Nerve Stimulation Pathway:    The sleep surgeon s office will work with the patient through the insurance prior-authorization process (including communications and appeals).    Nasal Procedures:  Nasal obstruction can interfere with nasal breathing during the day and night.  Studies have shown that relief of nasal obstruction can improve the ability of some patients to tolerate positive airway pressure therapy for obstructive sleep apnea1.  Treatment options include medications such as nasal saline, topical corticosteroid and antihistamine sprays, and oral medications such as antihistamines or decongestants. Non-surgical treatments can include external nasal dilators for selected patients. If these are not successful by themselves, surgery can improve the nasal airway either alone or in combination with these other options.      Combination Procedures:  Combination of surgical procedures and other treatments may be recommended, particularly if patients have more than one area of narrowing or persistent positional disease.  The success rate of combination surgery ranges from 66-80%2,3.    References  1. Jamie KENDRICK. The Role of the Nose in Snoring and  Obstructive Sleep Apnoea: An Update.  Eur Arch Otorhinolaryngol. 2011; 268: 1365-73.  2.  Harry SM; Niranjan JA; Jakub JR; Pallanch JF; Laila MB; Aniceto SG; Chula OWENS. Surgical modifications of the upper airway for obstructive sleep apnea in adults: a systematic review and meta-analysis. SLEEP 2010;33(10):8328-8106. Yared LINCOLN. Hypopharyngeal surgery in obstructive sleep apnea: an evidence-based medicine review.  Arch Otolaryngol Head Neck Surg. 2006 Feb;132(2):206-13.  3. Mina YH1, Delaney Y, William ANNITA. The efficacy of anatomically based multilevel surgery for obstructive sleep apnea. Otolaryngol Head Neck Surg. 2003 Oct;129(4):327-35.  4. Yared LINCOLN, Goldberg A. Hypopharyngeal Surgery in Obstructive Sleep Apnea: An Evidence-Based Medicine Review. Arch Otolaryngol Head Neck Surg. 2006 Feb;132(2):206-13.  5. Maura DELGADO et al. Upper-Airway Stimulation for Obstructive Sleep Apnea.  N Engl J Med. 2014 Jan 9;370(2):139-49.  6. Sophia Y et al. Increased Incidence of Cardiovascular Disease in Middle-aged Men with Obstructive Sleep Apnea. Am J Respir Crit Care Med; 2002 166: 159-165  7. Marylu HOWARD et al. Studying Life Effects and Effectiveness of Palatopharyngoplasty (SLEEP) study: Subjective Outcomes of Isolated Uvulopalatopharyngoplasty. Otolaryngol Head Neck Surg. 2011; 144: 623-631.

## 2020-08-18 NOTE — PROGRESS NOTES
"Tresa Chin is a 40 year old female who is being evaluated via a billable video visit.      The patient has been notified of following:     \"This video visit will be conducted via a call between you and your physician/provider. We have found that certain health care needs can be provided without the need for an in-person physical exam.  This service lets us provide the care you need with a video conversation.  If a prescription is necessary we can send it directly to your pharmacy.  If lab work is needed we can place an order for that and you can then stop by our lab to have the test done at a later time.    Video visits are billed at different rates depending on your insurance coverage.  Please reach out to your insurance provider with any questions.    If during the course of the call the physician/provider feels a video visit is not appropriate, you will not be charged for this service.\"    Patient has given verbal consent for Video visit? Yes  How would you like to obtain your AVS? MyChart  If you are dropped from the video visit, the video invite should be resent to: Text to cell phone: [phone]  Will anyone else be joining your video visit? No          "

## 2020-08-25 ENCOUNTER — OFFICE VISIT (OUTPATIENT)
Dept: ORTHOPEDICS | Facility: CLINIC | Age: 40
End: 2020-08-25
Payer: COMMERCIAL

## 2020-08-25 ENCOUNTER — THERAPY VISIT (OUTPATIENT)
Dept: PHYSICAL THERAPY | Facility: CLINIC | Age: 40
End: 2020-08-25
Payer: COMMERCIAL

## 2020-08-25 VITALS
BODY MASS INDEX: 47.2 KG/M2 | HEIGHT: 61 IN | WEIGHT: 250 LBS | SYSTOLIC BLOOD PRESSURE: 130 MMHG | DIASTOLIC BLOOD PRESSURE: 88 MMHG

## 2020-08-25 DIAGNOSIS — R60.0 LOCALIZED EDEMA: ICD-10-CM

## 2020-08-25 DIAGNOSIS — E66.01 MORBID OBESITY (H): ICD-10-CM

## 2020-08-25 DIAGNOSIS — M25.572 ACUTE LEFT ANKLE PAIN: ICD-10-CM

## 2020-08-25 DIAGNOSIS — S93.422D SPRAIN OF DELTOID LIGAMENT OF LEFT ANKLE, SUBSEQUENT ENCOUNTER: Primary | ICD-10-CM

## 2020-08-25 DIAGNOSIS — S93.422A SPRAIN OF DELTOID LIGAMENT OF LEFT ANKLE, INITIAL ENCOUNTER: ICD-10-CM

## 2020-08-25 PROCEDURE — 97010 HOT OR COLD PACKS THERAPY: CPT | Mod: GP | Performed by: PHYSICAL THERAPY ASSISTANT

## 2020-08-25 PROCEDURE — 99213 OFFICE O/P EST LOW 20 MIN: CPT | Performed by: FAMILY MEDICINE

## 2020-08-25 PROCEDURE — 97110 THERAPEUTIC EXERCISES: CPT | Mod: GP | Performed by: PHYSICAL THERAPY ASSISTANT

## 2020-08-25 ASSESSMENT — MIFFLIN-ST. JEOR: SCORE: 1751.37

## 2020-08-25 NOTE — PATIENT INSTRUCTIONS
1. Sprain of deltoid ligament of left ankle, subsequent encounter    2. Morbid obesity (H)      Keep appointments with Rita  Progress activity under her direction

## 2020-08-25 NOTE — LETTER
"    8/25/2020         RE: Tresa Chin  [address]  [place] [address]        Dear Colleague,    Thank you for referring your patient, Tresa Chin, to the [facility]. Please see a copy of my visit note below.    ASSESSMENT & PLAN    1. Sprain of deltoid ligament of left ankle, subsequent encounter    2. Morbid obesity (H)      Doing very well with significant improved with structure therapy  Has resting pes planus and posterior tib dysfunction so educated on need to maintain home exercises indefinitely  Keep appointments with Rita Dick activity under her direction  -----    SUBJECTIVE:  Tresa Chin is a 40 year old female who is seen in follow-up for left ankle sprain (deltoid ligament). DOI: 6/13/20 ~ 10.3 weeks out. They were last seen 6/29/2020 and was referred to physical therapy.    Since their last visit reports 90% improvement. She reports she discontinued the walking boot on 8/23/20. She has been walking without pain. She notes good improvement with therapy and home exercises. They indicate that their current pain level is 0/10. They have tried rest/activity avoidance, ice, home exercises and physical therapy (6 visits).      The patient is seen by themselves.    Patient's past medical, surgical, social, and family histories were reviewed today and no pertinent history related to patient's presenting problem.    REVIEW OF SYSTEMS:  Constitutional: NEGATIVE for fever, chills, change in weight  Skin: NEGATIVE for worrisome rashes, moles or lesions  GI/: NEGATIVE for bowel or bladder changes  Neuro: NEGATIVE for weakness, dizziness or paresthesias    OBJECTIVE:  /88   Ht 1.549 m (5' 1\")   Wt 113.4 kg (250 lb)   BMI 47.24 kg/m     General: healthy, alert and in no distress  HEENT: no scleral icterus or conjunctival erythema  Skin: no suspicious lesions or rash. No jaundice.  CV: regular rhythm by palpation, no pedal edema  Resp: normal " respiratory effort without conversational dyspnea   Psych: normal mood and affect  Gait: normal steady gait with appropriate coordination and balance  Neuro: normal light touch sensory exam of the extremities.    MSK:  LEFT ANKLE  Inspection:    resting pes planus  Palpation:    Mildly tender about the deltoid ligament - much improved from previous. Tender along posterior tib tendon  Range of Motion:     Plantarflexion full / dorsiflexion full / inversion full and now painfree / eversion full and now painfree  Strength:    Grossly intact and painfree    Yazan Solorzano DO [facility]          Again, thank you for allowing me to participate in the care of your patient.        Sincerely,        Yazan Solorzano DO

## 2020-08-25 NOTE — PROGRESS NOTES
"ASSESSMENT & PLAN    1. Sprain of deltoid ligament of left ankle, subsequent encounter    2. Morbid obesity (H)      Doing very well with significant improved with structure therapy  Has resting pes planus and posterior tib dysfunction so educated on need to maintain home exercises indefinitely  Keep appointments with Rita Dick activity under her direction  -----    SUBJECTIVE:  Tresa Chin is a 40 year old female who is seen in follow-up for left ankle sprain (deltoid ligament). DOI: 6/13/20 ~ 10.3 weeks out. They were last seen 6/29/2020 and was referred to physical therapy.    Since their last visit reports 90% improvement. She reports she discontinued the walking boot on 8/23/20. She has been walking without pain. She notes good improvement with therapy and home exercises. They indicate that their current pain level is 0/10. They have tried rest/activity avoidance, ice, home exercises and physical therapy (6 visits).      The patient is seen by themselves.    Patient's past medical, surgical, social, and family histories were reviewed today and no pertinent history related to patient's presenting problem.    REVIEW OF SYSTEMS:  Constitutional: NEGATIVE for fever, chills, change in weight  Skin: NEGATIVE for worrisome rashes, moles or lesions  GI/: NEGATIVE for bowel or bladder changes  Neuro: NEGATIVE for weakness, dizziness or paresthesias    OBJECTIVE:  /88   Ht 1.549 m (5' 1\")   Wt 113.4 kg (250 lb)   BMI 47.24 kg/m     General: healthy, alert and in no distress  HEENT: no scleral icterus or conjunctival erythema  Skin: no suspicious lesions or rash. No jaundice.  CV: regular rhythm by palpation, no pedal edema  Resp: normal respiratory effort without conversational dyspnea   Psych: normal mood and affect  Gait: normal steady gait with appropriate coordination and balance  Neuro: normal light touch sensory exam of the extremities.    MSK:  LEFT ANKLE  Inspection:    resting pes " planus  Palpation:    Mildly tender about the deltoid ligament - much improved from previous. Tender along posterior tib tendon  Range of Motion:     Plantarflexion full / dorsiflexion full / inversion full and now painfree / eversion full and now painfree  Strength:    Grossly intact and painfree    Yazan Solorzano, DO [facility]

## 2020-08-31 ENCOUNTER — TRANSFERRED RECORDS (OUTPATIENT)
Dept: HEALTH INFORMATION MANAGEMENT | Facility: CLINIC | Age: 40
End: 2020-08-31

## 2020-09-02 NOTE — PROGRESS NOTES
"2020    New Medical Weight Management Consult    PATIENT:  Tresa Chin  MRN:         [id number]  :         1980  AMANDA:         2020    Dear Melissa Franco MD    I had the pleasure of seeing your patient, Tresa Chin. Full intake/assessment was done to determine barriers to weight loss success and develop a treatment plan. Tresa Chin is a 40 year old female interested in treatment of medical problems associated with excess weight. She has a height of 5' 1\", a weight of 261 lbs 1.6 oz, and the calculated Body mass index is 49.33 kg/m .    ASSESSMENT/PLAN:    PROGRAM OVERVIEW  Reviewed options at [facility] including provider visits, dietician, 24 week program, health coaching, food supplements, Get Moving Program, and psychological support.  All of patients questions about the weight loss program were answered fully.     SURGICAL WEIGHT LOSS   Options presented. Given pt BMI is: Body mass index is 49.33 kg/m . and she has comorbid conditions: HTN, probable sleep apnea, Prediabetes. Pt has some misconceptions regarding bariatric surgery- high death rate. Gave pt: \"Is Weight Loss Something to Consider\" Handout.  Reviewed anatomy and changes with sleeve and bypass. at follow up visit.  Pt is concerned with weight loss surgery.  Has heard people can die from this.  However, she said her Uncle has the sleeve, lost a bunch of weight and is doing well.      Will revisit if necessary in future.     MEDICATIONS:  We discussed healthy habits to assist with weight loss. We reviewed medications associated with weight gain. We discussed the role of pharmacological agents in the treatment of obesity and the \"off-label\" use of medications in this practice. We reviewed medication that may assist with weight loss. Indications, contraindications, risks/benefits, and potential side effects were discussed.   NO medication was prescribed. With her probable PCOS, and insulin resistance " she is a candidate for metformin.  Will review at follow unit(s) appt.  Discussed that medications must always be used together with lifestyle changes such as improvements in diet choices, portion control and establishing and maintaining a regular exercise program.     CURRENT GOALS:   Goals:  Have breakfast shake within 1 hour of waking  Call to schedule eating disorder evaluation     NUTRITION: referral ordered today     - NUTRITION REFERRAL    Eating Disorder Evaluation referral given today.    Due to patients current binge eating.  I have ordered an ET evaluation to r/o binge eating disorder.  Pt will call to schedule this.  Contacts given today.  [facility] most likely will schedule with [facility]      SLEEP:  Pt saw  who referred her to our program. Pt has sleep study scheduled for later in Sept.      LABS:   Ordered   Vitamin D  HgA1C  CMP  Lipid panel- CC to PCP    PSYCH: Pts PHQ9 elevated to day.  Will have pt do eating disorder evaluation.  Mostly likely they will recommend therapy.  If not, when pt returns to see me we discussed ordering a bariatric psych eval through [facility].    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  We discussed insulin resistance and glycemic index as it relates to appetite and weight control.   We discussed the importance of physical activity including cardiovascular and strength training in maintaining a healthier weight and explored viable options.  We discussed the National Weight Control Registry healthy weight maintenance strategies and ways to optimize metabolism.  Patient education of above written in AVS.     Follow up: Return to clinic in 4-6 weeks.         She has the following co-morbidities:       9/6/2020   I have the following health issues associated with obesity: Pre-Diabetes, High Blood Pressure, High Cholesterol, Sleep Apnea, Fatty Liver, Stress Incontinence   I have the following symptoms associated with obesity: Knee Pain, Depression, Back Pain, Fatigue, Irregular Menstral Cycle   Pt has back and foot problems.  Kickboxing was the only exercise she enjoyed but had to stop this due to her back and foot pain..      Probable JONATHAN. Saw Dr. Bird for consult. Has sleep study on 9/21/2020.      Patient Goals 9/6/2020   I am interested in having a healthier weight to diminish current health problems: Yes   I am interested in having a healthier weight in order to prevent future health problems: Yes   I am interested in having a healthier weight in order to have a future surgery: No       Referring Provider 9/6/2020   Please name the provider who referred you to Medical [facility].  If you do not know, please answer: \"I Don't Know\". Farhan Bird     Past Surgical History:   Procedure Laterality Date     COMBINED CYSTOSCOPY, INSERT STENT URETER(S) Left 7/22/2020    Procedure: Cystoscopy with left ureteral stent insertion;  Surgeon: Anurag Gomez MD;  Location:  OR     COMBINED CYSTOSCOPY, RETROGRADES, URETEROSCOPY, LASER HOLMIUM LITHOTRIPSY URETER(S), INSERT STENT Left 8/4/2020    Procedure: Cystoscopy, removal of left ureteral stent, left " ureteroscopy with holmium laser lithotripsy, stone basketing and placement of left ureteral stent;  Surgeon: Anurag Gomez MD;  Location: [facility] OR     CYSTOSCOPY       ESOPHAGOSCOPY, GASTROSCOPY, DUODENOSCOPY (EGD), COMBINED N/A 5/31/2019    Procedure: ESOPHAGOGASTRODUODENOSCOPY (EGD)cold BX (fv);  Surgeon: Geremias Martinez MD;  Location:  GI     GENITOURINARY SURGERY         Weight History 9/6/2020   How concerned are you about your weight? Very Concerned   Would you describe your weight gain as gradual? Yes   I became overweight: As a Teenager   The following factors have contributed to my weight gain:  Change in Schedule, Eating Wrong Types of Food, Eating Too Much, Lack of Exercise, Stress   I have tried the following methods to lose weight: Watching Portions, Exercise, Weight Watchers- most successful with this and Estefany de jesus   The most weight I have ever lost was: (lbs) 50   I have the following family history of obesity/being overweight:  One or more of my siblings are overweight, Many of my relatives are overweight   Has anyone in your family had weight loss surgery? No   How has your weight changed over the last year?  Gained   Pt has been struggling with her weight. Last year she lost 50 lbs and then she gained it back.    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 Stress and lack of time for self plays into her weight.    Pt has problems with bordom.  Can stick too a diet/exercise plan for 1-2 months but gets bored.    Diet Recall Review with Patient 9/6/2020   Do you typically eat breakfast? Yes Wake up 5:30 am   If you do eat breakfast, what types of food do you eat? Fast food at 7-8 am   Do you typically eat lunch? Yes, 12-1 PM   If you do eat lunch, what types of food do you typically eat?  Fast food-Giovany Davila,   Do you typically eat supper? 2 times a week.  Usually after 6 pm   If you do eat supper, what types of food do you typically eat? Restaurant food with  co worker after food.     Do you typically eat snacks? Yes, at night after 6 PM.   If you do snack, what types of food do you typically eat? Chips ice cream or nuts   Do you like vegetables?  Yes   Do you drink water? Yes, pt has an alarm on phone to drink water.  Is trying drink 16 oz every 2 hours. (5 bottles a day after  kidney stone, 1 prior to)     How many glasses of juice do you drink in a typical day? 1   How many of glasses of milk do you drink in a typical day? 1   If you do drink milk, what type? 2%   How many 8oz glasses of sugar containing drinks such as [employer]/sweet tea do you drink in a day? 0   How many cans/bottles of sugar pop/soda/tea/sports drinks do you drink in a day? 0   How many cans/bottles of diet pop/soda/tea or sports drink do you drink in a day? 1- drinks decaf herbal tea daily.    How often do you have a drink of alcohol? Monthly or Less   If you do drink, how many drinks might you have in a day? 1 or 2     Pt used to drink premier protein when she was on weight watchers for her breakfast.     Eating Habits 9/6/2020   Generally, my meals include foods like these: bread, pasta, rice, potatoes, corn, crackers, sweet dessert, pop, or juice. Almost Everyday   Generally, my meals include foods like these: fried meats, brats, burgers, french fries, pizza, cheese, chips, or ice cream. Almost Everyday   Eat fast food (like [employer], [employer], [employer]aco [employer]). 5 times a week   Eat at a buffet or sit-down restaurant. A Few Times a Week   Eat most of my meals in front of the TV or computer. Almost Everyday   Often skip meals, eat at random times, have no regular eating times. Almost Everyday   Rarely sit down for a meal but snack or graze throughout.  A Few Times a Week   Eat extra snacks between meals. Almost Everyday   Eat most of my food at the end of the day. Almost Everyday   Eat in the middle of the night or wake up at night to eat. Once a Week   Eat extra snacks to prevent or correct low  blood sugar. A Few Times a Week   Eat to prevent acid reflux or stomach pain. A Few Times a Week   Worry about not having enough food to eat. Never   Have you been to the food shelf at least a few times this year? No   I eat when I am depressed. Almost Everyday   I eat when I am stressed. Almost Everyday   I eat when I am bored. Almost Everyday   I eat when I am anxious. Almost Everyday   I eat when I am happy or as a reward. Almost Everyday   I feel hungry all the time even if I just have eaten. A Few Times a Week   Feeling full is important to me. Almost Everyday   I finish all the food on my plate even if I am already full. Almost Everyday   I can't resist eating delicious food or walk past the good food/smell. Almost Everyday   I eat/snack without noticing that I am eating. Never   I eat when I am preparing the meal. A Few Times a Week   I eat more than usual when I see others eating. Almost Everyday   I have trouble not eating sweets, ice cream, cookies, or chips if they are around the house. Almost Everyday   I think about food all day. No (occasional boredom and stress eating)   What foods, if any, do you crave? Chips/Crackers, love sweets.  Snacks on sweets daily at night.  She would eat 2-3 pudding cups at once.         Amount of Food 9/6/2020   I make myself vomit what I have eaten or use laxatives to get rid of food. Never   I eat a large amount of food, like a loaf of bread, a box of cookies, a pint/quart of ice cream, all at once. Everyday    I eat a large amount of food even when I am not hungry. Everyday   I eat rapidly. Almost Everyday   I eat alone because I feel embarrassed and do not want others to see how much I have eaten. Everyday   I eat until I am uncomfortably full. Everyday   I feel bad, disgusted, or guilty after I overeat. Everyday   I make myself vomit what I have eaten or use laxatives to get rid of food. Never     Pt binge eats at night or on the weekend.  Example of binge: rice (a  full plate,), a potato, sometimes will add on 1 pint of ice cream.      Questions for Binge Eating Screen  Does your binge eating cause you stress? yes  Does binge occur at least 1 time per week for the past 3 months?  Usually eats 2 times a week since COVID started.   Do you use compensatory behaviors (such as purging or laxatives)? none  Binge eating episodes are associated with 3 or more of the following:     Do you eat more rapidly than normal?  yes  Do you eat until uncomfortably full? yes  Do you eat large amounts of food when not physically hungry? yes  Do you eat alone because of being embarrassed by how much you are eating? yes  Do you feel disgusted, depressed or guilty after overeating? yes    Activity/Exercise History 9/6/2020   How much of a typical 12 hour day do you spend sitting? Most of the Day   How much of a typical 12 hour day do you spend lying down? Less Than Half the Day   How much of a typical day do you spend walking/standing? Less Than Half the Day   How many hours (not including work) do you spend on the TV/Video Games/Computer/Tablet/Phone? 6 Hours or More   How many times a week are you active for the purpose of exercise? Never   What keeps you from being more active? Pain, Lack of Time, Too tired   How many total minutes do you spend doing some activity for the purpose of exercising when you exercise? None       PAST MEDICAL HISTORY:  Past Medical History:   Diagnosis Date     Back pain      Hypertension      Sleep apnea        Work/Social History Reviewed With Patient 9/6/2020   My employment status is: Full-Time   My job is: Accounting at [employer]   How much of your job is spent on the computer or phone? 100%   How many hours do you spend commuting to work daily?   20 minutes   What is your marital status? Single, lives with mom and 2 brothers.    If in a relationship, is your significant other overweight? N/A   Do you have children? No   If you have children, are they  overweight? N/A   Who do you live with?  Family   Are they supportive of your health goals? No   Who does the food shopping?  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 No   If yes, do you feel that the abuse is affecting your weight? N/A   If yes, would you like to talk to a counselor about the abuse? N/A   How often in the past 2 weeks have you felt little interest or pleasure in doing things? Nearly Everyday   Over the past 2 weeks how often have you felt down, depressed, or hopeless? For Several Days         Sleep History Reviewed With Patient 9/6/2020   How many hours do you sleep at night? 5   Do you think that you snore loudly or has anybody ever heard you snore loudly (louder than talking or so loud it can be heard behind a shut door)? Yes   Has anyone seen or heard you stop breathing during your sleep? Yes   Do you often feel tired, fatigued, or sleepy during the day? Yes   Do you have a TV/Computer in your bedroom? No     ROS:    General  Fatigue: yes  Sleep Quality:poor, sleep study scheduled  HEENT  Hx of glaucoma: none  Vision changes:   Cardiovascular  Chest Pain with Exertion: none  Hx of heart disease: none  HTN:present, controlled on amlodipine.    Pulmonary  Shortness of breath at rest: none  Shortness of breath with exertion: present  Snoring: yes  Gastrointestinal  Heartburn: none when on omeprazole 20 mg daily.  Abdominal pain: none  Constipation: none  Gastroparesis:none  Liver Dz: Pt has fatty liver  Hx of Small Bowel Obstruction:none  H/O Pancreatitis: none  H/O Gallbladder Dz:  Psychiatric  Moods Stable: no  Anxiety  Depression: present PHQ-9 score 22   History of alcohol/drug abuse: none  Hx of eating disorder: currently has symptoms consistent with Binge eating.  Endocrine  Polydipsia:   Thyroid disease:none  Thyroid Cancer: none  Pre-Diabetes: present  Neurologic:  Hx  of seizures:   Hx of paresthesias:  Headaches:  Memory Impairment:   :  History of kidney stones: yes in July 2020.  History of kidney disease: none  Current birth control: none  Menstrual periods: irregular, occurring less than monthly    SKIN:  Acne: present  Hirtuism: present    MEDICATIONS:   Current Outpatient Medications   Medication Sig Dispense Refill     acetaminophen (TYLENOL) 325 MG tablet Take 2 tablets (650 mg) by mouth every 4 hours as needed for mild pain 50 tablet 0     albuterol (PROAIR HFA/PROVENTIL HFA/VENTOLIN HFA) 108 (90 Base) MCG/ACT inhaler Inhale 2 puffs into the lungs every 6 hours as needed for shortness of breath / dyspnea or wheezing       amLODIPine 10 MG PO tablet Take 1 tablet (10 mg) by mouth daily 90 tablet 3     omeprazole (PRILOSEC OTC) 20 MG EC tablet Take 1 tablet (20 mg) by mouth daily 90 tablet 0     ondansetron (ZOFRAN ODT) 4 MG ODT tab Take 1 tablet (4 mg) by mouth every 8 hours as needed for nausea or vomiting 10 tablet 0     senna-docusate (SENOKOT-S/PERICOLACE) 8.6-50 MG tablet Take 1-2 tablets by mouth 2 times daily 30 tablet 0       ALLERGIES:   Allergies   Allergen Reactions     Latex      PN: Converted from LW Latex Sensitivity Flag     LABS:  Hemoglobin A1C   Date Value Ref Range Status   02/19/2020 5.8 (H) 0 - 5.6 % Final     Comment:     Normal <5.7% Prediabetes 5.7-6.4%  Diabetes 6.5% or higher - adopted from ADA   consensus guidelines.       Sodium   Date Value Ref Range Status   07/21/2020 139 133 - 144 mmol/L Final     Potassium   Date Value Ref Range Status   07/21/2020 3.8 3.4 - 5.3 mmol/L Final     Chloride   Date Value Ref Range Status   07/21/2020 107 94 - 109 mmol/L Final     Carbon Dioxide   Date Value Ref Range Status   07/21/2020 26 20 - 32 mmol/L Final     Anion Gap   Date Value Ref Range Status   07/21/2020 6 3 - 14 mmol/L Final     Glucose   Date Value Ref Range Status   07/21/2020 108 (H) 70 - 99 mg/dL Final     Urea Nitrogen   Date Value Ref  "Range Status   07/21/2020 16 7 - 30 mg/dL Final     Creatinine   Date Value Ref Range Status   07/21/2020 0.71 0.52 - 1.04 mg/dL Final     GFR Estimate   Date Value Ref Range Status   07/21/2020 >90 >60 mL/min/[1.73_m2] Final     Comment:     Non  GFR Calc  Starting 12/18/2018, serum creatinine based estimated GFR (eGFR) will be   calculated using the Chronic Kidney Disease Epidemiology Collaboration   (CKD-EPI) equation.       Calcium   Date Value Ref Range Status   07/21/2020 8.5 8.5 - 10.1 mg/dL Final     Bilirubin Total   Date Value Ref Range Status   03/13/2020 0.4 0.2 - 1.3 mg/dL Final     Alkaline Phosphatase   Date Value Ref Range Status   03/13/2020 63 40 - 150 U/L Final     ALT   Date Value Ref Range Status   03/13/2020 78 (H) 0 - 50 U/L Final     AST   Date Value Ref Range Status   03/13/2020 33 0 - 45 U/L Final     Cholesterol   Date Value Ref Range Status   02/19/2020 259 (H) <200 mg/dL Final     Comment:     Desirable:       <200 mg/dl     HDL Cholesterol   Date Value Ref Range Status   02/19/2020 69 >49 mg/dL Final     LDL Cholesterol Calculated   Date Value Ref Range Status   02/19/2020 169 (H) <100 mg/dL Final     Comment:     Above desirable:  100-129 mg/dl  Borderline High:  130-159 mg/dL  High:             160-189 mg/dL  Very high:       >189 mg/dl       Triglycerides   Date Value Ref Range Status   02/19/2020 104 <150 mg/dL Final     Comment:     Fasting specimen     WBC   Date Value Ref Range Status   07/21/2020 14.4 (H) 4.0 - 11.0 10e9/L Final     Hemoglobin   Date Value Ref Range Status   07/21/2020 14.5 11.7 - 15.7 g/dL Final     Hematocrit   Date Value Ref Range Status   07/21/2020 45.1 35.0 - 47.0 % Final     MCV   Date Value Ref Range Status   07/21/2020 91 78 - 100 fl Final     Platelet Count   Date Value Ref Range Status   07/21/2020 380 150 - 450 10e9/L Final        PHYSICAL EXAM:  /86   Pulse 98   Ht 5' 1\" (1.549 m)   Wt 261 lb 1.6 oz (118.4 kg)   SpO2 95%   " BMI 49.33 kg/m    PHYSICAL EXAM:  GENERAL:  Good development and normal affect in no acute distress.   HEENT: Head is normocephalic, nontraumatic. Pupils equal and round without conjunctival injection. Neck is supple without LAD or thyromegaly.    CARDIOVASCULAR: RRR no MRG  RESPIRATORY: Lungs are clear to auscultation bilaterally with good breath sounds.  GASTROINTESTINAL: Abdomen is obese, nondistended, soft, nontender.  LOWER EXTREMITIES: No LE edema bilaterally, no cyanosis, ulceration, or chronic venous stasis noted.  MUSCULOSKELETAL:  Moves all 4 extremities equal and strong. Has a normal gait.   NEUROLOGIC: Patient is alert and orientated x 3 and answers all questions appropriately.  SKIN: No intertriginous irritation or rash. No skin tags, No acanthosis nigrans  Location of obesity: [facility]        TIME: 65 min spent on evaluation, management, counseling, education, & motivational interviewing with greater than 50 % of the total time was spent on counseling and coordinating care    Sincerely,    Erna Lai PA-C

## 2020-09-02 NOTE — PATIENT INSTRUCTIONS
Goals:  Have breakfast shake within 1 hour of waking  Call to schedule eating disorder evaluation  Have labs drawn  See dietician.  Follow up in 1-2 months following ED evaluation.      10 Healthy Habits  Eat Better ? Move More ? Live Well  1.  Eat breakfast daily.      Try to eat within the first hour after you wake up. This helps you control your appetite during the day, and you are less likely to snack in the evening.     80% of people who skip meals are overweight or obese.    2.   Include protein with every meal, even breakfast.     Protein will suppress your appetite longer than other types of food. This helps you  feel more satisfied with the amount of food you have eaten.    3.  Fill up on Fiber    Fiber comes from plants--fruits, veggies, whole grains, nuts/seeds and beans    Fiber is low in calories, high in phytonutrients and helps you stay full longer    4.  Eat S-L-O-W-L-Y    Take 20-30 minutes to eat each meal by taking small bites, chewing foods to applesauce consistency or 20-30 times before you swallow    Eating foods too fast can delay satiety/fullness signals and increase overeating     5.  Avoid Mindless Eating    Be present when you eat--take note of the smell, taste and quality of your food    Make a list of alternative activities you could do to prevent boredom/stress eating  Go for a walk, call a friend, chew gum, paint your nails    6.  Keep a Journal    Writing down what you eat, how you feel and when you are active helps you identify new changes to work on from week to week          Look for ways to cut 100 calories from your current diet 2-3 times per day    7.  Drink 64 ounces of Non Calorie drinks between meals    Water    Zero calorie Propel , Vitamin Water , Crystal Light     Avoid regular soda pop    8.  Stop thinking about food choices as a  diet.     Instead, think of them as healthy, lifelong habits--an effort toward a new way of eating.    Weigh yourself once a week instead of  everyday.     9.  Get enough sleep--at least 7 to 8 hours each night.     Lack of sleep is one reason that fat builds up around the waist.    10.  Exercise five to six times per week     Do a combination aerobic exercise (fast walking, biking, swimming) and strength training (Dumbbells, pushups, weight machines, resistance bands).    Start at 10 minutes per day and slowly work your way up to 30 minutes or more.   Taking the stairs instead of the elevator, park at the far end of the parking lot, pace while on the phone, take the dog for a walk.     [email or web address]

## 2020-09-03 ENCOUNTER — THERAPY VISIT (OUTPATIENT)
Dept: PHYSICAL THERAPY | Facility: CLINIC | Age: 40
End: 2020-09-03
Payer: COMMERCIAL

## 2020-09-03 DIAGNOSIS — M25.572 ACUTE LEFT ANKLE PAIN: ICD-10-CM

## 2020-09-03 DIAGNOSIS — S93.422A SPRAIN OF DELTOID LIGAMENT OF LEFT ANKLE, INITIAL ENCOUNTER: ICD-10-CM

## 2020-09-03 DIAGNOSIS — R60.0 LOCALIZED EDEMA: ICD-10-CM

## 2020-09-03 PROCEDURE — 97010 HOT OR COLD PACKS THERAPY: CPT | Mod: GP | Performed by: PHYSICAL THERAPY ASSISTANT

## 2020-09-03 PROCEDURE — 97110 THERAPEUTIC EXERCISES: CPT | Mod: GP | Performed by: PHYSICAL THERAPY ASSISTANT

## 2020-09-03 NOTE — PROGRESS NOTES
Subjective:  HPI  Physical Exam                    Objective:  System    Physical Exam    General     ROS    Assessment/Plan:    ASSESSMENT/PLAN  Updated problem list and treatment plan: Diagnosis 1:  L ankle pain  Pain -  hot/cold therapy, manual therapy, splint/taping/bracing/orthotics, self management, education, directional preference exercise and home program  Decreased ROM/flexibility - manual therapy and therapeutic exercise  Decreased joint mobility - manual therapy and therapeutic exercise  Decreased strength - therapeutic exercise and therapeutic activities  Impaired balance - neuro re-education and therapeutic activities  Decreased proprioception - neuro re-education and therapeutic activities  Inflammation - self management/home program  Edema - cold therapy and self management/home program  Impaired gait - gait training  Impaired muscle performance - neuro re-education  Decreased function - therapeutic activities  STG/LTGs have been met:  Yes (See Goal flow sheet completed today.)  Progress toward STG/LTGs have been made:  Yes (See Goal flow sheet completed today.)  Assessment of Progress: The patient's condition is improving.  Self Management Plans:  Patient has been instructed in a home treatment program.  Patient  has been instructed in self management of symptoms.    Tresa continues to require the following intervention to meet STG and LT's:  PT    Recommendations:  This patient would benefit from continued therapy.     Frequency:  1 X week, once daily  Duration:  for 6 weeks        Please refer to the daily flowsheet for treatment today, total treatment time and time spent performing 1:1 timed codes.

## 2020-09-03 NOTE — PROGRESS NOTES
Subjective:  HPI  Physical Exam                    Objective:  System    Ankle/Foot Evaluation  ROM:    AROM:    Dorsiflexion: Left:   20  Right:    Plantarflexion: Left:  55    Right:                                                                           General     ROS    Assessment/Plan:    PROGRESS  REPORT    Progress reporting period is from 7/3/20 to 9/3/20.      SUBJECTIVE  Subjective changes noted by patient:  Patient reports that she went for 1 hour walk and she was tired but the ankle did ok.  Uneven gound is more difficult .  Feels that she feels that she is about 90% back to normal.  .      Current pain level is 1/10.       Previous pain level was:   Initial Pain level: 7/10   Changes in function:  Yes (See Goal flowsheet attached for changes in current functional level)     Adverse reaction to treatment or activity: None     OBJECTIVE  Changes noted in objective findings:  Yes, patient is progressing with her ambulation but still has a wide base of support.  Challenges are present with narrowing that base of support and focusing in on correct mechanics with the left ankle during gait.  Challenges with proprioception and balance.  Mobility of the left ankle has improved.  Patient is using a Tri-Lock brace at times when walking.

## 2020-09-04 ENCOUNTER — TELEPHONE (OUTPATIENT)
Dept: SURGERY | Facility: CLINIC | Age: 40
End: 2020-09-04

## 2020-09-04 NOTE — TELEPHONE ENCOUNTER
Walked pt through how to find questionnaire and gave address/directions to our clinic    Kika Hughes MA

## 2020-09-08 ENCOUNTER — THERAPY VISIT (OUTPATIENT)
Dept: PHYSICAL THERAPY | Facility: CLINIC | Age: 40
End: 2020-09-08
Payer: COMMERCIAL

## 2020-09-08 ENCOUNTER — OFFICE VISIT (OUTPATIENT)
Dept: SURGERY | Facility: CLINIC | Age: 40
End: 2020-09-08
Payer: COMMERCIAL

## 2020-09-08 VITALS
SYSTOLIC BLOOD PRESSURE: 122 MMHG | OXYGEN SATURATION: 95 % | DIASTOLIC BLOOD PRESSURE: 86 MMHG | HEIGHT: 61 IN | BODY MASS INDEX: 49.29 KG/M2 | WEIGHT: 261.1 LBS | HEART RATE: 98 BPM

## 2020-09-08 DIAGNOSIS — R63.2 BINGE EATING: ICD-10-CM

## 2020-09-08 DIAGNOSIS — S93.422A SPRAIN OF DELTOID LIGAMENT OF LEFT ANKLE, INITIAL ENCOUNTER: ICD-10-CM

## 2020-09-08 DIAGNOSIS — R60.0 LOCALIZED EDEMA: ICD-10-CM

## 2020-09-08 DIAGNOSIS — M25.572 ACUTE LEFT ANKLE PAIN: ICD-10-CM

## 2020-09-08 DIAGNOSIS — I10 ESSENTIAL HYPERTENSION: ICD-10-CM

## 2020-09-08 DIAGNOSIS — E78.5 HYPERLIPIDEMIA LDL GOAL <160: ICD-10-CM

## 2020-09-08 DIAGNOSIS — E28.2 PCOS (POLYCYSTIC OVARIAN SYNDROME): ICD-10-CM

## 2020-09-08 DIAGNOSIS — E66.01 MORBID OBESITY (H): Primary | ICD-10-CM

## 2020-09-08 DIAGNOSIS — E78.2 MIXED HYPERLIPIDEMIA: ICD-10-CM

## 2020-09-08 PROBLEM — N20.0 NEPHROLITHIASIS: Status: RESOLVED | Noted: 2020-04-22 | Resolved: 2020-09-08

## 2020-09-08 PROCEDURE — 97140 MANUAL THERAPY 1/> REGIONS: CPT | Mod: GP | Performed by: PHYSICAL THERAPY ASSISTANT

## 2020-09-08 PROCEDURE — 97110 THERAPEUTIC EXERCISES: CPT | Mod: GP | Performed by: PHYSICAL THERAPY ASSISTANT

## 2020-09-08 PROCEDURE — 97010 HOT OR COLD PACKS THERAPY: CPT | Mod: GP | Performed by: PHYSICAL THERAPY ASSISTANT

## 2020-09-08 PROCEDURE — 99215 OFFICE O/P EST HI 40 MIN: CPT | Performed by: PHYSICIAN ASSISTANT

## 2020-09-08 ASSESSMENT — MIFFLIN-ST. JEOR: SCORE: 1801.72

## 2020-09-08 ASSESSMENT — PATIENT HEALTH QUESTIONNAIRE - PHQ9: SUM OF ALL RESPONSES TO PHQ QUESTIONS 1-9: 21

## 2020-09-08 NOTE — Clinical Note
Thank you for referring this patient to our bariatric clinic for an initial medical weight loss evaluation.  I look forward to working with you during this process.  PT has significant depressive symptoms.  Phq9 of 22.She also has binge eating that might be binge eating disorder.  She is going to have an ED evaluation with a psychologist.  I suspect they will note this a address.  If not, will refer to [facility].  Here is a copy of my visit.      Sincerely,      Erna Lai PA-C

## 2020-09-09 ENCOUNTER — HOSPITAL ENCOUNTER (OUTPATIENT)
Dept: ULTRASOUND IMAGING | Facility: CLINIC | Age: 40
Discharge: HOME | End: 2020-09-09
Attending: STUDENT IN AN ORGANIZED HEALTH CARE EDUCATION/TRAINING PROGRAM | Admitting: STUDENT IN AN ORGANIZED HEALTH CARE EDUCATION/TRAINING PROGRAM
Payer: COMMERCIAL

## 2020-09-09 ENCOUNTER — TELEPHONE (OUTPATIENT)
Dept: FAMILY MEDICINE | Facility: CLINIC | Age: 40
End: 2020-09-09

## 2020-09-09 DIAGNOSIS — N20.1 LEFT URETERAL STONE: ICD-10-CM

## 2020-09-09 PROCEDURE — 76770 US EXAM ABDO BACK WALL COMP: CPT

## 2020-09-10 ENCOUNTER — VIRTUAL VISIT (OUTPATIENT)
Dept: SURGERY | Facility: CLINIC | Age: 40
End: 2020-09-10
Payer: COMMERCIAL

## 2020-09-10 DIAGNOSIS — E66.01 MORBID OBESITY (H): ICD-10-CM

## 2020-09-10 PROCEDURE — 97802 MEDICAL NUTRITION INDIV IN: CPT | Mod: GT | Performed by: DIETITIAN, REGISTERED

## 2020-09-10 NOTE — PROGRESS NOTES
"Tresa Chin is a 40 year old female who is being evaluated via a billable video visit.      The patient has been notified of following:     \"This video visit will be conducted via a call between you and your physician/provider. We have found that certain health care needs can be provided without the need for an in-person physical exam.  This service lets us provide the care you need with a video conversation.  If a prescription is necessary we can send it directly to your pharmacy.  If lab work is needed we can place an order for that and you can then stop by our lab to have the test done at a later time.    Video visits are billed at different rates depending on your insurance coverage.  Please reach out to your insurance provider with any questions.    If during the course of the call the physician/provider feels a video visit is not appropriate, you will not be charged for this service.\"    Patient has given verbal consent for Video visit? Yes      Video-Visit Details    Type of service:  Video Visit    Video Start Time: 10:00  Video End Time: 10:32    Originating Location (pt. Location): Other office (work)    Distant Location (provider location):  [facility]     Platform used for Video Visit: [facility] WEIGHT LOSS INITIAL EVALUATION  DIAGNOSIS:  Obese, class III    NUTRITION HISTORY:  Breakfast: skips 3 times per week; Burger Giovanni or Farnam 2 boiled egg + avocado + slices of turkey (8:00-9:00)  Lunch: chick file A -chicken sandwich + fries + diet lemonade (12:00-1:00)   Dinner: rice and meat (lamb) 6:00-8:00 PM  Snacks: granola bar x 2 (at same time)   Beverage choices: water; lemonade (diet) 1 time per month diet soda; Crystal Light   Dining out: 3-4 times per (sit down restaurant)   Binge eating: at home; chips-eats all at once ice cream -1 pint (2-3 times per week)   Emotional eating: yes  Night time eating: no   Exercise: Kick boxing classes (hurt ankle June " "2020) back pain 2019  Other: Patient feels her lifestyle causing weight gain. Patient feels her biggest struggle with food is sugar and carbs (bread, rice). Patient lost 50 lbs following [employer] due to patient was [place].  Patient liked weekly meeting for accountability.  Patient pleased that she was able to lose weight previously as states weighed >300 lbs.  Patient works as an .  Patient takes care of her mother with Alzheimer's.  Patient lives with her mother and 2 brothers.  Patient states she will binge eat in front of her brothers as each will be eating their own bag of chips.  Patient has not made appointment for eating disorder evaluation.      ANTHROPOMETRICS:  Height: 5'1\"  Weight: 261 lbs   BMI: 49.3 kg/m2  NUTRITION DIAGNOSIS:   Obese, class III related to excess energy intake as evidence by BMI of  49.3 kg/m2   NUTRITION INTERVENTIONS  Nutrition Prescription:  Recommend modified energy- nutrient intake  Implementation:  Nutrition Education (Content):    Discussed structured eating pattern to prevent binge eating in the evening.  Long discussion regarding eating behaviors and patterns.    Determined alternative to emotional eating  Nutrition Education (Application):     Patient to practice goals as stated below    Patient verbalizes understanding of diet by starting to eat 3 meals per day    Anticipate fair-good compliance    Goals:  Eat breakfast within 1 hour of waking   Make appointment for ED evaluation     FOLLOW UP AND MONITORING:   Other  - follow up in 4 weeks.     TIME SPENT WITH PATIENT:  32 minutes   Karma Russell, RD, LD  [facility]  [phone] (office phone)    "

## 2020-09-11 ENCOUNTER — OFFICE VISIT (OUTPATIENT)
Dept: UROLOGY | Facility: CLINIC | Age: 40
End: 2020-09-11
Payer: COMMERCIAL

## 2020-09-11 VITALS
WEIGHT: 260 LBS | DIASTOLIC BLOOD PRESSURE: 80 MMHG | HEIGHT: 61 IN | SYSTOLIC BLOOD PRESSURE: 114 MMHG | BODY MASS INDEX: 49.09 KG/M2

## 2020-09-11 DIAGNOSIS — N20.1 LEFT URETERAL STONE: Primary | ICD-10-CM

## 2020-09-11 DIAGNOSIS — E66.01 MORBID OBESITY (H): ICD-10-CM

## 2020-09-11 LAB
ALBUMIN UR-MCNC: NEGATIVE MG/DL
APPEARANCE UR: ABNORMAL
BILIRUB UR QL STRIP: NEGATIVE
COLOR UR AUTO: YELLOW
GLUCOSE UR STRIP-MCNC: NEGATIVE MG/DL
HBA1C MFR BLD: 6.1 % (ref 0–5.6)
HGB UR QL STRIP: ABNORMAL
KETONES UR STRIP-MCNC: NEGATIVE MG/DL
LEUKOCYTE ESTERASE UR QL STRIP: ABNORMAL
NITRATE UR QL: NEGATIVE
PH UR STRIP: 7 PH (ref 5–7)
SOURCE: ABNORMAL
SP GR UR STRIP: 1.02 (ref 1–1.03)
UROBILINOGEN UR STRIP-ACNC: 0.2 EU/DL (ref 0.2–1)

## 2020-09-11 PROCEDURE — 83036 HEMOGLOBIN GLYCOSYLATED A1C: CPT | Performed by: PHYSICIAN ASSISTANT

## 2020-09-11 PROCEDURE — 99214 OFFICE O/P EST MOD 30 MIN: CPT | Performed by: STUDENT IN AN ORGANIZED HEALTH CARE EDUCATION/TRAINING PROGRAM

## 2020-09-11 PROCEDURE — 82306 VITAMIN D 25 HYDROXY: CPT | Performed by: PHYSICIAN ASSISTANT

## 2020-09-11 PROCEDURE — 80061 LIPID PANEL: CPT | Performed by: PHYSICIAN ASSISTANT

## 2020-09-11 PROCEDURE — 36415 COLL VENOUS BLD VENIPUNCTURE: CPT | Performed by: PHYSICIAN ASSISTANT

## 2020-09-11 PROCEDURE — 81003 URINALYSIS AUTO W/O SCOPE: CPT | Mod: QW | Performed by: STUDENT IN AN ORGANIZED HEALTH CARE EDUCATION/TRAINING PROGRAM

## 2020-09-11 ASSESSMENT — PAIN SCALES - GENERAL: PAINLEVEL: NO PAIN (0)

## 2020-09-11 ASSESSMENT — MIFFLIN-ST. JEOR: SCORE: 1796.73

## 2020-09-11 NOTE — NURSING NOTE
Chief Complaint   Patient presents with     Ureteral Stone     review imaging and Litholink results       Stefani Cortez, EMT

## 2020-09-11 NOTE — PROGRESS NOTES
"CHIEF COMPLAINT   Tresa Chin who is a 40 year old female returns today for follow-up of nephrolithiasis.      HPI   Tresa Chin is a 40-year-old female with history of morbid obesity, hypertension, hyperlipidemia, nephrolithiasis in the past who was found to have an obstructing 6 mm left ureteral stone now s/p ureteroscopy 7/22/2020. She returns to discuss metabolic workup    Calculi composed primarily of:   30% calcium oxalate monohydrate,   10% calcium oxalate dihydrate, and   60% calcium phosphate (hydroxy- and carbonate- apatite).     Litholink results with hypercalciuria, high urine pH, and severe hyperuricosuria.     Patient notes that she eats a lot of meat in her diet. Also drinks a protein shake daily. She is working with a dietician regarding weight management. She is not currently on any calcium/vitd supplements but is being tested for this      PHYSICAL EXAM  Patient is a 40 year old  female   Vitals: Blood pressure 114/80, height 1.549 m (5' 1\"), weight 117.9 kg (260 lb), not currently breastfeeding.  Body mass index is 49.13 kg/m .  General Appearance Adult:   Alert, no acute distress, oriented  HENT: throat/mouth:normal, good dentition  Lungs: no respiratory distress, or pursed lip breathing  Heart: No obvious jugular venous distension present  Abdomen: obese, soft, nontender, no organomegaly or masses  Musculoskeltal: extremities normal, no peripheral edema  Skin: no suspicious lesions or rashes  Neuro: Alert, oriented, speech and mentation normal  Psych: affect and mood normal  Gait: Normal  : deferred    All pertinent imaging reviewed:    Renal ultrasound 9/9/2020  No stones, no hydro    ASSESSMENT and PLAN  40-year-old female with history of morbid obesity, hypertension, hyperlipidemia, nephrolithiasis in the past who was found to have an obstructing 6 mm left ureteral stone now s/p ureteroscopy 7/22/2020. Renal ultrasound with no stones, no hydro. Litholink with hypercalciuria, high " urine pH and severe hyperuricosuria. Discussed with patient that she needs to reduce her protein intake. Will start with diet changes first before considering initiation of any medications.    - Recommend low sodium diet  - Calcium 800 - 1200 mg /day from food. No extra vitD supplementation unless found to be deficient  - Reduce poultry, fish and meat protein. Stop protein shakes  - incidental finding of hepatic steatosis on renal ultrasound, recommended patient to follow up with PCP  - repeat litholink in 6 weeks and referral to nephrology regarding further workup and medical prevention  - given high risk factors for stone formation, follow up in 1 year with urology with CT scan prior    I spent over 25 minutes with the patient.  Over half this time was spent on counseling regarding metabolic prevention of kidney stones.    Anurag Gomez MD   [facility] Urology  [facility] Phone: [phone]

## 2020-09-11 NOTE — PATIENT INSTRUCTIONS
Start a low protein, low sodium diet  Do not take extra calcium supplements.  Stop drinking protein shakes    Please see a nephrologist to talk about some of your lab abnormalities (referral to [facility])    Patient Education     Preventing Kidney Stones  If you ve had a kidney stone, you may worry that you ll have another. Removing or passing your stone doesn t prevent future stones. But with your healthcare provider s help, you can reduce your risk of forming new stones. Follow up with your healthcare provider to help find new stones. You may need follow-up every 3 months to a year for a lifetime.    Drink lots of water  Staying well-hydrated is the best way to reduce your risk of future stones. Drink 8 12-ounce glasses of water daily. Have 2 with each meal and 2 between meals. Try keeping a pitcher of water nearby during the day and at night.  Take medicines if needed  Medicines, including vitamins and minerals, may be prescribed for certain types of stones. You may want to write your doses and medicine times on a calendar. Some medicines decrease stone-forming chemicals in your blood. Others help prevent those chemicals from crystallizing in urine. Still others help keep a normal acid balance in your urine.  Follow your prescribed diet  Your healthcare provider will tell you which foods contain the chemicals you should avoid. Your healthcare provider may also suggest talking to a dietitian. He or she can help you plan meals you ll enjoy. These meals won t put you at risk for future stones. You may be told to limit certain foods, depending on which type of stones you ve had. You should limit the amount of salt in your food to about 2 grams a day. This will help prevent most types of kidney stones. Make sure you get an adequate amount of calcium in your diet.  For calcium oxalate stones: Limit animal protein, such as meat, eggs, and fish. Limit grapefruit juice and alcohol. Limit high-oxalate foods (such as cola,  tea, chocolate, spinach, rhubarb, wheat bran, and peanuts).  For uric acid stones: Limit high-purine foods, such as mushrooms, peas, beans, anchovies, meat, poultry, shellfish, and organ meats. These foods increase uric acid production.  Date Last Reviewed: 2/1/2017 2000-2019 [employer]. [address] [place] [address]. All rights reserved. This information is not intended as a substitute for professional medical care. Always follow your healthcare professional's instructions.

## 2020-09-11 NOTE — LETTER
"9/11/2020       RE: Tresa Chin  [address] [place] [id number]     Dear Colleague,    Thank you for referring your patient, Tresa Chin, to the [facility] [place] [facility]. Please see a copy of my visit note below.    CHIEF COMPLAINT   Tresa Chin who is a 40 year old female returns today for follow-up of nephrolithiasis.      HPI   Tresa Chin is a 40-year-old female with history of morbid obesity, hypertension, hyperlipidemia, nephrolithiasis in the past who was found to have an obstructing 6 mm left ureteral stone now s/p ureteroscopy 7/22/2020. She returns to discuss metabolic workup    Calculi composed primarily of:   30% calcium oxalate monohydrate,   10% calcium oxalate dihydrate, and   60% calcium phosphate (hydroxy- and carbonate- apatite).     Litholink results with hypercalciuria, high urine pH, and severe hyperuricosuria.     Patient notes that she eats a lot of meat in her diet. Also drinks a protein shake daily. She is working with a dietician regarding weight management. She is not currently on any calcium/vitd supplements but is being tested for this      PHYSICAL EXAM  Patient is a 40 year old  female   Vitals: Blood pressure 114/80, height 1.549 m (5' 1\"), weight 117.9 kg (260 lb), not currently breastfeeding.  Body mass index is 49.13 kg/m .  General Appearance Adult:   Alert, no acute distress, oriented  HENT: throat/mouth:normal, good dentition  Lungs: no respiratory distress, or pursed lip breathing  Heart: No obvious jugular venous distension present  Abdomen: obese, soft, nontender, no organomegaly or masses  Musculoskeltal: extremities normal, no peripheral edema  Skin: no suspicious lesions or rashes  Neuro: Alert, oriented, speech and mentation normal  Psych: affect and mood normal  Gait: Normal  : deferred    All pertinent imaging reviewed:    Renal ultrasound " 9/9/2020  No stones, no hydro    ASSESSMENT and PLAN  40-year-old female with history of morbid obesity, hypertension, hyperlipidemia, nephrolithiasis in the past who was found to have an obstructing 6 mm left ureteral stone now s/p ureteroscopy 7/22/2020. Renal ultrasound with no stones, no hydro. Litholink with hypercalciuria, high urine pH and severe hyperuricosuria. Discussed with patient that she needs to reduce her protein intake. Will start with diet changes first before considering initiation of any medications.    - Recommend low sodium diet  - Calcium 800 - 1200 mg /day from food. No extra vitD supplementation unless found to be deficient  - Reduce poultry, fish and meat protein. Stop protein shakes  - incidental finding of hepatic steatosis on renal ultrasound, recommended patient to follow up with PCP  - repeat litholink in 6 weeks and referral to nephrology regarding further workup and medical prevention  - given high risk factors for stone formation, follow up in 1 year with urology with CT scan prior    I spent over 25 minutes with the patient.  Over half this time was spent on counseling regarding metabolic prevention of kidney stones.    Anurag Gomez MD   [facility] Urology  [facility] Phone: [phone]

## 2020-09-12 LAB
CHOLEST SERPL-MCNC: 280 MG/DL
HDLC SERPL-MCNC: 72 MG/DL
LDLC SERPL CALC-MCNC: 194 MG/DL
NONHDLC SERPL-MCNC: 208 MG/DL
TRIGL SERPL-MCNC: 69 MG/DL

## 2020-09-14 ENCOUNTER — TELEPHONE (OUTPATIENT)
Dept: FAMILY MEDICINE | Facility: CLINIC | Age: 40
End: 2020-09-14

## 2020-09-14 LAB — DEPRECATED CALCIDIOL+CALCIFEROL SERPL-MC: 20 UG/L (ref 20–75)

## 2020-09-14 NOTE — TELEPHONE ENCOUNTER
LMOM for pt to return call to [facility].  YAYO Guerra Amer, MD   9/14/2020  7:26 AM       Please call patient :   I wonder if patient lipid profile is very high, and so is her blood sugar.   I know she is going through weight loss program, and possible surgery, but I wonder if we can chat with virtual visit to talk about her very high cholesterol.   Melissa Franco MD   [facility]

## 2020-09-14 NOTE — LETTER
[facility]  [address] [place], [address]  [phone]        October 27, 2020    Tresa Chin                                                                                                                                                       [address]   [facility] [id number]            Dear Tresa,    Your lipid profile (cholesterol) is very high, and so is your blood sugar.   I wonder if we can chat with a virtual visit to talk about your lab results.  Please call the clinic to schedule an appointment.    Melissa Franco MD

## 2020-09-21 ENCOUNTER — THERAPY VISIT (OUTPATIENT)
Dept: SLEEP MEDICINE | Facility: CLINIC | Age: 40
End: 2020-09-21
Payer: COMMERCIAL

## 2020-09-21 DIAGNOSIS — R06.89 HYPOVENTILATION: ICD-10-CM

## 2020-09-21 PROCEDURE — 95810 POLYSOM 6/> YRS 4/> PARAM: CPT | Performed by: INTERNAL MEDICINE

## 2020-09-22 ENCOUNTER — VIRTUAL VISIT (OUTPATIENT)
Dept: FAMILY MEDICINE | Facility: CLINIC | Age: 40
End: 2020-09-22
Payer: COMMERCIAL

## 2020-09-22 DIAGNOSIS — N20.0 KIDNEY STONE: ICD-10-CM

## 2020-09-22 DIAGNOSIS — R89.9 ELEVATED LABORATORY TEST RESULT: ICD-10-CM

## 2020-09-22 DIAGNOSIS — R73.03 PREDIABETES: ICD-10-CM

## 2020-09-22 DIAGNOSIS — E66.01 MORBID OBESITY (H): Primary | ICD-10-CM

## 2020-09-22 PROCEDURE — 99214 OFFICE O/P EST MOD 30 MIN: CPT | Mod: 95 | Performed by: FAMILY MEDICINE

## 2020-09-22 NOTE — PROGRESS NOTES
"Tresa Chin is a 40 year old female who is being evaluated via a billable video visit.      The patient has been notified of following:     \"This video visit will be conducted via a call between you and your physician/provider. We have found that certain health care needs can be provided without the need for an in-person physical exam.  This service lets us provide the care you need with a video conversation.  If a prescription is necessary we can send it directly to your pharmacy.  If lab work is needed we can place an order for that and you can then stop by our lab to have the test done at a later time.    Video visits are billed at different rates depending on your insurance coverage.  Please reach out to your insurance provider with any questions.    If during the course of the call the physician/provider feels a video visit is not appropriate, you will not be charged for this service.\"    Patient has given verbal consent for Video visit? Yes  How would you like to obtain your AVS? MyChart  If you are dropped from the video visit, the video invite should be resent to: Text to cell phone:   Will anyone else be joining your video visit? No      Subjective     Tresa Chin is a 40 year old female who presents today via video visit for the following health issues:    HPI    Patient being seen today to review recent lab results. She was noted to have elevated cholesterol and A1c at recent visit.   States she has had 2 surgeries for kidney stones recently. Was advised by urologist to reduce intake of sodium and protein. Admits since last week she has significantly reduced her intake of meats and salt.  Started having pain again on her left flank for the last few days and wonders if she has another kidney stone. Reports pain is intermittent and not that bad. No         Video Start Time: 10:03am        Review of Systems   Constitutional, HEENT, cardiovascular, pulmonary, GI, , musculoskeletal, neuro, skin, " endocrine and psych systems are negative, except as otherwise noted.      Objective           Vitals:  No vitals were obtained today due to virtual visit.    Physical Exam     GENERAL: Healthy, alert and no distress  EYES: Eyes grossly normal to inspection.  No discharge or erythema, or obvious scleral/conjunctival abnormalities.  RESP: No audible wheeze, cough, or visible cyanosis.  No visible retractions or increased work of breathing.    PSYCH: Mentation appears normal, affect normal/bright, judgement and insight intact, normal speech and appearance well-groomed.              Assessment & Plan     Tresa was seen today for results.    Diagnoses and all orders for this visit:    Morbid obesity (H)    Kidney stone    Elevated laboratory test result    Prediabetes       talked at length with patient regarding all her labs and kidney stone bouts. Stressed importance of diet and lifestyle modification.   She will consider a mostly whole foods plant based diet with animal protein as a side if needed.   Also set a goal of walking 30 minutes every other day for the next few weeks.     At end of visit mentioned slight ice pick sensation on her right parietal area which only lasts a few seconds and is intermittent in nature. Discussed watchful waiting for now.     See Patient Instructions    Return in about 3 months (around 12/22/2020) for Routine Visit, in person, .    Kalyn Miller MD  [facility]      Video-Visit Details    Type of service:  Video Visit    Video End Time:10:43 am     Originating Location (pt. Location): Home    Distant Location (provider location):  [facility] [place]     Platform used for Video Visit: Felix

## 2020-09-30 ENCOUNTER — TELEPHONE (OUTPATIENT)
Dept: UROLOGY | Facility: CLINIC | Age: 40
End: 2020-09-30

## 2020-09-30 LAB — SLPCOMP: NORMAL

## 2020-09-30 NOTE — TELEPHONE ENCOUNTER
[facility] Call Center    Phone Message    May a detailed message be left on voicemail: yes     Reason for Call: Other: pt wants to know if she should do the 24 hour sample within 2 months, or next year? please call pt thanks!     Action Taken: Message routed to:  [facility] uro    Travel Screening: Not Applicable

## 2020-09-30 NOTE — TELEPHONE ENCOUNTER
Called patient and informed her per MD's note she should repeat litholink in 6 weeks from visit. Patient expressed understanding.     Andra Díaz LPN

## 2020-10-05 NOTE — PROGRESS NOTES
"Tresa Chin is a 40 year old female who is being evaluated via a billable video visit.      The patient has been notified of following:     \"This video visit will be conducted via a call between you and your physician/provider. We have found that certain health care needs can be provided without the need for an in-person physical exam.  This service lets us provide the care you need with a video conversation.  If a prescription is necessary we can send it directly to your pharmacy.  If lab work is needed we can place an order for that and you can then stop by our lab to have the test done at a later time.    Video visits are billed at different rates depending on your insurance coverage.  Please reach out to your insurance provider with any questions.    If during the course of the call the physician/provider feels a video visit is not appropriate, you will not be charged for this service.\"    Patient has given verbal consent for Video visit? Yes  How would you like to obtain your AVS? MyChart  If you are dropped from the video visit, the video invite should be resent to: Text to cell phone: [phone]   Will anyone else be joining your video visit? No        Video-Visit Details    Type of service:  Video Visit    Video Start Time: 8:02 AM  Video End Time: 8:16 AM    Originating Location (pt. Location): Home    Distant Location (provider location):  [facility]     Platform used for Video Visit: Corey maria    [facility]   Outpatient Sleep Medicine Consultation  October 6, 2020    Name: Tresa Chin MRN# [id number]   Age: 40 year old YOB: 1980     Date of Consultation: October 6, 2020  Consultation is requested by: No referring provider defined for this encounter.  Primary care provider: Melissa Franco           Assessment and Plan:       Moderate obstructive sleep apnea with hypoxemia and REM worsening    Insomnia with electronic device use and " "prominent features of psychophysiological insomnia in the setting of chronic anxiety and untreated sleep apnea      Comorbid Diagnoses:       Severe obesity BMI 47    Mood disturbance-chronic anxiety with panic attacks night and day    Hypertension    GERD    Back pain        Summary Recommendations:       Initiate auto titration CPAP 5-15 with coordinated care follow-up to ensure interface adaptation and optimization of pressures     Reevaluation of underlying insomnia with possible initiation of cognitive behavioral therapy for insomnia persistent on effective therapy for sleep apnea     Summary Counseling:   We reviewed potential complications of untreated disease and treatment options.  The patient is interested in a rapidly effective device and feels that she can adapt to nasal mask interface-she requests initiation of CPAP and agrees to coordinated care plan.                History of Present Illness:      Tresa Chin is a 40 year old female with insomnia [ LUIS M  22 ] in the setting of chronic anxiety and moderate obstructive sleep apnea with REM worsening {REM AHI 65].  She had some discomfort during the sleep study which may have contributed to her sleep disruption.  There is no evidence for coexisting periodic limb movement disorder.        SLEEP-WAKE SCHEDULE:      Bedtime 10 pm with TV latency 15min with 3x/week prolonged awakenings >1 hour or all night  Sleep very disturbed with brief awakenings 3-5x  Awakening 5:30 am with or without alarm  Occ napping  No crashes/ near miss accident        SCALES           PREVIOUS SLEEP STUDIES:     YOB: 1980   Study Date: 9/21/2020   MRN: [id number]   Referring Provider: MD Franco Amer   Ordering Provider: MD Bird Conrad   Indications for Polysomnography: The patient is a 40 year old female who is 5' 1\" and weighs 250.0 lbs. Her BMI is 47.8, Midway sleepiness scale 15 and neck circumference is 38 cm. Relevant medical history includes severe " obesity, insomnia, mood disturbance, hypertension and bruxism. A diagnostic polysomnogram was performed to evaluate for sleep apnea/ hypoventilation/hypoxemia.   Polysomnogram Data: A full night polysomnogram recorded the standard physiologic parameters including EEG, EOG, EMG, ECG, nasal and oral airflow. Respiratory parameters of chest and abdominal movements were recorded with respiratory inductance plethysmography. Oxygen saturation was recorded by pulse oximetry. Hypopnea scoring rule used: 1B 4%.   Sleep Architecture: All sleep stages present with sleep disruption attributable to respiratory events.   The total recording time of the polysomnogram was 416.1 minutes. The total sleep time was 388.0 minutes. Sleep latency was decreased at 2.5 minutes without the use of a sleep aid. REM latency was 54.5 minutes. Arousal index was increased at 30.5 arousals per hour. Sleep efficiency was normal at 93.3%. Wake after sleep onset was 24.0 minutes. The patient spent 0.5% of total sleep time in Stage N1, 41.1% in Stage N2, 33.4% in Stage N3, and 25.0% in REM. Time in REM supine was 26.5 minutes.   Respiration: Moderate obstructive sleep apnea with REM worsening and associated hypoxemia without hypoventilation.     Events ? The polysomnogram revealed a presence of 25 obstructive, 5 central, and - mixed apneas resulting in an apnea index of 4.6 events per hour. There were 132 obstructive hypopneas and - central hypopneas resulting in an obstructive hypopnea index of 20.4 and central hypopnea index of - events per hour. The combined apnea/hypopnea index was 25.1 events per hour (central apnea/hypopnea index was 0.8 events per hour). The REM AHI was 64.9 events per hour. The supine AHI was 37.4 events per hour. The RERA index was 6.0 events per hour. The RDI was 31.1 events per hour.     Snoring - was reported as moderate/loud/intermittent.     Respiratory rate and pattern - was notable for normal respiratory rate and  pattern.     Sustained Sleep Associated Hypoventilation - Transcutaneous carbon dioxide monitoring was used, however significant hypoventilation was not present with a maximum change from 34.2 to 43.1 mmHg and 0 minutes at or greater than 55 mmHg.     Sleep Associated Hypoxemia - (Greater than 5 minutes O2 sat at or below 88%) was present. Baseline oxygen saturation was 93.1%. Lowest oxygen saturation was 65.6%. Time spent less than or equal to 88% was 24.2 minutes. Time spent less than or equal to 89% was 32.2 minutes.     Movement Activity: No abnormal sleep movements.     Periodic Limb Activity - There were 14 PLMs during the entire study. The PLM index was 2.2 movements per hour. The PLM Arousal Index was 1.5 per hour.     REM EMG Activity - Excessive transient/sustained muscle activity was not present.     Nocturnal Behavior - Abnormal sleep related behaviors were not noted.     Bruxism - None apparent.     Cardiac Summary: Normal sinus rhythm.   The average pulse rate was 75.9 bpm. The minimum pulse rate was 57.9 bpm while the maximum pulse rate was 115.8 bpm. Arrhythmias were not noted.   Assessment:    All sleep stages present with sleep disruption attributable to respiratory events.     Moderate obstructive sleep apnea with REM worsening and associated hypoxemia without hypoventilation.     No abnormal sleep movements            Medications:     Current Outpatient Medications   Medication Sig     acetaminophen (TYLENOL) 325 MG tablet Take 2 tablets (650 mg) by mouth every 4 hours as needed for mild pain     albuterol (PROAIR HFA/PROVENTIL HFA/VENTOLIN HFA) 108 (90 Base) MCG/ACT inhaler Inhale 2 puffs into the lungs every 6 hours as needed for shortness of breath / dyspnea or wheezing     amLODIPine 10 MG PO tablet Take 1 tablet (10 mg) by mouth daily     omeprazole (PRILOSEC OTC) 20 MG EC tablet Take 1 tablet (20 mg) by mouth daily     No current facility-administered medications for this visit.          Allergies   Allergen Reactions     Latex      PN: Converted from LW Latex Sensitivity Flag            Past Medical History:     Does not need 02 supplement at night   Past Medical History:   Diagnosis Date     Back pain      Hypertension      Sleep apnea              Past Surgical History:    No h/o  upper airway surgery  Past Surgical History:   Procedure Laterality Date     COMBINED CYSTOSCOPY, INSERT STENT URETER(S) Left 7/22/2020    Procedure: Cystoscopy with left ureteral stent insertion;  Surgeon: Anurag Gomez MD;  Location:  OR     COMBINED CYSTOSCOPY, RETROGRADES, URETEROSCOPY, LASER HOLMIUM LITHOTRIPSY URETER(S), INSERT STENT Left 8/4/2020    Procedure: Cystoscopy, removal of left ureteral stent, left ureteroscopy with holmium laser lithotripsy, stone basketing and placement of left ureteral stent;  Surgeon: Anurag Gomez MD;  Location:  OR     CYSTOSCOPY       ESOPHAGOSCOPY, GASTROSCOPY, DUODENOSCOPY (EGD), COMBINED N/A 5/31/2019    Procedure: ESOPHAGOGASTRODUODENOSCOPY (EGD)cold BX (fv);  Surgeon: Geremias Martinez MD;  Location:  GI     GENITOURINARY SURGERY                        Physical Examination:     Objective   Reported vitals:  There were no vitals taken for this visit.   healthy, alert and no distress  Psych: Alert and oriented times 3; coherent speech, normal   rate and volume, able to articulate logical thoughts, able   to abstract reason, no tangential thoughts, no hallucinations   or delusions  Her affect is normal.         Copy to: Mleissa Franco MD 10/6/2020     [facility]   Floor 1, [address]   [address] [place] [address]   Appointments: [phone]    [facility]  3rd Floor  [address] [place] [address]

## 2020-10-06 ENCOUNTER — VIRTUAL VISIT (OUTPATIENT)
Dept: SLEEP MEDICINE | Facility: CLINIC | Age: 40
End: 2020-10-06
Payer: COMMERCIAL

## 2020-10-06 DIAGNOSIS — G47.33 OSA (OBSTRUCTIVE SLEEP APNEA): Primary | ICD-10-CM

## 2020-10-06 PROCEDURE — 99213 OFFICE O/P EST LOW 20 MIN: CPT | Mod: 95 | Performed by: INTERNAL MEDICINE

## 2020-10-13 ENCOUNTER — OFFICE VISIT (OUTPATIENT)
Dept: FAMILY MEDICINE | Facility: CLINIC | Age: 40
End: 2020-10-13
Payer: COMMERCIAL

## 2020-10-13 VITALS
DIASTOLIC BLOOD PRESSURE: 89 MMHG | HEART RATE: 84 BPM | TEMPERATURE: 98.6 F | OXYGEN SATURATION: 96 % | WEIGHT: 253 LBS | SYSTOLIC BLOOD PRESSURE: 121 MMHG | RESPIRATION RATE: 16 BRPM | BODY MASS INDEX: 47.8 KG/M2

## 2020-10-13 DIAGNOSIS — R10.9 FLANK PAIN: Primary | ICD-10-CM

## 2020-10-13 DIAGNOSIS — Z28.39 NOT UP TO DATE WITH TETANUS TOXOID IMMUNIZATION: ICD-10-CM

## 2020-10-13 LAB
ALBUMIN UR-MCNC: NEGATIVE MG/DL
APPEARANCE UR: CLEAR
BACTERIA #/AREA URNS HPF: ABNORMAL /HPF
BILIRUB UR QL STRIP: NEGATIVE
COLOR UR AUTO: YELLOW
GLUCOSE UR STRIP-MCNC: NEGATIVE MG/DL
HGB UR QL STRIP: NEGATIVE
KETONES UR STRIP-MCNC: NEGATIVE MG/DL
LEUKOCYTE ESTERASE UR QL STRIP: ABNORMAL
MUCOUS THREADS #/AREA URNS LPF: PRESENT /LPF
NITRATE UR QL: NEGATIVE
NON-SQ EPI CELLS #/AREA URNS LPF: ABNORMAL /LPF
PH UR STRIP: 6.5 PH (ref 5–7)
RBC #/AREA URNS AUTO: ABNORMAL /HPF
SOURCE: ABNORMAL
SP GR UR STRIP: 1.02 (ref 1–1.03)
UROBILINOGEN UR STRIP-ACNC: 0.2 EU/DL (ref 0.2–1)
WBC #/AREA URNS AUTO: ABNORMAL /HPF

## 2020-10-13 PROCEDURE — 81001 URINALYSIS AUTO W/SCOPE: CPT | Performed by: PHYSICIAN ASSISTANT

## 2020-10-13 PROCEDURE — 90471 IMMUNIZATION ADMIN: CPT

## 2020-10-13 PROCEDURE — 90715 TDAP VACCINE 7 YRS/> IM: CPT

## 2020-10-13 PROCEDURE — 99214 OFFICE O/P EST MOD 30 MIN: CPT | Mod: 25 | Performed by: PHYSICIAN ASSISTANT

## 2020-10-13 NOTE — LETTER
My Depression Action Plan  Name: Tresa Chin   Date of Birth 1980  Date: 10/13/2020    My doctor: Melissa Franco   My clinic: [address] [address] [phone]          GREEN    ZONE   Good Control    What it looks like:     Things are going generally well. You have normal ups and downs. You may even feel depressed from time to time, but bad moods usually last less than a day.   What you need to do:  1. Continue to care for yourself (see self care plan)  2. Check your depression survival kit and update it as needed  3. Follow your physician s recommendations including any medication.  4. Do not stop taking medication unless you consult with your physician first.           YELLOW         ZONE Getting Worse    What it looks like:     Depression is starting to interfere with your life.     It may be hard to get out of bed; you may be starting to isolate yourself from others.    Symptoms of depression are starting to last most all day and this has happened for several days.     You may have suicidal thoughts but they are not constant.   What you need to do:     1. Call your care team. Your response to treatment will improve if you keep your care team informed of your progress. Yellow periods are signs an adjustment may need to be made.     2. Continue your self-care.  Just get dressed and ready for the day.  Don't give yourself time to talk yourself out of it.    3. Talk to someone in your support network.    4. Open up your Depression Self-Care Plan/Wellness Kit.           RED    ZONE Medical Alert - Get Help    What it looks like:     Depression is seriously interfering with your life.     You may experience these or other symptoms: You can t get out of bed most days, can t work or engage in other necessary activities, you have trouble taking care of basic hygiene, or basic responsibilities, thoughts of suicide or death that  will not go away, self-injurious behavior.     What you need to do:  1. Call your care team and request a same-day appointment. If they are not available (weekends or after hours) call your local crisis line, emergency room or 911.            Depression Self-Care Plan / Wellness Kit    Self-Care for Depression  Here s the deal. Your body and mind are really not as separate as most people think.  What you do and think affects how you feel and how you feel influences what you do and think. This means if you do things that people who feel good do, it will help you feel better.  Sometimes this is all it takes.  There is also a place for medication and therapy depending on how severe your depression is, so be sure to consult with your medical provider and/ or Behavioral Health Consultant if your symptoms are worsening or not improving.     In order to better manage my stress, I will:    Exercise  Get some form of exercise, every day. This will help reduce pain and release endorphins, the  feel good  chemicals in your brain. This is almost as good as taking antidepressants!  This is not the same as joining a gym and then never going! (they count on that by the way ) It can be as simple as just going for a walk or doing some gardening, anything that will get you moving.      Hygiene   Maintain good hygiene (get out of bed in the morning, make your bed, brush your teeth, take a shower, and get dressed like you were going to work, even if you are unemployed).  If your clothes don't fit try to get ones that do.    Diet  Strive to eat foods that are good for me, drink plenty of water, and avoid excessive sugar, caffeine, alcohol, and other mood-altering substances.  Some foods that are helpful in depression are: complex carbohydrates, B vitamins, flaxseed, fish or fish oil, fresh fruits and vegetables.    Psychotherapy  Agree to participate in Individual Therapy (if recommended).    Medication  If prescribed medications, I  agree to take them.  Missing doses can result in serious side effects.  I understand that drinking alcohol, or other illicit drug use, may cause potential side effects.  I will not stop my medication abruptly without first discussing it with my provider.    Staying Connected With Others  Stay in touch with my friends, family members, and my primary care provider/team.    Use your imagination  Be creative.  We all have a creative side; it doesn t matter if it s oil painting, sand castles, or mud pies! This will also kick up the endorphins.    Witness Beauty  (AKA stop and smell the roses) Take a look outside, even in mid-winter. Notice colors, textures. Watch the squirrels and birds.     Service to others  Be of service to others.  There is always someone else in need.  By helping others we can  get out of ourselves  and remember the really important things.  This also provides opportunities for practicing all the other parts of the program.    Humor  Laugh and be silly!  Adjust your TV habits for less news and crime-drama and more comedy.    Control your stress  Try breathing deep, massage therapy, biofeedback, and meditation. Find time to relax each day.     Crisis Text Line  http://www.crisistextline.org    The Crisis Text Line serves anyone, in any type of crisis, providing access to free, 24/7 support and information via the medium people already use and trust:    Here's how it works:  1.  Text 611-649 from anywhere in the [place], anytime, about any type of crisis.  2.  A live, trained Crisis Counselor receives the text and responds quickly.  3.  The volunteer Crisis Counselor will help you move from a 'hot moment to a cool moment'.    My support system    Clinic Contact:  Phone number:    Contact 1:  Phone number:    Contact 2:  Phone number:    Yarsani/:  Phone number:    Therapist:  Phone number:    Local crisis center:    Phone number:    Other community support:  Phone number:

## 2020-10-13 NOTE — PATIENT INSTRUCTIONS
Miralax 1 capful daily for 1 week.    If worsening or not improving we can order CT for further evaluation.

## 2020-10-21 ENCOUNTER — TELEPHONE (OUTPATIENT)
Dept: SURGERY | Facility: CLINIC | Age: 40
End: 2020-10-21

## 2020-10-21 NOTE — TELEPHONE ENCOUNTER
Called [facility] as directed.  No answer at reception.  Left VM asking for return call to nurse line.    Sylvia New RN on 10/21/2020 at 1:44 PM

## 2020-10-21 NOTE — TELEPHONE ENCOUNTER
Called pt to check on status of ED evaluation.  No answer; left VM asking pt to call back today if possible.  Sylvia New RN on 10/21/2020 at 2:49 PM

## 2020-10-21 NOTE — TELEPHONE ENCOUNTER
[facility] called back; Leena estevez states they cannot release information back to us without a signed release.  Will call pt directly to check on this.  Sylvia New RN on 10/21/2020 at 2:48 PM

## 2020-10-21 NOTE — TELEPHONE ENCOUNTER
Pt called back. States she did complete ED evaluation. States she isn't sure if it has been faxed or not. Asked pt to call [facility] and see if they will fax it today.  Pt agrees; RN gave pt our fax number to give them.    Awaiting fax.  Sylvia New RN on 10/21/2020 at 2:54 PM

## 2020-10-22 NOTE — TELEPHONE ENCOUNTER
Called pt again. Pt states she was told to leave VM for her provider at [facility].  Left  yesterday asking for record to be faxed.  Has not been received yet.  Assisted pt to reschedule for tomorrow.  Pt states she will ask her provider at [facility]'s appointment to fax this.  Will watch for it tomorrow. Pt states she will cancel if it can't be done in time.  Sylvia New RN on 10/22/2020 at 8:33 AM

## 2020-10-28 ENCOUNTER — TELEPHONE (OUTPATIENT)
Dept: SLEEP MEDICINE | Facility: CLINIC | Age: 40
End: 2020-10-28

## 2020-10-28 NOTE — TELEPHONE ENCOUNTER
CALLED PT AND LVM FOR THE PT TO CALL [facility] AS THE  [facility] 10[facility]28/2020 9AM AND TO CALL [facility] TO RESCHEDULE.

## 2020-10-29 NOTE — PROGRESS NOTES
Subjective:  HPI  Physical Exam                    Objective:  System    Physical Exam    General     ROS    Assessment/Plan:    DISCHARGE REPORT    Progress reporting period is from 7/3/20 to 9/8/20.      SUBJECTIVE  Subjective changes noted by patient:   Patient reported that she walked for 2-3 hours and was really sore.  Woke up one time with the foot hurting.    Did not need to put on the boot.   Current pain level is 3/10    Previous pain level was:   Initial Pain level: 7/10   Changes in function:  Yes (See Goal flowsheet attached for changes in current functional level) Changes in function: Yes, see goal flow sheet for change in function   Adverse reaction to treatment or activity: None     OBJECTIVE  Changes noted in objective findings:  Patient has failed to return to therapy so current objective findings are unknown.  Objective: Needing frequent rest periods.  Unable to complete all of the exercises due to soreness.  Tightness in the left foot in the arch of the foot. Discussed with patient the use of  arch support due to patient has increased pronation     ASSESSMENT/PLAN  Diagnosis: Left Ankle Deltoid Ligament Sprain, Posterior TIbialis Tendon Dysfunction   Updated problem list and treatment plan:   Pain - HEP  Decreased ROM/flexibility - HEP  Decreased function - HEP  Decreased strength - HEP  STG/LTGs have been met or progress has been made towards goals:  Yes, please see goal flowsheet for most current information  Assessment of Progress: current status is unknown.    Last current status: Pt is progressing as expected   Self Management Plans:  HEP  I have re-evaluated this patient and find that the nature, scope, duration and intensity of the therapy is appropriate for the medical condition of the patient.  Tresa continues to require the following intervention to meet STG and LTG's:  HEP.    Recommendations:  Discharge with current home program.  Patient to follow up with MD as needed.    Please  refer to the daily flowsheet for treatment today, total treatment time and time spent performing 1:1 timed codes.

## 2020-11-02 ENCOUNTER — DOCUMENTATION ONLY (OUTPATIENT)
Dept: SLEEP MEDICINE | Facility: CLINIC | Age: 40
End: 2020-11-02
Payer: COMMERCIAL

## 2020-11-02 ENCOUNTER — TRANSFERRED RECORDS (OUTPATIENT)
Dept: HEALTH INFORMATION MANAGEMENT | Facility: CLINIC | Age: 40
End: 2020-11-02

## 2020-11-02 NOTE — PROGRESS NOTES
Patient was offered choice of vendor and chose [facility].  Patient Tresa Chin was set up at [facility] on November 2, 2020. Patient received a Resmed AirSense 10 Auto. Pressures were set at 5-15 cm H2O.   Patient s ramp is 5 cm H2O for Auto and FLEX/EPR is EPR, 2.  Patient received a Resmed Mask name: F20  Full Face mask size Medium, heated tubing and heated humidifier.  Patient does not need to meet compliance. Patient has a follow up on TBD with Dr. Bird.    Josefa Díaz

## 2020-11-05 ENCOUNTER — DOCUMENTATION ONLY (OUTPATIENT)
Dept: SLEEP MEDICINE | Facility: CLINIC | Age: 40
End: 2020-11-05

## 2020-11-05 DIAGNOSIS — K21.9 GASTROESOPHAGEAL REFLUX DISEASE WITHOUT ESOPHAGITIS: ICD-10-CM

## 2020-11-05 NOTE — PROGRESS NOTES
3 DAY STM VISIT    Diagnostic AHI: 25.1    PSG    Patient contacted for 3 day STM visit    Confirmed with patient at time of call- N/A Patient is still interested in STM service     Message left for patient to return call    Replacement device: No  STM ordered by provider: Yes     Device type: Auto-CPAP  PAP settings from order::  CPAP min 5 cm  H20       CPAP max 15 cm  H20         Mask type:    Nasal Mask     Device settings from machine      Min CPAP 5.0            Max CPAP 15.0                EPR level Setting: TWO      RESMED Soft response setting:  OFF  Assessment: Nightly usage, most nights under four hours.  Action plan: Patient to have 14 day STM visit. Patient has a follow up visit scheduled:   not required by insurance.     Total time spent on accessing and  interpreting remote patient PAP therapy data  10 minutes  Total time spent counseling, coaching  and reviewing PAP therapy data with patient  0 minutes  [id number] no

## 2020-11-17 ENCOUNTER — DOCUMENTATION ONLY (OUTPATIENT)
Dept: SLEEP MEDICINE | Facility: CLINIC | Age: 40
End: 2020-11-17
Payer: COMMERCIAL

## 2020-11-17 ENCOUNTER — OFFICE VISIT (OUTPATIENT)
Dept: FAMILY MEDICINE | Facility: CLINIC | Age: 40
End: 2020-11-17
Payer: COMMERCIAL

## 2020-11-17 VITALS
SYSTOLIC BLOOD PRESSURE: 110 MMHG | WEIGHT: 256 LBS | TEMPERATURE: 99.2 F | RESPIRATION RATE: 20 BRPM | BODY MASS INDEX: 48.37 KG/M2 | DIASTOLIC BLOOD PRESSURE: 80 MMHG | HEART RATE: 96 BPM

## 2020-11-17 DIAGNOSIS — Z11.4 SCREENING FOR HIV (HUMAN IMMUNODEFICIENCY VIRUS): ICD-10-CM

## 2020-11-17 DIAGNOSIS — Z11.59 NEED FOR HEPATITIS C SCREENING TEST: ICD-10-CM

## 2020-11-17 DIAGNOSIS — F43.21 ADJUSTMENT DISORDER WITH DEPRESSED MOOD: ICD-10-CM

## 2020-11-17 DIAGNOSIS — Z12.4 SCREENING FOR MALIGNANT NEOPLASM OF CERVIX: ICD-10-CM

## 2020-11-17 DIAGNOSIS — K76.0 NAFLD (NONALCOHOLIC FATTY LIVER DISEASE): ICD-10-CM

## 2020-11-17 DIAGNOSIS — R73.03 PREDIABETES: Primary | ICD-10-CM

## 2020-11-17 PROCEDURE — 99214 OFFICE O/P EST MOD 30 MIN: CPT | Performed by: FAMILY MEDICINE

## 2020-11-17 ASSESSMENT — ANXIETY QUESTIONNAIRES
7. FEELING AFRAID AS IF SOMETHING AWFUL MIGHT HAPPEN: NEARLY EVERY DAY
1. FEELING NERVOUS, ANXIOUS, OR ON EDGE: NEARLY EVERY DAY
6. BECOMING EASILY ANNOYED OR IRRITABLE: NEARLY EVERY DAY
GAD7 TOTAL SCORE: 16
7. FEELING AFRAID AS IF SOMETHING AWFUL MIGHT HAPPEN: NEARLY EVERY DAY
3. WORRYING TOO MUCH ABOUT DIFFERENT THINGS: NEARLY EVERY DAY
GAD7 TOTAL SCORE: 16
GAD7 TOTAL SCORE: 16
2. NOT BEING ABLE TO STOP OR CONTROL WORRYING: NEARLY EVERY DAY
4. TROUBLE RELAXING: SEVERAL DAYS
5. BEING SO RESTLESS THAT IT IS HARD TO SIT STILL: NOT AT ALL

## 2020-11-17 ASSESSMENT — PATIENT HEALTH QUESTIONNAIRE - PHQ9
10. IF YOU CHECKED OFF ANY PROBLEMS, HOW DIFFICULT HAVE THESE PROBLEMS MADE IT FOR YOU TO DO YOUR WORK, TAKE CARE OF THINGS AT HOME, OR GET ALONG WITH OTHER PEOPLE: EXTREMELY DIFFICULT
SUM OF ALL RESPONSES TO PHQ QUESTIONS 1-9: 16
SUM OF ALL RESPONSES TO PHQ QUESTIONS 1-9: 16

## 2020-11-17 NOTE — PROGRESS NOTES
14  DAY STM VISIT    Diagnostic AHI: 25.1  PSG    Subjective measures:   Patient sleeping better with CPAP she feels like she needs a different mask.     Assessment: Pt not meeting objective benchmarks for compliance  Patient failing following subjective benchmarks: mask discomfort     Action plan: pt to have 30 day STM visit.  Encourage patient to increase mask use.     Device type: Auto-CPAP    PAP settings: CPAP min 5.0 cm  H20       CPAP max 15.0 cm  H20      95th% pressure 12.7 cm  H20        RESMED EPR level Setting: TWO    RESMED Soft response setting:  OFF    Mask type:  Nasal Mask    Objective measures: 14 day rolling measures      Compliance  35 %      Leak  13.94  lpm  last  upload      AHI 0.3   last  upload      Average number of minutes 223      Objective measure goal  Compliance   Goal >70%  Leak   Goal < 24 lpm  AHI  Goal < 5  Usage  Goal >240        Total time spent on accessing and interpreting remote patient PAP therapy data  10 minutes    Total time spent counseling, coaching  and reviewing PAP therapy data with patient  3 minutes    [id number]  [id number]  no (3 day STM)

## 2020-11-18 ASSESSMENT — ANXIETY QUESTIONNAIRES: GAD7 TOTAL SCORE: 16

## 2020-11-18 ASSESSMENT — PATIENT HEALTH QUESTIONNAIRE - PHQ9: SUM OF ALL RESPONSES TO PHQ QUESTIONS 1-9: 16

## 2020-11-22 ENCOUNTER — HEALTH MAINTENANCE LETTER (OUTPATIENT)
Age: 40
End: 2020-11-22

## 2020-12-03 ENCOUNTER — DOCUMENTATION ONLY (OUTPATIENT)
Dept: SLEEP MEDICINE | Facility: CLINIC | Age: 40
End: 2020-12-03
Payer: COMMERCIAL

## 2020-12-03 NOTE — PROGRESS NOTES
30 DAY [facility] VISIT    Diagnostic AHI: 25.1  PSG    Subjective measures:   Patient sleeping much better when she is able to use her CPAP.      Assessment: Pt not meeting objective benchmarks for compliance  Patient meeting subjective benchmarks. Encouraged longer use of CPAP each night.   Action plan: pt to have 6 month [facility] visit  Patient has not scheduled a follow up visit.   Device type: Auto-CPAP  PAP settings: CPAP min 5.0 cm  H20     CPAP max 15.0 cm  H20    95th% pressure 14.2 cm  H20      RESMED EPR level Setting: TWO    RESMED Soft response setting:  OFF  Mask type:  Nasal Mask  Objective measures: 14 day rolling measures      Compliance  28 %      Leak  1.3 lpm  last  upload      AHI 0.23   last  upload      Average number of minutes 162      Objective measure goal  Compliance   Goal >70%  Leak   Goal < 24 lpm  AHI  Goal < 5  Usage  Goal >240        Total time spent on accessing and interpreting remote patient PAP therapy data  10 minutes    Total time spent counseling, coaching  and reviewing PAP therapy data with patient  3 minutes     [id number] this call  [id number] no  at 3 or 14 day [facility]

## 2020-12-15 PROBLEM — R60.9 EDEMA: Status: RESOLVED | Noted: 2020-07-10 | Resolved: 2020-09-03

## 2020-12-15 PROBLEM — S93.422A SPRAIN OF DELTOID LIGAMENT OF LEFT ANKLE, INITIAL ENCOUNTER: Status: RESOLVED | Noted: 2020-07-03 | Resolved: 2020-09-03

## 2020-12-15 PROBLEM — M25.572 ACUTE LEFT ANKLE PAIN: Status: RESOLVED | Noted: 2020-07-03 | Resolved: 2020-09-03

## 2021-02-18 ENCOUNTER — OFFICE VISIT (OUTPATIENT)
Dept: FAMILY MEDICINE | Facility: CLINIC | Age: 41
End: 2021-02-18
Payer: COMMERCIAL

## 2021-02-18 VITALS
HEIGHT: 61 IN | TEMPERATURE: 98.1 F | WEIGHT: 262 LBS | OXYGEN SATURATION: 97 % | HEART RATE: 91 BPM | RESPIRATION RATE: 18 BRPM | BODY MASS INDEX: 49.47 KG/M2

## 2021-02-18 DIAGNOSIS — G89.4 CHRONIC PAIN SYNDROME: ICD-10-CM

## 2021-02-18 DIAGNOSIS — F32.0 MILD MAJOR DEPRESSION (H): ICD-10-CM

## 2021-02-18 DIAGNOSIS — Z11.4 ENCOUNTER FOR SCREENING FOR HIV: ICD-10-CM

## 2021-02-18 DIAGNOSIS — E66.01 MORBID OBESITY (H): Primary | ICD-10-CM

## 2021-02-18 DIAGNOSIS — Z11.59 ENCOUNTER FOR HEPATITIS C SCREENING TEST FOR LOW RISK PATIENT: ICD-10-CM

## 2021-02-18 DIAGNOSIS — M25.572 PAIN IN JOINT, ANKLE AND FOOT, LEFT: ICD-10-CM

## 2021-02-18 LAB
ALBUMIN SERPL-MCNC: 3.4 G/DL (ref 3.4–5)
ALP SERPL-CCNC: 54 U/L (ref 40–150)
ALT SERPL W P-5'-P-CCNC: 71 U/L (ref 0–50)
AST SERPL W P-5'-P-CCNC: 26 U/L (ref 0–45)
BILIRUB DIRECT SERPL-MCNC: <0.1 MG/DL (ref 0–0.2)
BILIRUB SERPL-MCNC: 0.5 MG/DL (ref 0.2–1.3)
CHOLEST SERPL-MCNC: 234 MG/DL
HBA1C MFR BLD: 6 % (ref 0–5.6)
HCV AB SERPL QL IA: NONREACTIVE
HDLC SERPL-MCNC: 68 MG/DL
HIV 1+2 AB+HIV1 P24 AG SERPL QL IA: NONREACTIVE
LDLC SERPL CALC-MCNC: 148 MG/DL
NONHDLC SERPL-MCNC: 166 MG/DL
PROT SERPL-MCNC: 7.2 G/DL (ref 6.8–8.8)
TRIGL SERPL-MCNC: 92 MG/DL
TSH SERPL DL<=0.005 MIU/L-ACNC: 1.32 MU/L (ref 0.4–4)

## 2021-02-18 PROCEDURE — 80076 HEPATIC FUNCTION PANEL: CPT | Performed by: FAMILY MEDICINE

## 2021-02-18 PROCEDURE — 99214 OFFICE O/P EST MOD 30 MIN: CPT | Performed by: FAMILY MEDICINE

## 2021-02-18 PROCEDURE — 80061 LIPID PANEL: CPT | Performed by: FAMILY MEDICINE

## 2021-02-18 PROCEDURE — 84443 ASSAY THYROID STIM HORMONE: CPT | Performed by: FAMILY MEDICINE

## 2021-02-18 PROCEDURE — 36415 COLL VENOUS BLD VENIPUNCTURE: CPT | Performed by: FAMILY MEDICINE

## 2021-02-18 PROCEDURE — 83036 HEMOGLOBIN GLYCOSYLATED A1C: CPT | Performed by: FAMILY MEDICINE

## 2021-02-18 PROCEDURE — 87389 HIV-1 AG W/HIV-1&-2 AB AG IA: CPT | Performed by: FAMILY MEDICINE

## 2021-02-18 PROCEDURE — 86803 HEPATITIS C AB TEST: CPT | Performed by: FAMILY MEDICINE

## 2021-02-18 ASSESSMENT — ANXIETY QUESTIONNAIRES
4. TROUBLE RELAXING: NEARLY EVERY DAY
GAD7 TOTAL SCORE: 16
7. FEELING AFRAID AS IF SOMETHING AWFUL MIGHT HAPPEN: NEARLY EVERY DAY
GAD7 TOTAL SCORE: 16
6. BECOMING EASILY ANNOYED OR IRRITABLE: NEARLY EVERY DAY
GAD7 TOTAL SCORE: 16
2. NOT BEING ABLE TO STOP OR CONTROL WORRYING: NEARLY EVERY DAY
7. FEELING AFRAID AS IF SOMETHING AWFUL MIGHT HAPPEN: NEARLY EVERY DAY
1. FEELING NERVOUS, ANXIOUS, OR ON EDGE: SEVERAL DAYS
5. BEING SO RESTLESS THAT IT IS HARD TO SIT STILL: NOT AT ALL
3. WORRYING TOO MUCH ABOUT DIFFERENT THINGS: NEARLY EVERY DAY

## 2021-02-18 ASSESSMENT — PATIENT HEALTH QUESTIONNAIRE - PHQ9
SUM OF ALL RESPONSES TO PHQ QUESTIONS 1-9: 10
10. IF YOU CHECKED OFF ANY PROBLEMS, HOW DIFFICULT HAVE THESE PROBLEMS MADE IT FOR YOU TO DO YOUR WORK, TAKE CARE OF THINGS AT HOME, OR GET ALONG WITH OTHER PEOPLE: VERY DIFFICULT
SUM OF ALL RESPONSES TO PHQ QUESTIONS 1-9: 10

## 2021-02-18 ASSESSMENT — MIFFLIN-ST. JEOR: SCORE: 1805.8

## 2021-02-18 NOTE — PROGRESS NOTES
"Answers for HPI/ROS submitted by the patient on 2/18/2021   Chronic problems general questions HPI Form  If you checked off any problems, how difficult have these problems made it for you to do your work, take care of things at home, or get along with other people?: Very difficult  PHQ9 TOTAL SCORE: 10  NETO 7 TOTAL SCORE: 16      Assessment & Plan     Morbid obesity (H)  Discussed options, unfortunately her insurance doesn't cover bariatric surgery, pt agreed to follow up with weight management clinic.  Meanwhile, recheck Lipids and A1c.  - Lipid panel reflex to direct LDL Fasting  - Hemoglobin A1c  - COMPREHENSIVE WEIGHT MANAGEMENT  - Hepatic panel  - TSH with free T4 reflex  Plan weight loss about 10 pounds until next visit in 2 months.    Mild major depression (H)  Worsening, usually related to the weight, refer to   - MENTAL HEALTH REFERRAL  - Adult; Outpatient Treatment; Individual/Couples/Family/Group Therapy/Health Psychology; [facility]: [facility] [phone]; We will contact you to schedule the appointment or please call with any questions    Pain in joint, ankle and foot, left  Related to previous ankle sprain. Encourage ROM movement of the ankle, and ice as needed.    Encounter for hepatitis C screening test for low risk patient    - Hepatitis C Screen Reflex to HCV RNA Quant and Genotype    Encounter for screening for HIV    - HIV Antigen Antibody Combo    Chronic pain syndrome  This is prominent and causing significant distress, I think it is mostly fibromyalgia, given the multiple pain symptoms, along with depression, decrease pain threshold, chronic headaches..  Will discuss referal to pain clinic in 2 months.                BMI:   Estimated body mass index is 49.5 kg/m  as calculated from the following:    Height as of this encounter: 1.549 m (5' 1\").    Weight as of this encounter: 118.8 kg (262 lb).       Depression Screening Follow Up    PHQ 2/18/2021   PHQ-9 Total Score 10   Q9: " Thoughts of better off dead/self-harm past 2 weeks Not at all                    Return in about 2 months (around 4/18/2021) for multiple problems follow up.    Melissa Franco MD  [facility]    No future appointments.  Appointment Notes for this encounter:   Data Unavailable    Health Maintenance Due   Topic Date Due     ADVANCE CARE PLANNING  1982     HIV SCREENING  06/16/1997     HEPATITIS C SCREENING  06/16/2000     PAP  06/16/2003     INFLUENZA VACCINE (1) 09/01/2020     PREVENTIVE CARE VISIT  02/19/2021     Health Maintenance addressed:  Pap-declines  Flu-declines    MyChart Status:  Active and Using      Subjective   Tresa is a 40 year old who presents for the following health issues     History of Present Illness       Back Pain:  She presents for follow up of back pain. Patient's back pain is a chronic problem.  Location of back pain:  Right lower back, left lower back, right upper back, left upper back, right shoulder and left shoulder  Description of back pain: dull ache  Back pain spreads: right buttocks and right thigh    Since patient first noticed back pain, pain is: unchanged  Does back pain interfere with her job:  Yes      Mental Health Follow-up:  Patient presents to follow-up on Depression & Anxiety.Patient's depression since last visit has been:  No change  The patient is not having other symptoms associated with depression.  Patient's anxiety since last visit has been:  No change  The patient is not having other symptoms associated with anxiety.  Any significant life events: financial concerns  Patient is not feeling anxious or having panic attacks.  Patient has no concerns about alcohol or drug use.     Social History  Tobacco Use    Smoking status: Never Smoker    Smokeless tobacco: Never Used  Alcohol use: Yes    Frequency: Never    Comment: occ.  Drug use: No      Today's PHQ-9         PHQ-9 Total Score:     (P) 10   PHQ-9 Q9 Thoughts of better off  "dead/self-harm past 2 weeks :   (P) Not at all   Thoughts of suicide or self harm:      Self-harm Plan:        Self-harm Action:          Safety concerns for self or others:           Diabetes:   She presents for follow up of diabetes.  She is not checking blood glucose. She has no concerns regarding her diabetes at this time.  She is not experiencing numbness or burning in feet, excessive thirst, blurry vision, weight changes or redness, sores or blisters on feet.         Hyperlipidemia:  She presents for follow up of hyperlipidemia.  She is not taking medication to lower cholesterol. She is not having myalgia or other side effects to statin medications.    Hypertension: She presents for follow up of hypertension.  She does check blood pressure  regularly outside of the clinic. Outpatient blood pressures have not been over 140/90. She does not follow a low salt diet.     Migraines:   Since the patient's last clinic visit, headaches are: no change  The patient is getting headaches:  Daily  She is not able to do normal daily activities when she has a migraine.  The patient is taking the following rescue/relief medications:  No rescue/relief medications   Patient states \"I get no relief\" from the rescue/relief medications.   The patient is taking the following medications to prevent migraines:  No medications to prevent migraines  In the past 4 weeks, the patient has gone to an Urgent Care or Emergency Room 0 times times due to headaches.    She eats 2-3 servings of fruits and vegetables daily.She consumes 1 sweetened beverage(s) daily.She exercises with enough effort to increase her heart rate 20 to 29 minutes per day.  She exercises with enough effort to increase her heart rate 4 days per week.   She is taking medications regularly.     Headaches: start with congestion in the nose and and forehead, heaviness in the face, forehead headaces, blurred vision, then it goes away within few hours.  She takes tylenol as " "needed with some relief, and it is happened 3 times a week approximately.              Review of Systems   CONSTITUTIONAL: NEGATIVE for fever, chills, change in weight  RESP: NEGATIVE for significant cough or SOB  CV: NEGATIVE for chest pain, palpitations or peripheral edema      Objective    Pulse 91   Temp 98.1  F (36.7  C) (Oral)   Resp 18   Ht 1.549 m (5' 1\")   Wt 118.8 kg (262 lb)   SpO2 97%   BMI 49.50 kg/m    Body mass index is 49.5 kg/m .  Physical Exam   GENERAL: healthy, alert and no distress  Ankle exam :   Inspection:normal walk : pronation nl supination nl  ROM  Of the ankle : flexion: nl   Dorsiflexion:nl   inversion:painful  aversion : nl  Resisted ROM is as above  There isno swelling and tenderness over the lateral malleolus,   positive medial malleolus tenderness.  The ankle joint is intact without excessive opening on stressing.       Results for orders placed or performed in visit on 02/18/21 (from the past 24 hour(s))   Hemoglobin A1c   Result Value Ref Range    Hemoglobin A1C 6.0 (H) 0 - 5.6 %               "

## 2021-02-19 ASSESSMENT — ANXIETY QUESTIONNAIRES: GAD7 TOTAL SCORE: 16

## 2021-02-19 ASSESSMENT — PATIENT HEALTH QUESTIONNAIRE - PHQ9: SUM OF ALL RESPONSES TO PHQ QUESTIONS 1-9: 10

## 2021-03-11 ENCOUNTER — TELEPHONE (OUTPATIENT)
Dept: FAMILY MEDICINE | Facility: CLINIC | Age: 41
End: 2021-03-11

## 2021-03-11 ENCOUNTER — VIRTUAL VISIT (OUTPATIENT)
Dept: FAMILY MEDICINE | Facility: CLINIC | Age: 41
End: 2021-03-11
Payer: COMMERCIAL

## 2021-03-11 DIAGNOSIS — R07.89 OTHER CHEST PAIN: ICD-10-CM

## 2021-03-11 DIAGNOSIS — U07.1 2019 NOVEL CORONAVIRUS DISEASE (COVID-19): Primary | ICD-10-CM

## 2021-03-11 PROCEDURE — 99214 OFFICE O/P EST MOD 30 MIN: CPT | Mod: TEL | Performed by: NURSE PRACTITIONER

## 2021-03-11 NOTE — PROGRESS NOTES
Pre-Visit Planning :     Future Appointments   Date Time Provider Department Center   3/11/2021  1:00 PM Justa Gonzalez APRN CNP [facility]   3/17/2021  3:30 PM Francine Talamantes PA-C [facility]   4/22/2021  7:00 AM Melissa Franco MD [facility]      Arrival Time for this Appointment: 12:45 PM      Appointment Notes for this encounter:   Covid, chest pain     Questionnaires Reviewed/Assigned:   PHQ-9, NETO-7    Are there any additional questions or concerns you'd like to review with your provider during your visit? N/A      Last OV with provider:  2/18/2021; Morbid obesity (H); Mild major depression (H); Pain in joint, ankle and foot, left; Encounter for hepatitis C screening test for low risk patient; Encounter for screening for HIV; Chronic pain syndrome     Hospital ER Visits:  11/9/2020;[facility]; [place], [place];Oumou Isaac MD; Upper respiratory infection, viral (Primary Dx)    Is the visit preventive? No     Specialty Visits:  1/5/2021; [facility]; Eric Canchola MD-Nephrology;  Calculus of kidney and ureter (Primary Dx)     Imaging and Lab Review: 2/18/2021; Hbg A1C- 6.0%; Chol-234 High ; LDL-148High , Non HDL Chol-166 High   ALT-71 High;      Recent Procedures:  8/4/2020; [facility] ([place], [place]);Anurag Gomez MD; Cystoscopy, removal of left ureteral stent, left ureteroscopy with holmium laser lithotripsy, stone basketing and placement of left ureteral stent;               POST-OP DIAGNOSIS: left ureteral stone         MEDS ;    Current Outpatient Medications   Medication     albuterol (PROAIR HFA/PROVENTIL HFA/VENTOLIN HFA) 108 (90 Base) MCG/ACT inhaler     amLODIPine 10 MG PO tablet     omeprazole (PRILOSEC) 20 MG DR capsule     No current facility-administered medications for this visit.       Is there anything on your medication list that needs to be updated?  Ask pt @ check-in     Health Maintenance Due   Topic Date Due     PAP  Never done  "    INFLUENZA VACCINE (1) 09/01/2020     PREVENTIVE CARE VISIT  02/19/2021     Preferred pharmacy: [facility] [address] [place], [place] [id number] [address] [place] [address]     MyChart:  YES    Questionnaire Review :  Lastly, it appears there are some questions your care team has for you.    If MyChart ACTIVE \"If you get a chance to do your questions on [employer] Profile Inchart, your appointment will be much faster and smoother so feel free to sign in and go through those, otherwise please plan on arriving early so that you have time to complete them.     Patient preferred phone number: [phone]    Amy Abel, Registered Nurse, [facility]   [phone]    Answers for HPI/ROS submitted by the patient on 3/11/2021   Chronic problems general questions HPI Form  How many servings of fruits and vegetables do you eat daily?: 2-3  On average, how many sweetened beverages do you drink each day (Examples: soda, juice, sweet tea, etc.  Do NOT count diet or artificially sweetened beverages)?: 2  How many minutes a day do you exercise enough to make your heart beat faster?: 9 or less  How many days a week do you exercise enough to make your heart beat faster?: 3 or less  How many days per week do you miss taking your medication?: 0    "

## 2021-03-11 NOTE — TELEPHONE ENCOUNTER
Per Justa Gonzalez request.. called patient to triage symptoms, patient scheduled by Central scheduling for phone visit, note just says covid,chest pain. Patients phone went straight to voicemail, left message to call back AGUSTINA Strickland RN

## 2021-03-11 NOTE — PROGRESS NOTES
"Tresa is a 40 year old who is being evaluated via a billable telephone visit.      What phone number would you like to be contacted at? [phone]  How would you like to obtain your AVS? [facility]    Assessment & Plan     2019 novel coronavirus disease (COVID-19)  Other chest pain  Subjectively positive for COVID-19 with testing on 3/7/21. Symptomatic x 10 days with progressive cough and chest pain.   Discussed risk associated with diagnosis of COVID-19.   Patient is high risk for complications associated with COVID-19 with her comorbid conditions.   Discussed and strongly recommended evaluation in Emergency Department for potential secondary pneumonia, PE, amongst the many complications associated.   Patient verbalized understanding and agreed with evaluation.                BMI:   Estimated body mass index is 49.5 kg/m  as calculated from the following:    Height as of 2/18/21: 1.549 m (5' 1\").    Weight as of 2/18/21: 118.8 kg (262 lb).           No follow-ups on file.    BRIGIDO [facility]    Reny Gtz is a 40 year old who presents for the following health issues     HPI         Concern for COVID-19  About how many days ago did these symptoms start? 03/01/21  Is this your first visit for this illness? Yes  In the 14 days before your symptoms started, have you had close contact with someone with COVID-19 (Coronavirus)? Yes, I have been in contact with someone who has COVID-19/Coronavirus (confirmed by lab test).  Do you have a fever or chills? Yes, I felt feverish or had chills  Are you having new or worsening difficulty breathing? No  Do you have new or worsening cough? Yes, it's a dry cough.   Have you had any new or unexplained body aches? YES    Have you experienced any of the following NEW symptoms?    Headache: YES    Sore throat: YES    Loss of taste or smell: YES    Chest pain: YES    Diarrhea: YES    Rash: No  What treatments have you tried? " Tylenol, cough medication   Who do you live with? Pt does have a family at home but has isolated herself in a room   Are you, or a household member, a healthcare worker or a ? No  Do you live in a nursing home, group home, or shelter? No  Do you have a way to get food/medications if quarantined? Yes, I have a friend or family member who can help me.    Patient is subjectively positive for COVID-19. Outside testing.   She has had worsening cough and onset of central chest pain with cough.   Had COVID-19 vaccine, first dose, 3/1/21 through employer.             Review of Systems   Constitutional, HEENT, cardiovascular, pulmonary, gi and gu systems are negative, except as otherwise noted.      Objective           Vitals:  No vitals were obtained today due to virtual visit.    Physical Exam   healthy, alert and no distress  PSYCH: Alert and oriented times 3; coherent speech, normal   rate and volume, able to articulate logical thoughts, able   to abstract reason, no tangential thoughts, no hallucinations   or delusions  Her affect is normal  RESP: No cough, no audible wheezing, able to talk in full sentences  Remainder of exam unable to be completed due to telephone visits                Phone call duration: 11 minutes

## 2021-03-29 ENCOUNTER — DOCUMENTATION ONLY (OUTPATIENT)
Dept: FAMILY MEDICINE | Facility: CLINIC | Age: 41
End: 2021-03-29

## 2021-03-29 NOTE — PROGRESS NOTES
RN chart review     Per Dr Franco's list patient should be listed as SB #4 and is currently listed as SB #3     RN changed per pcp to SB #4    Mima Wells, Registered Nurse, PAL ([facility])   [facility]   [phone]

## 2021-04-04 ENCOUNTER — HEALTH MAINTENANCE LETTER (OUTPATIENT)
Age: 41
End: 2021-04-04

## 2021-04-13 ENCOUNTER — OFFICE VISIT (OUTPATIENT)
Dept: FAMILY MEDICINE | Facility: CLINIC | Age: 41
End: 2021-04-13
Payer: COMMERCIAL

## 2021-04-13 ENCOUNTER — ANCILLARY PROCEDURE (OUTPATIENT)
Dept: GENERAL RADIOLOGY | Facility: CLINIC | Age: 41
End: 2021-04-13
Attending: FAMILY MEDICINE
Payer: COMMERCIAL

## 2021-04-13 VITALS
DIASTOLIC BLOOD PRESSURE: 84 MMHG | BODY MASS INDEX: 52.53 KG/M2 | WEIGHT: 278 LBS | OXYGEN SATURATION: 97 % | TEMPERATURE: 98.7 F | RESPIRATION RATE: 18 BRPM | SYSTOLIC BLOOD PRESSURE: 126 MMHG | HEART RATE: 99 BPM

## 2021-04-13 DIAGNOSIS — Z12.4 SCREENING FOR MALIGNANT NEOPLASM OF CERVIX: ICD-10-CM

## 2021-04-13 DIAGNOSIS — U07.1 INFECTION DUE TO 2019 NOVEL CORONAVIRUS: Primary | ICD-10-CM

## 2021-04-13 DIAGNOSIS — E66.01 MORBID OBESITY (H): ICD-10-CM

## 2021-04-13 PROCEDURE — 99214 OFFICE O/P EST MOD 30 MIN: CPT | Performed by: FAMILY MEDICINE

## 2021-04-13 PROCEDURE — 71046 X-RAY EXAM CHEST 2 VIEWS: CPT | Performed by: RADIOLOGY

## 2021-04-13 PROCEDURE — 36415 COLL VENOUS BLD VENIPUNCTURE: CPT | Performed by: FAMILY MEDICINE

## 2021-04-13 PROCEDURE — 80048 BASIC METABOLIC PNL TOTAL CA: CPT | Performed by: FAMILY MEDICINE

## 2021-04-13 ASSESSMENT — PAIN SCALES - GENERAL: PAINLEVEL: NO PAIN (0)

## 2021-04-13 NOTE — PROGRESS NOTES
"    Assessment & Plan     Screening for malignant neoplasm of cervix  To schedule for next visit    Infection due to 2019 novel coronavirus  Pt is improving, still having dry cough, will check for Chest xray, for right now,   - Basic metabolic panel  (Ca, Cl, CO2, Creat, Gluc, K, Na, BUN)  - XR Chest 2 Views    Morbid obesity (H)  Not improving, refer to MTM for medications management, recheck in 2 months. Discussed diet today.               BMI:   Estimated body mass index is 52.53 kg/m  as calculated from the following:    Height as of 2/18/21: 1.549 m (5' 1\").    Weight as of this encounter: 126.1 kg (278 lb).   Weight management plan: Discussed healthy diet and exercise guidelines        Return in about 2 months (around 6/13/2021) for weight loss, F2F, multiple problems follow up.    Melissa Franco MD  [facility]    Reny Gtz is a 40 year old who presents for the following health issues     History of Present Illness       Back Pain:  She presents for follow up of back pain. Patient's back pain is a chronic problem.  Location of back pain:  Right lower back, left lower back, right middle of back, left middle of back, right side of neck, left side of neck, right shoulder and left shoulder  Description of back pain: other  Back pain spreads: right buttocks, left buttocks, right thigh, left thigh, right shoulder, left shoulder, right side of neck and left side of neck    Since patient first noticed back pain, pain is: unchanged  Does back pain interfere with her job:  Yes      Diabetes:   She presents for follow up of diabetes.  She is not checking blood glucose. She is concerned about other. She is not experiencing numbness or burning in feet, excessive thirst, blurry vision, weight changes or redness, sores or blisters on feet.         Hyperlipidemia:  She presents for follow up of hyperlipidemia.  She is not taking medication to lower cholesterol. She is not having myalgia or " "other side effects to statin medications.    Hypertension: She presents for follow up of hypertension.  She does not check blood pressure  regularly outside of the clinic. Outpatient blood pressures have not been over 140/90. She does not follow a low salt diet.     Migraines:   Since the patient's last clinic visit, headaches are: no change  The patient is getting headaches:  Not very often  She is not able to do normal daily activities when she has a migraine.  The patient is taking the following rescue/relief medications:  Ibuprofen (Advil, Motrin) and Tylenol   Patient states \"I get some relief\" from the rescue/relief medications.   The patient is taking the following medications to prevent migraines:  Other  In the past 4 weeks, the patient has gone to an Urgent Care or Emergency Room 0 times times due to headaches.    She eats 0-1 servings of fruits and vegetables daily.She consumes 1 sweetened beverage(s) daily.She exercises with enough effort to increase her heart rate 9 or less minutes per day.  She exercises with enough effort to increase her heart rate 3 or less days per week.   She is taking medications regularly.         Concern for COVID-19  About how many days ago did these symptoms start? 1 month ago.  Is this your first visit for this illness? No   How would you describe your symptoms since your last visit? My symptoms have improved  In the 14 days before your symptoms started, have you had close contact with someone with COVID-19 (Coronavirus)? No  Do you have a fever or chills? Not any more.  Are you having new or worsening difficulty breathing? Pt is still having some cough, and SOB on exertion.  Do you have new or worsening cough? Yes, it's a dry cough.   Have you had any new or unexplained body aches? YES    Have you experienced any of the following NEW symptoms?    Headache: YES    Sore throat: YES    Loss of taste or smell: YES    Chest pain: YES    Diarrhea: No    Rash: No  What treatments " have you tried? Tried albuterol, cough medicine, with some improvement.   Who do you live with? Parents.  Are you, or a household member, a healthcare worker or a ? No  Do you live in a nursing home, group home, or shelter? No  Do you have a way to get food/medications if quarantined? N/A        Review of Systems   CONSTITUTIONAL:weight gain since lat visit.  RESP:dry cough.  CV: NEGATIVE for chest pain, palpitations or peripheral edema      Objective    There were no vitals taken for this visit.  There is no height or weight on file to calculate BMI.  Physical Exam   GENERAL: healthy, alert and no distress  HENT: ear canals and TM's normal, nose and mouth without ulcers or lesions  RESP: lungs clear to auscultation - no rales, rhonchi or wheezes  CV: regular rate and rhythm, normal S1 S2, no S3 or S4, no murmur, click or rub, no peripheral edema and peripheral pulses strong

## 2021-04-14 LAB
ANION GAP SERPL CALCULATED.3IONS-SCNC: 3 MMOL/L (ref 3–14)
BUN SERPL-MCNC: 17 MG/DL (ref 7–30)
CALCIUM SERPL-MCNC: 9.1 MG/DL (ref 8.5–10.1)
CHLORIDE SERPL-SCNC: 107 MMOL/L (ref 94–109)
CO2 SERPL-SCNC: 30 MMOL/L (ref 20–32)
CREAT SERPL-MCNC: 0.55 MG/DL (ref 0.52–1.04)
GFR SERPL CREATININE-BSD FRML MDRD: >90 ML/MIN/{1.73_M2}
GLUCOSE SERPL-MCNC: 147 MG/DL (ref 70–99)
POTASSIUM SERPL-SCNC: 3.8 MMOL/L (ref 3.4–5.3)
SODIUM SERPL-SCNC: 140 MMOL/L (ref 133–144)

## 2021-04-16 ENCOUNTER — TELEPHONE (OUTPATIENT)
Dept: FAMILY MEDICINE | Facility: CLINIC | Age: 41
End: 2021-04-16

## 2021-04-16 NOTE — TELEPHONE ENCOUNTER
Please call.  Pt had moderna vaccine on 3/1 then she developed COVID infection.  At this time, the recommendations is to go ahead and do the second vaccine as soon as possible as long as she is feeling better and not sick any more (no fever, improvement in cough)  I spoke with GARY and that was the recommendations.  Melissa Franco MD  [facility]  [phone]

## 2021-04-18 DIAGNOSIS — I10 ESSENTIAL HYPERTENSION: ICD-10-CM

## 2021-04-19 ENCOUNTER — MYC MEDICAL ADVICE (OUTPATIENT)
Dept: FAMILY MEDICINE | Facility: CLINIC | Age: 41
End: 2021-04-19

## 2021-04-19 RX ORDER — AMLODIPINE BESYLATE 10 MG/1
TABLET ORAL
Qty: 90 TABLET | Refills: 3 | Status: SHIPPED | OUTPATIENT
Start: 2021-04-19 | End: 2021-10-21

## 2021-04-19 NOTE — TELEPHONE ENCOUNTER
Sent Pt Magot msg >stating the recommendations is to go ahead and do the second vaccine as soon as possible as long as she is feeling better and not sick any more (no fever, improvement in cough). Instructed the Pt to send a message back or call if there are any additional questions.    Ana Patterson/EMT-B

## 2021-04-19 NOTE — TELEPHONE ENCOUNTER
Pt called in stating PCP was to get back to her about when she could get the 2nd shot of the COVID vaccine. Pt stated she rec'd 1st shot on 3.1.21.  Pt stated she has not heard from PCP.   Pt stated she's also still waiting for refill for amliodipine / Records show refill request is pending. Routed to PCP / Ana Patterson/EMT-B

## 2021-04-20 ENCOUNTER — TELEPHONE (OUTPATIENT)
Dept: NURSING | Facility: CLINIC | Age: 41
End: 2021-04-20

## 2021-04-20 NOTE — TELEPHONE ENCOUNTER
Pt tested positive for Covid on 3/1/21.  She received her 1st Covid vaccine on 2/28/21.  Pt wants to schedule her 2nd vaccine.    Pt has resolving cough that is very intermittent/mild. Loss of taste and smell resolving. She is afebrile.    She said that her PCP, Dr Franco, advised her to get the 2nd vaccine as soon as possible.    Pt transferred to scheduling to set up appointment for 2nd vaccine.  Claire Hernandez RN 04/20/21 11:04 AM  [facility] Nurse Advisor

## 2021-04-22 NOTE — PROGRESS NOTES
Medication Therapy Management (MTM) Encounter    ASSESSMENT:                            Medication Adherence/Access: No issues identified    Obesity: Recommend starting topiramate as patient qualifies for weight loss medication d/t BMI >30. GLP-1 agonists were not affordable for her. Did also discuss increasing exercise and improving diet. Next visit may consider addition of bupropion d/t her energy level and depression.     Hypertension: stable    GERD: Chronic PPI use places patient at an increased risk of C. Diff, hypomagnesemia and lower bone mineral density, these risks were reviewed with the patient. Recommend switching PPI to H2 blocker.     PLAN:                            1. Decrease omeprazole 20mg to every other day for 2-3 weeks then can try stopping. At the same try famotidine 20mg twice daily as needed.   3. Start topiramate 25mg dailiy for 7 days then increase to 50mg daily.     Follow-up: Return in about 1 month (around 5/23/2021) for Medication Therapy Management Pharmacist.      SUBJECTIVE/OBJECTIVE:                          Tresa Chin is a 40 year old female coming in for an initial visit. She was referred to me from Dr. Franco.      Reason for visit: Weight loss.    Allergies/ADRs: Reviewed in chart  Tobacco: She reports that she has never smoked. She has never used smokeless tobacco.  Caffeine: 1-2 cups/day of coffee, recently started drinking soda last month  Activity: minimal d/t breathing right now  Past Medical History: Reviewed in chart      Medication Adherence/Access: no issues reported    Obesity: Not currently taking any medications. Did weight watchers in the past but gained weight back.   Diet/Eating Habits: Patient reports no diet, regular food. This AM - caribou coffee latte with scone. Lunch: tacos yesterday. Fast food usually nowadays. Dinner: scrambled eggs with toast (1.5 pieces). No snacking. 1 diet soda/day recently.  Exercise: Patient reports no exercise currently b/c she  feels SOB and exhausted. covid made it difficult to breath and walk.   Failed medications include: none.   Patient's personalized goal: Patient would like to lose 100 lbs eventually but for sure be <200 lbs.   Weight trend:   Wt Readings from Last 10 Encounters:   04/23/21 260 lb (117.9 kg)   04/13/21 278 lb (126.1 kg)   02/18/21 262 lb (118.8 kg)   11/17/20 256 lb (116.1 kg)   10/13/20 253 lb (114.8 kg)   09/11/20 260 lb (117.9 kg)   09/08/20 261 lb 1.6 oz (118.4 kg)   08/25/20 250 lb (113.4 kg)   08/18/20 250 lb (113.4 kg)   08/14/20 250 lb (113.4 kg)     Patient's comorbidities associated with obesity include: Hypertension, Hyperlipidemia, Type 2 Diabetes, GERD and Depression.  Lab Results   Component Value Date    A1C 6.0 02/18/2021    A1C 6.1 09/11/2020    A1C 5.8 02/19/2020     Recent Labs   Lab Test 02/18/21  0810 09/11/20  0948   CHOL 234* 280*   HDL 68 72   * 194*   TRIG 92 69     PHQ 9/8/2020 11/17/2020 2/18/2021   PHQ-9 Total Score 21 16 10   Q9: Thoughts of better off dead/self-harm past 2 weeks Not at all Not at all Not at all       Hypertension: Current medications include amlodipine 10mg daily.  Patient does self-monitor blood pressure rare.  Patient reports no current medication side effects.  BP Readings from Last 3 Encounters:   04/23/21 120/84   04/13/21 126/84   11/17/20 110/80       GERD: Current medications include: Prilosec (omeprazole) 20mg once daily. Pt reports no current symptoms.  Patient feels that current regimen is effective. Previously been on rekha seltzer. Family hx osteoporosis so worried about her bone.       Today's Vitals: /84   Wt 260 lb (117.9 kg)   BMI 49.13 kg/m    ----------------    I spent 60 minutes with this patient today. All changes were made via collaborative practice agreement with Melissa Franco. A copy of the visit note was provided to the patient's primary care provider.    The patient was given a summary of these recommendations.     Modesta Montaño,  PharmD  Medication Therapy Management Provider, [facility]  Pager: [phone]        Medication Therapy Recommendations  Gastroesophageal reflux disease without esophagitis    Current Medication: omeprazole (PRILOSEC) 20 MG DR capsule   Rationale: Unsafe medication for the patient - Adverse medication event - Safety   Recommendation: Change Medication - famotidine 20 MG tablet - Decrease omeprazole 20mg to every other day for 2-3 weeks then can try stopping. At the same try famotidine 20mg twice daily as needed.   Status: Accepted per CPA         Morbid obesity (H)    Current Medication: topiramate (TOPAMAX) 25 MG tablet   Rationale: Untreated condition - Needs additional medication therapy - Indication   Recommendation: Start Medication   Status: Accepted per CPA

## 2021-04-23 ENCOUNTER — OFFICE VISIT (OUTPATIENT)
Dept: PHARMACY | Facility: CLINIC | Age: 41
End: 2021-04-23
Attending: FAMILY MEDICINE
Payer: COMMERCIAL

## 2021-04-23 VITALS — WEIGHT: 260 LBS | DIASTOLIC BLOOD PRESSURE: 84 MMHG | SYSTOLIC BLOOD PRESSURE: 120 MMHG | BODY MASS INDEX: 49.13 KG/M2

## 2021-04-23 DIAGNOSIS — K21.9 GASTROESOPHAGEAL REFLUX DISEASE WITHOUT ESOPHAGITIS: ICD-10-CM

## 2021-04-23 DIAGNOSIS — I10 ESSENTIAL HYPERTENSION: ICD-10-CM

## 2021-04-23 DIAGNOSIS — E66.01 MORBID OBESITY (H): Primary | ICD-10-CM

## 2021-04-23 PROCEDURE — 99605 MTMS BY PHARM NP 15 MIN: CPT | Performed by: PHARMACIST

## 2021-04-23 PROCEDURE — 99607 MTMS BY PHARM ADDL 15 MIN: CPT | Performed by: PHARMACIST

## 2021-04-23 RX ORDER — FAMOTIDINE 20 MG/1
20 TABLET, FILM COATED ORAL 2 TIMES DAILY PRN
Qty: 180 TABLET | Refills: 1 | Status: SHIPPED | OUTPATIENT
Start: 2021-04-23 | End: 2021-07-29

## 2021-04-23 RX ORDER — TOPIRAMATE 25 MG/1
TABLET, FILM COATED ORAL
Qty: 180 TABLET | Refills: 0 | Status: SHIPPED | OUTPATIENT
Start: 2021-04-23 | End: 2021-07-29

## 2021-04-23 RX ORDER — ORAL SEMAGLUTIDE 3 MG/1
3 TABLET ORAL DAILY
Qty: 30 TABLET | Refills: 1 | Status: SHIPPED | OUTPATIENT
Start: 2021-04-23 | End: 2021-04-23

## 2021-04-23 NOTE — PATIENT INSTRUCTIONS
"Recommendations from today's MTM visit:                                                    MTM (medication therapy management) is a service provided by a clinical pharmacist designed to help you get the most of out of your medicines.   Today we reviewed what your medicines are for, how to know if they are working, that your medicines are safe and how to make your medicine regimen as easy as possible.      1. Decrease omeprazole 20mg to every other day for 2-3 weeks then can try stopping.   2. At the same try famotidine 20mg twice daily as needed.   3. Start topiramate 25mg dailiy for 7 days then increase to 50mg daily.   4. Try exercising 150 min/week.  5. No more soda or [place] in the morning!  6. Truvia for sweetener  7. Try to stick with 45-60 grams carbs/meal.     Intermittent fasting: Check out the website The Fasting Method.Well (join free portion of website) or \"The Obesity Code\" book by Dr. Vinny Gaytan for more details. Dr. Gaytan also has a cookbook out with meals/recipes --the book is called The Obesity Code Cookbook.      Follow-up: Return in about 1 month (around 5/23/2021) for Medication Therapy Management Pharmacist.    It was great to speak with you today.  I value your experience and would be very thankful for your time with providing feedback on our clinic survey. You may receive a survey via email or text message in the next few days.     To schedule another MTM appointment, please call the clinic directly or you may call the MTM scheduling line at [phone] or toll-free at [phone].     My Clinical Pharmacist's contact information:                                                      Please feel free to contact me with any questions or concerns you have.      Modesta Montaño, PharmD  Medication Therapy Management Provider, [facility]  "

## 2021-05-19 ENCOUNTER — DOCUMENTATION ONLY (OUTPATIENT)
Dept: SLEEP MEDICINE | Facility: CLINIC | Age: 41
End: 2021-05-19

## 2021-05-19 NOTE — PROGRESS NOTES
6 Month STM visit    Diagnostic AHI:  25.1  PSG        Message left for patient to return call     Assessment: Pt not meeting objective benchmarks for compliance     Action plan: waiting for patient to return call.   pt to follow up per provider request        Device type: Device type: Auto-CPAP      PAP settings: CPAP min :5cm  H20     CPAP max 15cm  H20         Objective measures: 14 day rolling measures         Compliance  : 0 %      Average number of minutes : 0             Objective measure goal  Compliance   Goal >70%  Leak   Goal < 24 lpm  AHI  Goal < 5  Usage  Goal >240

## 2021-05-25 ENCOUNTER — TELEPHONE (OUTPATIENT)
Dept: FAMILY MEDICINE | Facility: CLINIC | Age: 41
End: 2021-05-25

## 2021-05-25 NOTE — TELEPHONE ENCOUNTER
Patient Quality Outreach Summary      Summary:    Patient is due/failing the following:   Cervical Cancer Screening - PAP Needed    Type of outreach:    Phone, left message for patient/parent to call back.    Questions for provider review:    None                                                                                                                    Anel Mosquera MA       Chart routed to none.

## 2021-05-25 NOTE — LETTER
[address] [address] [phone]  Mishel 3, 2021    Tresa Chin  [address] [place] [address]   [facility] [address]    Dear Tresa,    I care about your health and have reviewed your health plan. I have reviewed your medical conditions, medication list, and lab results and am making recommendations based on this review, to better manage your health.    You are in particular need of attention regarding:  -Cervical Cancer Screening    I am recommending that you:  {recommendations:-schedule a PAP SMEAR EXAM which is due.  Please disregard this reminder if you have had this exam elsewhere within the last year.  It would be helpful for us to have a copy of your recent pap smear report in our file so that we can best coordinate your care.    Here is a list of Health Maintenance topics that are due now or due soon:  Health Maintenance Due   Topic Date Due     ADVANCE CARE PLANNING  Never done     PAP  Never done     PREVENTIVE CARE VISIT  02/19/2021     A1C  05/18/2021       Please call us at [phone] (or use [employer]) to address the above recommendations.     Thank you for trusting [facility] and we appreciate the opportunity to serve you.  We look forward to supporting your healthcare needs in the future.    Healthy Regards,    Your [facility] Team

## 2021-05-29 ENCOUNTER — HEALTH MAINTENANCE LETTER (OUTPATIENT)
Age: 41
End: 2021-05-29

## 2021-06-03 NOTE — TELEPHONE ENCOUNTER
Patient Quality Outreach 2nd Attempt      Summary:    Type of outreach:    Phone, left message for patient/parent to call back., Sent MyChart message. and Sent letter.    Next Steps:  Reach out within 90 days via Phone.    Max number of attempts reached: Yes. Will try again in 90 days if patient still on fail list.    Questions for provider review:    None                                                                                                                    Anel Mosquera MA       Chart routed to none.

## 2021-07-29 ENCOUNTER — OFFICE VISIT (OUTPATIENT)
Dept: FAMILY MEDICINE | Facility: CLINIC | Age: 41
End: 2021-07-29
Payer: COMMERCIAL

## 2021-07-29 ENCOUNTER — TELEPHONE (OUTPATIENT)
Dept: FAMILY MEDICINE | Facility: CLINIC | Age: 41
End: 2021-07-29

## 2021-07-29 ENCOUNTER — ANCILLARY PROCEDURE (OUTPATIENT)
Dept: GENERAL RADIOLOGY | Facility: CLINIC | Age: 41
End: 2021-07-29
Attending: FAMILY MEDICINE
Payer: COMMERCIAL

## 2021-07-29 VITALS
WEIGHT: 267.8 LBS | SYSTOLIC BLOOD PRESSURE: 124 MMHG | HEART RATE: 104 BPM | RESPIRATION RATE: 20 BRPM | TEMPERATURE: 98.6 F | OXYGEN SATURATION: 98 % | BODY MASS INDEX: 50.56 KG/M2 | DIASTOLIC BLOOD PRESSURE: 87 MMHG | HEIGHT: 61 IN

## 2021-07-29 DIAGNOSIS — M25.572 PAIN IN JOINT, ANKLE AND FOOT, LEFT: Primary | ICD-10-CM

## 2021-07-29 DIAGNOSIS — E66.01 MORBID OBESITY (H): ICD-10-CM

## 2021-07-29 LAB
FASTING STATUS PATIENT QL REPORTED: NO
GLUCOSE BLD-MCNC: 145 MG/DL (ref 70–99)
HBA1C MFR BLD: 6.2 % (ref 0–5.6)

## 2021-07-29 PROCEDURE — 83036 HEMOGLOBIN GLYCOSYLATED A1C: CPT | Performed by: FAMILY MEDICINE

## 2021-07-29 PROCEDURE — 73610 X-RAY EXAM OF ANKLE: CPT | Mod: LT | Performed by: RADIOLOGY

## 2021-07-29 PROCEDURE — 99214 OFFICE O/P EST MOD 30 MIN: CPT | Performed by: FAMILY MEDICINE

## 2021-07-29 PROCEDURE — 82947 ASSAY GLUCOSE BLOOD QUANT: CPT | Performed by: FAMILY MEDICINE

## 2021-07-29 PROCEDURE — 36415 COLL VENOUS BLD VENIPUNCTURE: CPT | Performed by: FAMILY MEDICINE

## 2021-07-29 ASSESSMENT — MIFFLIN-ST. JEOR: SCORE: 1827.11

## 2021-07-29 ASSESSMENT — PAIN SCALES - GENERAL: PAINLEVEL: NO PAIN (0)

## 2021-07-29 NOTE — PROGRESS NOTES
Assessment & Plan     Pain in joint, ankle and foot, left  Worsening recently, will repeat Xray of the ankle, if negative, will refer to PT at this time, pt to call in 1 month if no improvement .  - XR Ankle Left G/E 3 Views  - [facility] PT and Hand Referral; Future    Morbid obesity (H)  Discussed bariatric surgery, pt said that the insurance will not cover it, at this time, will check labs below, and also refer to weight management clinic.  - Hemoglobin A1c; Future  - Glucose; Future  - Hemoglobin A1c  - Glucose                 Return in about 3 months (around 10/29/2021) for multiple problems follow up.    Melissa Franco MD  [facility] FRANCO Gtz is a 41 year old who presents for the following health issues     History of Present Illness       Back Pain:  She presents for follow up of back pain. Patient's back pain is a chronic problem.  Location of back pain:  Right lower back, left lower back, right side of neck, left side of neck, right shoulder, left shoulder and left side of waist  Description of back pain: sharp, shooting and stabbing  Back pain spreads: left knee and left foot    Since patient first noticed back pain, pain is: gradually worsening  Does back pain interfere with her job:  Yes      Diabetes:   She presents for follow up of diabetes.  She is not checking blood glucose. She is concerned about other. She is having burning in feet.         Hypertension: She presents for follow up of hypertension.  She does not check blood pressure  regularly outside of the clinic. Outpatient blood pressures have not been over 140/90. She does not follow a low salt diet. She consumes 1 sweetened beverage(s) daily.She exercises with enough effort to increase her heart rate 10 to 19 minutes per day.  She exercises with enough effort to increase her heart rate 4 days per week. She is missing 2 dose(s) of medications per week.  She is not taking prescribed medications regularly due to  remembering to take.       Hyperlipidemia Follow-Up      Are you regularly taking any medication or supplement to lower your cholesterol?   No    Are you having muscle aches or other side effects that you think could be caused by your cholesterol lowering medication?  No      Musculoskeletal problem/pain  Onset/Duration: 1 year ago  Description  Location: ankle - left  Joint Swelling: YES  Redness: no  Pain: YES- worst 7/10, best 0/10, currently 0.10  Warmth: YES  Intensity:  moderate  Progression of Symptoms:  constant  Accompanying signs and symptoms:   Fevers: no  Numbness/tingling/weakness: YES, numbness in the left leg for the last 1 month, usually when she sits down or walks.  History  Trauma to the area: YES- 1 year ago  Recent illness:  no  Previous similar problem: no  Previous evaluation:  YES  Precipitating or alleviating factors:  Aggravating factors include: standing, walking, climbing stairs, exercise and overuse  Therapies tried and outcome: rest/inactivity, heat, ice, stretching, exercises, acetaminophen, Ibuprofen, NSAID and physical therapy            Review of Systems   CONSTITUTIONAL:weight gain.  CV: NEGATIVE for chest pain, palpitations or peripheral edema      Objective    There were no vitals taken for this visit.  There is no height or weight on file to calculate BMI.  Physical Exam   GENERAL: healthy, alert and no distress  CV: regular rate and rhythm, normal S1 S2, no S3 or S4, no murmur, click or rub, no peripheral edema and peripheral pulses strong  Ankle exam :   Inspection:normal walk : pronation nl supination nl  ROM  Of the ankle : flexion: painful   Dorsiflexion:nl   inversion:nl  aversion : painful  Resisted ROM is as above.  There isno swelling and tenderness over the lateral malleolus,   mild medial malleolus tenderness   Navicular bone none tender.  tib-fib squeeze sign :negative  Proximal fibula none tender.  . No tenderness over the anterior aspect of the ankle.  no tenderness  over the ATFL.  Anterior drawer sign : negative.   The fifth metatarsal is not tender. The ankle joint is intact without excessive opening on stressing.

## 2021-07-29 NOTE — TELEPHONE ENCOUNTER
Can we call Tresa:  2 things; she is still due for her pap smear, so if she wish to schedule with me or Liudmila.  Two: I put an order in for the weight loss clinic, and here is the number :  [phone], so if patient can call and schedule.  Melissa Franco MD  [facility]  [phone]

## 2021-08-03 ENCOUNTER — THERAPY VISIT (OUTPATIENT)
Dept: PHYSICAL THERAPY | Facility: CLINIC | Age: 41
End: 2021-08-03
Attending: FAMILY MEDICINE
Payer: COMMERCIAL

## 2021-08-03 DIAGNOSIS — M25.572 PAIN IN JOINT, ANKLE AND FOOT, LEFT: ICD-10-CM

## 2021-08-03 PROCEDURE — 97112 NEUROMUSCULAR REEDUCATION: CPT | Mod: GP | Performed by: PHYSICAL THERAPIST

## 2021-08-03 PROCEDURE — 97162 PT EVAL MOD COMPLEX 30 MIN: CPT | Mod: GP | Performed by: PHYSICAL THERAPIST

## 2021-08-03 PROCEDURE — 97110 THERAPEUTIC EXERCISES: CPT | Mod: GP | Performed by: PHYSICAL THERAPIST

## 2021-08-03 NOTE — PROGRESS NOTES
"Physical Therapy Initial Evaluation  Subjective:  Twisted L ankle ~ 1 year ago. X-rays negative. Worsened recently. Pain is worse after 5 minutes of walking/standing,feels really weak, can't trust it. Pain is L medial and posterior ankle. Swelling with standing. Better with ice, elevation, [id number] day, occasional ibuprofen. Now R knee/ankle hurting b/c \"babying\" L ankle. Sitting with work most of day. Goal is to get back to walking/exercise for health reasons as she is pre-diabetic.     The history is provided by the patient.   Patient Health History         Pain is reported as 6/10 on pain scale.  General health as reported by patient is good.  Pertinent medical history includes: high blood pressure, overweight and sleep disorder/apnea.     Medical allergies: latex.       Current medications:  High blood pressure medication and pain medication.    Current occupation is accounting.                                       Objective:  Standing Alignment:                Ankle/Foot:  Pes planus L and calcaneal valgus L    Gait:  Decreased push off L toes, everted push off, decreased stance time L LE.   Gait Type:  Antalgic               Ankle/Foot Evaluation  ROM:  AROM is normal (L end range pain with inversion/plantarflexion).      Strength:                Anterior Tibialis:Left: 3+/5   Pain:+  Right: 5/5   Pain:-  Posterior Tibialis: Left: 3+/5   Pain:+  Right: 5/5   Pain:-  Peroneals: Left: 3+/5   Pain:-/+  Right: 5/5   Pain:-  Extensor Digitorum: Left: 3+/5   Pain:-/+Right: 5/5   Pain:-  Strength wnl ankle: pain with B toe raise medial L ankle, ~50% ROM.   LIGAMENT TESTING:       Varus Stress (Calc Fib) Left: neg    Varus Stress (Calc Fib) Right: neg  Valgus Stress (Deltoid) Left: neg    Valgus Stress (Deltoid) Right: neg      SPECIAL TESTS:     Left ankle negative for the following special tests:  Tinel sign    Right ankle negative for the following special tests:  Tinel sign  PALPATION:   Left ankle tenderness " present at:  posterior tibialis; navicular and medial malleolus  Left ankle tenderness not present at:   plantar fascia  Right ankle tenderness present at:   posterior tibialis  Right ankle tenderness not present at:  plantar fascia or medial malleolus  EDEMA:   Left ankle edema present at: 57.5cm  Right ankle edema present at:  56cm      Figure 8 left: 57.5cmFigure 8 right: 56cm  MOBILITY TESTING: normal              FUNCTIONAL TESTS:           Proprioception:  Stork Balance Test: Left: unable, too painful  Right: 3-5 seconds                                                     General     ROS    Assessment/Plan:    Patient is a 41 year old female with left side ankle complaints.    Patient has the following significant findings with corresponding treatment plan.                Diagnosis 1:  L ankle/foot pain  Pain -  hot/cold therapy, manual therapy, splint/taping/bracing/orthotics, self management, education and home program  Decreased ROM/flexibility - manual therapy and therapeutic exercise  Decreased strength - therapeutic exercise and therapeutic activities  Impaired balance - neuro re-education and therapeutic activities  Decreased proprioception - neuro re-education and therapeutic activities  Inflammation - cold therapy and self management/home program  Edema - cold therapy and self management/home program  Impaired gait - gait training  Impaired muscle performance - neuro re-education  Decreased function - therapeutic activities  Impaired posture - neuro re-education    Therapy Evaluation Codes:   1) History comprised of:   Personal factors that impact the plan of care:      None.    Comorbidity factors that impact the plan of care are:      Overweight.     Medications impacting care: Pain.  2) Examination of Body Systems comprised of:   Body structures and functions that impact the plan of care:      Ankle.   Activity limitations that impact the plan of care are:      Stairs, Standing and  Walking.  3) Clinical presentation characteristics are:   Stable/Uncomplicated.  4) Decision-Making    Low complexity using standardized patient assessment instrument and/or measureable assessment of functional outcome.  Cumulative Therapy Evaluation is: Low complexity.    Previous and current functional limitations:  (See Goal Flow Sheet for this information)    Short term and Long term goals: (See Goal Flow Sheet for this information)     Communication ability:  Patient appears to be able to clearly communicate and understand verbal and written communication and follow directions correctly.  Treatment Explanation - The following has been discussed with the patient:   RX ordered/plan of care  Anticipated outcomes  Possible risks and side effects  This patient would benefit from PT intervention to resume normal activities.   Rehab potential is good.    Frequency:  1 X week, once daily  Duration:  for 6 weeks  Discharge Plan:  Achieve all LTG.  Independent in home treatment program.  Reach maximal therapeutic benefit.    Please refer to the daily flowsheet for treatment today, total treatment time and time spent performing 1:1 timed codes.

## 2021-08-10 ENCOUNTER — THERAPY VISIT (OUTPATIENT)
Dept: PHYSICAL THERAPY | Facility: CLINIC | Age: 41
End: 2021-08-10
Payer: COMMERCIAL

## 2021-08-10 DIAGNOSIS — M25.572 PAIN IN JOINT, ANKLE AND FOOT, LEFT: ICD-10-CM

## 2021-08-10 PROCEDURE — 97112 NEUROMUSCULAR REEDUCATION: CPT | Mod: GP | Performed by: PHYSICAL THERAPY ASSISTANT

## 2021-08-10 PROCEDURE — 97110 THERAPEUTIC EXERCISES: CPT | Mod: GP | Performed by: PHYSICAL THERAPY ASSISTANT

## 2021-08-17 ENCOUNTER — THERAPY VISIT (OUTPATIENT)
Dept: PHYSICAL THERAPY | Facility: CLINIC | Age: 41
End: 2021-08-17
Payer: COMMERCIAL

## 2021-08-17 DIAGNOSIS — M25.572 PAIN IN JOINT, ANKLE AND FOOT, LEFT: ICD-10-CM

## 2021-08-17 PROCEDURE — 97110 THERAPEUTIC EXERCISES: CPT | Mod: GP | Performed by: PHYSICAL THERAPY ASSISTANT

## 2021-08-18 ENCOUNTER — THERAPY VISIT (OUTPATIENT)
Dept: PHYSICAL THERAPY | Facility: CLINIC | Age: 41
End: 2021-08-18
Payer: COMMERCIAL

## 2021-08-18 DIAGNOSIS — M54.42 CHRONIC BILATERAL LOW BACK PAIN WITH LEFT-SIDED SCIATICA: ICD-10-CM

## 2021-08-18 DIAGNOSIS — G89.29 CHRONIC BILATERAL LOW BACK PAIN WITH LEFT-SIDED SCIATICA: ICD-10-CM

## 2021-08-18 PROCEDURE — 97161 PT EVAL LOW COMPLEX 20 MIN: CPT | Mod: GP | Performed by: PHYSICAL THERAPIST

## 2021-08-18 PROCEDURE — 97110 THERAPEUTIC EXERCISES: CPT | Mod: GP | Performed by: PHYSICAL THERAPIST

## 2021-08-18 NOTE — PROGRESS NOTES
Physical Therapy Initial Evaluation  Subjective:  The history is provided by the patient. No  was used.   Patient Health History  Tresa Chin being seen for LBP with right radiculopathy.     Date of Onset: after a fall in 2021 in which she fell and landed on her back.      Pain is reported as 5/10 on pain scale.    Pertinent medical history includes: high blood pressure, numbness/tingling, overweight and sleep disorder/apnea.     Medical allergies: latex.   Surgeries include:  None.    Current medications:  High blood pressure medication.    Current occupation is  at [employer].   Primary job tasks include:  Computer work and prolonged sitting.                  Therapist Generated HPI Evaluation  Problem details: Pt. complains of right sided LBP with right thigh pain that has been present for 9 years after falling and landing her back.  Sx's increase with standing and walking.  She is also being treated for ankle pain concurrently in therapy.  No known trauma.        .         Type of problem:  Lumbar.    This is a chronic condition.  Condition occurred with:  A fall/slip.  Where condition occurred: at home.  Patient reports pain:  Lumbar spine right.  Pain is described as aching and is intermittent.  Pain radiates to:  Thigh right. Pain is worse during the day.  Since onset symptoms are unchanged.  Associated symptoms:  Loss of motion/stiffness, loss of strength and loss of motion. Symptoms are exacerbated by standing and walking  and relieved by nothing.  Special tests included:  X-ray (negative, see EPIC).  Previous treatment includes chiropractic and physical therapy. There was none improvement following previous treatment.  Restrictions due to condition include:  Working in normal job without restrictions.  Barriers include:  None as reported by patient.                        Objective:  Standing Alignment:        Lumbar:  Lordosis incr  Pelvic:   Normal              Flexibility/Screens:   Positive screens:  Lumbar                   Lumbar/SI Evaluation  ROM:    AROM Lumbar:   Flexion:          75% with end range tightness  Ext:                    50% with ERP and increased right thigh sx's   Side Bend:        Left:  50% with ERP    Right:  50% with ERP and increased R thigh sx's  Rotation:           Left:     Right:   Side Glide:        Left:     Right:                     Lumbar Palpation:      Tenderness not present at Left:    Quadratus Lumborum; Erector Spinae; Piriformis; PSIS or Vertebral  Tenderness present at Right: Quadratus Lumborum and Erector Spinae  Tenderness not present at Right:  Piriformis; PSIS or Vertebral                                                     General     ROS    Assessment/Plan:    Patient is a 41 year old female with lumbar complaints.    Patient has the following significant findings with corresponding treatment plan.                Diagnosis 1:  LBP with R radiculopathy  Pain -  self management, education, directional preference exercise and home program  Decreased ROM/flexibility - manual therapy and therapeutic exercise  Decreased strength - therapeutic exercise and therapeutic activities  Impaired muscle performance - neuro re-education  Decreased function - therapeutic activities    Therapy Evaluation Codes:   1) Clinical presentation characteristics are:   Stable/Uncomplicated.  2) Decision-Making    Low complexity using standardized patient assessment instrument and/or measureable assessment of functional outcome.  Cumulative Therapy Evaluation is: Low complexity.    Previous and current functional limitations:  (See Goal Flow Sheet for this information)    Short term and Long term goals: (See Goal Flow Sheet for this information)     Communication ability:  Patient appears to be able to clearly communicate and understand verbal and written communication and follow directions correctly.  Treatment Explanation - The  following has been discussed with the patient:   RX ordered/plan of care  Anticipated outcomes  Possible risks and side effects  This patient would benefit from PT intervention to resume normal activities.   Rehab potential is fair.    Frequency:  1 X week, once daily  Duration:  for 6 weeks  Discharge Plan:  Achieve all LTG.  Independent in home treatment program.  Reach maximal therapeutic benefit.    Please refer to the daily flowsheet for treatment today, total treatment time and time spent performing 1:1 timed codes.

## 2021-08-27 ENCOUNTER — THERAPY VISIT (OUTPATIENT)
Dept: PHYSICAL THERAPY | Facility: CLINIC | Age: 41
End: 2021-08-27
Payer: COMMERCIAL

## 2021-08-27 DIAGNOSIS — M25.572 PAIN IN JOINT, ANKLE AND FOOT, LEFT: ICD-10-CM

## 2021-08-27 PROCEDURE — 97110 THERAPEUTIC EXERCISES: CPT | Mod: GP

## 2021-09-01 ENCOUNTER — THERAPY VISIT (OUTPATIENT)
Dept: PHYSICAL THERAPY | Facility: CLINIC | Age: 41
End: 2021-09-01
Payer: COMMERCIAL

## 2021-09-01 DIAGNOSIS — G89.29 CHRONIC BILATERAL LOW BACK PAIN WITH LEFT-SIDED SCIATICA: ICD-10-CM

## 2021-09-01 DIAGNOSIS — M54.42 CHRONIC BILATERAL LOW BACK PAIN WITH LEFT-SIDED SCIATICA: ICD-10-CM

## 2021-09-01 PROCEDURE — 97112 NEUROMUSCULAR REEDUCATION: CPT | Mod: GP | Performed by: PHYSICAL THERAPY ASSISTANT

## 2021-09-01 PROCEDURE — 97110 THERAPEUTIC EXERCISES: CPT | Mod: GP | Performed by: PHYSICAL THERAPY ASSISTANT

## 2021-09-14 ENCOUNTER — APPOINTMENT (OUTPATIENT)
Dept: CT IMAGING | Facility: CLINIC | Age: 41
End: 2021-09-14
Attending: EMERGENCY MEDICINE
Payer: COMMERCIAL

## 2021-09-14 ENCOUNTER — THERAPY VISIT (OUTPATIENT)
Dept: PHYSICAL THERAPY | Facility: CLINIC | Age: 41
End: 2021-09-14
Payer: COMMERCIAL

## 2021-09-14 ENCOUNTER — HOSPITAL ENCOUNTER (EMERGENCY)
Facility: CLINIC | Age: 41
Discharge: HOME | End: 2021-09-14
Attending: EMERGENCY MEDICINE | Admitting: EMERGENCY MEDICINE
Payer: COMMERCIAL

## 2021-09-14 ENCOUNTER — NURSE TRIAGE (OUTPATIENT)
Dept: NURSING | Facility: CLINIC | Age: 41
End: 2021-09-14

## 2021-09-14 VITALS
RESPIRATION RATE: 16 BRPM | SYSTOLIC BLOOD PRESSURE: 133 MMHG | BODY MASS INDEX: 49.13 KG/M2 | OXYGEN SATURATION: 98 % | HEART RATE: 81 BPM | TEMPERATURE: 96.7 F | WEIGHT: 260 LBS | DIASTOLIC BLOOD PRESSURE: 100 MMHG

## 2021-09-14 DIAGNOSIS — G44.209 ACUTE NON INTRACTABLE TENSION-TYPE HEADACHE: ICD-10-CM

## 2021-09-14 DIAGNOSIS — M54.42 CHRONIC BILATERAL LOW BACK PAIN WITH LEFT-SIDED SCIATICA: ICD-10-CM

## 2021-09-14 DIAGNOSIS — G89.29 CHRONIC BILATERAL LOW BACK PAIN WITH LEFT-SIDED SCIATICA: ICD-10-CM

## 2021-09-14 DIAGNOSIS — R42 VERTIGO: ICD-10-CM

## 2021-09-14 DIAGNOSIS — S46.811A TRAPEZIUS STRAIN, RIGHT, INITIAL ENCOUNTER: ICD-10-CM

## 2021-09-14 LAB
ANION GAP SERPL CALCULATED.3IONS-SCNC: 5 MMOL/L (ref 3–14)
BASOPHILS # BLD AUTO: 0.1 10E3/UL (ref 0–0.2)
BASOPHILS NFR BLD AUTO: 1 %
BUN SERPL-MCNC: 12 MG/DL (ref 7–30)
CALCIUM SERPL-MCNC: 8.7 MG/DL (ref 8.5–10.1)
CHLORIDE BLD-SCNC: 105 MMOL/L (ref 94–109)
CO2 SERPL-SCNC: 28 MMOL/L (ref 20–32)
CREAT SERPL-MCNC: 0.5 MG/DL (ref 0.52–1.04)
EOSINOPHIL # BLD AUTO: 0.1 10E3/UL (ref 0–0.7)
EOSINOPHIL NFR BLD AUTO: 1 %
ERYTHROCYTE [DISTWIDTH] IN BLOOD BY AUTOMATED COUNT: 13.6 % (ref 10–15)
GFR SERPL CREATININE-BSD FRML MDRD: >90 ML/MIN/1.73M2
GLUCOSE BLD-MCNC: 121 MG/DL (ref 70–99)
HBA1C MFR BLD: 6.2 % (ref 0–5.6)
HCT VFR BLD AUTO: 47 % (ref 35–47)
HGB BLD-MCNC: 14.9 G/DL (ref 11.7–15.7)
HOLD SPECIMEN: NORMAL
IMM GRANULOCYTES # BLD: 0.1 10E3/UL
IMM GRANULOCYTES NFR BLD: 1 %
LYMPHOCYTES # BLD AUTO: 2.4 10E3/UL (ref 0.8–5.3)
LYMPHOCYTES NFR BLD AUTO: 28 %
MCH RBC QN AUTO: 27.9 PG (ref 26.5–33)
MCHC RBC AUTO-ENTMCNC: 31.7 G/DL (ref 31.5–36.5)
MCV RBC AUTO: 88 FL (ref 78–100)
MONOCYTES # BLD AUTO: 0.6 10E3/UL (ref 0–1.3)
MONOCYTES NFR BLD AUTO: 6 %
NEUTROPHILS # BLD AUTO: 5.5 10E3/UL (ref 1.6–8.3)
NEUTROPHILS NFR BLD AUTO: 63 %
NRBC # BLD AUTO: 0 10E3/UL
NRBC BLD AUTO-RTO: 0 /100
PLATELET # BLD AUTO: 364 10E3/UL (ref 150–450)
POTASSIUM BLD-SCNC: 3.9 MMOL/L (ref 3.4–5.3)
RBC # BLD AUTO: 5.34 10E6/UL (ref 3.8–5.2)
SODIUM SERPL-SCNC: 138 MMOL/L (ref 133–144)
WBC # BLD AUTO: 8.6 10E3/UL (ref 4–11)

## 2021-09-14 PROCEDURE — 96361 HYDRATE IV INFUSION ADD-ON: CPT

## 2021-09-14 PROCEDURE — 96374 THER/PROPH/DIAG INJ IV PUSH: CPT | Mod: 59

## 2021-09-14 PROCEDURE — 70496 CT ANGIOGRAPHY HEAD: CPT

## 2021-09-14 PROCEDURE — 250N000011 HC RX IP 250 OP 636: Performed by: EMERGENCY MEDICINE

## 2021-09-14 PROCEDURE — 250N000013 HC RX MED GY IP 250 OP 250 PS 637: Performed by: EMERGENCY MEDICINE

## 2021-09-14 PROCEDURE — 80048 BASIC METABOLIC PNL TOTAL CA: CPT | Performed by: EMERGENCY MEDICINE

## 2021-09-14 PROCEDURE — 99285 EMERGENCY DEPT VISIT HI MDM: CPT | Mod: 25

## 2021-09-14 PROCEDURE — 36415 COLL VENOUS BLD VENIPUNCTURE: CPT | Performed by: EMERGENCY MEDICINE

## 2021-09-14 PROCEDURE — 83036 HEMOGLOBIN GLYCOSYLATED A1C: CPT | Performed by: EMERGENCY MEDICINE

## 2021-09-14 PROCEDURE — 258N000003 HC RX IP 258 OP 636: Performed by: EMERGENCY MEDICINE

## 2021-09-14 PROCEDURE — 97110 THERAPEUTIC EXERCISES: CPT | Mod: GP | Performed by: PHYSICAL THERAPY ASSISTANT

## 2021-09-14 PROCEDURE — 85025 COMPLETE CBC W/AUTO DIFF WBC: CPT | Performed by: EMERGENCY MEDICINE

## 2021-09-14 PROCEDURE — 70450 CT HEAD/BRAIN W/O DYE: CPT

## 2021-09-14 RX ORDER — MECLIZINE HYDROCHLORIDE 25 MG/1
25 TABLET ORAL EVERY 6 HOURS PRN
Qty: 15 TABLET | Refills: 1 | Status: SHIPPED | OUTPATIENT
Start: 2021-09-14 | End: 2021-10-20

## 2021-09-14 RX ORDER — CYCLOBENZAPRINE HCL 10 MG
10 TABLET ORAL 3 TIMES DAILY PRN
Qty: 15 TABLET | Refills: 0 | Status: SHIPPED | OUTPATIENT
Start: 2021-09-14 | End: 2021-09-20

## 2021-09-14 RX ORDER — MECLIZINE HYDROCHLORIDE 25 MG/1
25 TABLET ORAL ONCE
Status: COMPLETED | OUTPATIENT
Start: 2021-09-14 | End: 2021-09-14

## 2021-09-14 RX ORDER — KETOROLAC TROMETHAMINE 15 MG/ML
15 INJECTION, SOLUTION INTRAMUSCULAR; INTRAVENOUS ONCE
Status: COMPLETED | OUTPATIENT
Start: 2021-09-14 | End: 2021-09-14

## 2021-09-14 RX ORDER — CYCLOBENZAPRINE HCL 10 MG
10 TABLET ORAL ONCE
Status: COMPLETED | OUTPATIENT
Start: 2021-09-14 | End: 2021-09-14

## 2021-09-14 RX ORDER — IOPAMIDOL 755 MG/ML
70 INJECTION, SOLUTION INTRAVASCULAR ONCE
Status: COMPLETED | OUTPATIENT
Start: 2021-09-14 | End: 2021-09-14

## 2021-09-14 RX ADMIN — CYCLOBENZAPRINE HYDROCHLORIDE 10 MG: 10 TABLET, FILM COATED ORAL at 09:36

## 2021-09-14 RX ADMIN — KETOROLAC TROMETHAMINE 15 MG: 15 INJECTION, SOLUTION INTRAMUSCULAR; INTRAVENOUS at 09:36

## 2021-09-14 RX ADMIN — IOPAMIDOL 70 ML: 755 INJECTION, SOLUTION INTRAVENOUS at 09:49

## 2021-09-14 RX ADMIN — MECLIZINE HYDROCHLORIDE 25 MG: 25 TABLET ORAL at 09:36

## 2021-09-14 RX ADMIN — SODIUM CHLORIDE, POTASSIUM CHLORIDE, SODIUM LACTATE AND CALCIUM CHLORIDE 1000 ML: 600; 310; 30; 20 INJECTION, SOLUTION INTRAVENOUS at 09:36

## 2021-09-14 ASSESSMENT — ENCOUNTER SYMPTOMS
NECK PAIN: 1
NAUSEA: 1
LIGHT-HEADEDNESS: 1
DIZZINESS: 1
HEADACHES: 1

## 2021-09-14 NOTE — ED TRIAGE NOTES
Pt here with HA, room spinning, n/v since last week. No numbness/tingling, no visual changes. ABC intact. A&Ox4./

## 2021-09-14 NOTE — TELEPHONE ENCOUNTER
covid travel screening already answered.    Head had spinning sensation.  Nose bleeds too.  BP was normal 100/79    On Bp medication.    Protocol suggests ER/pcp no openings till sept 30.    Going to ER    Mary GORDON [employer] Nurse Advisor      Reason for Disposition    SEVERE headache or neck pain    Additional Information    Negative: Shock suspected (e.g., cold/pale/clammy skin, too weak to stand, low BP, rapid pulse)    Negative: Difficult to awaken or acting confused (e.g., disoriented, slurred speech)    Negative: Fainted, and still feels dizzy afterwards    Negative: Severe difficulty breathing (e.g., struggling for each breath, speaks in single words)    Negative: Overdose (accidental or intentional) of medications    Negative: New neurologic deficit that is present now: * Weakness of the face, arm, or leg on one side of the body * Numbness of the face, arm, or leg on one side of the body * Loss of speech or garbled speech    Negative: Heart beating < 50 beats per minute OR > 140 beats per minute    Negative: Sounds like a life-threatening emergency to the triager    Negative: Chest pain    Negative: Rectal bleeding, bloody stool, or tarry-black stool    Negative: Vomiting is the main symptom    Negative: Diarrhea is the main symptom    Negative: Headache is the main symptom    Negative: Heat exhaustion suspected (i.e., dehydration from heat exposure)    Negative: Patient states that he/she is having an anxiety/panic attack    Negative: SEVERE dizziness (e.g., unable to stand, requires support to walk, feels like passing out now)    Protocols used: DIZZINESS-A-OH

## 2021-09-14 NOTE — ED PROVIDER NOTES
History   Chief Complaint:  Headache      HPI  Tresa Chin is a 41 year old female with history of prediabetes who presents with headache. Tresa has had a headache intermittently for a week, as well as some neck pain, dizziness, and lightheadedness, so she presents to the ED. She reports having a 15-minute nosebleed at one point in the last few days as well, and has been feeling very thirsty. She is urinating normally. She describes her headache as located on both sides of her forehead and across the forehead, to the top of the front of her head. Also notes that her headache has improved mildly with Tylenol, and does not necessarily associate her neck pain and her headache.  Her neck hurts on the right side and she notes she has been seeing a chiropractor for similar pain for some time.  She describes her dizziness as the room spinning, and has associated nausea. Also mentions that she forgot to take her blood pressure medications yesterday & today. Denies feeling like she is going to pass out.     Review of Systems   HENT: Positive for nosebleeds.    Gastrointestinal: Positive for nausea.   Musculoskeletal: Positive for neck pain.   Neurological: Positive for dizziness, light-headedness and headaches.   All other systems reviewed and are negative.    Allergies:  Latex    Medications:  Amlodipine  Omeprazole  Albuterol inhaler  Nabumetone  Atorvastatin  Vistaril  Glycolax    Past Medical History:  Depressive disorder  Hypertension  Sleep apnea  Prediabetes  Ureterolithiasis  GERD  Hyperlipidemia  Obesity  Fatty liver disease, nonalcoholic  TB lung, latent  Pes planus    Past Surgical History:    Cystoscopy, insert stent ureter(s), left x2  Cystoscopy  EGD   surgery  Ankle surgery    Family History:    Dementia  Diabetes x3  Hypertension x3  Hyperlipidemia x2  Lung cancer  Heart disease  Stroke  Vision loss  Mental illness    Social History:  Presents alone.  PCP (auto-populated): Melissa Franco  Social History      Tobacco Use     Smoking status: Never Smoker     Smokeless tobacco: Never Used   Substance Use Topics     Alcohol use: Yes     Comment: occ.     Drug use: No       Physical Exam     Patient Vitals for the past 24 hrs:   BP Temp Temp src Pulse Resp SpO2 Weight   09/14/21 1236 -- -- -- 81 -- 98 % --   09/14/21 1230 (!) 133/100 -- -- 76 -- 95 % --   09/14/21 1200 (!) 137/98 -- -- 85 16 95 % --   09/14/21 1145 (!) 133/105 -- -- 83 -- 95 % --   09/14/21 1130 (!) 130/99 -- -- 81 -- 97 % --   09/14/21 1115 (!) 145/102 -- -- 74 -- 98 % --   09/14/21 1100 100/86 -- -- 78 -- -- --   09/14/21 0900 (!) 156/105 -- -- 81 -- 99 % --   09/14/21 0823 (!) 140/108 (!) 96.7  F (35.9  C) Temporal 83 16 100 % 117.9 kg (260 lb)       Physical Exam  Nursing note and vitals reviewed.  HENT:   Mouth/Throat: Moist mucous membranes.   Eyes: nonicteric sclera  Cardiovascular: Normal rate, regular rhythm, no murmurs, rubs, or gallops  Pulmonary/Chest: Effort normal and breath sounds normal. No respiratory distress. No wheezes. No rales.   Abdominal: Soft. Nontender, nondistended, no guarding or rigidity.   Musculoskeletal: Normal range of motion.  Pain to palpation right trapezius and shoulder and neck.  Palpation reproduces chief complaint.   Neurological: Alert. Moves all extremities spontaneously.     CN's II-XII intact. 5/5 BUE and BLE strength. PERRL    EOMI without nystagmus - though exam made pt dizzy.      Sensation intact to light touch. Negative pronator drift.   Skin: Skin is warm and dry. No rash noted.   Psychiatric: Normal mood and affect.       Emergency Department Course     Imaging:  CTA Head Neck with Contrast  Unremarkable CT angiogram of the head and neck. No  significant stenosis or occlusion of the major intracranial arteries  or the cervical carotid or vertebral arteries. No findings for acute  dissection. No aneurysm identified.  Reading per radiology.    Head CT w/o contrast  No CT findings of acute intracranial  process.  Reading per radiology.    Laboratory:  CBC: WBC: 8.6, HGB: 14.9, PLT: 364  BMP: Glucose 121 (H), Creatinine: 0.50 (L), o/w WNL  Hemoglobin A1C: 6.2 (H)    Emergency Department Course:    Reviewed:  I reviewed nursing notes, vitals, past medical history and care everywhere    Assessments:  0858 I obtained history and examined the patient as noted above.  1221 I rechecked the patient and explained findings.    Interventions:  0936 LR 1000 ml IV  0936 Meclizine 25 mg PO  0936 Flexeril 10 mg PO  0936 Toradol 15 mg IV    Disposition:  The patient was discharged to home.       Impression & Plan     Medical Decision Making:  Patient presents with dizziness most consistent with peripheral vertigo, right-sided neck pain which she reports is chronic, as well as headache.  On exam, her neurologic exam is normal.  I can reproduce her neck pain with palpation.  With a directional nature of her vertigo, as well as the intermittent symptoms, central etiology is considered less likely.  However, as patient goes to the chiropractor, and has right-sided neck pain, I did believe CT/CTA of the head and neck was indicated.  Fortunately, these are negative for acute etiology.  Labs overall unremarkable.  She is symptomatically improved after Flexeril, Toradol, and meclizine.  Given symptomatic improvement and negative work-up, I believe patient is safe for discharge home with outpatient follow-up. She is in stable condition at the time of discharge, indications for return to the ED were discussed as well as follow up. All questions were answered and she is in agreement with the plan.        Diagnosis:    ICD-10-CM    1. Vertigo  R42    2. Trapezius strain, right, initial encounter  S46.811A    3. Acute non intractable tension-type headache  G44.209        Discharge Medications:  Discharge Medication List as of 9/14/2021 12:32 PM      START taking these medications    Details   cyclobenzaprine (FLEXERIL) 10 MG tablet Take 1  tablet (10 mg) by mouth 3 times daily as needed for muscle spasms, Disp-15 tablet, R-0, Local Print      meclizine (ANTIVERT) 25 MG tablet Take 1 tablet (25 mg) by mouth every 6 hours as needed for dizziness, Disp-15 tablet, R-1, Local Print             Scribe Disclosure:  I, Juan Manuel Fernandez, am serving as a scribe at 8:51 AM on 9/14/2021 to document services personally performed by Ryley Fonseca MD based on my observations and the provider's statements to me.      Ryley Fonseca MD  09/14/21 6209

## 2021-09-14 NOTE — ED NOTES
Patient reviewed discharge.  Discussed printed discharge information.  Follow-up appts reviewed.   What s/s warrant return to the ER.    Importance of social distancing, good hand hygiene, and wearing a mask when in public spaces.

## 2021-09-14 NOTE — DISCHARGE INSTRUCTIONS
Discharge Instructions  Vertigo  You have been diagnosed with vertigo.  This is a dizzy feeling often described as spinning or that the room is moving around you. You will often have nausea (sick to your stomach), vomiting (throwing up), and balance problems with it.  Vertigo is usually caused by a problem in the inner ear which helps control your balance.  Many things can cause vertigo, including calcium collections in the inner ear, a virus infection of the inner ear, concussion, migraine, and some medicines.  Luckily, these causes are not life threatening and will eventually go away.  However, sometimes there is a serious problem that does not show up right away.  Generally, every Emergency Department visit should have a follow-up clinic visit with either a primary or a specialty clinic/provider. Please follow-up as instructed by your emergency provider today.  Return to the Emergency Department if you have:  New or severe headache.  Double vision (seeing two of things).  Trouble speaking or hearing.  Weakness or trouble moving/using one side of your body.  Passing out.  Numbness or tingling.  Chest pain.  Vomiting that will not stop.    Treatment:  There are several commonly prescribed medications:  Antihistamines such as meclizine (Antivert ), dimenhydrinate (Dramamine ), or diphenhydramine (Benadryl ).  Prescription anti-nausea medicines, such as promethazine (Phenergan ), metoclopramide (Reglan ), or ondansetron (Zofran ).  Prescription sedative medicines, such as diazepam (Valium ), lorazepam (Ativan ), or clonazepam (Klonopin ).  Most of these medicines make you sleepy, and you should not take them before you work or drive. You should only take prescription medicines to treat severe vertigo symptoms, and you should stop the medicine when your symptoms improve.    Follow Up:  If you have vertigo longer than three days, it is important that you follow up either with your primary provider or an Ear, Nose, and  Throat (ENT) specialist.  You may need further testing to evaluate your vertigo and you may also need  vestibular  therapy which is a special form of physical therapy to make the vertigo go away.    If you were given a prescription for medicine here today, be sure to read all of the information (including the package insert) that comes with your prescription.  This will include important information about the medicine, its side effects, and any warnings that you need to know about.  The pharmacist who fills the prescription can provide more information and answer questions you may have about the medicine.  If you have questions or concerns that the pharmacist cannot address, please call or return to the Emergency Department.     Remember that you can always come back to the Emergency Department if you are not able to see your regular provider in the amount of time listed above, if you get any new symptoms, or if there is anything that worries you.    Discharge Instructions  Headache    You were seen today for a headache. Headaches may be caused by many different things such as muscle tension, sinus inflammation, anxiety and stress, having too little sleep, too much alcohol, some medical conditions or injury. You may have a migraine, which is caused by changes in the blood vessels in your head.  At this time your provider does not find that your headache is a sign of anything dangerous or life-threatening.  However, sometimes the signs of serious illness do not show up right away.      Generally, every Emergency Department visit should have a follow-up clinic visit with either a primary or a specialty clinic/provider. Please follow-up as instructed by your emergency provider today.    Return to the Emergency Department if:  You get a new fever of 100.4 F or higher.  Your headache gets much worse.  You get a stiff neck with your headache.  You get a new headache that is significantly different or worse than headaches  you have had before.  You are vomiting (throwing up) and cannot keep food or water down.  You have blurry or double vision or other problems with your eyes.  You have a new weakness on one side of your body.  You have difficulty with balance which is new.  You or your family thinks you are confused.  You have a seizure.    What can I do to help myself?  Pain medications - You may take a pain medication such as Tylenol  (acetaminophen), Advil , Motrin  (ibuprofen) or Aleve  (naproxen).  Take a pain reliever as soon as you notice symptoms.  Starting medications as soon as you start to have symptoms may lessen the amount of pain you have.  Relaxing in a quiet, dark room may help.  Get enough sleep and eat meals regularly.  You may need to watch for certain foods or other things which may trigger your headaches.  Keeping a journal of your headaches and possible triggers may help you and your primary provider to identify things which you should avoid which may be causing your headaches.  If you were given a prescription for medicine here today, be sure to read all of the information (including the package insert) that comes with your prescription.  This will include important information about the medicine, its side effects, and any warnings that you need to know about.  The pharmacist who fills the prescription can provide more information and answer questions you may have about the medicine.  If you have questions or concerns that the pharmacist cannot address, please call or return to the Emergency Department.   Remember that you can always come back to the Emergency Department if you are not able to see your regular provider in the amount of time listed above, if you get any new symptoms, or if there is anything that worries you.

## 2021-09-15 ENCOUNTER — PATIENT OUTREACH (OUTPATIENT)
Dept: FAMILY MEDICINE | Facility: CLINIC | Age: 41
End: 2021-09-15

## 2021-09-15 NOTE — TELEPHONE ENCOUNTER
Called and left message for pt to call back and schedule follow up with provider.  Flory Sharpe / EMT-B  [facility] / [place]

## 2021-09-18 ENCOUNTER — HEALTH MAINTENANCE LETTER (OUTPATIENT)
Age: 41
End: 2021-09-18

## 2021-09-22 ENCOUNTER — OFFICE VISIT (OUTPATIENT)
Dept: FAMILY MEDICINE | Facility: CLINIC | Age: 41
End: 2021-09-22
Payer: COMMERCIAL

## 2021-09-22 VITALS
HEART RATE: 100 BPM | OXYGEN SATURATION: 99 % | TEMPERATURE: 98.5 F | DIASTOLIC BLOOD PRESSURE: 80 MMHG | BODY MASS INDEX: 49.92 KG/M2 | RESPIRATION RATE: 16 BRPM | SYSTOLIC BLOOD PRESSURE: 116 MMHG | WEIGHT: 264.2 LBS

## 2021-09-22 DIAGNOSIS — G44.209 TENSION HEADACHE: ICD-10-CM

## 2021-09-22 DIAGNOSIS — R42 VERTIGO: Primary | ICD-10-CM

## 2021-09-22 DIAGNOSIS — M54.2 NECK PAIN: ICD-10-CM

## 2021-09-22 PROCEDURE — 99214 OFFICE O/P EST MOD 30 MIN: CPT | Performed by: PHYSICIAN ASSISTANT

## 2021-09-22 RX ORDER — METHOCARBAMOL 500 MG/1
500-1000 TABLET, FILM COATED ORAL 4 TIMES DAILY PRN
Qty: 30 TABLET | Refills: 1 | Status: SHIPPED | OUTPATIENT
Start: 2021-09-22 | End: 2021-10-20

## 2021-09-22 RX ORDER — CYCLOBENZAPRINE HCL 10 MG
10 TABLET ORAL 3 TIMES DAILY PRN
COMMUNITY
End: 2021-09-22

## 2021-09-22 ASSESSMENT — ANXIETY QUESTIONNAIRES
6. BECOMING EASILY ANNOYED OR IRRITABLE: SEVERAL DAYS
5. BEING SO RESTLESS THAT IT IS HARD TO SIT STILL: SEVERAL DAYS
7. FEELING AFRAID AS IF SOMETHING AWFUL MIGHT HAPPEN: NOT AT ALL
2. NOT BEING ABLE TO STOP OR CONTROL WORRYING: SEVERAL DAYS
GAD7 TOTAL SCORE: 6
3. WORRYING TOO MUCH ABOUT DIFFERENT THINGS: SEVERAL DAYS
1. FEELING NERVOUS, ANXIOUS, OR ON EDGE: SEVERAL DAYS
7. FEELING AFRAID AS IF SOMETHING AWFUL MIGHT HAPPEN: NOT AT ALL
8. IF YOU CHECKED OFF ANY PROBLEMS, HOW DIFFICULT HAVE THESE MADE IT FOR YOU TO DO YOUR WORK, TAKE CARE OF THINGS AT HOME, OR GET ALONG WITH OTHER PEOPLE?: SOMEWHAT DIFFICULT
4. TROUBLE RELAXING: SEVERAL DAYS

## 2021-09-22 ASSESSMENT — PATIENT HEALTH QUESTIONNAIRE - PHQ9
10. IF YOU CHECKED OFF ANY PROBLEMS, HOW DIFFICULT HAVE THESE PROBLEMS MADE IT FOR YOU TO DO YOUR WORK, TAKE CARE OF THINGS AT HOME, OR GET ALONG WITH OTHER PEOPLE: SOMEWHAT DIFFICULT
SUM OF ALL RESPONSES TO PHQ QUESTIONS 1-9: 5
SUM OF ALL RESPONSES TO PHQ QUESTIONS 1-9: 5

## 2021-09-22 NOTE — PATIENT INSTRUCTIONS
Robaxin 500-1000 mg up to 3 times daily as needed for spasm. Do not drive or drink alcohol with medication.    Heat 20 minutes at a time.    Follow up with physical therapy.

## 2021-09-22 NOTE — LETTER
September 22, 2021      Tresa Chin  [id number] [address]        To Whom It May Concern:    Tresa Chin  was seen on 9/22/21.  Please excuse her  until 9/27/21 due to illness.        Sincerely,        Liudmila Marvin PA-C

## 2021-09-22 NOTE — PROGRESS NOTES
Assessment & Plan     Vertigo  I recommended follow up with PT for vestibular therapy.  - Physical Therapy Referral; Future    Neck pain  Discussed options. I do think she would benefit from a muscle relaxer that is less sedating (hpoefully she can use more often). Heat topically.  Follow up with PT.  - [facility] Referral; Future  - methocarbamol (ROBAXIN) 500 MG tablet; Take 1-2 tablets (500-1,000 mg) by mouth 4 times daily as needed for muscle spasms    Tension headache  Headache seems to be precipitated by neck tension. As noted above.  Imaging in ER normal.  - methocarbamol (ROBAXIN) 500 MG tablet; Take 1-2 tablets (500-1,000 mg) by mouth 4 times daily as needed for muscle spasms    Review of external notes as documented elsewhere in note  Prescription drug management  30 minutes spent on the date of the encounter doing chart review, history and exam, documentation and further activities per the note       Patient Instructions   Robaxin 500-1000 mg up to 3 times daily as needed for spasm. Do not drive or drink alcohol with medication.    Heat 20 minutes at a time.    Follow up with physical therapy.          Return in about 1 month (around 10/22/2021) for Physical Exam, In Person, With PCP.    Liudmila Marvin PA-C  [facility]    Reny Gtz is a 41 year old who presents for the following health issues     History of Present Illness       She eats 0-1 servings of fruits and vegetables daily.She consumes 1 sweetened beverage(s) daily.She exercises with enough effort to increase her heart rate 9 or less minutes per day.  She exercises with enough effort to increase her heart rate 3 or less days per week. She is missing 2 dose(s) of medications per week.       ED/UC Followup:    Facility: [facility]   Date of visit: 09/14/2021  Reason for visit: Vertigo and head ache.   Current Status: Patient still has stiff neck and when gets dizziness she feels nausea   and head. Also feeling thirsty. Head ache      Patient is a 41-year-old female with history of prediabetes who presents today for follow-up from the ER.  The patient was seen in the ER on 9/14/2021 for evaluation of headache as well as neck pain, dizziness and lightheadedness.  Patient also notes she had a 15-minute nosebleed in the few days prior to presenting to the ER.  Headache is located on her bilateral forehead and across the top of her head.  She had mild improvement with Tylenol.  She describes her dizziness as room spinning with associated nausea.  Patient did have lab work including BMP, CBC, A1c as well as CT of the head and CTA of the head and neck.  She was given meclizine as well as Flexeril in the ER along with 15 mg of Toradol.  She had improvement of symptoms and work-up was negative.  Patient instructed to follow-up.    Patient notes the dizziness perhaps slightly improved (still worse with turning over in bed- noticing more on the left, bending over). She continues to have the headache 3/10 in severity currently, constant and stiffness in neck bilateral (which she thinks are related).  She has been trying massage and stretching. She is taking cyclobenzaprine at night as well as the meclizine at night due to sedation that they cause.    Review of Systems   GENERAL:  No fevers  MUSCULOSKELETAL: As noted in HPI  NEURO:  As noted in HPI          Objective    BP (!) 141/90 (BP Location: Right arm, Patient Position: Sitting, Cuff Size: Adult Large)   Pulse 100   Temp 98.5  F (36.9  C) (Oral)   Resp 16   Wt 119.8 kg (264 lb 3.2 oz)   SpO2 99%   Breastfeeding No   BMI 49.92 kg/m    Body mass index is 49.92 kg/m .  Physical Exam   GENERAL: No acute distress  HEENT: Normocephalic, PERRL, EOMI, Nystagmus noted on exam. Canals patent, bilateral TM's non-erythematous and non-bulging. Turbinates normal in appearance bilaterally. Posterior oropharynx non-erythematous and without exudate.  NECK: No  nuchal rigidity. Tenderness across the upper shoulders and back bilaterally.  NEURO: Alert and oriented x 3. Cranial nerves II-XII grossly intact. Rapid alternating movements appropriate. Gait appropriate. Positive Lupe Hallpike        Answers for HPI/ROS submitted by the patient on 9/22/2021  If you checked off any problems, how difficult have these problems made it for you to do your work, take care of things at home, or get along with other people?: Somewhat difficult  PHQ9 TOTAL SCORE: 5  NETO 7 TOTAL SCORE: 6

## 2021-09-23 ASSESSMENT — ANXIETY QUESTIONNAIRES: GAD7 TOTAL SCORE: 6

## 2021-09-23 ASSESSMENT — PATIENT HEALTH QUESTIONNAIRE - PHQ9: SUM OF ALL RESPONSES TO PHQ QUESTIONS 1-9: 5

## 2021-09-27 ENCOUNTER — HOSPITAL ENCOUNTER (OUTPATIENT)
Dept: PHYSICAL THERAPY | Facility: CLINIC | Age: 41
End: 2021-09-27
Payer: COMMERCIAL

## 2021-09-27 ENCOUNTER — TELEPHONE (OUTPATIENT)
Dept: FAMILY MEDICINE | Facility: CLINIC | Age: 41
End: 2021-09-27

## 2021-09-27 DIAGNOSIS — M62.81 GENERALIZED MUSCLE WEAKNESS: Primary | ICD-10-CM

## 2021-09-27 DIAGNOSIS — R42 VERTIGO: ICD-10-CM

## 2021-09-27 DIAGNOSIS — M54.2 NECK PAIN: ICD-10-CM

## 2021-09-27 DIAGNOSIS — R42 DIZZINESS: ICD-10-CM

## 2021-09-27 PROCEDURE — 97110 THERAPEUTIC EXERCISES: CPT | Mod: GP | Performed by: PHYSICAL THERAPIST

## 2021-09-27 PROCEDURE — 97162 PT EVAL MOD COMPLEX 30 MIN: CPT | Mod: GP | Performed by: PHYSICAL THERAPIST

## 2021-09-27 NOTE — TELEPHONE ENCOUNTER
Liudmila Marvin, ANN  P Cr Triage  Please call patient to see if she has been able to see PT for the dizziness and how is she feeling?   Where is she at with returning to work next week?

## 2021-09-29 NOTE — PROGRESS NOTES
"   09/27/21 0930   Quick Adds   Quick Adds Vestibular Eval   Type of Visit Initial OP PT Evaluation   General Information   Start of Care Date 09/27/21   Referring Physician Liudmila Marvin PA-C   Orders Evaluate and Treat as Indicated   Order Date 09/22/21   Medical Diagnosis vertigo   Onset of illness/injury or Date of Surgery 09/14/21  (presented to ED)   Surgical/Medical history reviewed Yes   Pertinent history of current problem (include personal factors and/or comorbidities that impact the POC) Patient is a 41 y.o. female presenting to physical therapy to eval and treat vertigo.  Per chart review and patient report, patient presented to [facility] ED on 9/14/2021 with complaints of intermittent headache, neck pain, dizziness, and lightheadedness and reported a 15 minute nosebleed a few day prior to presenting to the ED; per ED note she had forgotten to take her BP medications for two days.  PMH includes depression, HTN, sleep apnea, prediabetes, obesity and TB lung - latent.  Patient had been seeing a chiropractor to address neck pain; last seen in March or April.  Patient reports that things are improving and that she looked up exercises on YouTube - stretching neck.  Patient reporting that turning to the left, looking up and bending can cause the symptoms.  Describes the dizziness as \"feeling off, lightheaded.\"  Initially had room spinning dizziness, however, no longer.  Patient also experiences nausea when dizzy.  Patient reporting that once she sits down and relax the dizziness will go away.  Patient reporting constant headache that will only decrease with use of medication.  Patient reporting changes to hearing bilateral for the past year; has not had anyone assess her hearing.  Patient reporting sensitivity to noises.  Patient reports eyes will get blurry when dizzy.  Denies recent illnesses; reports allergies with weather changes.  Patient reports history of headaches (frontal and goes towards the back " "of her head).  Has not taken Meclizine in the past two days.   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 No   Have you fallen and had an injury in the past year? No   Is patient a fall risk? Yes   Fall screen comments Elevated risk for falls when symptomatic   Abuse Screen (yes response referral indicated)   Feels Unsafe at Home or Work/School no   Feels Threatened by Someone no   Does Anyone Try to Keep You From Having Contact with Others or Doing Things Outside Your Home? no   Physical Signs of Abuse Present no   Functional Scales   Functional Scales and Outcomes DHI: 62/100   Pain   Pain comments Neck pain - located under occipital area, describes as sharp and stabbing and will go away temporally after stretching; 5-6/10 on average, 3/10 at best.  Headache pain - starts with forehead and wraps around to the back of her head, describes as \"pressure\",  Tylenol helps, 3-4/10 on average, 0/10 at best, daily headaches   Vitals Signs   Vital Signs Comments error reading when trying to use manual blood pressure cuff   Cognitive Status Examination   Orientation orientation to person, place and time   Level of Consciousness alert   Follows Commands and Answers Questions 100% of the time   Personal Safety and Judgment intact   Memory intact   Integumentary   Integumentary No deficits were identified   Posture   Posture Forward head position;Protracted shoulders   Range of Motion (ROM)   ROM Comment WFL bilateral upper and lower extremities   Strength   Strength Comments WFL bilateral " "upper and lower extremities   Bed Mobility   Bed Mobility Comments Modified independence   Transfer Skills   Transfer Comments Modified independence   Gait   Gait Comments Independent on level surfaces with no significant instability observed.   Balance   Balance Comments No significant instability observed with functional mobility tasks.   Cervicogenic Screen   Neck ROM WFL bilateral and symmetrical, patient reporting increased neck pain and dizziness   Vertebral Artery Test Normal   Oculomotor Exam   Smooth Pursuit Normal   Smooth Pursuit Comment reports \"fatigue\" with task   Saccades Normal   Saccades Comments reports \"fatigue\" with task   VOR Abnormal   VOR Comments difficulty keeping eyes on target, increased dizziness   Rapid Head Thrust Other   Rapid Head Thrust Comments unable to assess due to symptomatic with VOR x 1, difficulty maintaining eyes on target with head turns   Infrared Goggle Exam or Frenzel Lense Exam   Vestibular Suppressant in Last 24 Hours? No   Exam completed with Room Light  (attempted goggles, limited tolerance)   Spontaneous Nystagmus Negative  (goggles donned)   Gaze Evoked Nystagmus Negative  (goggles donned)   Positional Testing comments attempted with goggles, however, pt with limited tolerance   [place]-Hallpike (right) Other   [place]-Hallpike (right) comments pt reporting spinning symptoms which lasted < 10 seconds, unable to observe for nystagmus as patient had difficulty keeping eyes opened   Lu[place]-Hallpike (Left) Negative   Planned Therapy Interventions   Planned Therapy Interventions neuromuscular re-education;ROM;strengthening;stretching;manual therapy;joint mobilization;other (see comments)  (vestibular rehab)   Clinical Impression   Criteria for Skilled Therapeutic Interventions Met yes, treatment indicated   PT Diagnosis Dizziness, Neck pain   Influenced by the following impairments oculomotor screen and positional testing, deficits with cervical ROM, headaches and neck pain, poor " posturing, myofascial tightness cervical and scapular musculature   Functional limitations due to impairments Decreased tolerance and ease of ADLs, IADLs, functional mobility, recreational activities and job duties; Elevated risk for falls   Clinical Presentation Evolving/Changing   Clinical Presentation Rationale Based on current presentation, PMH, and social support.   Clinical Decision Making (Complexity) Moderate complexity   Therapy Frequency 1 time/week   Predicted Duration of Therapy Intervention (days/wks) 60 days   Risk & Benefits of therapy have been explained Yes   Patient, Family & other staff in agreement with plan of care Yes   Clinical Impression Comments Patient is a 41 y.o. female presenting with the above impairments contributing to limitations with daily activities and mobility.  Findings consistent with peripheral vestibular etiology (possible BPPV and hypofunction).  Limited examination today with goggles due to patient's tolerance with goggles.  Patient will benefit from skilled physical therapy to address the above impairments in order to return to prior level of function.   Education Assessment   Preferred Learning Style Listening;Demonstration;Pictures/video   Barriers to Learning No barriers   GOALS   PT Eval Goals 1;2;3;4   Goal 1   Goal Identifier DVA   Goal Description The patient will score within 2 lines of SVA with performance of DVA to demonstrate that her vestibular system is functioning optimally and is able to support gaze stability within a functional range.   Target Date 11/26/21   Goal 2   Goal Identifier DHI   Goal Description The patient will demonstrate an 18 point improvement (44 or less) in DHI scoring to demonstrate a statistically significant improvement in dizziness symptom severity in order to increase tolerance for functional mobility tasks.   Target Date 11/26/21   Goal 3   Goal Identifier Neck Pain/Headaches   Goal Description Patient will report a 25% reduction in  the severity and frequency of neck pain as well as headache pain in order to increase tolerance and ease for daily activities.   Target Date 11/23/21   Goal 4   Goal Identifier Positionals   Goal Description Patient will be able to complete bilateral coronado pike and roll tests without observable nystagmus or complaints of dizziness to demonstrate resolution of BPPV.   Target Date 11/23/21   Total Evaluation Time   PT Santosh, Moderate Complexity Minutes (23358) 38

## 2021-10-01 ENCOUNTER — TELEPHONE (OUTPATIENT)
Dept: SLEEP MEDICINE | Facility: CLINIC | Age: 41
End: 2021-10-01

## 2021-10-01 DIAGNOSIS — G47.33 OSA (OBSTRUCTIVE SLEEP APNEA): Primary | ICD-10-CM

## 2021-10-01 NOTE — TELEPHONE ENCOUNTER
Reason for call:  Other   Patient called regarding (reason for call): call back  Additional comments: Patient is calling because her prescription is expiring next week and she needs supplies, patient is wondering if it can be updated before her Oct 29th appointment.     Phone number to reach patient:  Home number on file [phone] (home)    Best Time:  Any time    Can we leave a detailed message on this number?  YES    Travel screening: Not Applicable

## 2021-10-05 ENCOUNTER — HOSPITAL ENCOUNTER (OUTPATIENT)
Dept: PHYSICAL THERAPY | Facility: CLINIC | Age: 41
End: 2021-10-05
Payer: COMMERCIAL

## 2021-10-05 DIAGNOSIS — M62.81 GENERALIZED MUSCLE WEAKNESS: ICD-10-CM

## 2021-10-05 DIAGNOSIS — R42 DIZZINESS: Primary | ICD-10-CM

## 2021-10-05 DIAGNOSIS — R42 VERTIGO: ICD-10-CM

## 2021-10-05 DIAGNOSIS — M54.2 NECK PAIN: ICD-10-CM

## 2021-10-05 PROCEDURE — 97110 THERAPEUTIC EXERCISES: CPT | Mod: GP | Performed by: PHYSICAL THERAPIST

## 2021-10-05 PROCEDURE — 97112 NEUROMUSCULAR REEDUCATION: CPT | Mod: GP | Performed by: PHYSICAL THERAPIST

## 2021-10-15 ENCOUNTER — TELEPHONE (OUTPATIENT)
Dept: UROLOGY | Facility: CLINIC | Age: 41
End: 2021-10-15

## 2021-10-15 NOTE — TELEPHONE ENCOUNTER
----- Message from aMrtha Castellon LPN sent at 10/15/2021  9:33 AM CDT -----  She isn't scheduled for her CT scan for her Appt with AN on Wednesday.  Martha Castellon LPN

## 2021-10-18 ENCOUNTER — HOSPITAL ENCOUNTER (OUTPATIENT)
Dept: CT IMAGING | Facility: CLINIC | Age: 41
Discharge: HOME | End: 2021-10-18
Attending: STUDENT IN AN ORGANIZED HEALTH CARE EDUCATION/TRAINING PROGRAM | Admitting: STUDENT IN AN ORGANIZED HEALTH CARE EDUCATION/TRAINING PROGRAM
Payer: COMMERCIAL

## 2021-10-18 DIAGNOSIS — N20.1 LEFT URETERAL STONE: ICD-10-CM

## 2021-10-18 PROCEDURE — 74176 CT ABD & PELVIS W/O CONTRAST: CPT

## 2021-10-20 ENCOUNTER — OFFICE VISIT (OUTPATIENT)
Dept: UROLOGY | Facility: CLINIC | Age: 41
End: 2021-10-20
Payer: COMMERCIAL

## 2021-10-20 VITALS
HEIGHT: 61 IN | BODY MASS INDEX: 49.84 KG/M2 | WEIGHT: 264 LBS | DIASTOLIC BLOOD PRESSURE: 72 MMHG | SYSTOLIC BLOOD PRESSURE: 112 MMHG

## 2021-10-20 DIAGNOSIS — N20.0 KIDNEY STONE: Primary | ICD-10-CM

## 2021-10-20 DIAGNOSIS — R82.993 HYPERURICOSURIA: ICD-10-CM

## 2021-10-20 DIAGNOSIS — R82.994 HYPERCALCIURIA: ICD-10-CM

## 2021-10-20 PROCEDURE — 99213 OFFICE O/P EST LOW 20 MIN: CPT | Performed by: STUDENT IN AN ORGANIZED HEALTH CARE EDUCATION/TRAINING PROGRAM

## 2021-10-20 ASSESSMENT — PAIN SCALES - GENERAL: PAINLEVEL: NO PAIN (0)

## 2021-10-20 ASSESSMENT — MIFFLIN-ST. JEOR: SCORE: 1809.88

## 2021-10-20 NOTE — PROGRESS NOTES
"CHIEF COMPLAINT   Tresa Chin who is a 41 year old female returns today for follow-up of nephrolithiasis.      HPI   Tresa Chin is a 41-year-old female with history of morbid obesity, hypertension, hyperlipidemia, nephrolithiasis in the past who was found to have an obstructing 6 mm left ureteral stone now s/p ureteroscopy 7/22/2020, with caox and caphos stones.    Has not had any further stone episodes. She occasional gets side pains when not drinking enough water    Is trying to be consistent with fluid intake, is drinking fluid on a timer. Is currently on nutrisystem. Is trying to eat less meat. She is considering bariatric surgery in her home country of [place] but is worried about effects on kidney stones    PHYSICAL EXAM  Patient is a 41 year old  female   Vitals: Blood pressure 112/72, height 1.549 m (5' 1\"), weight 119.7 kg (264 lb), not currently breastfeeding.  Body mass index is 49.88 kg/m .  General Appearance Adult:   Alert, no acute distress, oriented  HENT: throat/mouth:normal, good dentition  Lungs: no respiratory distress, or pursed lip breathing  Heart: No obvious jugular venous distension present  Abdomen: soft, nontender, no organomegaly or masses  Musculoskeltal: extremities normal, no peripheral edema  Skin: no suspicious lesions or rashes  Neuro: Alert, oriented, speech and mentation normal  Psych: affect and mood normal  Gait: Normal    All pertinent imaging reviewed:    CT abd/pelvis 10/18/2021  IMPRESSION:   1.  No hydronephrosis or urolithiasis identified. Previously noted  left ureter stone is no longer seen.  2.  Fatty infiltration of the liver.      I reviewed images personally. No nephrolithiasis seen.    ASSESSMENT and PLAN  41-year-old female with history of morbid obesity, hypertension, hyperlipidemia, nephrolithiasis in the past who was found to have an obstructing 6 mm left ureteral stone now s/p ureteroscopy 7/22/2020, with caox and caphos stones.    No recurrent stones " on imaging today. Her flank pain seems more musculoskeletal in nature.    She has concerns regarding bariatric surgery and whether this will cause damage to her kidneys or cause further stone formation. I discussed that certainly there is a risk of increased stones especially with Elke en Y gastric bypass but that this increased risk of stones should not stop her from her goal of losing weight, given the myriad health benefits due to weight loss    Referral to nephrology for medical management of stones, h/o hypercalciuria and hyperuricosuria, for further discussion of medical stone prevention especially in setting of possible bariatric surgery;.    Return 1 year with KUB prior or earlier if having symptoms    Anurag Gomez MD   [facility] Urology  [facility] Phone: [phone]    22 minutes spent on this encounter

## 2021-10-20 NOTE — NURSING NOTE
Chief Complaint   Patient presents with     Kidney Stone Related     Here for yearly follow up with ct scan,has menses today no urine done   Martha Castellon LPN

## 2021-10-20 NOTE — PATIENT INSTRUCTIONS
Referral to nephrology for medical management of stones, h/o hypercalciuria and hyperuricosuria  Return 1 year with KUB prior or earlier if having symptoms    Patient Education     Preventing Kidney Stones  If you ve had a kidney stone, you may worry that you ll have another. Removing or passing your stone doesn t prevent future stones. But with your healthcare provider s help, you can reduce your risk of forming new stones. Follow up with your healthcare provider to help find new stones. Depending on your medical condition, you may need follow-up every 3 months to a year for the rest of your life.     Drink lots of water  Staying well-hydrated is the best way to reduce your risk of future stones. Drink 8 12-ounce glasses of water daily. Have 2 with each meal and 2 between meals. Keep track of your intake. Try keeping a pitcher of water nearby during the day and at night.   Take medicines if needed  Medicines, including vitamins and minerals, may be prescribed for certain types of stones. You may want to write your doses and medicine times on a calendar. Some medicines decrease stone-forming chemicals in your blood. Others help prevent those chemicals from crystallizing in urine. Still others help keep a normal acid balance in your urine.   Follow your prescribed diet  Your healthcare provider will tell you which foods contain the chemicals you should avoid. Your healthcare provider may also suggest talking to a dietitian. He or she can help you plan meals you ll enjoy. These meals won t put you at risk for future stones. Bring your spouse, partner, or close friend with you when you meet with the dietitian so you can have support for your necessary diet changes.   You may be told to limit certain foods, depending on which type of stones you ve had. You should limit the amount of salt in your food to about 2 grams a day. This will help prevent most types of kidney stones. Make sure you get an adequate amount of  calcium in your diet.   For calcium oxalate stones: Limit animal protein, such as meat, eggs, and fish. Limit grapefruit juice and alcohol. Limit high-oxalate foods (such as cola, tea, chocolate, spinach, rhubarb, wheat bran, and peanuts).   For uric acid stones: Limit high-purine foods, such as mushrooms, peas, beans, anchovies, meat, poultry, shellfish, and organ meats. These foods increase uric acid production.   For cystine stones: Limit high-methionine foods (fish is the most common, but eggs and meats, also). These foods increase production of cystine.   [employer] last reviewed this educational content on 4/1/2020 2000-2021 [employer]. All rights reserved. This information is not intended as a substitute for professional medical care. Always follow your healthcare professional's instructions.

## 2021-10-20 NOTE — PROGRESS NOTES
Pre-Visit Planning   Next 5 appointments (look out 90 days)    Oct 21, 2021  7:15 AM  (Arrive by 6:50 AM)  Adult Preventative Visit with Melissa Franco MD  [facility] ([facility] ) [address] [address] [address] [phone]        Appointment Notes for this encounter:   fasting physical    Questionnaires Reviewed/Assigned  Additional questionnaires assigned: .    Patient preferred phone number: [phone]    Unable to reach. Left voicemail. Reminded of visit tomorrow. Advised patient to call clinic back at [phone] if she needed to cancel or reschedule visit.

## 2021-10-20 NOTE — LETTER
"10/20/2021       RE: Tresa Chin  [address]  [place] [address]     Dear Colleague,    Thank you for referring your patient, Tresa Chin, to the [facility] [place] [facility]. Please see a copy of my visit note below.    CHIEF COMPLAINT   Tresa Chin who is a 41 year old female returns today for follow-up of nephrolithiasis.      HPI   Tresa Chin is a 41-year-old female with history of morbid obesity, hypertension, hyperlipidemia, nephrolithiasis in the past who was found to have an obstructing 6 mm left ureteral stone now s/p ureteroscopy 7/22/2020, with caox and caphos stones.    Has not had any further stone episodes. She occasional gets side pains when not drinking enough water    Is trying to be consistent with fluid intake, is drinking fluid on a timer. Is currently on nutrisystem. Is trying to eat less meat. She is considering bariatric surgery in her home country of [place] but is worried about effects on kidney stones    PHYSICAL EXAM  Patient is a 41 year old  female   Vitals: Blood pressure 112/72, height 1.549 m (5' 1\"), weight 119.7 kg (264 lb), not currently breastfeeding.  Body mass index is 49.88 kg/m .  General Appearance Adult:   Alert, no acute distress, oriented  HENT: throat/mouth:normal, good dentition  Lungs: no respiratory distress, or pursed lip breathing  Heart: No obvious jugular venous distension present  Abdomen: soft, nontender, no organomegaly or masses  Musculoskeltal: extremities normal, no peripheral edema  Skin: no suspicious lesions or rashes  Neuro: Alert, oriented, speech and mentation normal  Psych: affect and mood normal  Gait: Normal    All pertinent imaging reviewed:    CT abd/pelvis 10/18/2021  IMPRESSION:   1.  No hydronephrosis or urolithiasis identified. Previously noted  left ureter stone is no longer seen.  2.  Fatty infiltration of the liver.      I " reviewed images personally. No nephrolithiasis seen.    ASSESSMENT and PLAN  41-year-old female with history of morbid obesity, hypertension, hyperlipidemia, nephrolithiasis in the past who was found to have an obstructing 6 mm left ureteral stone now s/p ureteroscopy 7/22/2020, with caox and caphos stones.    No recurrent stones on imaging today. Her flank pain seems more musculoskeletal in nature.    She has concerns regarding bariatric surgery and whether this will cause damage to her kidneys or cause further stone formation. I discussed that certainly there is a risk of increased stones especially with Elke en Y gastric bypass but that this increased risk of stones should not stop her from her goal of losing weight, given the myriad health benefits due to weight loss    Referral to nephrology for medical management of stones, h/o hypercalciuria and hyperuricosuria, for further discussion of medical stone prevention especially in setting of possible bariatric surgery;.    Return 1 year with KUB prior or earlier if having symptoms    Anurag Gomez MD   [facility]rology  [facility] Phone: [phone]    22 minutes spent on this encounter

## 2021-10-21 ENCOUNTER — OFFICE VISIT (OUTPATIENT)
Dept: FAMILY MEDICINE | Facility: CLINIC | Age: 41
End: 2021-10-21
Payer: COMMERCIAL

## 2021-10-21 ENCOUNTER — DOCUMENTATION ONLY (OUTPATIENT)
Dept: ONCOLOGY | Facility: CLINIC | Age: 41
End: 2021-10-21

## 2021-10-21 ENCOUNTER — TELEPHONE (OUTPATIENT)
Dept: FAMILY MEDICINE | Facility: CLINIC | Age: 41
End: 2021-10-21

## 2021-10-21 VITALS
HEIGHT: 60 IN | TEMPERATURE: 98.5 F | DIASTOLIC BLOOD PRESSURE: 70 MMHG | OXYGEN SATURATION: 97 % | RESPIRATION RATE: 12 BRPM | WEIGHT: 258 LBS | SYSTOLIC BLOOD PRESSURE: 90 MMHG | HEART RATE: 85 BPM | BODY MASS INDEX: 50.65 KG/M2

## 2021-10-21 DIAGNOSIS — I10 ESSENTIAL HYPERTENSION: ICD-10-CM

## 2021-10-21 DIAGNOSIS — E66.01 MORBID OBESITY (H): ICD-10-CM

## 2021-10-21 DIAGNOSIS — Z00.00 ROUTINE GENERAL MEDICAL EXAMINATION AT A HEALTH CARE FACILITY: Primary | ICD-10-CM

## 2021-10-21 DIAGNOSIS — H90.6 MIXED CONDUCTIVE AND SENSORINEURAL HEARING LOSS OF BOTH EARS: ICD-10-CM

## 2021-10-21 DIAGNOSIS — F32.1 CURRENT MODERATE EPISODE OF MAJOR DEPRESSIVE DISORDER WITHOUT PRIOR EPISODE (H): ICD-10-CM

## 2021-10-21 DIAGNOSIS — Z80.3 FAMILY HISTORY OF MALIGNANT NEOPLASM OF BREAST: ICD-10-CM

## 2021-10-21 PROBLEM — F32.9 MAJOR DEPRESSION, SINGLE EPISODE: Status: ACTIVE | Noted: 2021-10-21

## 2021-10-21 LAB — HBA1C MFR BLD: 6.2 % (ref 0–5.6)

## 2021-10-21 PROCEDURE — 36415 COLL VENOUS BLD VENIPUNCTURE: CPT | Performed by: FAMILY MEDICINE

## 2021-10-21 PROCEDURE — 80061 LIPID PANEL: CPT | Performed by: FAMILY MEDICINE

## 2021-10-21 PROCEDURE — 80048 BASIC METABOLIC PNL TOTAL CA: CPT | Performed by: FAMILY MEDICINE

## 2021-10-21 PROCEDURE — 99395 PREV VISIT EST AGE 18-39: CPT | Performed by: FAMILY MEDICINE

## 2021-10-21 PROCEDURE — 83036 HEMOGLOBIN GLYCOSYLATED A1C: CPT | Performed by: FAMILY MEDICINE

## 2021-10-21 PROCEDURE — 99214 OFFICE O/P EST MOD 30 MIN: CPT | Mod: 25 | Performed by: FAMILY MEDICINE

## 2021-10-21 RX ORDER — AMLODIPINE BESYLATE 5 MG/1
5 TABLET ORAL DAILY
Qty: 90 TABLET | Refills: 3 | Status: SHIPPED | OUTPATIENT
Start: 2021-10-21 | End: 2022-10-13

## 2021-10-21 ASSESSMENT — ENCOUNTER SYMPTOMS
WEAKNESS: 1
SHORTNESS OF BREATH: 0
COUGH: 0
SORE THROAT: 0
HEMATOCHEZIA: 0
DYSURIA: 0
BREAST MASS: 0
EYE PAIN: 0
HEARTBURN: 0
FEVER: 0
PARESTHESIAS: 0
NERVOUS/ANXIOUS: 1
MYALGIAS: 1
ARTHRALGIAS: 1
DIZZINESS: 0
PALPITATIONS: 0
FREQUENCY: 0
HEMATURIA: 0
HEADACHES: 1
DIARRHEA: 0
CONSTIPATION: 0
JOINT SWELLING: 0
ABDOMINAL PAIN: 0
NAUSEA: 0
CHILLS: 0

## 2021-10-21 ASSESSMENT — PATIENT HEALTH QUESTIONNAIRE - PHQ9
10. IF YOU CHECKED OFF ANY PROBLEMS, HOW DIFFICULT HAVE THESE PROBLEMS MADE IT FOR YOU TO DO YOUR WORK, TAKE CARE OF THINGS AT HOME, OR GET ALONG WITH OTHER PEOPLE: VERY DIFFICULT
SUM OF ALL RESPONSES TO PHQ QUESTIONS 1-9: 15
SUM OF ALL RESPONSES TO PHQ QUESTIONS 1-9: 15

## 2021-10-21 ASSESSMENT — ANXIETY QUESTIONNAIRES
1. FEELING NERVOUS, ANXIOUS, OR ON EDGE: NEARLY EVERY DAY
5. BEING SO RESTLESS THAT IT IS HARD TO SIT STILL: SEVERAL DAYS
8. IF YOU CHECKED OFF ANY PROBLEMS, HOW DIFFICULT HAVE THESE MADE IT FOR YOU TO DO YOUR WORK, TAKE CARE OF THINGS AT HOME, OR GET ALONG WITH OTHER PEOPLE?: VERY DIFFICULT
6. BECOMING EASILY ANNOYED OR IRRITABLE: SEVERAL DAYS
2. NOT BEING ABLE TO STOP OR CONTROL WORRYING: NEARLY EVERY DAY
GAD7 TOTAL SCORE: 12
GAD7 TOTAL SCORE: 12
4. TROUBLE RELAXING: NOT AT ALL
7. FEELING AFRAID AS IF SOMETHING AWFUL MIGHT HAPPEN: SEVERAL DAYS
7. FEELING AFRAID AS IF SOMETHING AWFUL MIGHT HAPPEN: SEVERAL DAYS
GAD7 TOTAL SCORE: 12
3. WORRYING TOO MUCH ABOUT DIFFERENT THINGS: NEARLY EVERY DAY

## 2021-10-21 ASSESSMENT — MIFFLIN-ST. JEOR: SCORE: 1766.78

## 2021-10-21 NOTE — PROGRESS NOTES
HEARING FREQUENCY    Right Ear:      1000 Hz RESPONSE- on Level: 40 db (Conditioning sound)   1000 Hz: RESPONSE- on Level:   20 db    2000 Hz: RESPONSE- on Level:   20 db    4000 Hz: RESPONSE- on Level:   20 db     Left Ear:      4000 Hz: RESPONSE- on Level:   20 db    2000 Hz: RESPONSE- on Level:   20 db    1000 Hz: RESPONSE- on Level:   20 db     500 Hz: RESPONSE- on Level: tone not heard    Right Ear:    500 Hz: RESPONSE- on Level: tone not heard    Hearing Acuity: Pass    Hearing Assessment: normal

## 2021-10-21 NOTE — TELEPHONE ENCOUNTER
Pt is planning to get her surgery for weight loss done in [place] due to insurance not covering it.  Would the weight loss clinic be ok to see her before that? Pt has many questions about the surgery, preparation for surgery and plans after, can we call the clinic and check what do they recommend?  Melissa Franco MD  [facility]  [phone]

## 2021-10-21 NOTE — TELEPHONE ENCOUNTER
Dr. Franco- I do know every Weight Loss Clinic has different guidelines.  I know they do not see patient's who had surgery elsewhere.  Her questions should be addressed with provider doing surgery as provider in [place] will have different answers than another such as our Weight Loss Clinic.  If you still want me to call I will but I know they will say she needs to discuss with her clinic/surgeon.  Brittany Farrell RN

## 2021-10-21 NOTE — PROGRESS NOTES
SUBJECTIVE:   CC: Tresa Chin is an 41 year old woman who presents for preventive health visit.       Patient has been advised of split billing requirements and indicates understanding: Yes  Healthy Habits:     Getting at least 3 servings of Calcium per day:  Yes    Bi-annual eye exam:  Yes    Dental care twice a year:  Yes    Sleep apnea or symptoms of sleep apnea:  Sleep apnea    Diet:  Other    Frequency of exercise:  None    Taking medications regularly:  No    Barriers to taking medications:  Problems remembering to take them    Medication side effects:  None    PHQ-2 Total Score: 3    Additional concerns today:  Yes        Today's PHQ-2 Score:   PHQ-2 ( 1999 [employer]) 10/21/2021   Q1: Little interest or pleasure in doing things 0   Q2: Feeling down, depressed or hopeless 3   PHQ-2 Score 3   Q1: Little interest or pleasure in doing things Not at all   Q2: Feeling down, depressed or hopeless Nearly every day   PHQ-2 Score 3       Abuse: Current or Past (Physical, Sexual or Emotional) - No  Do you feel safe in your environment? Yes    Have you ever done Advance Care Planning? (For example, a Health Directive, POLST, or a discussion with a medical provider or your loved ones about your wishes): No, advance care planning information given to patient to review.  Patient plans to discuss their wishes with loved ones or provider.      Social History     Tobacco Use     Smoking status: Never Smoker     Smokeless tobacco: Never Used   Substance Use Topics     Alcohol use: Not Currently     Comment: occ.     If you drink alcohol do you typically have >3 drinks per day or >7 drinks per week? No    Alcohol Use 10/21/2021   Prescreen: >3 drinks/day or >7 drinks/week? No   Prescreen: >3 drinks/day or >7 drinks/week? -   No flowsheet data found.    Reviewed orders with patient.  Reviewed health maintenance and updated orders accordingly - Yes  Patient Active Problem List   Diagnosis     Morbid obesity (H)     Essential  hypertension     Hyperlipidemia LDL goal <160     Anxiety state     NAFLD (nonalcoholic fatty liver disease)     Chronic bilateral low back pain with left-sided sciatica     Panic attack     NSAID induced gastritis     Gastroesophageal reflux disease without esophagitis     Pain in joint, ankle and foot, left     Ureterolithiasis     Kidney stone     Left ureteral stone     Prediabetes     Major depression, single episode     Past Surgical History:   Procedure Laterality Date     COMBINED CYSTOSCOPY, INSERT STENT URETER(S) Left 7/22/2020    Procedure: Cystoscopy with left ureteral stent insertion;  Surgeon: Anurag Gomez MD;  Location: [facility] OR     COMBINED CYSTOSCOPY, RETROGRADES, URETEROSCOPY, LASER HOLMIUM LITHOTRIPSY URETER(S), INSERT STENT Left 8/4/2020    Procedure: Cystoscopy, removal of left ureteral stent, left ureteroscopy with holmium laser lithotripsy, stone basketing and placement of left ureteral stent;  Surgeon: Anurag Gomez MD;  Location:  OR     CYSTOSCOPY       ESOPHAGOSCOPY, GASTROSCOPY, DUODENOSCOPY (EGD), COMBINED N/A 5/31/2019    Procedure: ESOPHAGOGASTRODUODENOSCOPY (EGD)cold BX (fv);  Surgeon: Geremias Martinez MD;  Location:  GI     GENITOURINARY SURGERY         Social History     Tobacco Use     Smoking status: Never Smoker     Smokeless tobacco: Never Used   Substance Use Topics     Alcohol use: Yes     Comment: occ.     Family History   Problem Relation Age of Onset     Dementia Mother      Diabetes Mother      Hypertension Mother      Hyperlipidemia Mother      Lung Cancer Father      Hypertension Father      Hyperlipidemia Father      Other Cancer Father      Diabetes Sister      Hypertension Sister      Diabetes Brother      Depression No family hx of      Anxiety Disorder No family hx of      Obesity No family hx of          Current Outpatient Medications   Medication Sig Dispense Refill     amLODIPine (NORVASC) 5 MG tablet Take 1 tablet (5 mg) by mouth daily 90  tablet 3     omeprazole (PRILOSEC) 20 MG DR capsule Take 1 capsule by mouth once daily 90 capsule 3     Allergies   Allergen Reactions     Latex      PN: Converted from LW Latex Sensitivity Flag       Breast Cancer Screening:    FHS-7:   Breast CA Risk Assessment (FHS-7) 10/21/2021   Did any of your first-degree relatives have breast or ovarian cancer? No   Did any of your relatives have bilateral breast cancer? Yes   Did any man in your family have breast cancer? No   Did any woman in your family have breast and ovarian cancer? No   Did any woman in your family have breast cancer before age 50 y? No   Do you have 2 or more relatives with breast and/or ovarian cancer? Yes   Do you have 2 or more relatives with breast and/or bowel cancer? Yes     click delete button to remove this line now  Patient under 40 years of age: Routine Mammogram Screening not recommended.   Pertinent mammograms are reviewed under the imaging tab.    History of abnormal Pap smear: NO - age 30-65 PAP every 5 years with negative HPV co-testing recommended     Reviewed and updated as needed this visit by clinical staff                 Reviewed and updated as needed this visit by Provider                Past Medical History:   Diagnosis Date     Back pain      Depressive disorder      Hypertension      Sleep apnea       Past Surgical History:   Procedure Laterality Date     COMBINED CYSTOSCOPY, INSERT STENT URETER(S) Left 7/22/2020    Procedure: Cystoscopy with left ureteral stent insertion;  Surgeon: Anurag Gomez MD;  Location:  OR     COMBINED CYSTOSCOPY, RETROGRADES, URETEROSCOPY, LASER HOLMIUM LITHOTRIPSY URETER(S), INSERT STENT Left 8/4/2020    Procedure: Cystoscopy, removal of left ureteral stent, left ureteroscopy with holmium laser lithotripsy, stone basketing and placement of left ureteral stent;  Surgeon: Anurag Gomez MD;  Location:  OR     CYSTOSCOPY       ESOPHAGOSCOPY, GASTROSCOPY, DUODENOSCOPY (EGD), COMBINED N/A  5/31/2019    Procedure: ESOPHAGOGASTRODUODENOSCOPY (EGD)cold BX (fv);  Surgeon: Geremias Martinez MD;  Location:  GI     GENITOURINARY SURGERY         Review of Systems   Constitutional: Negative for chills and fever.   HENT: Positive for hearing loss. Negative for congestion, ear pain and sore throat.    Eyes: Negative for pain and visual disturbance.   Respiratory: Negative for cough and shortness of breath.    Cardiovascular: Negative for chest pain, palpitations and peripheral edema.   Gastrointestinal: Negative for abdominal pain, constipation, diarrhea, heartburn, hematochezia and nausea.   Breasts:  Positive for tenderness. Negative for breast mass and discharge.   Genitourinary: Positive for pelvic pain, vaginal bleeding and vaginal discharge. Negative for dysuria, frequency, genital sores, hematuria and urgency.   Musculoskeletal: Positive for arthralgias and myalgias. Negative for joint swelling.   Skin: Negative for rash.   Neurological: Positive for weakness and headaches. Negative for dizziness and paresthesias.   Psychiatric/Behavioral: Positive for mood changes. The patient is nervous/anxious.           OBJECTIVE:   There were no vitals taken for this visit.  Physical Exam  GENERAL: healthy, alert and no distress  EYES: Eyes grossly normal to inspection, PERRL and conjunctivae and sclerae normal  HENT: ear canals and TM's normal, nose and mouth without ulcers or lesions  NECK: no adenopathy, no asymmetry, masses, or scars and thyroid normal to palpation  RESP: lungs clear to auscultation - no rales, rhonchi or wheezes  CV: regular rate and rhythm, normal S1 S2, no S3 or S4, no murmur, click or rub, no peripheral edema and peripheral pulses strong  ABDOMEN: obese, non tender.  MS: no gross musculoskeletal defects noted, no edema  SKIN: no suspicious lesions or rashes  NEURO: Normal strength and tone, mentation intact and speech normal  PSYCH: mentation appears normal, affect  "normal/bright        ASSESSMENT/PLAN:   (Z00.00) Routine general medical examination at a health care facility  (primary encounter diagnosis)  Comment: congratulated on her weight loss.   Plan: Lipid panel reflex to direct LDL Fasting        Pt to schedule for pap smear test done.  Pt admits to multliple cousins with breast cancer from both sides of the family, usually in their 50's (4 cousins)  Will refer to genetic testing.    (I10) Essential hypertension  Comment: much improved after 11 pounds weight loss, cut down on Norvasc to 5 mg daily.  Plan: amLODIPine (NORVASC) 5 MG tablet, Basic         metabolic panel  (Ca, Cl, CO2, Creat, Gluc, K,         Na, BUN)            (F32.1) Current moderate episode of major depressive disorder without prior episode (H)  Comment: worsening, discussed options for treatment, pt prefer to stay off any medications for that.  Plan: will refer to [facility] for an appointment, given her worsening symptoms.     (H90.6) Mixed conductive and sensorineural hearing loss of both ears  Comment: normal hearing exam.  Plan: watchful monitoring, avoid cleaning ears with Qtips.    (E66.01) Morbid obesity (H)  Comment: pt is planning to get her weight loss surgery in [place], maybe in March of 2022, discussed options for surgery, and post surgical complications and need for skin reduction procedure after, and other complications.   Plan: will contact weight loss clinic and see their recommendations in this case.   [place].    Patient has been advised of split billing requirements and indicates understanding: Yes  COUNSELING:  Reviewed preventive health counseling, as reflected in patient instructions       Regular exercise       Healthy diet/nutrition    Estimated body mass index is 49.88 kg/m  as calculated from the following:    Height as of 10/20/21: 1.549 m (5' 1\").    Weight as of 10/20/21: 119.7 kg (264 lb).    Weight management " plan: Discussed healthy diet and exercise guidelines    She reports that she has never smoked. She has never used smokeless tobacco.      Counseling Resources:  ATP IV Guidelines  Pooled Cohorts Equation Calculator  Breast Cancer Risk Calculator  BRCA-Related Cancer Risk Assessment: FHS-7 Tool  FRAX Risk Assessment  ICSI Preventive Guidelines  Dietary Guidelines for Americans, 2010  USDA's MyPlate  ASA Prophylaxis  Lung CA Screening    Melissa Franco MD  [facility]  Answers for HPI/ROS submitted by the patient on 10/21/2021  If you checked off any problems, how difficult have these problems made it for you to do your work, take care of things at home, or get along with other people?: Very difficult  PHQ9 TOTAL SCORE: 15  NETO 7 TOTAL SCORE: 12

## 2021-10-22 LAB
ANION GAP SERPL CALCULATED.3IONS-SCNC: 9 MMOL/L (ref 3–14)
BUN SERPL-MCNC: 12 MG/DL (ref 7–30)
CALCIUM SERPL-MCNC: 8.5 MG/DL (ref 8.5–10.1)
CHLORIDE BLD-SCNC: 106 MMOL/L (ref 94–109)
CHOLEST SERPL-MCNC: 220 MG/DL
CO2 SERPL-SCNC: 23 MMOL/L (ref 20–32)
CREAT SERPL-MCNC: 0.54 MG/DL (ref 0.52–1.04)
FASTING STATUS PATIENT QL REPORTED: YES
GFR SERPL CREATININE-BSD FRML MDRD: >90 ML/MIN/1.73M2
GLUCOSE BLD-MCNC: 108 MG/DL (ref 70–99)
HDLC SERPL-MCNC: 59 MG/DL
LDLC SERPL CALC-MCNC: 144 MG/DL
NONHDLC SERPL-MCNC: 161 MG/DL
POTASSIUM BLD-SCNC: 4.1 MMOL/L (ref 3.4–5.3)
SODIUM SERPL-SCNC: 138 MMOL/L (ref 133–144)
TRIGL SERPL-MCNC: 83 MG/DL

## 2021-10-22 ASSESSMENT — PATIENT HEALTH QUESTIONNAIRE - PHQ9: SUM OF ALL RESPONSES TO PHQ QUESTIONS 1-9: 15

## 2021-10-22 ASSESSMENT — ANXIETY QUESTIONNAIRES: GAD7 TOTAL SCORE: 12

## 2021-10-22 NOTE — TELEPHONE ENCOUNTER
Sedrick Soto, I wonder if we can call the patient back and explain the risks of [place], meanwhile, maybe check and see if there is a glitch with the insurance and something she may have to do to get it covered?

## 2021-10-24 ASSESSMENT — SLEEP AND FATIGUE QUESTIONNAIRES
HOW LIKELY ARE YOU TO NOD OFF OR FALL ASLEEP WHILE SITTING INACTIVE IN A PUBLIC PLACE: SLIGHT CHANCE OF DOZING
HOW LIKELY ARE YOU TO NOD OFF OR FALL ASLEEP WHILE WATCHING TV: MODERATE CHANCE OF DOZING
HOW LIKELY ARE YOU TO NOD OFF OR FALL ASLEEP WHILE SITTING AND READING: MODERATE CHANCE OF DOZING
HOW LIKELY ARE YOU TO NOD OFF OR FALL ASLEEP WHILE SITTING AND TALKING TO SOMEONE: WOULD NEVER DOZE
HOW LIKELY ARE YOU TO NOD OFF OR FALL ASLEEP WHILE SITTING QUIETLY AFTER LUNCH WITHOUT ALCOHOL: SLIGHT CHANCE OF DOZING
HOW LIKELY ARE YOU TO NOD OFF OR FALL ASLEEP WHILE LYING DOWN TO REST IN THE AFTERNOON WHEN CIRCUMSTANCES PERMIT: SLIGHT CHANCE OF DOZING
HOW LIKELY ARE YOU TO NOD OFF OR FALL ASLEEP IN A CAR, WHILE STOPPED FOR A FEW MINUTES IN TRAFFIC: WOULD NEVER DOZE
HOW LIKELY ARE YOU TO NOD OFF OR FALL ASLEEP WHEN YOU ARE A PASSENGER IN A CAR FOR AN HOUR WITHOUT A BREAK: SLIGHT CHANCE OF DOZING

## 2021-10-25 ENCOUNTER — VIRTUAL VISIT (OUTPATIENT)
Dept: ONCOLOGY | Facility: CLINIC | Age: 41
End: 2021-10-25
Attending: FAMILY MEDICINE
Payer: COMMERCIAL

## 2021-10-25 DIAGNOSIS — Z80.1 FAMILY HISTORY OF LUNG CANCER: ICD-10-CM

## 2021-10-25 DIAGNOSIS — Z80.41 FAMILY HISTORY OF MALIGNANT NEOPLASM OF OVARY: ICD-10-CM

## 2021-10-25 DIAGNOSIS — Z80.3 FAMILY HISTORY OF MALIGNANT NEOPLASM OF BREAST: Primary | ICD-10-CM

## 2021-10-25 DIAGNOSIS — Z80.42 FAMILY HISTORY OF PROSTATE CANCER: ICD-10-CM

## 2021-10-25 PROCEDURE — 96040 HC GENETIC COUNSELING, EACH 30 MINUTES: CPT | Mod: GT,95 | Performed by: GENETIC COUNSELOR, MS

## 2021-10-25 NOTE — LETTER
Cancer Risk Management  Program Locations    [facility]  [facility]  [facility]  [facility]  [facility]  Mailing Address  Cancer Risk Management Program  [facility]  [address]  [facility] [address]  [place] [address]    New patient appointments  [phone]  October 25, 2021    Tresa Chin  [address]   [facility] [address]    Dear Tresa,    It was a pleasure speaking with you over video on October 25, 2021. Here is a copy of the progress note from our discussion. If you have any additional questions, please feel free to call.    Referring Provider: Melissa Franco MD    Presenting Information:   I met with Tresa Duranjareth today for her video genetic counseling visit through the Cancer Risk Management Program to discuss her family history of breast, prostate, and ovarian cancer. She is here today to review this history, cancer screening recommendations, and available genetic testing options.    Personal History:  Tresa is a 41 year old female. She does not have any personal history of cancer. In her hormonal-based medical history, she had her first menstrual period at age 13, does not have biological children, and is premenopausal. Tresa has her ovaries, fallopian tubes and uterus in place, and reports no ovarian cancer screening to date. She reports that she has no history of hormone replacement therapy.  She reports oral contraceptive use for approximately 3 years.       She does not have regular mammograms. However, Tresa had a mammogram and ultrasound in February 2018 due to bilateral breast pain which was normal. Annual mammograms starting at age 40 was recommended. Tresa has not had a colonoscopy. Tresa denies any history of dermatological exams. She does not regularly do any other cancer screening at this time. Tresa reported no history  of smoking and occasional alcohol use.        Family History: (Please see scanned pedigree for detailed family history information)    Maternal uncle was diagnosed with metastatic prostate cancer in his late 60's and passed away shortly after due to complications of the disease.     Maternal half aunt (Tresa's mother's maternal half sister) was diagnosed with ovarian cancer in her late 60's and passed away at age 69.     Another maternal half aunt's daughter was diagnosed with breast cancer in her late 40's. She is currently in her late 50's.     Tresa's father was diagnosed with lung cancer at age 40 and passed away at age 75.  It was reported that he was a smoker.     Paternal uncle was diagnosed with lung cancer. It was reported that he was a smoker.     Three paternal cousins diagnosed with breast cancer in their 50's. They are currently in their 60's.     Her maternal ethnicity is . Her paternal ethnicity is . There is no known Ashkenazi Denominational ancestry on either side of her family. There is no reported consanguinity.    Discussion:  Adelas family history of breast, prostate, and ovarian cancer is suggestive of a hereditary cancer syndrome.  We reviewed the features of sporadic, familial, and hereditary cancers. We discussed the natural history and genetics of several hereditary cancer syndromes, including Hereditary Breast and Ovarian Cancer Syndrome (HBOC).   The most common cause of hereditary breast cancer is HBOC, which is caused by mutations in the BRCA1 and BRCA2 genes. Individuals with HBOC syndrome are at increased risk for several different cancers, including breast, ovarian, male breast, prostate, melanoma, and pancreatic cancer. HBOC typically presents with multiple family members diagnosed with breast cancer before age 50 and/or ovarian cancer.   A detailed handout regarding information about HBOC and related hereditary cancer syndromes will be provided to Tresa  via Evi and can be found in the after visit summary. Topics included: inheritance pattern, cancer risks, cancer screening recommendations, and also risks, benefits and limitations of testing.  In looking at Tresa's personal and family history, it is possible that a cancer susceptibility gene is present due to a maternal uncle diagnosed with metastatic prostate cancer, maternal half aunt diagnosed with ovarian cancer, a maternal half cousin diagnosed with breast cancer in her late 40's, and three paternal cousins diagnosed with breast cancer in their 50's.  Tresa's affected second and third degree maternal relatives did not purse genetic testing before their passing. rTesa's mother meets current National Comprehensive Cancer Network (NCCN) criteria for high-penetrance ovarian and prostate cancer susceptibility genes. However, she has declined to purse genetic testing.  Since Tresa's maternal relatives have not/did not pursed genetic testing, it would be reasonable for Tresa to purse testing to better understand her risk of a hereditary cancer syndrome and risk of developing cancer being inherited from her maternal family.   Tresa's paternal cousins were diagnosed with breast cancer in their 50's and have not pursed testing. Since her affected paternal cousins are not a first or second degree blood relatives, Tresa does not meet current National Comprehensive Cancer Network (NCCN) criteria for genetic testing of high-penetrance breast cancer susceptibility genes. However, since other paternal relatives have not pursed genetic testing, it would be reasonable for Tresa to purse testing to better understand her risk of a hereditary cancer syndrome and risk of developing cancer being inherited from her paternal family.     We discussed that there are additional genes that could cause increased risk for prostate, breast, and ovarian cancer. As many of these genes present with overlapping features in a  family and accurate cancer risk cannot always be established based upon the pedigree analysis alone, it would be reasonable for Tresa to consider panel genetic testing to analyze multiple genes at once.  We reviewed genetic testing options for Tresa based on her personal and family history: a panel of genes associated with an increased risk for hereditary breast and gynecologic cancers, or larger panel options to include genes associated with increased risk for multiple different cancer types. Tresa expressed interest in [employer] [facility]'s Common Hereditary Cancers panel.   Genetic testing is available for 47 genes associated with cancers of the breast, ovary, uterus, prostate and gastrointestinal system: [facility] Common Hereditary Cancers panel (APC, BILLIE, AXIN2, BARD1, BMPR1A, BRCA1, BRCA2, BRIP1, CDH1, CDK4, CDKN2A, CHEK2, CTNNA1, DICER1, EPCAM, GREM1, HOXB13, KIT, MEN1, MLH1, MSH2, MSH3, MSH6, MUTYH, NBN, NF1, NTHL1, PALB2, PDGFRA, PMS2, POLD1, POLE, PTEN,RAD50, RAD51C, RAD51D, SDHA, SDHB, SDHC, SDHD, SMAD4, SMARCA4, STK11, TP53,TSC1, TSC2, VHL).    We discussed that many of these genes are associated with specific hereditary cancer syndromes and published management guidelines: Hereditary Breast and Ovarian Cancer syndrome (BRCA1, BRCA2), Lewis syndrome (MLH1, MSH2, MSH6, PMS2, EPCAM), Familial Adenomatous Polyposis (APC), Hereditary Diffuse Gastric Cancer (CDH1), Familial Atypical Multiple Mole Melanoma syndrome (CDK4, CDKN2A), Multiple Endocrine Neoplasia type 1 (MEN1), Juvenile Polyposis syndrome (BMPR1A, SMAD4), Cowden syndrome (PTEN), Li Fraumeni syndrome (TP53), Hereditary Paraganglioma and Pheochromocytoma syndrome (SDHA, SDHB, SDHC, SDHD), Peutz-Jeghers syndrome (STK11), MUTYH Associated Polyposis (MUTYH), Tuberous sclerosis complex (TSC1, TSC2), Von Hippel-Lindau disease (VHL), and Neurofibromatosis type 1 (NF1).   The BILLIE, AXIN2, BRIP1, CHEK2, GREM1, MSH3, NBN, NTHL1, PALB2, POLD1, POLE, RAD51C, and RAD51D  genes are associated with increased cancer risk and have published management guidelines for certain cancers.   The remaining genes (BARD1, CTNNA1, DICER1, HOXB13, KIT, PDGFRA, RAD50, and SMARCA4) are associated with increased cancer risk and may allow us to make medical recommendations when mutations are identified.   Tresa would like to submit a blood sample for her genetic testing. she will go to her nearest [facility] at her earliest convenience to get her blood drawn for her genetic testing.   Verbal consent was given over the phone and written on the consent form. Turnaround time is approximately 4-6 weeks once the lab receives the sample.    Medical Management: For Tresa, we reviewed that the information from genetic testing may determine:    additional cancer screening for which Tresa may qualify (i.e. mammogram and breast MRI, more frequent colonoscopies, more frequent dermatologic exams, etc.),    options for risk reducing surgeries Tresa could consider (i.e. bilateral mastectomy, surgery to remove her ovaries and/or uterus, etc.),      and targeted chemotherapies if she were to develop certain cancers in the future (i.e. immunotherapy for individuals with Lewis syndrome, PARP inhibitors, etc.).     These recommendations and possible targeted chemotherapies will be discussed in detail once genetic testing is completed.     Plan:  1) Today Tresa elected to proceed with the Common Hereditary Cancers Panel through [facility]. Therefore, consent was reviewed and verbally obtained for this testing.   2) Tresa plans to schedule her blood draw appointment at a clinic that is convenient to her.   3) The results should be available in 4-6 weeks after her blood is drawn.  4) Tresa will either have a telephone visit or video visit to discuss the results.  Additional recommendations about screening will be made at that time.    Tresa is a 41 year old who is being evaluated via a  billable video visit.    Face to face time over video: 50 minutes    Liudmila Longo MS  Genetic Counseling Intern  (P): [phone]    Attestation: Patient seen, evaluated and discussed with the Genetic Counseling Intern. I have verified the content of the note, which accurately reflects my assessment of the patient and the plan of care.     Supervising Genetic Counselor  Agustin Goldman MS, [facility] [place]  Licensed, Certified Genetic Counselor  (P): [phone]  (F): [phone]

## 2021-10-25 NOTE — Clinical Note
10/25/2021         RE: Tresa Chin  [address]  [facility] [id number]        Dear Colleague,    Thank you for referring your patient, Tresa Chin, to the [facility]. Please see a copy of my visit note below.    Video-Visit Details    Type of service: Video Visit    Video Start Time: 9:00  Video End Time: 9:50    Originating Location (pt. Location): Home    Distant Location (provider location): [facility], Cancer Risk Management Program    Platform used for Video Visit: Corey    10/25/2021    Referring Provider: Melissa Franco MD    Presenting Information:   I met with Tresa Chin today for her video genetic counseling visit through the Cancer Risk Management Program to discuss her family history of breast, prostate, and ovarian cancer. She is here today to review this history, cancer screening recommendations, and available genetic testing options.    Personal History:  Tresa is a 41 year old female. She does not have any personal history of cancer. In her hormonal-based medical history, she had her first menstrual period at age 13, does not have biological children, and is premenopausal. Tresa has her ovaries, fallopian tubes and uterus in place, and reports no ovarian cancer screening to date. She reports that she has no history of hormone replacement therapy.  She reports oral contraceptive use for approximately 3 years.       She does not have regular mammograms. However, Tresa had a mammogram and ultrasound in February 2018 due to bilateral breast pain which was normal. Annual mammograms starting at age 40 was recommended. Tresa has not had a colonoscopy. Tresa denies any history of dermatological exams. She does not regularly do any other cancer screening at this time. Tresa reported no history of smoking and occasioanl alcohol use.    Family History: (Please see scanned pedigree for detailed family history  information)    Maternal uncle was diagnosed with metastatic prostate cancer in his late 60's and passed away shortly after due to complications of the disease.     Maternal half aunt (Tresa's mother's maternal half sister) was diagnosed with ovarian cancer in her late 60's and passed away at age 69.     Another maternal half aunt's daughter was diagnosed with breast cancer in her late 40's. She is currently in her late 50's.     Tresa's father was diagnosed with lung cancer and passed away at age 75.  It was reported that he was a smoker.     Paternal uncle was diagnosed with lung cancer. It was reported that he was a smoker.     Three paternal cousins diagnosed with breast cancer in their 50's. They are currently in their 60's.     Her maternal ethnicity is . Her paternal ethnicity is . There is no known Ashkenazi Zoroastrianism ancestry on either side of her family. There is no reported consanguinity.    Discussion:  Tresa's family history of breast, prostate, and ovarian cancer is suggestive of a hereditary cancer syndrome.  We reviewed the features of sporadic, familial, and hereditary cancers. We discussed the natural history and genetics of several hereditary cancer syndromes, including Hereditary Breast and Ovarian Cancer Syndrome (HBOC).   The most common cause of hereditary breast cancer is HBOC, which is caused by mutations in the BRCA1 and BRCA2 genes. Individuals with HBOC syndrome are at increased risk for several different cancers, including breast, ovarian, male breast, prostate, melanoma, and pancreatic cancer. HBOC typically presents with multiple family members diagnosed with breast cancer before age 50 and/or ovarian cancer.   A detailed handout regarding information about HBOC and related hereditary cancer syndromes will be provided to Tresa via [employer] ***mailed to Tresa*** and can be found in the after visit summary. Topics included: inheritance pattern, cancer  risks, cancer screening recommendations, and also risks, benefits and limitations of testing.  In looking at Tresa's personal and family history, it is possible that a cancer susceptibility gene is present due to a maternal uncle diagnosed with metastatic prostate cancer, .  Based on her personal and family history, Tresa meets current National Comprehensive Cancer Network (NCCN) criteria for genetic testing of high-penetrance breast cancer susceptibility genes (including BRCA1, BRCA2, CDH1, PALB2, PTEN, and TP53).   Based on her personal and family history, Tresa meets current National Comprehensive Cancer Network (NCCN) criteria for genetic testing of high-penetrance ovarian cancer susceptibility genes (including BRCA1, BRCA2, BRIP1, MLH1, MSH2, MSH6, PMS2, EPCAM, PALB2, RAD51C,and  RAD51D).   We discussed that there are additional genes that could cause increased risk for ***breast and ovarian cancer. As many of these genes present with overlapping features in a family and accurate cancer risk cannot always be established based upon the pedigree analysis alone, it would be reasonable for Tresa to consider panel genetic testing to analyze multiple genes at once.  We reviewed genetic testing options for Tresa based on her personal and family history: a panel of genes associated with an increased risk for ***certain cancers/ hereditary breast and gynecologic cancers***, or larger panel options to include genes associated with increased risk for multiple different cancer types. Tresa expressed interest in *** panel.         Tresa would like to submit a blood sample for her genetic testing. she will go to her nearest [facility] at her earliest convenience to get her blood drawn for her genetic testing.   Verbal consent was given over the phone and written on the consent form. Turnaround time is approximately 4-6 weeks once the lab receives the sample.    Medical Management: For Tresa, we  reviewed that the information from genetic testing may determine:    additional cancer screening for which Tresa may qualify (i.e. mammogram and breast MRI, more frequent colonoscopies, more frequent dermatologic exams, etc.***),    options for risk reducing surgeries Tresa could consider (i.e. bilateral mastectomy, surgery to remove her ovaries and/or uterus, etc.***),      and targeted chemotherapies if she were to develop certain cancers in the future (i.e. immunotherapy for individuals with Lewis syndrome, PARP inhibitors, etc.***).     These recommendations and possible targeted chemotherapies will be discussed in detail once genetic testing is completed.     Plan:  1) Today Tresa elected to proceed with ***.  2) This information should be available in 4-6*** weeks.  3) Tresa will return to clinic to discuss the results.      Tresa is a 41 year old who is being evaluated via a billable video visit.    Face to face time over video: 50 minutes    Liudmila Longo MS  Genetic Counseling Intern  (P): [phone]    ***    Attestation: Patient seen, evaluated and discussed with the Genetic Counseling Intern. I have verified the content of the note, which accurately reflects my assessment of the patient and the plan of care.     Supervising Genetic Counselor  Agustin Goldman MS, [facility]  Licensed, Certified Genetic Counselor  (P): [phone]  (F): [phone]            Again, thank you for allowing me to participate in the care of your patient.        Sincerely,        Agustin Goldman GC

## 2021-10-25 NOTE — PROGRESS NOTES
Video-Visit Details    Type of service: Video Visit    Video Start Time: 9:00  Video End Time: 9:50    Originating Location (pt. Location): Home    Distant Location (provider location): [facility], Cancer Risk Management Program    Platform used for Video Visit: Corey    10/25/2021    Referring Provider: Melissa Franco MD    Presenting Information:   I met with Tresa Chin today for her video genetic counseling visit through the Cancer Risk Management Program to discuss her family history of breast, prostate, and ovarian cancer. She is here today to review this history, cancer screening recommendations, and available genetic testing options.    Personal History:  Tresa is a 41 year old female. She does not have any personal history of cancer. In her hormonal-based medical history, she had her first menstrual period at age 13, she does not have biological children, and is premenopausal. Tresa has her ovaries, fallopian tubes and uterus in place, and reports no ovarian cancer screening to date. She reports that she has no history of hormone replacement therapy. She reports oral contraceptive use for approximately 3 years.       She does not have regular mammograms. However, Tresa had a mammogram and ultrasound in February 2018 due to bilateral breast pain which was normal. Annual mammograms starting at age 40 was recommended. Tresa has not had a colonoscopy. Tresa denies any history of dermatological exams. She does not regularly do any other cancer screening at this time. Tresa reported no history of smoking and occasional alcohol use.    Family History: (Please see scanned pedigree for detailed family history information)    Maternal uncle was diagnosed with metastatic prostate cancer in his late 60's and passed away shortly after due to complications of the disease.     Maternal half aunt (Tresa's mother's maternal half sister) was diagnosed with ovarian cancer in her late  60's and passed away at age 69.     Another maternal half aunt's daughter was diagnosed with breast cancer in her late 40's. She is currently in her late 50's.     Tresa's father was diagnosed with lung cancer at age 40 and passed away at age 75.  It was reported that he was a smoker.     Paternal uncle was diagnosed with lung cancer. It was reported that he was a smoker.     Three paternal cousins diagnosed with breast cancer in their 50's. They are currently in their 60's.     Her maternal ethnicity is . Her paternal ethnicity is . There is no known Ashkenazi Cheondoism ancestry on either side of her family. There is no reported consanguinity.    Discussion:  Tresa's family history of breast, prostate, and ovarian cancer is suggestive of a hereditary cancer syndrome.  We reviewed the features of sporadic, familial, and hereditary cancers. We discussed the natural history and genetics of several hereditary cancer syndromes, including Hereditary Breast and Ovarian Cancer Syndrome (HBOC).   The most common cause of hereditary breast cancer is HBOC, which is caused by mutations in the BRCA1 and BRCA2 genes. Individuals with HBOC syndrome are at increased risk for several different cancers, including breast, ovarian, male breast, prostate, melanoma, and pancreatic cancer. HBOC typically presents with multiple family members diagnosed with breast cancer before age 50 and/or ovarian cancer.   A detailed handout regarding information about HBOC and related hereditary cancer syndromes will be provided to Tresa via [employer] and can be found in the after visit summary. Topics included: inheritance pattern, cancer risks, cancer screening recommendations, and also risks, benefits and limitations of testing.  In looking at Tresa's personal and family history, it is possible that a cancer susceptibility gene is present due to a maternal uncle diagnosed with metastatic prostate cancer, maternal half  aunt diagnosed with ovarian cancer, a maternal half cousin diagnosed with breast cancer in her late 40's, and three paternal cousins diagnosed with breast cancer in their 50's.  Tresa's affected second and third degree maternal relatives did not purse genetic testing before their passing. Tresa's mother meets current National Comprehensive Cancer Network (NCCN) criteria for high-penetrance ovarian and prostate cancer susceptibility genes. However, she has declined to purse genetic testing.  Since Tresa's maternal relatives have not/did not pursed genetic testing, it would be reasonable for Tresa to purse testing to better understand her risk of a hereditary cancer syndrome and risk of developing cancer being inherited from her maternal family.   Tresa's paternal cousins were diagnosed with breast cancer in their 50's and have not pursed testing. Since her affected paternal cousins are not a first or second degree blood relatives, Tresa does not meet current National Comprehensive Cancer Network (NCCN) criteria for genetic testing of high-penetrance breast cancer susceptibility genes. However, since other paternal relatives have not pursed genetic testing, it would be reasonable for Tresa to purse testing to better understand her risk of a hereditary cancer syndrome and risk of developing cancer being inherited from her paternal family.   We discussed that there are additional genes that could cause increased risk for prostate, breast, and ovarian cancer. As many of these genes present with overlapping features in a family and accurate cancer risk cannot always be established based upon the pedigree analysis alone, it would be reasonable for Tresa to consider panel genetic testing to analyze multiple genes at once.  We reviewed genetic testing options for Tresa based on her personal and family history: a panel of genes associated with an increased risk for hereditary breast and gynecologic cancers,  or larger panel options to include genes associated with increased risk for multiple different cancer types. Tresa expressed interest in [employer] Common Hereditary Cancers panel.   Genetic testing is available for 47 genes associated with cancers of the breast, ovary, uterus, prostate and gastrointestinal system: [employer] Common Hereditary Cancers panel (APC, BILLIE, AXIN2, BARD1, BMPR1A, BRCA1, BRCA2, BRIP1, CDH1, CDK4, CDKN2A, CHEK2, CTNNA1, DICER1, EPCAM, GREM1, HOXB13, KIT, MEN1, MLH1, MSH2, MSH3, MSH6, MUTYH, NBN, NF1, NTHL1, PALB2, PDGFRA, PMS2, POLD1, POLE, PTEN,RAD50, RAD51C, RAD51D, SDHA, SDHB, SDHC, SDHD, SMAD4, SMARCA4, STK11, TP53,TSC1, TSC2, VHL).    We discussed that many of these genes are associated with specific hereditary cancer syndromes and published management guidelines: Hereditary Breast and Ovarian Cancer syndrome (BRCA1, BRCA2), Lewis syndrome (MLH1, MSH2, MSH6, PMS2, EPCAM), Familial Adenomatous Polyposis (APC), Hereditary Diffuse Gastric Cancer (CDH1), Familial Atypical Multiple Mole Melanoma syndrome (CDK4, CDKN2A), Multiple Endocrine Neoplasia type 1 (MEN1), Juvenile Polyposis syndrome (BMPR1A, SMAD4), Cowden syndrome (PTEN), Li Fraumeni syndrome (TP53), Hereditary Paraganglioma and Pheochromocytoma syndrome (SDHA, SDHB, SDHC, SDHD), Peutz-Jeghers syndrome (STK11), MUTYH Associated Polyposis (MUTYH), Tuberous sclerosis complex (TSC1, TSC2), Von Hippel-Lindau disease (VHL), and Neurofibromatosis type 1 (NF1).   The BILLIE, AXIN2, BRIP1, CHEK2, GREM1, MSH3, NBN, NTHL1, PALB2, POLD1, POLE, RAD51C, and RAD51D genes are associated with increased cancer risk and have published management guidelines for certain cancers.   The remaining genes (BARD1, CTNNA1, DICER1, HOXB13, KIT, PDGFRA, RAD50, and SMARCA4) are associated with increased cancer risk and may allow us to make medical recommendations when mutations are identified.   Tresa would like to submit a blood sample for her genetic testing. she  will go to her nearest [facility] at her earliest convenience to get her blood drawn for her genetic testing.   Verbal consent was given over the phone and written on the consent form. Turnaround time is approximately 4-6 weeks once the lab receives the sample.    Medical Management: For Tresa, we reviewed that the information from genetic testing may determine:    additional cancer screening for which Tresa may qualify (i.e. mammogram and breast MRI, more frequent colonoscopies, more frequent dermatologic exams, etc.),    options for risk reducing surgeries Tresa could consider (i.e. bilateral mastectomy, surgery to remove her ovaries and/or uterus, etc.),      and targeted chemotherapies if she were to develop certain cancers in the future (i.e. immunotherapy for individuals with Lewis syndrome, PARP inhibitors, etc.).     These recommendations and possible targeted chemotherapies will be discussed in detail once genetic testing is completed.     Plan:  1) Today Tresa elected to proceed with the Common Hereditary Cancers Panel through [facility]. Therefore, consent was reviewed and verbally obtained for this testing.   2) Tresa plans to schedule her blood draw appointment at a clinic that is convenient to her.   3) The results should be available in 4-6 weeks after her blood is drawn.  4) Tresa will either have a telephone visit or video visit to discuss the results.  Additional recommendations about screening will be made at that time.    Tresa is a 41 year old who is being evaluated via a billable video visit.    Face to face time over video: 50 minutes    Liudmila Longo MS  Genetic Counseling Intern  (P): [phone]    Attestation: Patient seen, evaluated and discussed with the Genetic Counseling Intern. I have verified the content of the note, which accurately reflects my assessment of the patient and the plan of care.     Supervising Genetic Counselor  Agustin Goldman MS,  [facility]  Licensed, Certified Genetic Counselor  (P): [phone]  (F): [phone]

## 2021-10-25 NOTE — PATIENT INSTRUCTIONS
Assessing Cancer Risk  Only about 5-10% of cancers are thought to be due to an inherited cancer susceptibility gene.    These families often have:    Several people with the same or related types of cancer    Cancers diagnosed at a young age (before age 50)    Individuals with more than one primary cancer    Multiple generations of the family affected with cancer    Some people may be candidates for genetic testing of more than one gene.  For these families, genetic testing using a cancer panel may be offered.  These panels will test different genes known to increase the risk for breast, ovarian, uterine, and/or other cancers. All of the genes discussed below have published clinical management guidelines for individuals who are found to carry a mutation. The purpose of this handout is to serve as a brief summary of the genes analyzed by the panels used to inquire about hereditary breast and gynecologic cancer:  BILLIE, BRCA1, BRCA2, BRIP1, CDH1, CHEK2, MLH1, MSH2, MSH6, PMS2, EPCAM, PTEN, PALB2, RAD51C, RAD51D, and TP53.  ______________________________________________________________________________  Hereditary Breast and Ovarian Cancer Syndrome   (BRCA1 and BRCA2)  A single mutation in one of the copies of BRCA1 or BRCA2 increases the risk for breast and ovarian cancer, among others.  The risk for pancreatic cancer and melanoma may also be slightly increased in some families.  The chart below shows the chance that someone with a BRCA mutation would develop cancer in his or her lifetime1,2,3,4.        A person s ethnic background is also important to consider, as individuals of Ashkenazi Amish ancestry have a higher chance of having a BRCA gene mutation.  There are three BRCA mutations that occur more frequently in this population.    Lewis Syndrome   (MLH1, MSH2, MSH6, PMS2, and EPCAM)  Currently five genes are known to cause Lewis Syndrome: MLH1, MSH2, MSH6, PMS2, and EPCAM.  A single mutation in one of the  Lewis Syndrome genes increases the risk for colon, endometrial, ovarian, and stomach cancers.  Other cancers that occur less commonly in Lewis Syndrome include urinary tract, skin, and brain cancers.  The chart below shows the chance that a person with Lewis syndrome would develop cancer in his or her lifetime5.      *Cancer risk varies depending on Lewis syndrome gene found    Cowden Syndrome   (PTEN)  Cowden syndrome is a hereditary condition that increases the risk for breast, thyroid, endometrial, colon, and kidney cancer.  Cowden syndrome is caused by a mutation in the PTEN gene.  A single mutation in one of the copies of PTEN causes Cowden syndrome and increases cancer risk.  The chart below shows the chance that someone with a PTEN mutation would develop cancer in their lifetime6,7.  Other benign features seen in some individuals with Cowden syndrome include benign skin lesions (facial papules, keratoses, lipomas), learning disability, autism, thyroid nodules, colon polyps, and larger head size.      *One recent study found breast cancer risk to be increased to 85%    Li-Fraumeni Syndrome   (TP53)  Li-Fraumeni Syndrome (LFS) is a cancer predisposition syndrome caused by a mutation in the TP53 gene. A single mutation in one of the copies of TP53 increases the risk for multiple cancers. Individuals with LFS are at an increased risk for developing cancer at a young age. The lifetime risk for development of a LFS-associated cancer is 50% by age 30 and 90% by age 60.   Core Cancers: Sarcomas, Breast, Brain, Lung, Leukemias/Lymphomas, Adrenocortical carcinomas  Other Cancers: Gastrointestinal, Thyroid, Skin, Genitourinary    Hereditary Diffuse Gastric Cancer   (CDH1)  Currently, one gene is known to cause hereditary diffuse gastric cancer (HDGC): CDH1.  Individuals with HDGC are at increased risk for diffuse gastric cancer and lobular breast cancer. Of people diagnosed with HDGC, 30-50% have a mutation in the CDH1  gene.  This suggests there are likely other genes that may cause HDGC that have not been identified yet.      Lifetime Cancer Risks    General Population HDGC    Diffuse Gastric  <1% ~80%   Breast 12% 39-52%         Additional Genes  BILLIE  BILLIE is a moderate-risk breast cancer gene. Women who have a mutation in BILLIE can have between a 2-4 fold increased risk for breast cancer compared to the general population8. BILLIE mutations have also been associated with increased risk for pancreatic cancer, however an estimate of this cancer risk is not well understood9. Individuals who inherit two BILLIE mutations have a condition called ataxia-telangiectasia (AT).  This rare autosomal recessive condition affects the nervous system and immune system, and is associated with progressive cerebellar ataxia beginning in childhood.  Individuals with ataxia-telangiectasia often have a weakened immune system and have an increased risk for childhood cancers.    PALB2  Mutations in PALB2 have been shown to increase the risk of breast cancer up to 33-58% in some families; where individuals fall within this risk range is dependent upon family glpeioy24. PALB2 mutations have also been associated with increased risk for pancreatic cancer, although this risk has not been quantified yet.  Individuals who inherit two PALB2 mutations--one from their mother and one from their father--have a condition called Fanconi Anemia.  This rare autosomal recessive condition is associated with short stature, developmental delay, bone marrow failure, and increased risk for childhood cancers.    CHEK2   CHEK2 is a moderate-risk breast cancer gene.  Women who have a mutation in CHEK2 have around a 2-fold increased risk for breast cancer compared to the general population, and this risk may be higher depending upon family history.11,12,13 Mutations in CHEK2 have also been shown to increase the risk of a number of other cancers, including colon and prostate, however  these cancer risks are currently not well understood.    BRIP1, RAD51C and RAD51D  Mutations in BRIP1, RAD51C, and RAD51D have been shown to increase the risk of ovarian cancer and possibly female breast cancer as well14,15 .       Lifetime Cancer Risk    General Population BRIP1 RAD51C RAD51D   Ovarian 1-2% ~5-8% ~5-9% ~7-15%           Inheritance  All of the cancer syndromes reviewed above are inherited in an autosomal dominant pattern.  This means that if a parent has a mutation, each of his or her children will have a 50% chance of inheriting that same mutation.  Therefore, each child--male or female--would have a 50% chance of being at increased risk for developing cancer.      Image obtained from Genetics Home Reference, 2013     Mutations in some genes can occur de rinku, which means that a person s mutation occurred for the first time in them and was not inherited from a parent.  Now that they have the mutation, however, it can be passed on to future generations.    Genetic Testing  Genetic testing involves a blood test and will look at the genetic information in the BILLIE, BRCA1, BRCA2, BRIP1, CDH1, CHEK2, MLH1, MSH2, MSH6, PMS2, EPCAM, PTEN, PALB2, RAD51C, RAD51D, and TP53 genes for any harmful mutations that are associated with increased cancer risk.  If possible, it is recommended that the person(s) who has had cancer be tested before other family members.  That person will give us the most useful information about whether or not a specific gene is associated with the cancer in the family.    Results  There are three possible results of genetic testing:    Positive--a harmful mutation was identified in one or more of the genes    Negative--no mutation was identified in any of the genes on this panel    Variant of unknown significance--a variation in one of the genes was identified, but it is unclear how this impacts cancer risk in the family    Advantages and Disadvantages   There are advantages and  disadvantages to genetic testing.    Advantages    May clarify your cancer risk    Can help you make medical decisions    May explain the cancers in your family    May give useful information to your family members (if you share your results)    Disadvantages    Possible negative emotional impact of learning about inherited cancer risk    Uncertainty in interpreting a negative test result in some situations    Possible genetic discrimination concerns (see below)    Genetic Information Nondiscrimination Act (ELIANA)  [place] is a federal law that protects individuals from health insurance or employment discrimination based on a genetic test result alone.  Although rare, there are currently no legal discrimination protections in terms of life insurance, long term care, or disability insurances.  Visit the [employer] website to learn more.    Reducing Cancer Risk  All of the genes described above have nationally recognized cancer screening guidelines that would be recommended for individuals who test positive.  In addition to increased cancer screening, surgeries may be offered or recommended to reduce cancer risk.  Recommendations are based upon an individual s genetic test result as well as their personal and family history of cancer.    Questions to Think About Regarding Genetic Testing:    What effect will the test result have on me and my relationship with my family members if I have an inherited gene mutation?  If I don t have a gene mutation?    Should I share my test results, and how will my family react to this news, which may also affect them?    Are my children ready to learn new information that may one day affect their own health?    Hereditary Cancer Resources    FORCE: Facing Our Risk of Cancer Empowered facingourrisk.org   Bright Pink bebrightpink.org   Li-Fraumeni Syndrome Association lfsassociation.org   PTEN World PTENworld.com   No stomach for cancer, Inc.  nostomachforcancer.org   Stomach cancer relief network Scrnet.org   Collaborative Group of the Americas on Inherited Colorectal Cancer (CGA) cgaicc.com    Cancer Care cancercare.org   American Cancer Society (ACS) cancer.org   National Cancer [place] (NCI) cancer.gov     Please call us if you have any questions or concerns.   Cancer Risk Management Program [phone] ([phone])  ? Agustin Goldman, MS, [facility] [phone]  ? Jess Forrester, MS, [facility]  [phone]  ? Heike Davies, MS, [facility]  [phone]  ? Adriane Fadi, MS, [facility] [phone]  ? Poonam Hilda, MS, [facility] [phone]  ? Xena Lang, MS, [facility]  [phone]  ? Liudmila Longo, MS  [phone]    References  1. Vonnie SABILLON, Morales PDP, Parviz S, Ayla HYMAN, Daniela JE, Joey JL, Grace N, Rhina H, Chad O, Live A, Loc B, Radiflo P, Manharpreet S, Marilin DM, Carrera N, Blank E, Rashid H, Shiva E, Refugio J, Gronterra J, Aba B, Shane H, Thorlacius S, Eerola H, Nevsaranyana H, Velma K, Nancy OP. Average risks of breast and ovarian cancer associated with BRCA1 or BRCA2 mutations detected in case series unselected for family history: a combined analysis of 222 studies. Am J Hum Doris. 2003;72:1117-30.  2. Hung N, Jayne M, Jose G.  BRCA1 and BRCA2 Hereditary Breast and Ovarian Cancer. Gene Reviews online. 2013.  3. Rupesh YC, George S, Edita G, Casillas S. Breast cancer risk among male BRCA1 and BRCA2 mutation carriers. J Natl Cancer Inst. 2007;99:1811-4.  4. David CONKLIN, Sisi I, Devante J, Matteo E, Roshni ER, Dede F. Risk of breast cancer in male BRCA2 carriers. J Med Doris. 2010;47:710-1.  5. National Comprehensive Cancer Network. Clinical practice guidelines in oncology, colorectal cancer screening. Available online (registration required). 2015.  6. Pancho CASTILLO, Fermin J, Enrico J, Radhika LA, Curt VARGAS, Eng C. Lifetime cancer risks in individuals with germline PTEN mutations. Clin Cancer Res. 2012;18:400-7.  7. Pilarski R. Cowden  Syndrome: A Critical Review of the Clinical Literature. J Doris . 2009:18:13-27.  8. Jordon A, Arcadio D, Angel S, Berna P, Cullen T, Mitzy M, Baudilio B, Rosa M H, Willem R, Len K, Taryn L, David DG, Marilin D, Loco DF, Marija MR, The Breast Cancer Susceptibility Collaboration (UK) & Yaya GREWAL. BILLIE mutations that cause ataxia-telangiectasia are breast cancer susceptibility alleles. Nature Genetics. 2006;38:873-875  9. Raúl N , Aravind Y, Liz J, Aguilar L, Angelica GM , Corrie ML, Gallinger S, Gibson AG, Syngal S, Hazel ML, Sonia J , Francisco R, Aric SZ, João JR, Satish VE, Marleny M, Voleonardo B, Shar N, Jewel RH, Toñito KW, and Tirso AP. BILLIE mutations in patients with hereditary pancreatic cancer. Cancer Discover. 2012;2:41-46  10. Vonnie HUTCHINSON, et al. Breast-Cancer Risk in Families with Mutations in PALB2. NEJM. 2014; 371(6):497-506.  11. CHEK2 Breast Cancer Case-Control Consortium. CHEK2*1100delC and susceptibility to breast cancer: A collaborative analysis involving 10,860 breast cancer cases and 9,065 controls from 10 studies. Am J Hum Odris, 74 (2004), pp. 8594-6846  12. Isaias T, Brando S, Nir K, et al. Spectrum of Mutations in BRCA1, BRCA2, CHEK2, and TP53 in Families at High Risk of Breast Cancer. MONTSERRAT. 2006;295(12):7245-9575.   13. Sridhar C, Corinna D, Jim A, et al. Risk of breast cancer in women with a CHEK2 mutation with and without a family history of breast cancer. J Clin Oncol. 2011;29:5266-0295.  14. Tim H, Peggy E, Betsy SJ, et al. Contribution of germline mutations in the RAD51B, RAD51C, and RAD51D genes to ovarian cancer in the population. J Clin Oncol. 2015;33(26):1751-3259. Doi:10.1200/JCO.2015.61.2408.  15. Pradeep T, Elgin LIPSCOMB, Celeste P, et al. Mutations in BRIP1 confer high risk of ovarian cancer. Cierra Doris. 2011;43(11):1535-3966. doi:10.1038/ng.955.

## 2021-10-28 ENCOUNTER — VIRTUAL VISIT (OUTPATIENT)
Dept: BEHAVIORAL HEALTH | Facility: CLINIC | Age: 41
End: 2021-10-28
Payer: COMMERCIAL

## 2021-10-28 DIAGNOSIS — F34.1 PERSISTENT DEPRESSIVE DISORDER: Primary | ICD-10-CM

## 2021-10-28 PROCEDURE — 90834 PSYTX W PT 45 MINUTES: CPT | Mod: 25 | Performed by: SOCIAL WORKER

## 2021-10-28 ASSESSMENT — PATIENT HEALTH QUESTIONNAIRE - PHQ9
SUM OF ALL RESPONSES TO PHQ QUESTIONS 1-9: 14
10. IF YOU CHECKED OFF ANY PROBLEMS, HOW DIFFICULT HAVE THESE PROBLEMS MADE IT FOR YOU TO DO YOUR WORK, TAKE CARE OF THINGS AT HOME, OR GET ALONG WITH OTHER PEOPLE: VERY DIFFICULT
SUM OF ALL RESPONSES TO PHQ QUESTIONS 1-9: 14

## 2021-10-28 NOTE — PROGRESS NOTES
"[facility] Primary [facility]: Integrated Behavioral Health  October 28, 2021    Behavioral Health Clinician Progress Note    Patient Name: Tresa Chin           Service Type:  Individual      Service Location:   [facility] / Email (patient reached) Agustin     Session Start Time: 8:33 a.m.  Session End Time: 9:25 a.m.      Session Length: 38 - 52      Attendees: Client    Visit Activities (Refresh list every visit): NEW and [facility] Only    Diagnostic Assessment Date: next session  Treatment Plan Review Date: 3rd session  PHQ and NETO Review Date: 11/28/21  CGI Review Date:   See Flowsheets for today's PHQ-9 and NETO-7 results  Previous PHQ-9:   PHQ-9 SCORE 9/22/2021 10/21/2021 10/28/2021   PHQ-9 Total Score [facility] 5 (Mild depression) 15 (Moderately severe depression) 14 (Moderate depression)   PHQ-9 Total Score 5 15 14     Previous NETO-7:   NETO-7 SCORE 2/18/2021 9/22/2021 10/21/2021   Total Score 16 (severe anxiety) 6 (mild anxiety) 12 (moderate anxiety)   Total Score 16 6 12       AMILCAR LEVEL:  AMILCAR Score (Last Two) 2/19/2020 9/6/2020   AMILCAR Raw Score 36 25   Activation Score 75.5 42.9   AMILCAR Level 4 1       DATA  Extended Session (60+ minutes): No  Interactive Complexity: No  Crisis: No  [facility] Patient: No    Treatment Objective(s) Addressed in This Session:  Target Behavior(s): depression and anxiety    Depressed Mood: Increase interest, engagement, and pleasure in doing things  Decrease frequency and intensity of feeling down, depressed, hopeless    Current Stressors / Issues:  Pt reports she has been struggling with depression and anxiety for \"a long time,\" but has felt like she's in denial and not going to the doctor for it.  She estimates it's been \"a couple years,maybe more.\"  Sx include: irritability, increased tearfulness, 'very angry really fast sometimes without reason, over simple things.' Asked about appetite, she reports eating \"a lot,\" and is overweight, but this isn't a change from previous " "eating habits.  She reports decrease in energy level, lack of motivation,anhedonia, passive thoughts of death, such as \"sometimes I wish I'm dead,\" but no thoughts of self-harm.  She reports having these thoughts 1-2 times in her life, the last being a couple of years ago.   Was dx with sleep apnea last year so uses a CPAP but \"always tired no matter what.\"   Asked about sx of anxiety, pt reports she worries a lot, feeling anxious, has difficulty not worrying, and these worries are about a number of things, \"just my life.\"  She worries about her mother, who she takes care of.  Her mother has Alzheimer's and lives with her and her brothers.  Pt's father passed away in 2011.  Mother is 77 years old.  Pt has two brothers and 5 sisters.  Her sisters live in [place].  Pt moved to  in 2000 to have a better life and to work.  Her father came first, then mom and brother, then she came over and her brother came later.  Her brothers are older than is she.   Pt works at [employer] full-time but recently has been cutting her hours down due to being \"sick a lot\", taking care of her mother more.  She has been \"making a lot of mistakes at work\" due to not paying attention to what she's doing.   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    Pt reports that yesterday her mother fell and broke her ankle.    Pt reports her \"personality is not great,\" since she was a little girl. She feels like she can't make decisions of her own, lets others control her, has low self-esteem [place]  Pt notes that when she has a disagreement with someone, she focuses on not making them mad at her and fears losing them in her life. She has one friend and finds that person \"a little bit\" supportive and sees her or talks with her daily.   This friend was the one that suggested therapy for pt.    Asked about trauma or abuse, pt reports no traumas, but \"it's just the way I was raised.\"  She relays " "that her oldest brother which whom she lives with \"thinks he can do whatever he wants\" and they frequently get into arguments.      Progress on Treatment Objective(s) / Homework:  n/a- first session    Motivational Interviewing    MI Intervention: Expressed Empathy/Understanding and Open-ended questions     Change Talk Expressed by the Patient: Desire to change    Provider Response to Change Talk: E - Evoked more info from patient about behavior change, A - Affirmed patient's thoughts, decisions, or attempts at behavior change and R - Reflected patient's change talk      Care Plan review completed: Yes    Medication Review:  No current psychiatric medications prescribed    Medication Compliance:  NA    Changes in Health Issues:   Yes: Weight / dietary issues, Associated Psychological Distress, high blood pressure, pre-diabetic    Chemical Use Review:   Substance Use: Chemical use reviewed, no active concerns identified      Tobacco Use: No current tobacco use.      Assessment: Current Emotional / Mental Status (status of significant symptoms):  Risk status (Self / Other harm or suicidal ideation)  Patient denies a history of suicidal ideation, suicide attempts, self-injurious behavior, homicidal ideation, homicidal behavior and and other safety concerns  Patient denies current fears or concerns for personal safety.  Patient denies current or recent suicidal ideation or behaviors.  Patient denies current or recent homicidal ideation or behaviors.  Patient denies current or recent self injurious behavior or ideation.  Patient denies other safety concerns.  A safety and risk management plan has not been developed at this time, however patient was encouraged to call [facility] should there be a change in any of these risk factors.    Appearance:   Appropriate   Eye Contact:   Good   Psychomotor Behavior: Normal   Attitude:   Cooperative   Orientation:   All  Speech   Rate / Production: Normal/ " Responsive   Volume:  Normal   Mood:    Depressed   Affect:    Appropriate   Thought Content:  Clear   Thought Form:  Coherent  Logical   Insight:    Good     Diagnoses:  1. Persistent depressive disorder        Collateral Reports Completed:  PHQ and NETO    Plan: (Homework, other):  Patient was given information about behavioral services and encouraged to schedule a follow up appointment with the clinic C in 1 week.   CD Recommendations: No indications of CD issues.  Dominga Foster

## 2021-10-28 NOTE — PROGRESS NOTES
Is a 41 year old who is being evaluated via a billable video visit.      How would you like to obtain your AVS? MyChart  If the video visit is dropped, the invitation should be resent by: Send to e-mail at: [email or web address]  Will anyone else be joining your video visit? Juli Horn MA    Video Start Time: 4:25 PM  Video-Visit Details    Type of service:  Video Visit    Video End Time:4:40 PM    Originating Location (pt. Location): Home    Distant Location (provider location):  [facility] [place]     Platform used for Video Visit: [facility]   Outpatient Sleep Medicine Follow-up Visit  October 29, 2021    Name: Tresa Chin MRN# [id number]   Age: 41 year old YOB: 1980     Date of Consultation: October 29, 2021  Consultation is requested by: No referring provider defined for this encounter.  Primary care provider: Melissa Franco           Assessment and Plan:     Sleep Diagnoses:    Moderate obstructive sleep apnea with hypoxemia and REM worsening    Insomnia with electronic device use and prominent features of psychophysiological insomnia in the setting of chronic anxiety and untreated sleep apnea      Comorbid Diagnoses:       Severe obesity BMI 47    Mood disturbance-chronic anxiety with panic attacks night and day    Hypertension    GERD    Back pain        Summary Recommendations:  Reduce the tension on the mask so that is not uncomfortable over the bridge of your nose  Minor leaking of the mask is not a problem as the device is built to accommodate modest mask leaks  Use the device 4 hours per night or more on a regular basis  If the mask comes off during the night replace it before going back to bed               History of Present Illness:      Tresa Chin is a 41 year old female with moderate obstructive sleep apnea with REM worsening [REM AHI 65] initially presenting a year ago with insomnia.    She is  "caring for her mother who has Alzheimer's in the home and also having to help her at night because another stroke in her leg.  This is resulted in some follow-up in CPAP use since July.  In August she was using device 7 out of 10 days for more than 4 hours over 30 daytime.  Her disease is well controlled but she senses a sense of leaking and puts the mask on so tight it is uncomfortable over the bridge of her nose.    SLEEP-WAKE SCHEDULE: Previous notes  Bedtime 10 pm with TV latency 15min with 3x/week prolonged awakenings >1 hour or all night  Sleep very disturbed with brief awakenings 3-5x  Awakening 5:30 am with or without alarm  Occ napping  No crashes/ near miss accident             PREVIOUS SLEEP STUDIES:      YOB: 1980   Study Date: 9/21/2020   MRN: [id number]   Referring Provider: MD Franco Amer   Ordering Provider: MD Bird Conrad   Indications for Polysomnography: The patient is a 40 year old female who is 5' 1\" and weighs 250.0 lbs. Her BMI is 47.8, [place] sleepiness scale 15 and neck circumference is 38 cm. Relevant medical history includes severe obesity, insomnia, mood disturbance, hypertension and bruxism. A diagnostic polysomnogram was performed to evaluate for sleep apnea/ hypoventilation/hypoxemia.   Polysomnogram Data: A full night polysomnogram recorded the standard physiologic parameters including EEG, EOG, EMG, ECG, nasal and oral airflow. Respiratory parameters of chest and abdominal movements were recorded with respiratory inductance plethysmography. Oxygen saturation was recorded by pulse oximetry. Hypopnea scoring rule used: 1B 4%.   Sleep Architecture: All sleep stages present with sleep disruption attributable to respiratory events.   The total recording time of the polysomnogram was 416.1 minutes. The total sleep time was 388.0 minutes. Sleep latency was decreased at 2.5 minutes without the use of a sleep aid. REM latency was 54.5 minutes. Arousal index was increased at " 30.5 arousals per hour. Sleep efficiency was normal at 93.3%. Wake after sleep onset was 24.0 minutes. The patient spent 0.5% of total sleep time in Stage N1, 41.1% in Stage N2, 33.4% in Stage N3, and 25.0% in REM. Time in REM supine was 26.5 minutes.   Respiration: Moderate obstructive sleep apnea with REM worsening and associated hypoxemia without hypoventilation.     Events ? The polysomnogram revealed a presence of 25 obstructive, 5 central, and - mixed apneas resulting in an apnea index of 4.6 events per hour. There were 132 obstructive hypopneas and - central hypopneas resulting in an obstructive hypopnea index of 20.4 and central hypopnea index of - events per hour. The combined apnea/hypopnea index was 25.1 events per hour (central apnea/hypopnea index was 0.8 events per hour). The REM AHI was 64.9 events per hour. The supine AHI was 37.4 events per hour. The RERA index was 6.0 events per hour. The RDI was 31.1 events per hour.     Snoring - was reported as moderate/loud/intermittent.     Respiratory rate and pattern - was notable for normal respiratory rate and pattern.     Sustained Sleep Associated Hypoventilation - Transcutaneous carbon dioxide monitoring was used, however significant hypoventilation was not present with a maximum change from 34.2 to 43.1 mmHg and 0 minutes at or greater than 55 mmHg.     Sleep Associated Hypoxemia - (Greater than 5 minutes O2 sat at or below 88%) was present. Baseline oxygen saturation was 93.1%. Lowest oxygen saturation was 65.6%. Time spent less than or equal to 88% was 24.2 minutes. Time spent less than or equal to 89% was 32.2 minutes.      Movement Activity: No abnormal sleep movements.     Periodic Limb Activity - There were 14 PLMs during the entire study. The PLM index was 2.2 movements per hour. The PLM Arousal Index was 1.5 per hour.     REM EMG Activity - Excessive transient/sustained muscle activity was not present.     Nocturnal Behavior - Abnormal sleep  related behaviors were not noted.     Bruxism - None apparent.      Cardiac Summary: Normal sinus rhythm.   The average pulse rate was 75.9 bpm. The minimum pulse rate was 57.9 bpm while the maximum pulse rate was 115.8 bpm. Arrhythmias were not noted.   Assessment:    All sleep stages present with sleep disruption attributable to respiratory events.     Moderate obstructive sleep apnea with REM worsening and associated hypoxemia without hypoventilation.     No abnormal sleep movements               Most Recent SCALES:    EPWORTH SLEEPINESS SCALE WITHIN 1 YEAR WITHIN 10 DAYS   Sitting and reading 2 2   Watching TV 2 2   Sitting, inactive in a public place (theatre or mtg.) 1  1    As a passenger in a car 1 1   Lying down to rest in the afternoon when circumstance permit 1 1   Sitting and talking to someone 0 0   Sitting quietly after lunch without alcohol 1 1   In a car, while stopped for a few minutes in traffic 0 0   TOTAL SCORE 8 8       INSOMNIA SEVERITY INDEX WITHIN 1 YEAR   Difficulty falling asleep 0   Difficult staying asleep 1   Problems waking up to early 0   How SATISFIED/DISSATISFIED are you with your CURRENT sleep pattern? 1   How NOTICEABLE to others do you think your sleep pattern is in terms of your quality of life? 2   How WORRIED/DISTRESSED are you about your current sleep pattern? 1   To what extent do you consider your sleep problem to INTERFERE with your daily fuctioning(e.g. daytime fatigue, mood, ability to function at work/daily chores, concentration, mood,etc.) CURRENTLY? 2   INSOMNIA SEVERITY INDEX TOTAL SCORE 7    --absence of insomnia (0-7); sub-threshold insomnia (8-14); moderate insomnia (15-21); and severe insomnia (22-28)--          Objective:  CPAP Compliance Targets:   >70% days > 4 hours AHI < 5   30 days ending October 29, 2021  Mask type:  Nasal Mask   ResMed        Auto-PAP 5.0 - 15.0 cmH2O 30 day usage data:    46% of days with > 4 hours of use. 14/30 days with no use.    Average use 195 minutes per day.   95%ile Leak 7.13 L/min.   CPAP 95% pressure 13.6 cm.   AHI 0.51 events per hour.                   Medications:     Current Outpatient Medications   Medication Sig     amLODIPine (NORVASC) 5 MG tablet Take 1 tablet (5 mg) by mouth daily     omeprazole (PRILOSEC) 20 MG DR capsule Take 1 capsule by mouth once daily     No current facility-administered medications for this visit.        Allergies   Allergen Reactions     Latex      PN: Converted from LW Latex Sensitivity Flag            Problem List:     Patient Active Problem List   Diagnosis     Morbid obesity (H)     Essential hypertension     Hyperlipidemia LDL goal <160     Anxiety state     NAFLD (nonalcoholic fatty liver disease)     Chronic bilateral low back pain with left-sided sciatica     Panic attack     NSAID induced gastritis     Gastroesophageal reflux disease without esophagitis     Pain in joint, ankle and foot, left     Ureterolithiasis     Kidney stone     Left ureteral stone     Prediabetes     Major depression, single episode     Family history of malignant neoplasm of breast            Past Medical History:     Does not need 02 supplement at night   Past Medical History:   Diagnosis Date     Back pain      Depressive disorder      Hypertension      Sleep apnea              Past Surgical History:    No h/o  upper airway surgery  Past Surgical History:   Procedure Laterality Date     COMBINED CYSTOSCOPY, INSERT STENT URETER(S) Left 7/22/2020    Procedure: Cystoscopy with left ureteral stent insertion;  Surgeon: Anurag Gomez MD;  Location: [facility] OR     COMBINED CYSTOSCOPY, RETROGRADES, URETEROSCOPY, LASER HOLMIUM LITHOTRIPSY URETER(S), INSERT STENT Left 8/4/2020    Procedure: Cystoscopy, removal of left ureteral stent, left ureteroscopy with holmium laser lithotripsy, stone basketing and placement of left ureteral stent;  Surgeon: Anurag Gomez MD;  Location: [facility] OR     CYSTOSCOPY       ESOPHAGOSCOPY,  GASTROSCOPY, DUODENOSCOPY (EGD), COMBINED N/A 5/31/2019    Procedure: ESOPHAGOGASTRODUODENOSCOPY (EGD)cold BX (fv);  Surgeon: Geremias Martinez MD;  Location:  GI     GENITOURINARY SURGERY                Physical Examination:     Mandibular profile  Reported vitals:  There were no vitals taken for this visit.   GENERAL: Healthy, alert and no distress  EYES: Eyes grossly normal to inspection.  No discharge or erythema, or obvious scleral/conjunctival abnormalities.  RESP: No audible wheeze, cough, or visible cyanosis.  No visible retractions or increased work of breathing.    SKIN: Visible skin clear. No significant rash, abnormal pigmentation or lesions.  NEURO: Cranial nerves grossly intact.  Mentation and speech appropriate for age.  PSYCH: Mentation appears normal, affect normal/bright, judgement and insight intact, normal speech and appearance well-groomed.        Copy to: Melissa Franco MD 10/29/2021       Total time spent with patient: 25 min >50% counseling and 10 minutes documenting and reviewing records

## 2021-10-29 ENCOUNTER — VIRTUAL VISIT (OUTPATIENT)
Dept: SLEEP MEDICINE | Facility: CLINIC | Age: 41
End: 2021-10-29
Payer: COMMERCIAL

## 2021-10-29 DIAGNOSIS — G47.33 OSA (OBSTRUCTIVE SLEEP APNEA): Primary | ICD-10-CM

## 2021-10-29 PROCEDURE — 99214 OFFICE O/P EST MOD 30 MIN: CPT | Mod: 95 | Performed by: INTERNAL MEDICINE

## 2021-10-29 ASSESSMENT — PATIENT HEALTH QUESTIONNAIRE - PHQ9: SUM OF ALL RESPONSES TO PHQ QUESTIONS 1-9: 14

## 2021-10-30 NOTE — PROGRESS NOTES
Subjective:  HPI  Physical Exam                    Objective:  System    Physical Exam    General     ROS    Assessment/Plan:    DISCHARGE REPORT    Progress reporting period is from 8/3/21 to 9/14/21.      SUBJECTIVE  Subjective changes noted by patient:  Press up made a difference.  No numbness in the left L/E.  That has been minimal in the last week.    Current pain level is  1/10    Previous pain level was:       Changes in function:  Yes (See Goal flowsheet attached for changes in current functional level) Changes in function: Yes, see goal flow sheet for change in function   Adverse reaction to treatment or activity: None     OBJECTIVE  Changes noted in objective findings:  Yes, Patient has failed to return to therapy so current objective findings are unknown.  Objective: Patient was able to advance to the standing extension with pt OP.  Weakness in the CORE at TA.  Needing cueing for the proper tech.

## 2021-11-02 PROBLEM — G89.29 CHRONIC BILATERAL LOW BACK PAIN WITH LEFT-SIDED SCIATICA: Status: RESOLVED | Noted: 2019-02-09 | Resolved: 2021-11-02

## 2021-11-02 PROBLEM — M54.42 CHRONIC BILATERAL LOW BACK PAIN WITH LEFT-SIDED SCIATICA: Status: RESOLVED | Noted: 2019-02-09 | Resolved: 2021-11-02

## 2021-11-02 NOTE — PROGRESS NOTES
Subjective:  HPI  Physical Exam                    Objective:  System    Physical Exam    General     ROS    Assessment/Plan:    ASSESSMENT/PLAN  Updated problem list and treatment plan:   STG/LTGs have been met:  Yes (See Goal flow sheet completed today.)  Progress toward STG/LTGs have been made:  Yes (See Goal flow sheet completed today.)  Assessment of Progress: The patient has not returned to therapy. Current status is unknown.  Self Management Plans:  Patient has been instructed in a home treatment program.  Patient  has been instructed in self management of symptoms.    Tresa continues to require the following intervention to meet STG and LT's:  PT intervention is no longer required to meet STG/LTG.    Recommendations:  This patient is ready to be discharged from therapy and continue their home treatment program.    Please refer to the daily flowsheet for treatment today, total treatment time and time spent performing 1:1 timed codes.

## 2021-11-10 ENCOUNTER — VIRTUAL VISIT (OUTPATIENT)
Dept: BEHAVIORAL HEALTH | Facility: CLINIC | Age: 41
End: 2021-11-10
Payer: COMMERCIAL

## 2021-11-10 DIAGNOSIS — F34.1 PERSISTENT DEPRESSIVE DISORDER: Primary | ICD-10-CM

## 2021-11-10 DIAGNOSIS — F41.1 GENERALIZED ANXIETY DISORDER: ICD-10-CM

## 2021-11-10 PROCEDURE — 90791 PSYCH DIAGNOSTIC EVALUATION: CPT | Mod: 95 | Performed by: SOCIAL WORKER

## 2021-11-10 ASSESSMENT — COLUMBIA-SUICIDE SEVERITY RATING SCALE - C-SSRS
3. HAVE YOU BEEN THINKING ABOUT HOW YOU MIGHT KILL YOURSELF?: NO
1. IN THE PAST MONTH, HAVE YOU WISHED YOU WERE DEAD OR WISHED YOU COULD GO TO SLEEP AND NOT WAKE UP?: NO
6. HAVE YOU EVER DONE ANYTHING, STARTED TO DO ANYTHING, OR PREPARED TO DO ANYTHING TO END YOUR LIFE?: NO
1. IN THE PAST MONTH, HAVE YOU WISHED YOU WERE DEAD OR WISHED YOU COULD GO TO SLEEP AND NOT WAKE UP?: YES
ATTEMPT LIFETIME: NO
TOTAL  NUMBER OF INTERRUPTED ATTEMPTS LIFETIME: NO
6. HAVE YOU EVER DONE ANYTHING, STARTED TO DO ANYTHING, OR PREPARED TO DO ANYTHING TO END YOUR LIFE?: NO
TOTAL  NUMBER OF ABORTED OR SELF INTERRUPTED ATTEMPTS PAST 3 MONTHS: NO
5. HAVE YOU STARTED TO WORK OUT OR WORKED OUT THE DETAILS OF HOW TO KILL YOURSELF? DO YOU INTEND TO CARRY OUT THIS PLAN?: NO
2. HAVE YOU ACTUALLY HAD ANY THOUGHTS OF KILLING YOURSELF?: NO
4. HAVE YOU HAD THESE THOUGHTS AND HAD SOME INTENTION OF ACTING ON THEM?: NO
ATTEMPT PAST THREE MONTHS: NO
4. HAVE YOU HAD THESE THOUGHTS AND HAD SOME INTENTION OF ACTING ON THEM?: NO
5. HAVE YOU STARTED TO WORK OUT OR WORKED OUT THE DETAILS OF HOW TO KILL YOURSELF? DO YOU INTEND TO CARRY OUT THIS PLAN?: NO
2. HAVE YOU ACTUALLY HAD ANY THOUGHTS OF KILLING YOURSELF LIFETIME?: NO
TOTAL  NUMBER OF ABORTED OR SELF INTERRUPTED ATTEMPTS PAST LIFETIME: NO
TOTAL  NUMBER OF INTERRUPTED ATTEMPTS PAST 3 MONTHS: NO

## 2021-11-10 ASSESSMENT — PATIENT HEALTH QUESTIONNAIRE - PHQ9
SUM OF ALL RESPONSES TO PHQ QUESTIONS 1-9: 18
10. IF YOU CHECKED OFF ANY PROBLEMS, HOW DIFFICULT HAVE THESE PROBLEMS MADE IT FOR YOU TO DO YOUR WORK, TAKE CARE OF THINGS AT HOME, OR GET ALONG WITH OTHER PEOPLE: VERY DIFFICULT
SUM OF ALL RESPONSES TO PHQ QUESTIONS 1-9: 18

## 2021-11-10 ASSESSMENT — ANXIETY QUESTIONNAIRES
2. NOT BEING ABLE TO STOP OR CONTROL WORRYING: NEARLY EVERY DAY
7. FEELING AFRAID AS IF SOMETHING AWFUL MIGHT HAPPEN: NEARLY EVERY DAY
GAD7 TOTAL SCORE: 21
GAD7 TOTAL SCORE: 21
6. BECOMING EASILY ANNOYED OR IRRITABLE: NEARLY EVERY DAY
1. FEELING NERVOUS, ANXIOUS, OR ON EDGE: NEARLY EVERY DAY
3. WORRYING TOO MUCH ABOUT DIFFERENT THINGS: NEARLY EVERY DAY
4. TROUBLE RELAXING: NEARLY EVERY DAY
GAD7 TOTAL SCORE: 21
5. BEING SO RESTLESS THAT IT IS HARD TO SIT STILL: NEARLY EVERY DAY
7. FEELING AFRAID AS IF SOMETHING AWFUL MIGHT HAPPEN: NEARLY EVERY DAY
8. IF YOU CHECKED OFF ANY PROBLEMS, HOW DIFFICULT HAVE THESE MADE IT FOR YOU TO DO YOUR WORK, TAKE CARE OF THINGS AT HOME, OR GET ALONG WITH OTHER PEOPLE?: VERY DIFFICULT
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
## 2021-11-10 NOTE — Clinical Note
Dr. Salvador Dubose.  I completed the DA on Tresa today.  She doesn't want to take psychotropic medications, but I did discuss evidence-based research on best treatment for these is therapy and medication. She says she'll think about it.  I see her again next week but looks like she does have a longer-term therapist scheduled for later this month.  I'll reach out to that provider.    Dominga

## 2021-11-11 ASSESSMENT — ANXIETY QUESTIONNAIRES: GAD7 TOTAL SCORE: 21

## 2021-11-11 ASSESSMENT — PATIENT HEALTH QUESTIONNAIRE - PHQ9: SUM OF ALL RESPONSES TO PHQ QUESTIONS 1-9: 18

## 2021-11-16 ENCOUNTER — VIRTUAL VISIT (OUTPATIENT)
Dept: BEHAVIORAL HEALTH | Facility: CLINIC | Age: 41
End: 2021-11-16
Payer: COMMERCIAL

## 2021-11-16 DIAGNOSIS — Z91.199 NO-SHOW FOR APPOINTMENT: Primary | ICD-10-CM

## 2021-11-16 NOTE — PROGRESS NOTES
Writer phoned pht at 2:05 p.m. when she didn't appear in virtual session and left message that writer would call her back.  Writer texted pt the link and called pt again at 2:12 p.m. and left another message with writer's phone number.  Writer will send MyChart message with rescheduling information.       MARIANNE Avila, [facility]  Behavioral Health Clinician              This patient was a no show for this scheduled appointment.

## 2021-11-22 ENCOUNTER — VIRTUAL VISIT (OUTPATIENT)
Dept: BEHAVIORAL HEALTH | Facility: CLINIC | Age: 41
End: 2021-11-22
Payer: COMMERCIAL

## 2021-11-22 DIAGNOSIS — F34.1 PERSISTENT DEPRESSIVE DISORDER: Primary | ICD-10-CM

## 2021-11-22 DIAGNOSIS — F41.1 GENERALIZED ANXIETY DISORDER: ICD-10-CM

## 2021-11-22 PROCEDURE — 90832 PSYTX W PT 30 MINUTES: CPT | Mod: 95 | Performed by: SOCIAL WORKER
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
## 2021-11-22 NOTE — Clinical Note
Darya Marte.  You see this pt tomorrow.  DA is already done.  She is wanting in person; struggles with the technological part.  I did make an appointment with me in person for beginning of December, but she is needing longer-term.    Thanks!    Dominga

## 2021-11-23 ENCOUNTER — VIRTUAL VISIT (OUTPATIENT)
Dept: NEPHROLOGY | Facility: CLINIC | Age: 41
End: 2021-11-23
Attending: STUDENT IN AN ORGANIZED HEALTH CARE EDUCATION/TRAINING PROGRAM
Payer: COMMERCIAL

## 2021-11-23 ENCOUNTER — MEDICAL CORRESPONDENCE (OUTPATIENT)
Dept: HEALTH INFORMATION MANAGEMENT | Facility: CLINIC | Age: 41
End: 2021-11-23

## 2021-11-23 ENCOUNTER — VIRTUAL VISIT (OUTPATIENT)
Dept: PSYCHOLOGY | Facility: CLINIC | Age: 41
End: 2021-11-23
Attending: FAMILY MEDICINE
Payer: COMMERCIAL

## 2021-11-23 DIAGNOSIS — R73.02 IMPAIRED GLUCOSE TOLERANCE: ICD-10-CM

## 2021-11-23 DIAGNOSIS — N20.0 KIDNEY STONE: ICD-10-CM

## 2021-11-23 DIAGNOSIS — E66.01 MORBID OBESITY (H): Primary | ICD-10-CM

## 2021-11-23 DIAGNOSIS — I10 HYPERTENSION, ESSENTIAL: ICD-10-CM

## 2021-11-23 DIAGNOSIS — F32.1 CURRENT MODERATE EPISODE OF MAJOR DEPRESSIVE DISORDER WITHOUT PRIOR EPISODE (H): ICD-10-CM

## 2021-11-23 DIAGNOSIS — F41.1 GENERALIZED ANXIETY DISORDER: Primary | ICD-10-CM

## 2021-11-23 PROCEDURE — 99204 OFFICE O/P NEW MOD 45 MIN: CPT | Mod: 95 | Performed by: INTERNAL MEDICINE

## 2021-11-23 PROCEDURE — 90834 PSYTX W PT 45 MINUTES: CPT | Mod: 95

## 2021-11-23 ASSESSMENT — PATIENT HEALTH QUESTIONNAIRE - PHQ9
SUM OF ALL RESPONSES TO PHQ QUESTIONS 1-9: 18
SUM OF ALL RESPONSES TO PHQ QUESTIONS 1-9: 18
10. IF YOU CHECKED OFF ANY PROBLEMS, HOW DIFFICULT HAVE THESE PROBLEMS MADE IT FOR YOU TO DO YOUR WORK, TAKE CARE OF THINGS AT HOME, OR GET ALONG WITH OTHER PEOPLE: VERY DIFFICULT

## 2021-11-23 NOTE — PROGRESS NOTES
[facility] Nephrology Comprehensive Stone Clinic  11/23/2021     Tresa Chin MRN:[id number] YOB: 1980  Primary care provider: Melissa Franco  Requesting physician: Anurag Gomez     ASSESSMENT AND RECOMMENDATIONS:   Tresa Chin is a 41 year old female presenting for nephrolithiasis.  # Recurrent kidney stones:   8/4/2020 analysis - 30% calcium oxalate monohydrate, 10% calcium oxalate dihydrate, and 60% calcium phosphate (hydroxy- and carbonate- apatite).   # hypercalciuria - reviewed importance of low sodium diet.    Stone risk noted to be related to many health issues such as HTN, obesity, metabolic syndrome.    # HTN - on amlodipine 5 mg daily - consider change to chlorthalidone to control urine calcium  # GERD - on omeprazole 20 mg daily  # hyperlipidemia  # NAFLD  # obestiy - 5 feet 258 lbs - BMI 50 - with above complications  - refer to weight loss clinic  # NSAID induced gastritis  # impaired glucose tolerance - Hgb A1C 6.2 on 10/21/2021  -Known issues that I take into account for other medical decisions, no current exacerbations or new concerns.     # Vitamin D deficiency - Vit D level 20 on 9/11/2020    Repeat 24 hour chemistries April 2022  Return in about 6 months (around 5/23/2022).     Start 2:13 PM   End 2:40 PM    Jeannette George MD  [facility]  Department of Medicine  Division of Renal Disease and Hypertension  [employer]  senthil  [employer] Web Console        REASON FOR CONSULT: nephrolithiasis    HISTORY OF PRESENT ILLNESS:  Tresa Chin is a 41 year old with kidney stones, HTN, obesity, hyperglycemia (without diabetes) who presents to review options for medical management of kidney stones.    Last imaging: 10/18/2021  Surgery:  (I reviewed op report)  7/22/2020 left URS and stent placement for obstructing left ureteral stone with complication of urinary infection/luekocytosis and elevated lactate  8/4/2020- left URS and laser lithotripsy/stone  basketing  Stone [place]: stone free per 10/18/2021 CT    Has not had kidney stone symptoms since this 2020 event.    Tresa Chin's diet consists of 3 meals per day.    nutrisystem for the last 1.5 months  Prior to doing nutrisystem would sometimes skip breakfast or get starbucks  Ate a lot of junk food - fast food or restaurant food. Not a lot of vegetables, fruits sometimes.     Current nutrisystem plan  Small pre-made meals, sandwich, hot pocket etc.  Stays under 1200 calories per day    She has tried to lose weight many times, gets bored with diet. Considering going oversees to get gastric bypass surgery as it is not covered by her insurance.    I reviewed 24 hour urine chemstries:            Family history is negative for kidney stones    PAST MEDICAL HISTORY:  Problem list  Patient Active Problem List   Diagnosis     Morbid obesity (H)     Essential hypertension     Hyperlipidemia LDL goal <160     Generalized anxiety disorder     NAFLD (nonalcoholic fatty liver disease)     Panic attack     NSAID induced gastritis     Gastroesophageal reflux disease without esophagitis     Pain in joint, ankle and foot, left     Ureterolithiasis     Kidney stone     Left ureteral stone     Prediabetes     Major depression, single episode     Family history of malignant neoplasm of breast     Persistent depressive disorder         PSH:  Reviewed  Past Surgical History:   Procedure Laterality Date     COMBINED CYSTOSCOPY, INSERT STENT URETER(S) Left 7/22/2020    Procedure: Cystoscopy with left ureteral stent insertion;  Surgeon: Anurag Gomez MD;  Location:  OR     COMBINED CYSTOSCOPY, RETROGRADES, URETEROSCOPY, LASER HOLMIUM LITHOTRIPSY URETER(S), INSERT STENT Left 8/4/2020    Procedure: Cystoscopy, removal of left ureteral stent, left ureteroscopy with holmium laser lithotripsy, stone basketing and placement of left ureteral stent;  Surgeon: Anurag Gomez MD;  Location: [facility] OR     CYSTOSCOPY       ESOPHAGOSCOPY,  GASTROSCOPY, DUODENOSCOPY (EGD), COMBINED N/A 5/31/2019    Procedure: ESOPHAGOGASTRODUODENOSCOPY (EGD)cold BX (fv);  Surgeon: Geremias Martinez MD;  Location:  GI     GENITOURINARY SURGERY          MEDICATIONS:  Current Outpatient Medications   Medication Sig Dispense Refill     amLODIPine (NORVASC) 5 MG tablet Take 1 tablet (5 mg) by mouth daily 90 tablet 3     omeprazole (PRILOSEC) 20 MG DR capsule Take 1 capsule by mouth once daily 90 capsule 3        ALLERGIES:    Allergies   Allergen Reactions     Latex      PN: Converted from LW Latex Sensitivity Flag       REVIEW OF SYSTEMS:  A 10 point review of systems was negative except as noted above.    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  Family/siblings in [place]    FAMILY MEDICAL HISTORY:   Family History   Problem Relation Age of Onset     Dementia Mother      Diabetes Mother      Hypertension Mother      Hyperlipidemia Mother      Lung Cancer Father      Hypertension Father      Hyperlipidemia Father      Other Cancer Father      Diabetes Sister      Hypertension Sister      Diabetes Brother      Depression No family hx of      Anxiety Disorder No family hx of      Obesity No family hx of        PHYSICAL EXAM:   GENERAL APPEARANCE: alert and no distress  RESP: normal work of breathing, no conversational dyspnea  NEURO: mentation intact and speech normal    LABS:   I reviewed:  Electrolytes/Renal -   Recent Labs   Lab Test 10/21/21  0814 09/14/21  0904 07/29/21  0822 04/13/21  1702    138  --  140   POTASSIUM 4.1 3.9  --  3.8   CHLORIDE 106 105  --  107   CO2 23 28  --  30   BUN 12 12  --  17   CR 0.54 0.50*  --  0.55   * 121* 145* 147*   NAOMI 8.5 8.7  --  9.1       CBC -   Recent Labs   Lab Test 09/14/21  0904 07/21/20  2241 03/13/20  1443   WBC 8.6 14.4* 9.0   HGB 14.9 14.5 15.0    380 301       LFTs -   Recent Labs   Lab Test 02/18/21  0810 03/13/20  1443   ALKPHOS 54 63   BILITOTAL 0.5 0.4   ALT 71* 78*   AST  26 33   PROTTOTAL 7.2 7.5   ALBUMIN 3.4 3.7       Coags - No lab results found.    Iron Panel - No lab results found.    Endocrine -   Recent Labs   Lab Test 10/21/21  0814 09/14/21  0904 07/29/21  0822 02/18/21  0810   A1C 6.2* 6.2* 6.2* 6.0*   TSH  --   --   --  1.32       IMAGING:  reviewed    Jeannette George MD

## 2021-11-23 NOTE — PROGRESS NOTES
Tresa is a 41 year old who is being evaluated via a billable video visit.      How would you like to obtain your AVS? MyChart  If the video visit is dropped, the invitation should be resent by: Text to cell phone: [phone]  Will anyone else be joining your video visit? No      Video Start Time: 2:13 pm  Video-Visit Details    Type of service:  Video Visit    Video End Time:2:40 PM    Originating Location (pt. Location): Other Car - outside of work    Distant Location (provider location):  [facility] [place]     Platform used for Video Visit: NewsiT
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
## 2021-11-23 NOTE — PATIENT INSTRUCTIONS
Dear Tresa Chin    Your were seen in the [facility].  Basic advice to avoid kidney stones  - Drink 100 ounces of fluid daily (urine volume should be 80 ounces per day)  - low sodium diet (less than 2400 mg sodium per day- 500-700 mg per meal)  - minimize processed foods  - three servings daily of food/beverage high in calcium.  Please have one high calcium food three times a day with meals - can be either 8 oz milk, 6 oz yogurt or 2 oz low sodium cheese or 8 oz calcium fortified OJ/dairy alternative)   - unsweetened flax milk is the preferred dairy alternative  ---- Total should be at least 1000 mg calcium a day from food  - Protein (meat) in moderation    Higher fruit and vegetable intake preferred  Some good options include: (higher alkali fruits and vegetables)  -apples, apricots, oranges (the pith contains oxalate so be mindful of portions), peaches, pears, raisins, strawberries, carrots, cauliflower, eggplant, lettuce, potatoes, tomatoes, and zucchini     [email or web address]    Today we discussed  - please work on lowering sodium in your diet to less than 2000 mg per day  - litholink - 24 hour urine April 2022  Return in about 6 months (around 5/23/2022).     It was a pleasure meeting with you today. Thank you for allowing me and my team the privilege of caring for you today. Please let us know if there is anything else we can do for you.    Take care!  Jeannette George MD  Department of Medicine  Division of Renal Diseases and Hypertension  [facility]    Email: [email or web address]  You may reach a nurse by calling the urology clinic at [phone]

## 2021-11-23 NOTE — LETTER
11/23/2021       RE: Tresa Chin  [address]  [facility] [address]     Dear Colleague,    Thank you for referring your patient, Tresa Chin, to the [facility] NEPHROLOGY CLINIC [place] at [facility]. Please see a copy of my visit note below.    [facility] Nephrology Comprehensive Stone Clinic  11/23/2021     Tresa Chin MRN:[id number] YOB: 1980  Primary care provider: Melissa Franco  Requesting physician: Anurag Gomez     ASSESSMENT AND RECOMMENDATIONS:   Tresa Chin is a 41 year old female presenting for nephrolithiasis.  # Recurrent kidney stones:   8/4/2020 analysis - 30% calcium oxalate monohydrate, 10% calcium oxalate dihydrate, and 60% calcium phosphate (hydroxy- and carbonate- apatite).   # hypercalciuria - reviewed importance of low sodium diet.    Stone risk noted to be related to many health issues such as HTN, obesity, metabolic syndrome.    # HTN - on amlodipine 5 mg daily - consider change to chlorthalidone to control urine calcium  # GERD - on omeprazole 20 mg daily  # hyperlipidemia  # NAFLD  # obestiy - 5 feet 258 lbs - BMI 50 - with above complications  - refer to weight loss clinic  # NSAID induced gastritis  # impaired glucose tolerance - Hgb A1C 6.2 on 10/21/2021  -Known issues that I take into account for other medical decisions, no current exacerbations or new concerns.     # Vitamin D deficiency - Vit D level 20 on 9/11/2020    Repeat 24 hour chemistries April 2022  Return in about 6 months (around 5/23/2022).     Start 2:13 PM   End 2:40 PM    Jeannette George MD  [facility]  Department of Medicine  Division of Renal Disease and Hypertension  Amcom  senthil Morales Web Console        REASON FOR CONSULT: nephrolithiasis    HISTORY OF PRESENT ILLNESS:  Tresa Chin is a 41 year old with kidney stones, HTN, obesity, hyperglycemia (without diabetes)  who presents to review options for medical management of kidney stones.    Last imaging: 10/18/2021  Surgery:  (I reviewed op report)  7/22/2020 left URS and stent placement for obstructing left ureteral stone with complication of urinary infection/luekocytosis and elevated lactate  8/4/2020- left URS and laser lithotripsy/stone basketing  Stone [place]: stone free per 10/18/2021 CT    Has not had kidney stone symptoms since this 2020 event.    Tresa Chin's diet consists of 3 meals per day.    nutrisystem for the last 1.5 months  Prior to doing nutrisystem would sometimes skip breakfast or get starbucks  Ate a lot of junk food - fast food or restaurant food. Not a lot of vegetables, fruits sometimes.     Current nutrisystem plan  Small pre-made meals, sandwich, hot pocket etc.  Stays under 1200 calories per day    She has tried to lose weight many times, gets bored with diet. Considering going oversees to get gastric bypass surgery as it is not covered by her insurance.    I reviewed 24 hour urine chemstries:            Family history is negative for kidney stones    PAST MEDICAL HISTORY:  Problem list  Patient Active Problem List   Diagnosis     Morbid obesity (H)     Essential hypertension     Hyperlipidemia LDL goal <160     Generalized anxiety disorder     NAFLD (nonalcoholic fatty liver disease)     Panic attack     NSAID induced gastritis     Gastroesophageal reflux disease without esophagitis     Pain in joint, ankle and foot, left     Ureterolithiasis     Kidney stone     Left ureteral stone     Prediabetes     Major depression, single episode     Family history of malignant neoplasm of breast     Persistent depressive disorder         PSH:  Reviewed  Past Surgical History:   Procedure Laterality Date     COMBINED CYSTOSCOPY, INSERT STENT URETER(S) Left 7/22/2020    Procedure: Cystoscopy with left ureteral stent insertion;  Surgeon: Anurag Gomez MD;  Location: [facility] OR     COMBINED CYSTOSCOPY,  RETROGRADES, URETEROSCOPY, LASER HOLMIUM LITHOTRIPSY URETER(S), INSERT STENT Left 8/4/2020    Procedure: Cystoscopy, removal of left ureteral stent, left ureteroscopy with holmium laser lithotripsy, stone basketing and placement of left ureteral stent;  Surgeon: Anurag Gomez MD;  Location:  OR     CYSTOSCOPY       ESOPHAGOSCOPY, GASTROSCOPY, DUODENOSCOPY (EGD), COMBINED N/A 5/31/2019    Procedure: ESOPHAGOGASTRODUODENOSCOPY (EGD)cold BX (fv);  Surgeon: Geremias Martinez MD;  Location:  GI     GENITOURINARY SURGERY          MEDICATIONS:  Current Outpatient Medications   Medication Sig Dispense Refill     amLODIPine (NORVASC) 5 MG tablet Take 1 tablet (5 mg) by mouth daily 90 tablet 3     omeprazole (PRILOSEC) 20 MG DR capsule Take 1 capsule by mouth once daily 90 capsule 3        ALLERGIES:    Allergies   Allergen Reactions     Latex      PN: Converted from LW Latex Sensitivity Flag       REVIEW OF SYSTEMS:  A 10 point review of systems was negative except as noted above.    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  Family/siblings in [place]    FAMILY MEDICAL HISTORY:   Family History   Problem Relation Age of Onset     Dementia Mother      Diabetes Mother      Hypertension Mother      Hyperlipidemia Mother      Lung Cancer Father      Hypertension Father      Hyperlipidemia Father      Other Cancer Father      Diabetes Sister      Hypertension Sister      Diabetes Brother      Depression No family hx of      Anxiety Disorder No family hx of      Obesity No family hx of        PHYSICAL EXAM:   GENERAL APPEARANCE: alert and no distress  RESP: normal work of breathing, no conversational dyspnea  NEURO: mentation intact and speech normal    LABS:   I reviewed:  Electrolytes/Renal -   Recent Labs   Lab Test 10/21/21  0814 09/14/21  0904 07/29/21  0822 04/13/21  1702    138  --  140   POTASSIUM 4.1 3.9  --  3.8   CHLORIDE 106 105  --  107   CO2 23 28  --  30   BUN 12 12  --  17    CR 0.54 0.50*  --  0.55   * 121* 145* 147*   NAOMI 8.5 8.7  --  9.1       CBC -   Recent Labs   Lab Test 09/14/21  0904 07/21/20  2241 03/13/20  1443   WBC 8.6 14.4* 9.0   HGB 14.9 14.5 15.0    380 301       LFTs -   Recent Labs   Lab Test 02/18/21  0810 03/13/20  1443   ALKPHOS 54 63   BILITOTAL 0.5 0.4   ALT 71* 78*   AST 26 33   PROTTOTAL 7.2 7.5   ALBUMIN 3.4 3.7       Coags - No lab results found.    Iron Panel - No lab results found.    Endocrine -   Recent Labs   Lab Test 10/21/21  0814 09/14/21  0904 07/29/21  0822 02/18/21  0810   A1C 6.2* 6.2* 6.2* 6.0*   TSH  --   --   --  1.32       IMAGING:  reviewed    Jeannette George MD         Tresa is a 41 year old who is being evaluated via a billable video visit.      How would you like to obtain your AVS? MyChart  If the video visit is dropped, the invitation should be resent by: Text to cell phone: [phone]  Will anyone else be joining your video visit? No      Video Start Time: 2:13 pm  Video-Visit Details    Type of service:  Video Visit    Video End Time:2:40 PM    Originating Location (pt. Location): Other Car - outside of work    Distant Location (provider location):  [facility] NEPHROLOGY [facility]     Platform used for Video Visit: [employer]

## 2021-11-24 ASSESSMENT — PATIENT HEALTH QUESTIONNAIRE - PHQ9: SUM OF ALL RESPONSES TO PHQ QUESTIONS 1-9: 18

## 2021-11-30 ENCOUNTER — VIRTUAL VISIT (OUTPATIENT)
Dept: PSYCHOLOGY | Facility: CLINIC | Age: 41
End: 2021-11-30
Attending: FAMILY MEDICINE
Payer: COMMERCIAL

## 2021-11-30 DIAGNOSIS — F41.1 GENERALIZED ANXIETY DISORDER: Primary | ICD-10-CM

## 2021-11-30 PROBLEM — M25.572 PAIN IN JOINT, ANKLE AND FOOT, LEFT: Status: RESOLVED | Noted: 2020-07-03 | Resolved: 2021-11-30

## 2021-11-30 PROCEDURE — 90834 PSYTX W PT 45 MINUTES: CPT | Mod: 95
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
## 2021-12-06 ENCOUNTER — OFFICE VISIT (OUTPATIENT)
Dept: BEHAVIORAL HEALTH | Facility: CLINIC | Age: 41
End: 2021-12-06
Payer: COMMERCIAL

## 2021-12-06 DIAGNOSIS — F34.1 PERSISTENT DEPRESSIVE DISORDER: Primary | ICD-10-CM

## 2021-12-06 PROCEDURE — 90834 PSYTX W PT 45 MINUTES: CPT | Performed by: SOCIAL WORKER
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
## 2021-12-07 ENCOUNTER — VIRTUAL VISIT (OUTPATIENT)
Dept: PSYCHOLOGY | Facility: CLINIC | Age: 41
End: 2021-12-07
Attending: FAMILY MEDICINE
Payer: COMMERCIAL

## 2021-12-07 DIAGNOSIS — F32.1 CURRENT MODERATE EPISODE OF MAJOR DEPRESSIVE DISORDER WITHOUT PRIOR EPISODE (H): ICD-10-CM

## 2021-12-07 DIAGNOSIS — F41.1 GENERALIZED ANXIETY DISORDER: Primary | ICD-10-CM

## 2021-12-07 PROCEDURE — 90834 PSYTX W PT 45 MINUTES: CPT | Mod: 95

## 2021-12-07 NOTE — PROGRESS NOTES
Progress Note    Patient Name: Tresa Chin  Date: 12/7/21         Service Type: Individual      Session Start Time: 8:00 am  Session End Time: 8:45 am     Session Length: 45 min    Session #: 3    Attendees: Client attended alone    Service Modality:  Video Visit:      Provider verified identity through the following two step process.  Patient provided:  Patient is known previously to provider    Telemedicine Visit: The patient's condition can be safely assessed and treated via synchronous audio and visual telemedicine encounter.      Reason for Telemedicine Visit: Patient convenience (e.g. access to timely appointments / distance to available provider)    Originating Site (Patient Location): Patient's other vehicle    Distant Site (Provider Location): Provider Remote Setting- Home Office    Consent:  The patient/guardian has verbally consented to: the potential risks and benefits of telemedicine (video visit) versus in person care; bill my insurance or make self-payment for services provided; and responsibility for payment of non-covered services.     Patient would like the video invitation sent by:  My Chart    Mode of Communication:  Video Conference via Amwell    As the provider I attest to compliance with applicable laws and regulations related to telemedicine.     Treatment Plan Last Reviewed: next session  PHQ-9 / NETO-7 :     DATA  Interactive Complexity: No  Crisis: No       Progress Since Last Session (Related to Symptoms / Goals / Homework):   Symptoms: Improving mood, continuing anxiety    Homework: Achieved / completed to satisfaction      Episode of Care Goals: Minimal progress - CONTEMPLATION (Considering change and yet undecided); Intervened by assessing the negative and positive thinking (ambivalence) about behavior change     Current / Ongoing Stressors and Concerns:  Planning a trip to [place] to see family. Was planning to have gastric bypass over  there, but [place] providers raised concerns about aftercare and other medical complications.  Ongoing stressor of care giving for mom/sharing room.      Treatment Objective(s) Addressed in This Session:   use at least 3 coping skills for anxiety management in the next 8 weeks     Intervention:   DBT: Reviewed DBT Tip skills. Discussed Insight Timer kalyan provided by [facility].    Supportive therapy:  Provided active listening and validation. Discussed living options, ways to address relational  dynamics.        ASSESSMENT: Current Emotional / Mental Status (status of significant symptoms):   Risk status (Self / Other harm or suicidal ideation)   Patient denies current fears or concerns for personal safety.   Patient denies current or recent suicidal ideation or behaviors.   Patient denies current or recent homicidal ideation or behaviors.   Patient denies current or recent self injurious behavior or ideation.   Patient denies other safety concerns.   Patient reports there has been no change in risk factors since their last session.     Patient reports there has been no change in protective factors since their last session.     A safety and risk management plan has been developed including: Patient consented to co-developed safety plan.  Safety and risk management plan was completed.  Patient agreed to use safety plan should any safety concerns arise.  A copy was given to the patient.     Appearance:   Appropriate    Eye Contact:   Good    Psychomotor Behavior: Normal    Attitude:   Cooperative  Interested   Orientation:   All   Speech    Rate / Production: Normal     Volume:  Normal    Mood:    Anxious  Depressed    Affect:    Appropriate    Thought Content:  Clear    Thought Form:  Coherent  Goal Directed  Logical    Insight:    Fair      Medication Review:   No current psychiatric medications prescribed     Medication Compliance:   No     Changes in Health Issues:   None reported     Chemical Use Review:   Substance Use:  "Chemical use reviewed, no active concerns identified      Tobacco Use: No current tobacco use.      Diagnosis:  1. Generalized anxiety disorder    2. Current moderate episode of major depressive disorder without prior episode (H)        Collateral Reports Completed:   Routed note to PCP    PLAN: (Patient Tasks / Therapist Tasks / Other)  Use TIP skills and insight timer, proactive communication with boss.     GASTON Scherer 12/7/21  This note has been reviewed and I agree with the plan of care. This note is co-signed by MARIANNE Magallanes, [facility], Supervisor, on 12/13/2021  __________________________________________________________________            [facility]                                       Tresa Chin     SAFETY PLAN:  Step 1: Warning signs / cues (Thoughts, images, mood, situation, behavior) that a crisis may be developing:    Thoughts: \"I can't do this anymore\" \"I want to disappear\" \"I don't want to be here\" \"I can't deal with any more problems\"    Images: none    Thinking Processes: racing thoughts and highly critical and negative thoughts: no specific recall     Mood: worsening depression, hopelessness, intense anger and intense worry-anxiety    Behaviors: eating sugary, fatty foods, slamming doors    Situations: Household dynamic with caregiving/brothers  When house is dirty/needs cleaning, caregiving exhaustion  Step 2: Coping strategies - Things I can do to take my mind off of my problems without contacting another person (relaxation technique, physical activity):    Distress Tolerance Strategies:  listen to positive and upbeat music: ,, watch a funny movie: Family movies, [place] movies, change body temperature (ice pack/cold water)  and paced breathing/progressive muscle relaxation,     Physical Activities: yoga, meditation, deep breathing and stretching     Focus on helpful thoughts:  \"This is temporary\", \"I will get through this\" and self-compassion statements: " I am doing difficult things.   Step 3: People and social settings that provide distraction:   Name:  Abbey (friend)    movie theater, work and shopping malls, [place]   Step 4: Remind myself of people and things that are important to me and worth living for:  Travel, future relationship  Step 5: When I am in crisis, I can ask these people to help me use my safety plan:   Name: Abbey    Step 6: Making the environment safe:     be around others  Step 7: Professionals or agencies I can contact during a crisis:    Suicide Prevention Lifeline: 9-537-402-TALK (2170)    Crisis Text Line Service (available 24 hours a day, 7 days a week): Text [place] to 859544  Local Crisis Services: [facility] Crisis  Call [phone]  24 hours phone and face to face    Call 696 or go to my nearest emergency department.   I helped develop this safety plan and agree to use it when needed.  I have been given a copy of this plan.    Client signature _________________________________________________________________  Today s date:  11/30/2021  Completed by Provider Name/ Credentials:  Darya Jurado MSW LGSW  November 30, 2021  Adapted from Safety Plan Template 2008 Jovita Gonzalez and Jose Acosta is reprinted with the express permission of the authors.  No portion of the Safety Plan Template may be reproduced without the express, written permission.  You can contact the authors at [email or web address] or [email or web address].

## 2021-12-15 ENCOUNTER — OFFICE VISIT (OUTPATIENT)
Dept: OPTOMETRY | Facility: CLINIC | Age: 41
End: 2021-12-15
Payer: COMMERCIAL

## 2021-12-15 ENCOUNTER — VIRTUAL VISIT (OUTPATIENT)
Dept: PSYCHOLOGY | Facility: CLINIC | Age: 41
End: 2021-12-15
Payer: COMMERCIAL

## 2021-12-15 DIAGNOSIS — H04.129 DRY EYE: ICD-10-CM

## 2021-12-15 DIAGNOSIS — H52.13 MYOPIA OF BOTH EYES WITH ASTIGMATISM: Primary | ICD-10-CM

## 2021-12-15 DIAGNOSIS — E11.9 TYPE 2 DIABETES MELLITUS WITHOUT RETINOPATHY (H): ICD-10-CM

## 2021-12-15 DIAGNOSIS — F41.1 GENERALIZED ANXIETY DISORDER: Primary | ICD-10-CM

## 2021-12-15 DIAGNOSIS — F32.1 CURRENT MODERATE EPISODE OF MAJOR DEPRESSIVE DISORDER WITHOUT PRIOR EPISODE (H): ICD-10-CM

## 2021-12-15 DIAGNOSIS — H52.203 MYOPIA OF BOTH EYES WITH ASTIGMATISM: Primary | ICD-10-CM

## 2021-12-15 DIAGNOSIS — H10.13 ALLERGIC CONJUNCTIVITIS, BILATERAL: ICD-10-CM

## 2021-12-15 PROCEDURE — 92015 DETERMINE REFRACTIVE STATE: CPT | Performed by: OPTOMETRIST

## 2021-12-15 PROCEDURE — 90834 PSYTX W PT 45 MINUTES: CPT | Mod: 95

## 2021-12-15 PROCEDURE — 92004 COMPRE OPH EXAM NEW PT 1/>: CPT | Performed by: OPTOMETRIST

## 2021-12-15 RX ORDER — OLOPATADINE HYDROCHLORIDE 1 MG/ML
1 SOLUTION/ DROPS OPHTHALMIC 2 TIMES DAILY
Qty: 5 ML | Refills: 11 | Status: SHIPPED | OUTPATIENT
Start: 2021-12-15 | End: 2022-12-23

## 2021-12-15 ASSESSMENT — REFRACTION_MANIFEST
OS_SPHERE: -6.75
OD_AXIS: 047
OD_SPHERE: -5.50
OD_CYLINDER: +0.50
OD_CYLINDER: +0.50
OD_SPHERE: -5.75
OS_CYLINDER: +0.75
METHOD_AUTOREFRACTION: 1
OS_CYLINDER: +0.25
OS_AXIS: 112
OS_AXIS: 097
OD_AXIS: 009
OS_SPHERE: -6.25

## 2021-12-15 ASSESSMENT — KERATOMETRY
OD_AXISANGLE_DEGREES: 067
OS_AXISANGLE2_DEGREES: 167
OD_K1POWER_DIOPTERS: 41.62
OD_AXISANGLE2_DEGREES: 157
OS_AXISANGLE_DEGREES: 77
OS_K2POWER_DIOPTERS: 42.62
OS_K1POWER_DIOPTERS: 41.75
METHOD_AUTO_MANUAL: AUTOMATED
OD_K2POWER_DIOPTERS: 42.50

## 2021-12-15 ASSESSMENT — REFRACTION_WEARINGRX
OD_CYLINDER: +0.50
OS_SPHERE: -6.25
OS_AXIS: 091
OS_CYLINDER: +0.75
SPECS_TYPE: SVL
OD_AXIS: 051
OD_SPHERE: -5.50

## 2021-12-15 ASSESSMENT — VISUAL ACUITY
OD_CC: 20/20-2
METHOD: SNELLEN - LINEAR
CORRECTION_TYPE: GLASSES
OD_SC: 20/500
OD_CC: 20/20
OS_CC: 20/20-2
OS_CC: 20/25
OS_CC+: +2
OS_SC: 20/500
OD_SC: 20/20-1
OS_SC: 20/120

## 2021-12-15 ASSESSMENT — TONOMETRY
OS_IOP_MMHG: 15
IOP_METHOD: APPLANATION
OD_IOP_MMHG: 15

## 2021-12-15 ASSESSMENT — CONF VISUAL FIELD
METHOD: COUNTING FINGERS
OD_NORMAL: 1
OS_NORMAL: 1

## 2021-12-15 ASSESSMENT — EXTERNAL EXAM - LEFT EYE: OS_EXAM: NORMAL

## 2021-12-15 ASSESSMENT — CUP TO DISC RATIO
OD_RATIO: 0.4
OS_RATIO: 0.4

## 2021-12-15 ASSESSMENT — EXTERNAL EXAM - RIGHT EYE: OD_EXAM: NORMAL

## 2021-12-15 ASSESSMENT — SLIT LAMP EXAM - LIDS
COMMENTS: NORMAL
COMMENTS: NORMAL

## 2021-12-15 NOTE — PROGRESS NOTES
Progress Note    Patient Name: Tresa Chin  Date: 12/15/21         Service Type: Individual      Session Start Time: 8:00 am  Session End Time: 8:45 am     Session Length: 45 min    Session #: 4    Attendees: Client attended alone    Service Modality:  Video Visit:      Provider verified identity through the following two step process.  Patient provided:  Patient is known previously to provider    Telemedicine Visit: The patient's condition can be safely assessed and treated via synchronous audio and visual telemedicine encounter.      Reason for Telemedicine Visit: Patient convenience (e.g. access to timely appointments / distance to available provider)    Originating Site (Patient Location): Patient's other vehicle    Distant Site (Provider Location): Provider Remote Setting- Home Office    Consent:  The patient/guardian has verbally consented to: the potential risks and benefits of telemedicine (video visit) versus in person care; bill my insurance or make self-payment for services provided; and responsibility for payment of non-covered services.     Patient would like the video invitation sent by:  My Chart    Mode of Communication:  Video Conference via Amwell    As the provider I attest to compliance with applicable laws and regulations related to telemedicine.     Treatment Plan Last Reviewed:  12/15/21  PHQ-9 / NETO-7 :     DATA  Interactive Complexity: No  Crisis: No       Progress Since Last Session (Related to Symptoms / Goals / Homework):   Symptoms: Varying mood, highs and lows, continuing anxiety    Homework: Achieved / completed to satisfaction      Episode of Care Goals: Minimal progress - CONTEMPLATION (Considering change and yet undecided); Intervened by assessing the negative and positive thinking (ambivalence) about behavior change     Current / Ongoing Stressors and Concerns:  Caregiver for mom has resigned; client is looking for a new caregiver.    Planning a trip to [place] to see family. Medical concerns. Ongoing stressor of care giving for mom/sharing room.      Treatment Objective(s) Addressed in This Session:   use at least 3 coping skills for anxiety management in the next 8 weeks   Consider medication option     Intervention:   DBT: Reviewed DBT Tip skills. Discussed Insight Timer kalyan provided by [facility].    Supportive therapy:  Provided active listening and validation. Provided education on benefits of medication.  Motivational Interviewing  Target Behavior: medication    Stage of Change: CONTEMPLATION (Considering change and yet undecided)    MI Intervention: Expressed Empathy/Understanding, Open-ended questions and Rolled with resistance: Emphasized patient autonomy, Simple reflection and Complex reflection     Change Talk Expressed by the Patient: Reasons to change Need to change    Provider Response to Change Talk: E - Evoked more info from patient about behavior change       ASSESSMENT: Current Emotional / Mental Status (status of significant symptoms):   Risk status (Self / Other harm or suicidal ideation)   Patient denies current fears or concerns for personal safety.   Patient denies current or recent suicidal ideation or behaviors.   Patient denies current or recent homicidal ideation or behaviors.   Patient denies current or recent self injurious behavior or ideation.   Patient denies other safety concerns.   Patient reports there has been no change in risk factors since their last session.     Patient reports there has been no change in protective factors since their last session.     A safety and risk management plan has been developed including: Patient consented to co-developed safety plan.  Safety and risk management plan was completed.  Patient agreed to use safety plan should any safety concerns arise.  A copy was given to the patient.     Appearance:   Appropriate    Eye Contact:   Good    Psychomotor Behavior: Normal     Attitude:   Cooperative  Interested   Orientation:   All   Speech    Rate / Production: Normal     Volume:  Normal    Mood:    Anxious  Depressed    Affect:    Appropriate    Thought Content:  Clear    Thought Form:  Coherent  Goal Directed  Logical    Insight:    Fair      Medication Review:   No current psychiatric medications prescribed     Medication Compliance:   No     Changes in Health Issues:   None reported     Chemical Use Review:   Substance Use: Chemical use reviewed, no active concerns identified      Tobacco Use: No current tobacco use.      Diagnosis:  1. Generalized anxiety disorder    2. Current moderate episode of major depressive disorder without prior episode (H)        Collateral Reports Completed:   Routed note to PCP    PLAN: (Patient Tasks / Therapist Tasks / Other)  Use TIP skills and insight timer, proactive communication with boss. Contact PCP regarding medication options.    GASTON Scherer 12/15/21  This note has been reviewed and I agree with the plan of care. This note is co-signed by MARIANNE Magallanes, [facility], Supervisor, on 12/15/2021      __________________________________________________________________                                                    Treatment Plan    Client's Name: Tresa Chin  YOB: 1980    Date: 12/15/21    DSM-V Diagnoses: 296.22 (F32.1)  Major Depressive Disorder, Single Episode, Moderate With anxious distress or 300.02 (F41.1) Generalized Anxiety Disorder  Psychosocial / Contextual Factors: caregiver for mother, living with brothers, cultural expectations, social isolation  WHODAS: 37     Referral / Collaboration:  Referral to another professional/service is not indicated at this time..    Anticipated number of session or this episode of care: 15    MeasurableTreatment Goal(s) related to diagnosis / functional impairment(s)  Goal 1: Client will report an improvement in functioning and have the capacity to enjoy her life  "evidenced by self report, observation, and NETO and PHQ9 scores of 5 or less.    I will know I've met my goal when I'm not feeling so down all the time and more able to focus .      Objective #A (Client Action)    Client will Discuss motivation / ambivalence about taking anti-depressant / mood stabilizer medication(s).  Status: New - Date: 12/15/21     Intervention(s)  Therapist will provide psychoeducation about medication.    Objective #B  Client will use skills and strategies to support anxiety reduction. .  Status: New - Date: 12/15/21     Intervention(s)  Therapist will teach emotion regulation, boundary and proactive communication skills..    Objective #C  Client will Increase interest, engagement, and pleasure in doing things  Decrease frequency and intensity of feeling down, depressed, hopeless  Improve quantity and quality of night time sleep / decrease daytime naps  Feel less tired and more energy during the day   Improve diet, appetite, mindful eating, and / or meal planning  Identify negative self-talk and behaviors: challenge core beliefs, myths, and actions  Improve concentration, focus, and mindfulness in daily activities   Feel less fidgety, restless or slow in daily activities / interpersonal interactions  Decrease thoughts that you'd be better off dead or of suicide / self-harm.  Status: New - Date: 12/15/21     Intervention(s)  Therapist will teach teach CBT and DBT skills and sleep hygiene an dassign homework.    Patient has reviewed and agreed to the above plan.    Darya Jurado, LGSW  December 15, 2021              [facility]                                       Tresa Chin     SAFETY PLAN:  Step 1: Warning signs / cues (Thoughts, images, mood, situation, behavior) that a crisis may be developing:    Thoughts: \"I can't do this anymore\" \"I want to disappear\" \"I don't want to be here\" \"I can't deal with any more problems\"    Images: none    Thinking Processes: racing " "thoughts and highly critical and negative thoughts: no specific recall     Mood: worsening depression, hopelessness, intense anger and intense worry-anxiety    Behaviors: eating sugary, fatty foods, slamming doors    Situations: Household dynamic with caregiving/brothers  When house is dirty/needs cleaning, caregiving exhaustion  Step 2: Coping strategies - Things I can do to take my mind off of my problems without contacting another person (relaxation technique, physical activity):    Distress Tolerance Strategies:  listen to positive and upbeat music: ,, watch a funny movie: Family movies, [place] movies, change body temperature (ice pack/cold water)  and paced breathing/progressive muscle relaxation,     Physical Activities: yoga, meditation, deep breathing and stretching     Focus on helpful thoughts:  \"This is temporary\", \"I will get through this\" and self-compassion statements: I am doing difficult things.   Step 3: People and social settings that provide distraction:   Name:  Daalexandre (friend)    movie theater, work and shopping malls, [place]   Step 4: Remind myself of people and things that are important to me and worth living for:  Travel, future relationship  Step 5: When I am in crisis, I can ask these people to help me use my safety plan:   Name: Abbey    Step 6: Making the environment safe:     be around others  Step 7: Professionals or agencies I can contact during a crisis:    Suicide Prevention Lifeline: 1-632-694-TALK (6178)    Crisis Text Line Service (available 24 hours a day, 7 days a week): Text [place] to 022572  Local Crisis Services: [facility] Crisis  Call [phone]  24 hours phone and face to face    Call 771 or go to my nearest emergency department.   I helped develop this safety plan and agree to use it when needed.  I have been given a copy of this plan.    Client signature _________________________________________________________________  Today s date:  11/30/2021  Completed by Provider " Name/ Credentials:  Darya Jurado MSW LGSW  November 30, 2021  Adapted from Safety Plan Template 2008 Jovita Gonzalez and Jose Acosta is reprinted with the express permission of the authors.  No portion of the Safety Plan Template may be reproduced without the express, written permission.  You can contact the authors at [email or web address] or [email or web address].

## 2021-12-15 NOTE — PROGRESS NOTES
Chief Complaint   Patient presents with     Diabetic Eye Exam     Pre-Diabetic        Chief Complaint(s) and History of Present Illness(es)     Diabetic Eye Exam     Comments: Pre-Diabetic               Hemoglobin A1C POCT   Date Value Ref Range Status   02/18/2021 6.0 (H) 0 - 5.6 % Final     Comment:     Normal <5.7% Prediabetes 5.7-6.4%  Diabetes 6.5% or higher - adopted from ADA   consensus guidelines.     09/11/2020 6.1 (H) 0 - 5.6 % Final     Comment:     Normal <5.7% Prediabetes 5.7-6.4%  Diabetes 6.5% or higher - adopted from ADA   consensus guidelines.     02/19/2020 5.8 (H) 0 - 5.6 % Final     Comment:     Normal <5.7% Prediabetes 5.7-6.4%  Diabetes 6.5% or higher - adopted from ADA   consensus guidelines.       Hemoglobin A1C   Date Value Ref Range Status   10/21/2021 6.2 (H) 0.0 - 5.6 % Final     Comment:     Normal <5.7%   Prediabetes 5.7-6.4%    Diabetes 6.5% or higher     Note: Adopted from ADA consensus guidelines.   09/14/2021 6.2 (H) 0.0 - 5.6 % Final     Comment:     Normal <5.7%   Prediabetes 5.7-6.4%    Diabetes 6.5% or higher     Note: Adopted from ADA consensus guidelines.   07/29/2021 6.2 (H) 0.0 - 5.6 % Final     Comment:     Normal <5.7%   Prediabetes 5.7-6.4%    Diabetes 6.5% or higher     Note: Adopted from ADA consensus guidelines.            Last Eye Exam: ~2 years - not sure  Dilated Previously: Yes, side effects of dilation explained today    What are you currently using to see?  Glasses - wears full time    Distance Vision Acuity: Noticed gradual change in both eyes    Near Vision Acuity: Satisfied with vision while reading and using computer with glasses    Eye Comfort: itchy - some allergies. Rubs eyes often  Has congestion/rhinitis symptoms   Do you use eye drops? : No  Occupation or Hobbies: [employer] and [employer]     Medical, surgical and family histories reviewed and updated 12/15/2021.       OBJECTIVE: See Ophthalmology exam    ASSESSMENT:    ICD-10-CM    1.  Myopia of both eyes with astigmatism  H52.13 EYE EXAM (SIMPLE-NONBILLABLE)    H52.203 REFRACTION   2. Type 2 diabetes mellitus without retinopathy (H)  E11.9    3. Dry eye  H04.129    4. Allergic conjunctivitis, bilateral  H10.13       PLAN:  Artificial tears as needed   Allergy drops two times daily   Tresa Chin aware  eye exam results will be sent to Melissa Franco.    Diana Díaz OD

## 2021-12-15 NOTE — Clinical Note
Progress note. I discussed medication as an option to address depression/anxiety and encouraged scheduling with PCP.  Thank you

## 2021-12-15 NOTE — PATIENT INSTRUCTIONS
Patient Education   Diabetes weakens the blood vessels all over the body, including the eyes. Damage to the blood vessels in the eyes can cause swelling or bleeding into part of the eye (called the retina). This is called diabetic retinopathy (ROBERTO-tin-AH-puh-thee). If not treated, this disease can cause vision loss or blindness.   Symptoms may include blurred or distorted vision, but many people have no symptoms. It's important to see your eye doctor regularly to check for problems.   Early treatment and good control can help protect your vision. Here are the things you can do to help prevent vision loss:      1. Keep your blood sugar levels under tight control.      2. Bring high blood pressure under control.      3. No smoking.      4. Have yearly dilated eye exams.      Patient Education     What Is Diabetic Retinopathy?  Diabetic retinopathy is the main cause of blindness in adults. It happens when diabetes harms blood vessels in the eye. These weak vessels leak fluid into a part of the eye called the retina. New blood vessels can break and bleed into the retina. Old blood vessels can leak and cause swelling. These vessels can damage parts of the retina. This can cause blurry, distorted vision.     What causes diabetic retinopathy?  Diabetes is the cause of this eye disease. Over time, diabetes weakens blood vessels all over the body, even in the eyes. Poor blood sugar control can make it worse. So can:    Smoking    High cholesterol    High blood pressure    Pregnancy  This health problem happens more often in Hispanics and in  Americans.   What are the symptoms?  You can have diabetic retinopathy without knowing it. There is often no pain and no outward sign. Over time, you may notice blurring or some vision loss. Some people have trouble seeing at night or see spots or floaters. Symptoms may come and go. Early treatment and good control of risk factors may help prevent vision loss or blindness.  What  can you do?  Have your eyes checked at least once a year by an eye specialist. Your healthcare provider will tell you when and how often you need these exams. You can also help control your diabetes through:    Exercise    Diet    Medicine, if needed  If you already have diabetic retinopathy, these same steps may help you control it, too. But don't do exercises that raise your blood pressure quickly.  [employer] last reviewed this educational content on 11/1/2019 2000-2021 [employer]. All rights reserved. This information is not intended as a substitute for professional medical care. Always follow your healthcare professional's instructions.           Patient Education     Treating Diabetic Retinopathy    Treatment may help slow the progress of diabetic retinopathy. Your treatment plan depends on your condition. You may need frequent exams to watch for changes. You may also need laser treatment and other procedures.   Watching your vision   At first, your healthcare provider may simply want to watch your vision. In some cases, you may also have:     An angiogram. This test uses a special dye to make detailed images of the retina. These images help your provider decide if you need special treatments.    Ocular coherence tomography (OCT) testing. This uses light waves to see if there is fluid leaking into certain parts of the eye. It can also look at the thickness of the retina.    Photos of your retina. These can show any changes in your retina since your last checkup.  Types of treatment  Special treatments can help stop bleeding. They can also slow or stop new vessel growth. And they can save vision and even make it better. The type of treatment you get depends on your condition:     Laser treatment can help stop leaks. It can also limit abnormal vessel growth.    Surgery can remove the vitreous or fix a retina damaged by scar tissue. This may help if the vitreous becomes filled with blood and makes  it hard to see.    Cryotherapy shrinks blood vessels and fixes the retina.    Medicines injected in the eye can help decrease swelling of the retina. They can also slow the abnormal growth of blood vessels.   Corinne last reviewed this educational content on 8/1/2018 2000-2021 [employer]. All rights reserved. This information is not intended as a substitute for professional medical care. Always follow your healthcare professional's instructions.  A prescription for allergic conjunctivitis was sent to pharmacy there are also over the counter products that work well for itching  Using over the counter Zaditor or Alaway- 1 drop in both eyes 2 x day or Pataday once daily   along with cold compresses and frequent eye/ brow washing will help for itchy, watery eyes.   Using continuously for 2 weeks will have better efficacy    You may also use chilled artificial tears during the day two times daily      DRY EYE TREATMENT    I recommend using artificial tears for your dry eye. There are over the counter drops that work well and may be used up to 4x daily. ( systane balance or ultra, blink, refresh optive, soothe xp) stay away from any red eye relief products such as visine and clear eyes.     If you need more than 4 drops daily, use a preservative free product which come in individual vials and may be used for 24 hours and discarded.   The more severe the dry eye, the more frequent instillation of artificial tears  that are needed to reduce irritation/ inflammation. (Sometimes every 1-2 hours for a couple of days).  You can also add a lubricating ointment in the lower lid at bedtime. ( over the counter refresh pm, genteal gel, lacrilube etc.)    Artificial tears work best as a preventative and not as well after your eyes are starting to bother you and/ or are red.  It may take 4- 6 weeks of using the drops before you notice improvement.  If after that time you are still having problems schedule an  appointment for an evaluation to discuss different treatments.    Dry eyes are a chronic condition and you may have more symptoms at certain times of the year.      Additional recommended treatment:  Warm compresses once to twice daily for 5-10 minutes    Directions for warm soaks  There are few methods for hot compresses. Moisten a washcloth with hot water, or microwave for 10 seconds, being careful to not get the cloth too hot.   Then put the washcloth onto your eyelids for 5 minutes. It will cool quickly so a rice pack or eyemask that can be heated and laid on top of the washcloth will help retain the heat.      Overabundance of bacterial microorganisms along the eyelashes and lid margins induce stress on the tear film and promote inflammation.  Regular lid hygiene helps diminish the bacterial population to prevent inflammation and infection.  Use a warm compress to loosen crusts   Cleanse lids once daily with a lid cleansing product as directed such as Ocusoft or Sterilid which can be purchased at most pharmacies. Diluted baby shampoo will also work, but not as well as it dries the skin and can irritate eyelids.  Hypo chlor spray may also be used on closed lids.      Omega 3 fatty acid supplements taken 1-2x daily  Recommend  at least  2000mg omega 3  800 EPA  600 DHA    Blink regularly  Stay hydrated  Increase humidity  Wear sunglasses   Avoid direct exposure to forced air--turn air vents in the car away and keep fans from blowing directly on your face

## 2021-12-15 NOTE — LETTER
12/15/2021         RE: Tresa Chin  [address]  [facility] [address]        Dear Colleague,    Thank you for referring your patient, Tresa Chin, to the [facility]. Please see a copy of my visit note below.    Chief Complaint   Patient presents with     Diabetic Eye Exam     Pre-Diabetic        Chief Complaint(s) and History of Present Illness(es)     Diabetic Eye Exam     Comments: Pre-Diabetic               Hemoglobin A1C POCT   Date Value Ref Range Status   02/18/2021 6.0 (H) 0 - 5.6 % Final     Comment:     Normal <5.7% Prediabetes 5.7-6.4%  Diabetes 6.5% or higher - adopted from ADA   consensus guidelines.     09/11/2020 6.1 (H) 0 - 5.6 % Final     Comment:     Normal <5.7% Prediabetes 5.7-6.4%  Diabetes 6.5% or higher - adopted from ADA   consensus guidelines.     02/19/2020 5.8 (H) 0 - 5.6 % Final     Comment:     Normal <5.7% Prediabetes 5.7-6.4%  Diabetes 6.5% or higher - adopted from ADA   consensus guidelines.       Hemoglobin A1C   Date Value Ref Range Status   10/21/2021 6.2 (H) 0.0 - 5.6 % Final     Comment:     Normal <5.7%   Prediabetes 5.7-6.4%    Diabetes 6.5% or higher     Note: Adopted from ADA consensus guidelines.   09/14/2021 6.2 (H) 0.0 - 5.6 % Final     Comment:     Normal <5.7%   Prediabetes 5.7-6.4%    Diabetes 6.5% or higher     Note: Adopted from ADA consensus guidelines.   07/29/2021 6.2 (H) 0.0 - 5.6 % Final     Comment:     Normal <5.7%   Prediabetes 5.7-6.4%    Diabetes 6.5% or higher     Note: Adopted from ADA consensus guidelines.            Last Eye Exam: ~2 years - not sure  Dilated Previously: Yes, side effects of dilation explained today    What are you currently using to see?  Glasses - wears full time    Distance Vision Acuity: Noticed gradual change in both eyes    Near Vision Acuity: Satisfied with vision while reading and using computer with glasses    Eye Comfort: itchy - some allergies. Rubs eyes often  Has  congestion/rhinitis symptoms   Do you use eye drops? : No  Occupation or Hobbies: [employer] and [employer]    Vanessa Perez     Medical, surgical and family histories reviewed and updated 12/15/2021.       OBJECTIVE: See Ophthalmology exam    ASSESSMENT:    ICD-10-CM    1. Myopia of both eyes with astigmatism  H52.13 EYE EXAM (SIMPLE-NONBILLABLE)    H52.203 REFRACTION   2. Type 2 diabetes mellitus without retinopathy (H)  E11.9    3. Dry eye  H04.129    4. Allergic conjunctivitis, bilateral  H10.13       PLAN:  Artificial tears as needed   Allergy drops two times daily   Tresa Chin aware  eye exam results will be sent to Melissa Franco.    Diana Díaz OD           Again, thank you for allowing me to participate in the care of your patient.        Sincerely,        Diana Díaz, OD

## 2021-12-22 ASSESSMENT — ANXIETY QUESTIONNAIRES
7. FEELING AFRAID AS IF SOMETHING AWFUL MIGHT HAPPEN: NEARLY EVERY DAY
5. BEING SO RESTLESS THAT IT IS HARD TO SIT STILL: NOT AT ALL
1. FEELING NERVOUS, ANXIOUS, OR ON EDGE: NEARLY EVERY DAY
GAD7 TOTAL SCORE: 14
4. TROUBLE RELAXING: SEVERAL DAYS
7. FEELING AFRAID AS IF SOMETHING AWFUL MIGHT HAPPEN: NEARLY EVERY DAY
GAD7 TOTAL SCORE: 14
3. WORRYING TOO MUCH ABOUT DIFFERENT THINGS: NEARLY EVERY DAY
GAD7 TOTAL SCORE: 14
6. BECOMING EASILY ANNOYED OR IRRITABLE: SEVERAL DAYS
2. NOT BEING ABLE TO STOP OR CONTROL WORRYING: NEARLY EVERY DAY

## 2021-12-23 ENCOUNTER — VIRTUAL VISIT (OUTPATIENT)
Dept: PSYCHOLOGY | Facility: CLINIC | Age: 41
End: 2021-12-23
Payer: COMMERCIAL

## 2021-12-23 DIAGNOSIS — F41.1 GENERALIZED ANXIETY DISORDER: Primary | ICD-10-CM

## 2021-12-23 DIAGNOSIS — F32.1 CURRENT MODERATE EPISODE OF MAJOR DEPRESSIVE DISORDER WITHOUT PRIOR EPISODE (H): ICD-10-CM

## 2021-12-23 PROCEDURE — 90834 PSYTX W PT 45 MINUTES: CPT | Mod: 95

## 2021-12-23 ASSESSMENT — ANXIETY QUESTIONNAIRES: GAD7 TOTAL SCORE: 14

## 2021-12-23 NOTE — PROGRESS NOTES
Progress Note    Patient Name: Tresa Chin  Date: 12/23/21         Service Type: Individual      Session Start Time: 4:40 pm  Session End Time: 5:20 pm     Session Length: 40 min    Session #: 5    Attendees: Client attended alone    Service Modality:  Video Visit:      Provider verified identity through the following two step process.  Patient provided:  Patient is known previously to provider    Telemedicine Visit: The patient's condition can be safely assessed and treated via synchronous audio and visual telemedicine encounter.      Reason for Telemedicine Visit: Patient convenience (e.g. access to timely appointments / distance to available provider)    Originating Site (Patient Location): Patient's other vehicle    Distant Site (Provider Location): Provider Remote Setting- Home Office    Consent:  The patient/guardian has verbally consented to: the potential risks and benefits of telemedicine (video visit) versus in person care; bill my insurance or make self-payment for services provided; and responsibility for payment of non-covered services.     Patient would like the video invitation sent by:  My Chart    Mode of Communication:  Video Conference via [employer]well    As the provider I attest to compliance with applicable laws and regulations related to telemedicine.     Treatment Plan Last Reviewed:  12/15/21  PHQ-9 / NETO-7 :   Answers for HPI/ROS submitted by the patient on 12/22/2021  NETO 7 TOTAL SCORE: 14    DATA  Interactive Complexity: No  Crisis: No       Progress Since Last Session (Related to Symptoms / Goals / Homework):   Symptoms: Varying mood, highs and lows, continuing anxiety    Homework: Achieved / completed to satisfaction      Episode of Care Goals: Minimal progress - CONTEMPLATION (Considering change and yet undecided); Intervened by assessing the negative and positive thinking (ambivalence) about behavior change     Current / Ongoing Stressors and  Concerns:  Mom fell and broke her hip, is in rehab. Client's current back and knee problems make giving care; working with  to explore options for long term senior care. MRI results indicated cortisone shots in knee.   Planning a trip to [place] to see family. Medical concerns. Ongoing stressor of care giving for mom/sharing room.      Treatment Objective(s) Addressed in This Session:   use at least 3 coping skills for anxiety management in the next 8 weeks   Consider medication option     Intervention:   Supportive therapy:  Provided active listening and validation. Role played proactive communication with care  Coordinator regarding care needs. Reviewed plan and set goal for medication.        Motivational Interviewing  Target Behavior: medication-see PCP and start medication    Stage of Change: PREPARATION (Decided to change - considering how)    MI Intervention: Expressed Empathy/Understanding, Supported Autonomy, Collaboration, Evocation, Open-ended questions, Reflections: simple and complex, Rolled with resistance: Emphasized patient autonomy, Simple reflection and Complex reflection and Change talk (evoked)     Change Talk Expressed by the Patient: Reasons to change Need to change    Provider Response to Change Talk: E - Evoked more info from patient about behavior change       ASSESSMENT: Current Emotional / Mental Status (status of significant symptoms):   Risk status (Self / Other harm or suicidal ideation)   Patient denies current fears or concerns for personal safety.   Patient denies current or recent suicidal ideation or behaviors.   Patient denies current or recent homicidal ideation or behaviors.   Patient denies current or recent self injurious behavior or ideation.   Patient denies other safety concerns.   Patient reports there has been no change in risk factors since their last session.     Patient reports there has been no change in protective factors since their last session.     A  safety and risk management plan has been developed including: Patient consented to co-developed safety plan.  Safety and risk management plan was completed.  Patient agreed to use safety plan should any safety concerns arise.  A copy was given to the patient.     Appearance:   Appropriate    Eye Contact:   Good    Psychomotor Behavior: Normal    Attitude:   Cooperative  Interested   Orientation:   All   Speech    Rate / Production: Normal     Volume:  Normal    Mood:    Anxious  Depressed    Affect:    Appropriate    Thought Content:  Clear    Thought Form:  Coherent  Goal Directed  Logical    Insight:    Fair      Medication Review:   No current psychiatric medications prescribed     Medication Compliance:   No     Changes in Health Issues:   None reported     Chemical Use Review:   Substance Use: Chemical use reviewed, no active concerns identified      Tobacco Use: No current tobacco use.      Diagnosis:  1. Generalized anxiety disorder    2. Current moderate episode of major depressive disorder without prior episode (H)        Collateral Reports Completed:   Routed note to PCP    PLAN: (Patient Tasks / Therapist Tasks / Other)  Use TIP skills and insight timer/other mindfulness resources, proactive communication with care coordinator regarding mom's needs. Contact PCP regarding medication options.    GASTON Scherer 12/23/21  This note has been reviewed and I agree with the plan of care. This note is co-signed by MARIANNE Magallanes, [facility] Hospital[facility], Supervisor, on 12/24/2021  __________________________________________________________________                                                    Treatment Plan    Client's Name: Tresa Chin  YOB: 1980    Date: 12/15/21    DSM-V Diagnoses: 296.22 (F32.1)  Major Depressive Disorder, Single Episode, Moderate With anxious distress or 300.02 (F41.1) Generalized Anxiety Disorder  Psychosocial / Contextual Factors: caregiver for mother, living with  brothers, cultural expectations, social isolation  WHODAS: 37     Referral / Collaboration:  Referral to another professional/service is not indicated at this time..    Anticipated number of session or this episode of care: 15    MeasurableTreatment Goal(s) related to diagnosis / functional impairment(s)  Goal 1: Client will report an improvement in functioning and have the capacity to enjoy her life evidenced by self report, observation, and NETO and PHQ9 scores of 5 or less.    I will know I've met my goal when I'm not feeling so down all the time and more able to focus .      Objective #A (Client Action)    Client will Discuss motivation / ambivalence about taking anti-depressant / mood stabilizer medication(s).  Status: New - Date: 12/15/21     Intervention(s)  Therapist will provide psychoeducation about medication.    Objective #B  Client will use skills and strategies to support anxiety reduction. .  Status: New - Date: 12/15/21     Intervention(s)  Therapist will teach emotion regulation, boundary and proactive communication skills..    Objective #C  Client will Increase interest, engagement, and pleasure in doing things  Decrease frequency and intensity of feeling down, depressed, hopeless  Improve quantity and quality of night time sleep / decrease daytime naps  Feel less tired and more energy during the day   Improve diet, appetite, mindful eating, and / or meal planning  Identify negative self-talk and behaviors: challenge core beliefs, myths, and actions  Improve concentration, focus, and mindfulness in daily activities   Feel less fidgety, restless or slow in daily activities / interpersonal interactions  Decrease thoughts that you'd be better off dead or of suicide / self-harm.  Status: New - Date: 12/15/21     Intervention(s)  Therapist will teach teach CBT and DBT skills and sleep hygiene an dassign homework.    Patient has reviewed and agreed to the above plan.    Darya Jurado, ZENA  December 15,  "2021              [facility]                                       Tresa Chin     SAFETY PLAN:  Step 1: Warning signs / cues (Thoughts, images, mood, situation, behavior) that a crisis may be developing:    Thoughts: \"I can't do this anymore\" \"I want to disappear\" \"I don't want to be here\" \"I can't deal with any more problems\"    Images: none    Thinking Processes: racing thoughts and highly critical and negative thoughts: no specific recall     Mood: worsening depression, hopelessness, intense anger and intense worry-anxiety    Behaviors: eating sugary, fatty foods, slamming doors    Situations: Household dynamic with caregiving/brothers  When house is dirty/needs cleaning, caregiving exhaustion  Step 2: Coping strategies - Things I can do to take my mind off of my problems without contacting another person (relaxation technique, physical activity):    Distress Tolerance Strategies:  listen to positive and upbeat music: ,, watch a funny movie: Family movies, [place] movies, change body temperature (ice pack/cold water)  and paced breathing/progressive muscle relaxation,     Physical Activities: yoga, meditation, deep breathing and stretching     Focus on helpful thoughts:  \"This is temporary\", \"I will get through this\" and self-compassion statements: I am doing difficult things.   Step 3: People and social settings that provide distraction:   Name:  Abbey (friend)    movie theater, work and shopping malls, [place]   Step 4: Remind myself of people and things that are important to me and worth living for:  Travel, future relationship  Step 5: When I am in crisis, I can ask these people to help me use my safety plan:   Name: Abbey    Step 6: Making the environment safe:     be around others  Step 7: Professionals or agencies I can contact during a crisis:    Suicide Prevention Lifeline: 2-202-063-TALK (4426)    Crisis Text Line Service (available 24 hours a day, 7 days a week): Text [place] to " 490527  [facility] Crisis Services: [facility]  Call [phone]  24 hours phone and face to face    Call 911 or go to my nearest emergency department.   I helped develop this safety plan and agree to use it when needed.  I have been given a copy of this plan.    Client signature _________________________________________________________________  Today s date:  11/30/2021  Completed by Provider Name/ Credentials:  Darya Jurado MSW LGSW  November 30, 2021  Adapted from Safety Plan Template 2008 Jovita Gonzalez and Jose Acosta is reprinted with the express permission of the authors.  No portion of the Safety Plan Template may be reproduced without the express, written permission.  You can contact the authors at bhs@[email or web address] or [email or web address].

## 2022-01-06 ENCOUNTER — VIRTUAL VISIT (OUTPATIENT)
Dept: PSYCHOLOGY | Facility: CLINIC | Age: 42
End: 2022-01-06
Payer: COMMERCIAL

## 2022-01-06 DIAGNOSIS — F41.1 GENERALIZED ANXIETY DISORDER: Primary | ICD-10-CM

## 2022-01-06 DIAGNOSIS — F32.1 CURRENT MODERATE EPISODE OF MAJOR DEPRESSIVE DISORDER WITHOUT PRIOR EPISODE (H): ICD-10-CM

## 2022-01-06 PROCEDURE — 90834 PSYTX W PT 45 MINUTES: CPT | Mod: 95

## 2022-01-06 ASSESSMENT — PATIENT HEALTH QUESTIONNAIRE - PHQ9
SUM OF ALL RESPONSES TO PHQ QUESTIONS 1-9: 6
SUM OF ALL RESPONSES TO PHQ QUESTIONS 1-9: 6
10. IF YOU CHECKED OFF ANY PROBLEMS, HOW DIFFICULT HAVE THESE PROBLEMS MADE IT FOR YOU TO DO YOUR WORK, TAKE CARE OF THINGS AT HOME, OR GET ALONG WITH OTHER PEOPLE: VERY DIFFICULT

## 2022-01-06 NOTE — PROGRESS NOTES
Progress Note    Patient Name: Tresa Chin  Date: 1/6/22         Service Type: Individual      Session Start Time: 4:35 pm  Session End Time: 5:15 pm     Session Length: 40 min    Session #: 6    Attendees: Client attended alone    Service Modality:  Video Visit:      Provider verified identity through the following two step process.  Patient provided:  Patient is known previously to provider    Telemedicine Visit: The patient's condition can be safely assessed and treated via synchronous audio and visual telemedicine encounter.      Reason for Telemedicine Visit: Patient convenience (e.g. access to timely appointments / distance to available provider)    Originating Site (Patient Location): Patient's other vehicle    Distant Site (Provider Location): Provider Remote Setting- Home Office    Consent:  The patient/guardian has verbally consented to: the potential risks and benefits of telemedicine (video visit) versus in person care; bill my insurance or make self-payment for services provided; and responsibility for payment of non-covered services.     Patient would like the video invitation sent by:  My Chart    Mode of Communication:  Video Conference via [facility]    As the provider I attest to compliance with applicable laws and regulations related to telemedicine.     Treatment Plan Last Reviewed:  12/15/21  PHQ-9 / NETO-7 :   Answers for HPI/ROS submitted by the patient on 12/22/2021  NETO 7 TOTAL SCORE: 14    Answers for HPI/ROS submitted by the patient on 1/6/2022  If you checked off any problems, how difficult have these problems made it for you to do your work, take care of things at home, or get along with other people?: Very difficult  PHQ9 TOTAL SCORE: 6      DATA  Interactive Complexity: No  Crisis: No       Progress Since Last Session (Related to Symptoms / Goals / Homework):   Symptoms: Varying mood, highs and lows, continuing anxiety    Homework: Achieved  / completed to satisfaction      Episode of Care Goals: Minimal progress - CONTEMPLATION (Considering change and yet undecided); Intervened by assessing the negative and positive thinking (ambivalence) about behavior change     Current / Ongoing Stressors and Concerns:  Mom fell and broke her hip, is in rehab. Client's current back and knee problems make giving care; working with  to explore options for long term senior care.   Planning a trip to [place] to see family. Medical concerns. Ongoing stressor of care giving for mom/sharing room.      Treatment Objective(s) Addressed in This Session:   use at least 3 coping skills for anxiety management in the next 8 weeks   Consider medication option     Intervention:   Supportive therapy:  Provided active listening and validation. Surfaced worry about mom's long term care and a sense of comfort about current care.     Surfaced need to move away from negative thinking and introduced checking the facts and cognitive distortions. Introduced GLAD gratitude journaling practice and assigned homework.       Motivational Interviewing  Target Behavior: medication-see PCP and start medication    Stage of Change: PREPARATION (Decided to change - considering how)    MI Intervention: Expressed Empathy/Understanding, Supported Autonomy, Collaboration, Evocation, Open-ended questions, Reflections: simple and complex, Rolled with resistance: Emphasized patient autonomy, Simple reflection and Complex reflection and Change talk (evoked)     Change Talk Expressed by the Patient: Reasons to change Need to change    Provider Response to Change Talk: E - Evoked more info from patient about behavior change       ASSESSMENT: Current Emotional / Mental Status (status of significant symptoms):   Risk status (Self / Other harm or suicidal ideation)   Patient denies current fears or concerns for personal safety.   Patient denies current or recent suicidal ideation or behaviors.   Patient  denies current or recent homicidal ideation or behaviors.   Patient denies current or recent self injurious behavior or ideation.   Patient denies other safety concerns.   Patient reports there has been no change in risk factors since their last session.     Patient reports there has been no change in protective factors since their last session.     A safety and risk management plan has been developed including: Patient consented to co-developed safety plan.  Safety and risk management plan was completed.  Patient agreed to use safety plan should any safety concerns arise.  A copy was given to the patient.     Appearance:   Appropriate    Eye Contact:   Good    Psychomotor Behavior: Normal    Attitude:   Cooperative  Interested   Orientation:   All   Speech    Rate / Production: Normal     Volume:  Normal    Mood:    Anxious  Depressed    Affect:    Appropriate    Thought Content:  Clear    Thought Form:  Coherent  Goal Directed  Logical    Insight:    Fair      Medication Review:   No current psychiatric medications prescribed     Medication Compliance:   No     Changes in Health Issues:   None reported     Chemical Use Review:   Substance Use: Chemical use reviewed, no active concerns identified      Tobacco Use: No current tobacco use.      Diagnosis:  1. Generalized anxiety disorder    2. Current moderate episode of major depressive disorder without prior episode (H)        Collateral Reports Completed:   Routed note to PCP    PLAN: (Patient Tasks / Therapist Tasks / Other)  Use TIP skills and insight timer/other mindfulness resources, proactive communication with care coordinator regarding mom's needs. Contact PCP regarding medication options.  Use GLAD practice, check cognitive distortions.    GASTON Scherer  1/6/22  This note has been reviewed and I agree with the plan of care. This note is co-signed by MARIANNE Magallanes, CITLALISW, Supervisor, on  1/06/2022  __________________________________________________________________                                                    Treatment Plan    Client's Name: Tresa Chin  YOB: 1980    Date: 12/15/21    DSM-V Diagnoses: 296.22 (F32.1)  Major Depressive Disorder, Single Episode, Moderate With anxious distress or 300.02 (F41.1) Generalized Anxiety Disorder  Psychosocial / Contextual Factors: caregiver for mother, living with brothers, cultural expectations, social isolation  WHODAS: 37     Referral / Collaboration:  Referral to another professional/service is not indicated at this time..    Anticipated number of session or this episode of care: 15    MeasurableTreatment Goal(s) related to diagnosis / functional impairment(s)  Goal 1: Client will report an improvement in functioning and have the capacity to enjoy her life evidenced by self report, observation, and NETO and PHQ9 scores of 5 or less.    I will know I've met my goal when I'm not feeling so down all the time and more able to focus .      Objective #A (Client Action)    Client will Discuss motivation / ambivalence about taking anti-depressant / mood stabilizer medication(s).  Status: New - Date: 12/15/21     Intervention(s)  Therapist will provide psychoeducation about medication.    Objective #B  Client will use skills and strategies to support anxiety reduction. .  Status: New - Date: 12/15/21     Intervention(s)  Therapist will teach emotion regulation, boundary and proactive communication skills..    Objective #C  Client will Increase interest, engagement, and pleasure in doing things  Decrease frequency and intensity of feeling down, depressed, hopeless  Improve quantity and quality of night time sleep / decrease daytime naps  Feel less tired and more energy during the day   Improve diet, appetite, mindful eating, and / or meal planning  Identify negative self-talk and behaviors: challenge core beliefs, myths, and actions  Improve  "concentration, focus, and mindfulness in daily activities   Feel less fidgety, restless or slow in daily activities / interpersonal interactions  Decrease thoughts that you'd be better off dead or of suicide / self-harm.  Status: New - Date: 12/15/21     Intervention(s)  Therapist will teach teach CBT and DBT skills and sleep hygiene an dassign homework.    Patient has reviewed and agreed to the above plan.    Darya Jurado, SW  December 15, 2021              [facility]                                       Tresa Chin     SAFETY PLAN:  Step 1: Warning signs / cues (Thoughts, images, mood, situation, behavior) that a crisis may be developing:    Thoughts: \"I can't do this anymore\" \"I want to disappear\" \"I don't want to be here\" \"I can't deal with any more problems\"    Images: none    Thinking Processes: racing thoughts and highly critical and negative thoughts: no specific recall     Mood: worsening depression, hopelessness, intense anger and intense worry-anxiety    Behaviors: eating sugary, fatty foods, slamming doors    Situations: Household dynamic with caregiving/brothers  When house is dirty/needs cleaning, caregiving exhaustion  Step 2: Coping strategies - Things I can do to take my mind off of my problems without contacting another person (relaxation technique, physical activity):    Distress Tolerance Strategies:  listen to positive and upbeat music: ,, watch a funny movie: Family movies, [place] movies, change body temperature (ice pack/cold water)  and paced breathing/progressive muscle relaxation,     Physical Activities: yoga, meditation, deep breathing and stretching     Focus on helpful thoughts:  \"This is temporary\", \"I will get through this\" and self-compassion statements: I am doing difficult things.   Step 3: People and social settings that provide distraction:   Name:  Daad (friend)    movie theater, work and shopping malls, [place]   Step 4: Remind myself of people and " things that are important to me and worth living for:  Travel, future relationship  Step 5: When I am in crisis, I can ask these people to help me use my safety plan:   Name: Abbey    Step 6: Making the environment safe:     be around others  Step 7: Professionals or agencies I can contact during a crisis:    Suicide Prevention Lifeline: 0-666-890-TALK (8255)    Crisis Text Line Service (available 24 hours a day, 7 days a week): Text [place] to 995601  Local Crisis Services: [facility]  Call [phone]  24 hours phone and face to face    Call 523 or go to my nearest emergency department.   I helped develop this safety plan and agree to use it when needed.  I have been given a copy of this plan.    Client signature _________________________________________________________________  Today s date:  11/30/2021  Completed by Provider Name/ Credentials:  Darya Jurado MSW LGSW  November 30, 2021  Adapted from Safety Plan Template 2008 Jovita Gonzalez and Jose Acosta is reprinted with the express permission of the authors.  No portion of the Safety Plan Template may be reproduced without the express, written permission.  You can contact the authors at bhs@[email or web address] or [email or web address].

## 2022-01-07 ASSESSMENT — PATIENT HEALTH QUESTIONNAIRE - PHQ9: SUM OF ALL RESPONSES TO PHQ QUESTIONS 1-9: 6

## 2022-01-10 ENCOUNTER — TELEPHONE (OUTPATIENT)
Dept: SURGERY | Facility: CLINIC | Age: 42
End: 2022-01-10
Payer: COMMERCIAL

## 2022-01-10 NOTE — TELEPHONE ENCOUNTER
Patient unable to get medical records from [facility] to clinic today for tomorrow's appt.  Per provider appt has to be rescheduled.    Called patient and LM on  that tomorrow had to be cx due to info needed didn't arrive.  To call tomorrow to discuss.    When calls, inform patient that she will have to have [facility] notes her prior to rescheduling when calls.  Natasha Mazariegos, MS, RD, RN

## 2022-01-10 NOTE — TELEPHONE ENCOUNTER
Patient has appointment with provider at 0830 tomorrow 1/11/22.  Per provider - patient to have [facility] visits available at her appointment or before.  LM for patient to call clinic re:above.  Natasha Mazariegos MS, RD, RN    Patient returned call.  Stated that she changed providers and now is with a provider at [facility].  Informed patient that I was calling re: the ED eval at [facility].  It has been over a year and we haven't received it.  Will be required for her visit tomorrow.  Patient provided with our fax and number for [facility].  If not received by closing at 4 pm will have to reschedule.  Informed patient Erna is booking out to April at this time.  Patient stated she would call [facility] and try to get faxed today.  Natasha Mazariegos MS, RD, RN

## 2022-01-10 NOTE — TELEPHONE ENCOUNTER
Pt called and asked for an RAMEZ to be emailed to her. I verified her email address and emailed the RAMEZ. Advised pt reach out of [facility] to see how she can get it to them. Pt verbalized understanding.    Lamar Madera RN on 1/10/2022 at 12:05 PM

## 2022-01-10 NOTE — TELEPHONE ENCOUNTER
Spoke to patient reminding them to fill out the new patient questionnaire before their appointment.    Kika ANDERSON MA

## 2022-02-10 ENCOUNTER — VIRTUAL VISIT (OUTPATIENT)
Dept: FAMILY MEDICINE | Facility: CLINIC | Age: 42
End: 2022-02-10
Payer: COMMERCIAL

## 2022-02-10 DIAGNOSIS — J20.9 ACUTE BRONCHITIS WITH SYMPTOMS > 10 DAYS: ICD-10-CM

## 2022-02-10 DIAGNOSIS — I10 ESSENTIAL HYPERTENSION: Primary | ICD-10-CM

## 2022-02-10 PROCEDURE — 99214 OFFICE O/P EST MOD 30 MIN: CPT | Mod: 95 | Performed by: FAMILY MEDICINE

## 2022-02-10 RX ORDER — AZITHROMYCIN 250 MG/1
TABLET, FILM COATED ORAL
Qty: 6 TABLET | Refills: 0 | Status: SHIPPED | OUTPATIENT
Start: 2022-02-10 | End: 2022-02-23

## 2022-02-10 RX ORDER — ALBUTEROL SULFATE 90 UG/1
2 AEROSOL, METERED RESPIRATORY (INHALATION) EVERY 4 HOURS PRN
Qty: 18 G | Refills: 0 | Status: SHIPPED | OUTPATIENT
Start: 2022-02-10 | End: 2022-02-23

## 2022-02-10 NOTE — PROGRESS NOTES
Tresa is a 41 year old who is being evaluated via a billable video visit.      How would you like to obtain your AVS? MyChart  If the video visit is dropped, the invitation should be resent by: Text to cell phone: [phone]  Will anyone else be joining your video visit? No    Video Start Time: 4:20    Assessment & Plan     Essential hypertension  Patient has some question about refilling the medication amlodipine apparently she has some refills I have offered her to get her a new refill it may help but she thinks her pharmacy will not refill as she already picked up medication.  Reassured that this is common medication it will not be having any issue finding this medication  even if you are out of country and there is some pharmacy.  Also advised to have pharmacy contact us or PCP if they have any concerns of refilling that medication.  She will check with her pharmacy and if they need refill or approval from us we are more than happy to do that which is offered.  She is planning to discuss her long-term anxiety with their primary clinic office.  Acute bronchitis with symptoms > 10 days    - azithromycin (ZITHROMAX) 250 MG tablet; Two tablets first day, then one tablet daily for four days.  - albuterol (PROAIR HFA/PROVENTIL HFA/VENTOLIN HFA) 108 (90 Base) MCG/ACT inhaler; Inhale 2 puffs into the lungs every 4 hours as needed for shortness of breath / dyspnea or wheezing        Mathew Schmitz MD  [facility]    Reny Gtz is a 41 year old who presents for the following health issues     HPI     Acute Illness  Acute illness concerns: Coughing   Onset/Duration: x January 15 but tested negative for Covid-19 twice-last time  01/28   Hx of sleep apnea and uses CPAP machine   Symptoms:  Fever: no  Chills/Sweats: no  Headache (location?): YES  Sinus Pressure: no- dry sinuses   Conjunctivitis:  no  Ear Pain: no  Rhinorrhea: no  Congestion: YES  Sore Throat: no  Cough: YES-non-productive,  with shortness of breath-feels something stuck in chest   Wheeze: no  Decreased Appetite: no  Nausea: no  Vomiting: no  Diarrhea: YES  Dysuria/Freq.: no  Dysuria or Hematuria: no  Fatigue/Achiness: no  Sick/Strep Exposure: no  Therapies tried and outcome: None    Concern - Amlodipine request     Description: Says she is travelling for 3 months and that insurance wont allow her to fill-         Review of Systems   Constitutional, HEENT, cardiovascular, pulmonary, gi and gu systems are negative, except as otherwise noted.      Objective           Vitals:  No vitals were obtained today due to virtual visit.    Physical Exam   GENERAL: Healthy, alert and no distress  EYES: Eyes grossly normal to inspection.  No discharge or erythema, or obvious scleral/conjunctival abnormalities.  RESP: No audible wheeze, cough, or visible cyanosis.  No visible retractions or increased work of breathing.    SKIN: Visible skin clear. No significant rash, abnormal pigmentation or lesions.  NEURO: Cranial nerves grossly intact.  Mentation and speech appropriate for age.  PSYCH: Mentation appears normal, affect normal/bright, judgement and insight intact, normal speech and appearance well-groomed.        Video-Visit Details    Type of service:  Video Visit    Video End Time:4:31 PM    Originating Location (pt. Location): Home    Distant Location (provider location):  [facility]     Platform used for Video Visit: Group-IB

## 2022-02-15 ENCOUNTER — MYC MEDICAL ADVICE (OUTPATIENT)
Dept: FAMILY MEDICINE | Facility: CLINIC | Age: 42
End: 2022-02-15
Payer: COMMERCIAL

## 2022-02-16 ENCOUNTER — TELEPHONE (OUTPATIENT)
Dept: SLEEP MEDICINE | Facility: CLINIC | Age: 42
End: 2022-02-16
Payer: COMMERCIAL

## 2022-02-16 NOTE — TELEPHONE ENCOUNTER
RETURNED  PT'S CALL AND LVM INFORMING THE PT THAT WHEN LOOKING AT THE DATA THE ENVIRONMENT IS DRY AND TO HELP POSSIBLY ADDING A HUMIDIFIER AND OR INCREASING THE HUMIDITY LEVEL ON THE DEVICE. CURRENTLY THE SETTING IS 4 AND THE ENVIRONMENT IS REGISTERING 8MG/L:DRY. ALSO INFORMED THE PT TO GIVE US A CALL BACK IF WANTING TO DISCUSS FURTHER [phone].

## 2022-02-22 RX ORDER — COVID-19 TEST SPECIMEN COLLECT
MISCELLANEOUS MISCELLANEOUS
COMMUNITY
Start: 2022-01-18 | End: 2022-02-23

## 2022-02-22 RX ORDER — DIAZEPAM 10 MG
TABLET ORAL
COMMUNITY
Start: 2021-12-02 | End: 2022-02-23

## 2022-02-23 ENCOUNTER — OFFICE VISIT (OUTPATIENT)
Dept: FAMILY MEDICINE | Facility: CLINIC | Age: 42
End: 2022-02-23
Payer: COMMERCIAL

## 2022-02-23 VITALS
TEMPERATURE: 98.5 F | OXYGEN SATURATION: 97 % | HEART RATE: 89 BPM | DIASTOLIC BLOOD PRESSURE: 82 MMHG | SYSTOLIC BLOOD PRESSURE: 126 MMHG | HEIGHT: 61 IN | BODY MASS INDEX: 50.2 KG/M2 | WEIGHT: 265.9 LBS

## 2022-02-23 DIAGNOSIS — Z00.00 ROUTINE GENERAL MEDICAL EXAMINATION AT A HEALTH CARE FACILITY: Primary | ICD-10-CM

## 2022-02-23 DIAGNOSIS — Z80.1 FAMILY HISTORY OF LUNG CANCER: ICD-10-CM

## 2022-02-23 DIAGNOSIS — Z80.3 FAMILY HISTORY OF MALIGNANT NEOPLASM OF BREAST: ICD-10-CM

## 2022-02-23 DIAGNOSIS — Z80.42 FAMILY HISTORY OF PROSTATE CANCER: ICD-10-CM

## 2022-02-23 DIAGNOSIS — K76.0 NAFLD (NONALCOHOLIC FATTY LIVER DISEASE): ICD-10-CM

## 2022-02-23 PROBLEM — R73.03 PREDIABETES: Status: RESOLVED | Noted: 2020-09-22 | Resolved: 2022-02-23

## 2022-02-23 PROCEDURE — 36415 COLL VENOUS BLD VENIPUNCTURE: CPT | Performed by: FAMILY MEDICINE

## 2022-02-23 PROCEDURE — 80076 HEPATIC FUNCTION PANEL: CPT | Performed by: FAMILY MEDICINE

## 2022-02-23 PROCEDURE — 99395 PREV VISIT EST AGE 18-39: CPT | Performed by: FAMILY MEDICINE

## 2022-02-23 ASSESSMENT — ENCOUNTER SYMPTOMS
BREAST MASS: 0
HEADACHES: 0
HEMATOCHEZIA: 0
EYE PAIN: 0
CONSTIPATION: 0
JOINT SWELLING: 1
PALPITATIONS: 0
HEARTBURN: 1
CHILLS: 0
WEAKNESS: 1
DYSURIA: 0
DIZZINESS: 0
DIARRHEA: 0
COUGH: 1
MYALGIAS: 1
ABDOMINAL PAIN: 0
SHORTNESS OF BREATH: 0
FREQUENCY: 0
FEVER: 0
ARTHRALGIAS: 1
SORE THROAT: 0
NAUSEA: 0
PARESTHESIAS: 0
HEMATURIA: 0
NERVOUS/ANXIOUS: 1

## 2022-02-23 ASSESSMENT — PATIENT HEALTH QUESTIONNAIRE - PHQ9
SUM OF ALL RESPONSES TO PHQ QUESTIONS 1-9: 19
SUM OF ALL RESPONSES TO PHQ QUESTIONS 1-9: 19
10. IF YOU CHECKED OFF ANY PROBLEMS, HOW DIFFICULT HAVE THESE PROBLEMS MADE IT FOR YOU TO DO YOUR WORK, TAKE CARE OF THINGS AT HOME, OR GET ALONG WITH OTHER PEOPLE: VERY DIFFICULT

## 2022-02-23 NOTE — PROGRESS NOTES
SUBJECTIVE:   CC: Tresa Chin is an 41 year old woman who presents for preventive health visit.       Patient has been advised of split billing requirements and indicates understanding: Yes  Healthy Habits:     Getting at least 3 servings of Calcium per day:  Yes    Bi-annual eye exam:  Yes    Dental care twice a year:  Yes    Sleep apnea or symptoms of sleep apnea:  Sleep apnea    Diet:  Regular (no restrictions)    Frequency of exercise:  None    Taking medications regularly:  No    Barriers to taking medications:  Problems remembering to take them    Medication side effects:  None    PHQ-2 Total Score: 6    Additional concerns today:  No             Pre diabetes Follow-up      How often are you checking your blood sugar? Not at all    What concerns do you have today about your diabetes? None     Do you have any of these symptoms? (Select all that apply)  No numbness or tingling in feet.  No redness, sores or blisters on feet.  No complaints of excessive thirst.  No reports of blurry vision.  No significant changes to weight.      BP Readings from Last 2 Encounters:   02/23/22 126/82   10/21/21 90/70     Hemoglobin A1C POCT (%)   Date Value   02/18/2021 6.0 (H)   09/11/2020 6.1 (H)     Hemoglobin A1C (%)   Date Value   10/21/2021 6.2 (H)   09/14/2021 6.2 (H)     LDL Cholesterol Calculated (mg/dL)   Date Value   10/21/2021 144 (H)   02/18/2021 148 (H)   09/11/2020 194 (H)         Depression Followup    How are you doing with your depression since your last visit? Worsened, pt will be in [place] and she may have many comments on her weight,.    Are you having other symptoms that might be associated with depression? Yes:  worried about traveling to [place].    Have you had a significant life event?  OTHER: traveling to [place].     Are you feeling anxious or having panic attacks?   Yes:  anxiety about traveling.    Do you have any concerns with your use of alcohol or other drugs? No    Social History      Tobacco Use     Smoking status: Never Smoker     Smokeless tobacco: Never Used   Vaping Use     Vaping Use: Never used   Substance Use Topics     Alcohol use: Yes     Comment: occ.     Drug use: No     PHQ 11/23/2021 1/6/2022 2/23/2022   PHQ-9 Total Score 18 6 19   Q9: Thoughts of better off dead/self-harm past 2 weeks Several days Not at all Not at all   F/U: Thoughts of suicide or self-harm No - -   F/U: Safety concerns No - -     NETO-7 SCORE 10/21/2021 11/10/2021 12/22/2021   Total Score 12 (moderate anxiety) 21 (severe anxiety) 14 (moderate anxiety)   Total Score 12 21 14         Suicide Assessment Five-step Evaluation and Treatment (SAFE-T)      Today's PHQ-2 Score:   PHQ-2 ( 1999 [employer]) 2/23/2022   Q1: Little interest or pleasure in doing things 3   Q2: Feeling down, depressed or hopeless 3   PHQ-2 Score 6   PHQ-2 Total Score (12-17 Years)- Positive if 3 or more points; Administer PHQ-A if positive -   Q1: Little interest or pleasure in doing things Nearly every day   Q2: Feeling down, depressed or hopeless Nearly every day   PHQ-2 Score 6       Abuse: Current or Past (Physical, Sexual or Emotional) - No  Do you feel safe in your environment? Yes        Social History     Tobacco Use     Smoking status: Never Smoker     Smokeless tobacco: Never Used   Substance Use Topics     Alcohol use: Yes     Comment: occ.     If you drink alcohol do you typically have >3 drinks per day or >7 drinks per week? No    Alcohol Use 2/23/2022   Prescreen: >3 drinks/day or >7 drinks/week? No   Prescreen: >3 drinks/day or >7 drinks/week? -       Reviewed orders with patient.  Reviewed health maintenance and updated orders accordingly - Yes  BP Readings from Last 3 Encounters:   02/23/22 126/82   10/21/21 90/70   10/20/21 112/72    Wt Readings from Last 3 Encounters:   02/23/22 120.6 kg (265 lb 14.4 oz)   01/10/22 117 kg (258 lb)   10/21/21 117 kg (258 lb)                  Patient Active Problem List   Diagnosis     Morbid  obesity (H)     Essential hypertension     Hyperlipidemia LDL goal <160     Generalized anxiety disorder     NAFLD (nonalcoholic fatty liver disease)     Backache     Panic attack     NSAID induced gastritis     Gastroesophageal reflux disease without esophagitis     Ureterolithiasis     Kidney stone     Left ureteral stone     Major depression, single episode     Family history of malignant neoplasm of breast     Persistent depressive disorder     Past Surgical History:   Procedure Laterality Date     COMBINED CYSTOSCOPY, INSERT STENT URETER(S) Left 7/22/2020    Procedure: Cystoscopy with left ureteral stent insertion;  Surgeon: Anurag Gomez MD;  Location: [facility] OR     COMBINED CYSTOSCOPY, RETROGRADES, URETEROSCOPY, LASER HOLMIUM LITHOTRIPSY URETER(S), INSERT STENT Left 8/4/2020    Procedure: Cystoscopy, removal of left ureteral stent, left ureteroscopy with holmium laser lithotripsy, stone basketing and placement of left ureteral stent;  Surgeon: Anurag Gomez MD;  Location:  OR     CYSTOSCOPY       ESOPHAGOSCOPY, GASTROSCOPY, DUODENOSCOPY (EGD), COMBINED N/A 5/31/2019    Procedure: ESOPHAGOGASTRODUODENOSCOPY (EGD)cold BX (fv);  Surgeon: Geremias Martinez MD;  Location:  GI     GENITOURINARY SURGERY         Social History     Tobacco Use     Smoking status: Never Smoker     Smokeless tobacco: Never Used   Substance Use Topics     Alcohol use: Yes     Comment: occ.     Family History   Problem Relation Age of Onset     Dementia Mother      Diabetes Mother      Hypertension Mother      Hyperlipidemia Mother      Lung Cancer Father      Hypertension Father      Hyperlipidemia Father      Other Cancer Father      Diabetes Sister      Hypertension Sister      Diabetes Brother      Depression No family hx of      Anxiety Disorder No family hx of      Obesity No family hx of          Current Outpatient Medications   Medication Sig Dispense Refill     amLODIPine (NORVASC) 5 MG tablet Take 1 tablet (5  mg) by mouth daily 90 tablet 3     olopatadine (PATANOL) 0.1 % ophthalmic solution Place 1 drop into both eyes 2 times daily 5 mL 11     omeprazole (PRILOSEC) 20 MG DR capsule Take 1 capsule by mouth once daily 90 capsule 2       Breast Cancer Screening:  Any new diagnosis of family breast, ovarian, or bowel cancer? Yes, check below.    FHS-7:   Breast CA Risk Assessment (FHS-7) 10/21/2021   Did any of your first-degree relatives have breast or ovarian cancer? No   Did any of your relatives have bilateral breast cancer? Yes   Did any man in your family have breast cancer? No   Did any woman in your family have breast and ovarian cancer? No   Did any woman in your family have breast cancer before age 50 y? No   Do you have 2 or more relatives with breast and/or ovarian cancer? Yes   Do you have 2 or more relatives with breast and/or bowel cancer? Yes         Pertinent mammograms are reviewed under the imaging tab.    History of abnormal Pap smear: NO - age 30-65 PAP every 5 years with negative HPV co-testing recommended     Reviewed and updated as needed this visit by clinical staff   Tobacco  Allergies    Med Hx  Surg Hx  Fam Hx  Soc Hx        Reviewed and updated as needed this visit by Provider                 Past Medical History:   Diagnosis Date     Back pain      Depressive disorder      Hypertension      Sleep apnea       Past Surgical History:   Procedure Laterality Date     COMBINED CYSTOSCOPY, INSERT STENT URETER(S) Left 7/22/2020    Procedure: Cystoscopy with left ureteral stent insertion;  Surgeon: Anurag Gomez MD;  Location:  OR     COMBINED CYSTOSCOPY, RETROGRADES, URETEROSCOPY, LASER HOLMIUM LITHOTRIPSY URETER(S), INSERT STENT Left 8/4/2020    Procedure: Cystoscopy, removal of left ureteral stent, left ureteroscopy with holmium laser lithotripsy, stone basketing and placement of left ureteral stent;  Surgeon: Anurag Gomez MD;  Location: [facility] OR     CYSTOSCOPY       ESOPHAGOSCOPY,  "GASTROSCOPY, DUODENOSCOPY (EGD), COMBINED N/A 5/31/2019    Procedure: ESOPHAGOGASTRODUODENOSCOPY (EGD)cold BX (fv);  Surgeon: Geremias Martinez MD;  Location:  GI     GENITOURINARY SURGERY         Review of Systems   Constitutional: Negative for chills and fever.   HENT: Positive for congestion. Negative for ear pain, hearing loss and sore throat.    Eyes: Negative for pain and visual disturbance.   Respiratory: Positive for cough. Negative for shortness of breath.    Cardiovascular: Positive for peripheral edema. Negative for chest pain and palpitations.   Gastrointestinal: Positive for heartburn. Negative for abdominal pain, constipation, diarrhea, hematochezia and nausea.   Breasts:  Positive for tenderness. Negative for breast mass and discharge.   Genitourinary: Positive for vaginal discharge. Negative for dysuria, frequency, genital sores, hematuria, pelvic pain, urgency and vaginal bleeding.   Musculoskeletal: Positive for arthralgias, joint swelling and myalgias.   Skin: Negative for rash.   Neurological: Positive for weakness. Negative for dizziness, headaches and paresthesias.   Psychiatric/Behavioral: Positive for mood changes. The patient is nervous/anxious.           OBJECTIVE:   /82 (BP Location: Right arm, Patient Position: Sitting, Cuff Size: Adult Large)   Pulse 89   Temp 98.5  F (36.9  C) (Oral)   Ht 1.549 m (5' 1\")   Wt 120.6 kg (265 lb 14.4 oz)   LMP 01/17/2022 (Approximate)   SpO2 97%   BMI 50.24 kg/m    Physical Exam  GENERAL: healthy, alert and no distress  HENT: ear canals and TM's normal, nose and mouth without ulcers or lesions  NECK: no adenopathy, no asymmetry, masses, or scars and thyroid normal to palpation  RESP: lungs clear to auscultation - no rales, rhonchi or wheezes  CV: regular rate and rhythm, normal S1 S2, no S3 or S4, no murmur, click or rub, no peripheral edema and peripheral pulses strong  ABDOMEN: soft, nontender, no hepatosplenomegaly, no masses " "and bowel sounds normal  MS: no gross musculoskeletal defects noted, no edema  SKIN: no suspicious lesions or rashes  NEURO: Normal strength and tone, mentation intact and speech normal  PSYCH: mentation appears normal, affect normal/bright        ASSESSMENT/PLAN:   (Z00.00) Routine general medical examination at a health care facility  (primary encounter diagnosis)  Comment: discussed her anxiety symptoms, and given a book to read to help with her anxiety and depression symptoms.   Plan: Today I counseled the patient about diet, regular exercise and weight loss planning.      (Z80.3) Family history of malignant neoplasm of breast :   Pt is scheduled for blood test today.    (K76.0) NAFLD (nonalcoholic fatty liver disease)  Comment: recheck LFT today  Plan: Hepatic panel (Albumin, ALT, AST, Bili, Alk         Phos, TP)                  COUNSELING:  Reviewed preventive health counseling, as reflected in patient instructions       Regular exercise       Healthy diet/nutrition    Estimated body mass index is 50.24 kg/m  as calculated from the following:    Height as of this encounter: 1.549 m (5' 1\").    Weight as of this encounter: 120.6 kg (265 lb 14.4 oz).    Weight management plan: Discussed healthy diet and exercise guidelines    She reports that she has never smoked. She has never used smokeless tobacco.      Counseling Resources:  ATP IV Guidelines  Pooled Cohorts Equation Calculator  Breast Cancer Risk Calculator  BRCA-Related Cancer Risk Assessment: FHS-7 Tool  FRAX Risk Assessment  ICSI Preventive Guidelines  Dietary Guidelines for Americans, 2010  USDA's MyPlate  ASA Prophylaxis  Lung CA Screening    Melissa Franco MD  [facility]  Answers for HPI/ROS submitted by the patient on 2/23/2022  If you checked off any problems, how difficult have these problems made it for you to do your work, take care of things at home, or get along with other people?: Very difficult  PHQ9 TOTAL SCORE: " 19

## 2022-02-24 LAB
ALBUMIN SERPL-MCNC: 3.5 G/DL (ref 3.4–5)
ALP SERPL-CCNC: 64 U/L (ref 40–150)
ALT SERPL W P-5'-P-CCNC: 91 U/L (ref 0–50)
AST SERPL W P-5'-P-CCNC: 35 U/L (ref 0–45)
BILIRUB DIRECT SERPL-MCNC: <0.1 MG/DL (ref 0–0.2)
BILIRUB SERPL-MCNC: 0.4 MG/DL (ref 0.2–1.3)
PROT SERPL-MCNC: 7.5 G/DL (ref 6.8–8.8)

## 2022-02-24 ASSESSMENT — PATIENT HEALTH QUESTIONNAIRE - PHQ9: SUM OF ALL RESPONSES TO PHQ QUESTIONS 1-9: 19

## 2022-03-11 NOTE — PROGRESS NOTES
[facility]    Outpatient Physical Therapy Discharge Note  Patient: Tresa Chin  : 1980    Beginning/End Dates of Reporting Period:  21 to 10/5/2021; total of 2 visits    Referring Provider: Liudmila Marvin PA-C    Therapy Diagnosis: Dizziness, Neck pain     Client Self Report: Patient reports her dizziness has been a lot better since last session, getting more lightheaded today. She notes it is not happening as much lately with looking to the left, happening more with bending over. Patient notes she has been feeling a pressure headache all day and more lightheaded. She reports compliance to her neck stretches and scap retraction. Patient reports 4/10 headache and 6/10 neck pain upon start of session.     Goals:  Goal Identifier DVA   Goal Description The patient will score within 2 lines of SVA with performance of DVA to demonstrate that her vestibular system is functioning optimally and is able to support gaze stability within a functional range.   Target Date 21   Date Met      Progress (detail required for progress note):  Unknown, patient cancelled all future appointments.     Goal Identifier DHI   Goal Description The patient will demonstrate an 18 point improvement (44 or less) in DHI scoring to demonstrate a statistically significant improvement in dizziness symptom severity in order to increase tolerance for functional mobility tasks.   Target Date 21   Date Met      Progress (detail required for progress note):  Unknown, patient cancelled all future appointments.     Goal Identifier Neck Pain/Headaches   Goal Description Patient will report a 25% reduction in the severity and frequency of neck pain as well as headache pain in order to increase tolerance and ease for daily activities.   Target Date 21   Date Met      Progress (detail required for progress note):   Unknown, patient cancelled all future appointments.     Goal Identifier Positionals   Goal Description Patient will be able to complete bilateral coronado pike and roll tests without observable nystagmus or complaints of dizziness to demonstrate resolution of BPPV.   Target Date 11/23/21   Date Met      Progress (detail required for progress note): 10/5/21: Negative positional testing today, lightheadedness but no vertigo     Plan:  Discharge from therapy.    Discharge:    Reason for Discharge: Patient cancelled all future appointments.    Discharge Plan: Patient to continue home program.

## 2022-03-11 NOTE — ADDENDUM NOTE
Encounter addended by: Juana Zuniga, PT on: 3/11/2022 3:34 PM   Actions taken: Clinical Note Signed, Episode resolved

## 2022-03-30 ENCOUNTER — TRANSFERRED RECORDS (OUTPATIENT)
Dept: MULTI SPECIALTY CLINIC | Facility: CLINIC | Age: 42
End: 2022-03-30

## 2022-03-30 LAB — RETINOPATHY: NEGATIVE

## 2022-04-26 ENCOUNTER — VIRTUAL VISIT (OUTPATIENT)
Dept: PSYCHOLOGY | Facility: CLINIC | Age: 42
End: 2022-04-26
Payer: COMMERCIAL

## 2022-04-26 DIAGNOSIS — F32.1 CURRENT MODERATE EPISODE OF MAJOR DEPRESSIVE DISORDER WITHOUT PRIOR EPISODE (H): ICD-10-CM

## 2022-04-26 DIAGNOSIS — F41.1 GENERALIZED ANXIETY DISORDER: Primary | ICD-10-CM

## 2022-04-26 PROCEDURE — 90834 PSYTX W PT 45 MINUTES: CPT | Mod: 95

## 2022-04-26 ASSESSMENT — PATIENT HEALTH QUESTIONNAIRE - PHQ9
10. IF YOU CHECKED OFF ANY PROBLEMS, HOW DIFFICULT HAVE THESE PROBLEMS MADE IT FOR YOU TO DO YOUR WORK, TAKE CARE OF THINGS AT HOME, OR GET ALONG WITH OTHER PEOPLE: VERY DIFFICULT
SUM OF ALL RESPONSES TO PHQ QUESTIONS 1-9: 6
SUM OF ALL RESPONSES TO PHQ QUESTIONS 1-9: 6

## 2022-04-26 NOTE — PROGRESS NOTES
[facility]                                     Progress Note    Patient Name: Tresa Chin  Date: 4/26/22         Service Type: Individual      Session Start Time: 3:30 pm  Session End Time: 4:15 pm     Session Length: 45 min    Session #: 7     Attendees: Client attended alone    Service Modality:  Video Visit:      Provider verified identity through the following two step process.  Patient provided:  Patient is known previously to provider    Telemedicine Visit: The patient's condition can be safely assessed and treated via synchronous audio and visual telemedicine encounter.      Reason for Telemedicine Visit: Patient has requested telehealth visit    Originating Site (Patient Location): Patient's other vehicle    Distant Site (Provider Location): Provider Remote Setting- Home Office    Consent:  The patient/guardian has verbally consented to: the potential risks and benefits of telemedicine (video visit) versus in person care; bill my insurance or make self-payment for services provided; and responsibility for payment of non-covered services.     Patient would like the video invitation sent by:  My Chart    Mode of Communication:  Video Conference via Amwell    As the provider I attest to compliance with applicable laws and regulations related to telemedicine.    DATA  Interactive Complexity: No  Crisis: No        Progress Since Last Session (Related to Symptoms / Goals / Homework):   Symptoms: Improving reduced anxiety and mood    Homework: Partially completed effort regarding boundaries and communication with brother      Episode of Care Goals: Minimal progress - PRECONTEMPLATION (Not seeing need for change); Intervened by educating the patient about the effects of current behavior on health.  Evoked information about reasons to continue behavior, express concern / recommendations, and explored any change talk     Current / Ongoing Stressors and Concerns:  Considering additional  income options in order to rent an apartment alone. Recognizing need to lose weight but not currently cooking, eating convenience food. Mom fell and broke her hip, is in rehab/senior. Client's current back and knee problems make giving care; working with  to explore options for long term senior care.        Treatment Objective(s) Addressed in This Session:   Decrease frequency and intensity of feeling down, depressed, hopeless       Intervention:   CBT: Identified and celebrated boundaries put into place with brothers; surfaced and addressed sense of guilt by using fact checking and identifying opportunities to be in relationship with mother.  Supportive therapy:  Provided active listening and validation. Surfaced need to move away from negative thinking and challenge cognitive distortions. Reviewed YODIT gratitude journaling practice.    Assessments completed prior to visit:  The following assessments were completed by patient for this visit:  PHQ9:   PHQ-9 SCORE 10/21/2021 10/28/2021 11/10/2021 11/23/2021 1/6/2022 2/23/2022 4/26/2022   PHQ-9 Total Score MyChart 15 (Moderately severe depression) 14 (Moderate depression) 18 (Moderately severe depression) 18 (Moderately severe depression) 6 (Mild depression) 19 (Moderately severe depression) 6 (Mild depression)   PHQ-9 Total Score 15 14 18 18 6 19 6     GAD7:   NETO-7 SCORE 7/14/2020 11/17/2020 2/18/2021 9/22/2021 10/21/2021 11/10/2021 12/22/2021   Total Score - 16 (severe anxiety) 16 (severe anxiety) 6 (mild anxiety) 12 (moderate anxiety) 21 (severe anxiety) 14 (moderate anxiety)   Total Score 7 16 16 6 12 21 14     PROMIS 10-Global Health (only subscores and total score):   PROMIS-10 Scores Only 9/6/2020 12/22/2021 1/6/2022 4/26/2022 4/26/2022   Global Mental Health Score 9 7 9 10 10   Global Physical Health Score 8 10 11 11 11   PROMIS TOTAL - SUBSCORES 17 17 20 21 21         ASSESSMENT: Current Emotional / Mental Status (status of significant  symptoms):   Risk status (Self / Other harm or suicidal ideation)   Patient denies current fears or concerns for personal safety.   Patient denies current or recent suicidal ideation or behaviors.   Patient denies current or recent homicidal ideation or behaviors.   Patient denies current or recent self injurious behavior or ideation.   Patient denies other safety concerns.   Patient reports there has been no change in risk factors since their last session.     Patient reports there has been no change in protective factors since their last session.     Recommended that patient call 911 or go to the local ED should there be a change in any of these risk factors.     Appearance:   Appropriate    Eye Contact:   Good    Psychomotor Behavior: Normal    Attitude:   Cooperative  Pleasant   Orientation:   All   Speech    Rate / Production: Slow  Normal     Volume:  Normal    Mood:    Depressed    Affect:    Appropriate    Thought Content:  Clear    Thought Form:  Coherent  Logical    Insight:    Poor      Medication Review:   No changes to current psychiatric medication(s)     Medication Compliance:   Yes     Changes in Health Issues:   None reported     Chemical Use Review:   Substance Use: Chemical use reviewed, no active concerns identified      Tobacco Use: No current tobacco use.      Diagnosis:  1. Generalized anxiety disorder        Collateral Reports Completed:   Not Applicable    PLAN: (Patient Tasks / Therapist Tasks / Other)  Continue with boundaries and regular exercise. Explore [employer] options        MARIANNE Scherer LICSW 4/27/22                                                         ______________________________________________________________________    Individual Treatment Plan    Patient's Name: Tresa Chin  YOB: 1980    Date of Creation: 12/15/21  Date Treatment Plan Last Reviewed/Revised: 4/26/22      DSM-V Diagnoses: 296.22 (F32.1)  Major Depressive Disorder, Single  Episode, Moderate With anxious distress or 300.02 (F41.1) Generalized Anxiety Disorder  Psychosocial / Contextual Factors: caregiver for mother, living with brothers, cultural expectations, social isolation  WHODAS: 35   PROMIS (reviewed every 90 days): 21  Due 7/27/22    Referral / Collaboration:  The following referral(s) was/were discussed but patient declines follow up at this time. medicatio eval PCP.    Anticipated number of session for this episode of care: 20  Anticipation frequency of session: Every other week  Anticipated Duration of each session: 38-52 minutes  Treatment plan will be reviewed in 90 days or when goals have been changed.     MeasurableTreatment Goal(s) related to diagnosis / functional impairment(s)  Goal 1: Client will report an improvement in functioning and have the capacity to enjoy her life evidenced by self report, observation, and NETO and PHQ9 scores of 5 or less.    I will know I've met my goal when I'm not feeling so down all the time and more able to focus .      Objective #A (Client Action)    Client will Discuss motivation / ambivalence about taking anti-depressant / mood stabilizer medication(s).  Status: 4/26/22    Intervention(s)  Therapist will provide psychoeducation about medication.    Objective #B  Client will use skills and strategies to support anxiety reduction. .  Status: 4/26/22    Intervention(s)  Therapist will teach emotion regulation, boundary and proactive communication skills..    Objective #C  Client will Increase interest, engagement, and pleasure in doing things  Decrease frequency and intensity of feeling down, depressed, hopeless  Improve quantity and quality of night time sleep / decrease daytime naps  Feel less tired and more energy during the day   Improve diet, appetite, mindful eating, and / or meal planning  Identify negative self-talk and behaviors: challenge core beliefs, myths, and actions  Improve concentration, focus, and mindfulness in daily  "activities   Feel less fidgety, restless or slow in daily activities / interpersonal interactions  Decrease thoughts that you'd be better off dead or of suicide / self-harm.  Status: 4/26/22     Intervention(s)  Therapist will teach teach CBT and DBT skills and sleep hygiene an dassign homework.    Patient has reviewed and agreed to the above plan.    Darya Jurado, [facility]  April 26, 2022              [facility]                                       Tresa Chin     SAFETY PLAN:  Step 1: Warning signs / cues (Thoughts, images, mood, situation, behavior) that a crisis may be developing:    Thoughts: \"I can't do this anymore\" \"I want to disappear\" \"I don't want to be here\" \"I can't deal with any more problems\"    Images: none    Thinking Processes: racing thoughts and highly critical and negative thoughts: no specific recall     Mood: worsening depression, hopelessness, intense anger and intense worry-anxiety    Behaviors: eating sugary, fatty foods, slamming doors    Situations: Household dynamic with caregiving/brothers  When house is dirty/needs cleaning, caregiving exhaustion  Step 2: Coping strategies - Things I can do to take my mind off of my problems without contacting another person (relaxation technique, physical activity):    Distress Tolerance Strategies:  listen to positive and upbeat music: ,, watch a funny movie: Family movies, [place] movies, change body temperature (ice pack/cold water)  and paced breathing/progressive muscle relaxation,     Physical Activities: yoga, meditation, deep breathing and stretching     Focus on helpful thoughts:  \"This is temporary\", \"I will get through this\" and self-compassion statements: I am doing difficult things.   Step 3: People and social settings that provide distraction:   Name:  Daad (friend)    movie theater, work and shopping malls, [place]   Step 4: Remind myself of people and things that are important to me and worth living for:  " Travel, future relationship  Step 5: When I am in crisis, I can ask these people to help me use my safety plan:   Name: Abbey    Step 6: Making the environment safe:     be around others  Step 7: Professionals or agencies I can contact during a crisis:    Suicide Prevention Lifeline: 3-544-163-TALK (8255)    Crisis Text Line Service (available 24 hours a day, 7 days a week): Text [place] to 967186  Local Crisis Services: [facility]  Call [phone]  24 hours phone and face to face    Call 873 or go to my nearest emergency department.   I helped develop this safety plan and agree to use it when needed.  I have been given a copy of this plan.    Client signature _________________________________________________________________  Today s date:  11/30/2021  Completed by Provider Name/ Credentials:  Darya Jurado MSW LGSW  November 30, 2021  Adapted from Safety Plan Template 2008 Jovita Gonzalez and Jose Acosta is reprinted with the express permission of the authors.  No portion of the Safety Plan Template may be reproduced without the express, written permission.  You can contact the authors at [email or web address] or [email or web address].  Answers for HPI/ROS submitted by the patient on 4/26/2022  If you checked off any problems, how difficult have these problems made it for you to do your work, take care of things at home, or get along with other people?: Very difficult  PHQ9 TOTAL SCORE: 6

## 2022-04-27 ENCOUNTER — TELEPHONE (OUTPATIENT)
Dept: FAMILY MEDICINE | Facility: CLINIC | Age: 42
End: 2022-04-27

## 2022-04-27 ASSESSMENT — PATIENT HEALTH QUESTIONNAIRE - PHQ9: SUM OF ALL RESPONSES TO PHQ QUESTIONS 1-9: 6

## 2022-04-27 NOTE — TELEPHONE ENCOUNTER
Patient Quality Outreach    Patient is due for the following:   Cervical Cancer Screening - PAP Needed    NEXT STEPS:   Patient has upcoming appointment, these items will be addressed at that time.    Type of outreach:    Chart review performed, no outreach needed.      Questions for provider review:    None     Fito Baker

## 2022-04-30 ENCOUNTER — HEALTH MAINTENANCE LETTER (OUTPATIENT)
Age: 42
End: 2022-04-30

## 2022-05-02 ENCOUNTER — VIRTUAL VISIT (OUTPATIENT)
Dept: PSYCHOLOGY | Facility: CLINIC | Age: 42
End: 2022-05-02
Payer: COMMERCIAL

## 2022-05-02 DIAGNOSIS — F41.1 GENERALIZED ANXIETY DISORDER: Primary | ICD-10-CM

## 2022-05-02 DIAGNOSIS — F32.1 CURRENT MODERATE EPISODE OF MAJOR DEPRESSIVE DISORDER WITHOUT PRIOR EPISODE (H): ICD-10-CM

## 2022-05-02 PROCEDURE — 90834 PSYTX W PT 45 MINUTES: CPT | Mod: 95

## 2022-05-02 NOTE — PROGRESS NOTES
[facility]                                     Progress Note    Patient Name: Tresa Chin  Date: 5/2/22         Service Type: Individual      Session Start Time: 4:30 pm  Session End Time: 5:15 pm     Session Length: 45 min    Session #: 8     Attendees: Client attended alone    Service Modality:  Video Visit:      Provider verified identity through the following two step process.  Patient provided:  Patient is known previously to provider    Telemedicine Visit: The patient's condition can be safely assessed and treated via synchronous audio and visual telemedicine encounter.      Reason for Telemedicine Visit: Patient has requested telehealth visit    Originating Site (Patient Location): Patient's other vehicle    Distant Site (Provider Location): Provider Remote Setting- Home Office    Consent:  The patient/guardian has verbally consented to: the potential risks and benefits of telemedicine (video visit) versus in person care; bill my insurance or make self-payment for services provided; and responsibility for payment of non-covered services.     Patient would like the video invitation sent by:  My Chart    Mode of Communication:  Video Conference via Amwell    As the provider I attest to compliance with applicable laws and regulations related to telemedicine.    DATA  Interactive Complexity: No  Crisis: No        Progress Since Last Session (Related to Symptoms / Goals / Homework):   Symptoms: Improving reduced anxiety and mood    Homework: Partially completed effort regarding boundaries and communication with brother      Episode of Care Goals: Minimal progress - PREPARATION (Decided to change - considering how); Intervened by negotiating a change plan and determining options / strategies for behavior change, identifying triggers, exploring social supports, and working towards setting a date to begin behavior change     Current / Ongoing Stressors and Concerns:  Dreading an  upcoming wedding that will require meeting a lot of new people and buying a new dress for the event (provoking anxiety/lack of confidence).. Considering additional income options in order to rent an apartment alone. Recognizing need to lose weight but not currently cooking, eating convenience food. Mom fell and broke her hip, is in rehab/senior. Client's current back and knee problems make giving care; working with  to explore options for long term senior care.        Treatment Objective(s) Addressed in This Session:   Decrease frequency and intensity of feeling down, depressed, hopeless     Intervention:    Supportive therapy:  Provided active listening and validation. Surfaced need to move away from negative thinking and challenge cognitive distortions. Reviewed GLAD gratitude journaling practice. Identified confidence in talking with people as a growth opportunity. Suggested exposure ideas for practice, book option to consider.  Shared [employer] Assn resource.    Motivational Interviewing  Target Behavior: disease management/lifestyle changes increase exercise, manage calorie intake    Stage of Change: ACTION (Actively working towards change)    MI Intervention: Expressed Empathy/Understanding, Open-ended questions, Reflections: simple and complex and Change talk (evoked)     Change Talk Expressed by the Patient: Reasons to change Need to change Activation    Provider Response to Change Talk: E - Evoked more info from patient about behavior change, A - Affirmed patient's thoughts, decisions, or attempts at behavior change, R - Reflected patient's change talk and S - Summarized patient's change talk statements      Assessments completed prior to visit:  The following assessments were completed by patient for this visit:  PHQ9:   PHQ-9 SCORE 10/21/2021 10/28/2021 11/10/2021 11/23/2021 1/6/2022 2/23/2022 4/26/2022   PHQ-9 Total Score MyChart 15 (Moderately severe depression) 14 (Moderate depression) 18  (Moderately severe depression) 18 (Moderately severe depression) 6 (Mild depression) 19 (Moderately severe depression) 6 (Mild depression)   PHQ-9 Total Score 15 14 18 18 6 19 6     GAD7:   NETO-7 SCORE 7/14/2020 11/17/2020 2/18/2021 9/22/2021 10/21/2021 11/10/2021 12/22/2021   Total Score - 16 (severe anxiety) 16 (severe anxiety) 6 (mild anxiety) 12 (moderate anxiety) 21 (severe anxiety) 14 (moderate anxiety)   Total Score 7 16 16 6 12 21 14     PROMIS 10-Global Health (only subscores and total score):   PROMIS-10 Scores Only 9/6/2020 12/22/2021 1/6/2022 4/26/2022 4/26/2022   Global Mental Health Score 9 7 9 10 10   Global Physical Health Score 8 10 11 11 11   PROMIS TOTAL - SUBSCORES 17 17 20 21 21         ASSESSMENT: Current Emotional / Mental Status (status of significant symptoms):   Risk status (Self / Other harm or suicidal ideation)   Patient denies current fears or concerns for personal safety.   Patient denies current or recent suicidal ideation or behaviors.   Patient denies current or recent homicidal ideation or behaviors.   Patient denies current or recent self injurious behavior or ideation.   Patient denies other safety concerns.   Patient reports there has been no change in risk factors since their last session.     Patient reports there has been no change in protective factors since their last session.     Recommended that patient call 911 or go to the local ED should there be a change in any of these risk factors.     Appearance:   Appropriate    Eye Contact:   Good    Psychomotor Behavior: Normal    Attitude:   Cooperative  Pleasant   Orientation:   All   Speech    Rate / Production: Slow  Normal     Volume:  Normal    Mood:    Depressed    Affect:    Appropriate    Thought Content:  Clear    Thought Form:  Coherent  Logical    Insight:    Poor      Medication Review:   No changes to current psychiatric medication(s)     Medication Compliance:   Yes     Changes in Health Issues:   None  "reported     Chemical Use Review:   Substance Use: Chemical use reviewed, no active concerns identified      Tobacco Use: No current tobacco use.      Diagnosis:  1. Generalized anxiety disorder    2. Current moderate episode of major depressive disorder without prior episode (H)        Collateral Reports Completed:   Not Applicable    PLAN: (Patient Tasks / Therapist Tasks / Other)  Continue with boundaries and regular exercise. Explore Al[employer]n, [employer] options.Consider small talk options, practice. Chk out \"Confidence Gap.\"        MARIANNE Scherer LICSW 5/2/22                                                         ______________________________________________________________________    Individual Treatment Plan    Patient's Name: Tresa Chin  YOB: 1980    Date of Creation: 12/15/21  Date Treatment Plan Last Reviewed/Revised: 4/26/22      DSM-V Diagnoses: 296.22 (F32.1)  Major Depressive Disorder, Single Episode, Moderate With anxious distress or 300.02 (F41.1) Generalized Anxiety Disorder  Psychosocial / Contextual Factors: caregiver for mother, living with brothers, cultural expectations, social isolation  WHODAS: 35   PROMIS (reviewed every 90 days): 21  Due 7/27/22    Referral / Collaboration:  The following referral(s) was/were discussed but patient declines follow up at this time. medicatio eval PCP.    Anticipated number of session for this episode of care: 20  Anticipation frequency of session: Every other week  Anticipated Duration of each session: 38-52 minutes  Treatment plan will be reviewed in 90 days or when goals have been changed.     MeasurableTreatment Goal(s) related to diagnosis / functional impairment(s)  Goal 1: Client will report an improvement in functioning and have the capacity to enjoy her life evidenced by self report, observation, and NETO and PHQ9 scores of 5 or less.    I will know I've met my goal when I'm not feeling so down all the time and more able " "to focus .      Objective #A (Client Action)    Client will Discuss motivation / ambivalence about taking anti-depressant / mood stabilizer medication(s).  Status: 4/26/22    Intervention(s)  Therapist will provide psychoeducation about medication.    Objective #B  Client will use skills and strategies to support anxiety reduction. .  Status: 4/26/22    Intervention(s)  Therapist will teach emotion regulation, boundary and proactive communication skills..    Objective #C  Client will Increase interest, engagement, and pleasure in doing things  Decrease frequency and intensity of feeling down, depressed, hopeless  Improve quantity and quality of night time sleep / decrease daytime naps  Feel less tired and more energy during the day   Improve diet, appetite, mindful eating, and / or meal planning  Identify negative self-talk and behaviors: challenge core beliefs, myths, and actions  Improve concentration, focus, and mindfulness in daily activities   Feel less fidgety, restless or slow in daily activities / interpersonal interactions  Decrease thoughts that you'd be better off dead or of suicide / self-harm.  Status: 4/26/22     Intervention(s)  Therapist will teach teach CBT and DBT skills and sleep hygiene an dassign homework.    Patient has reviewed and agreed to the above plan.    Darya Jurado, [facility]  April 26, 2022              [facility]                                       Tresa Chin     SAFETY PLAN:  Step 1: Warning signs / cues (Thoughts, images, mood, situation, behavior) that a crisis may be developing:    Thoughts: \"I can't do this anymore\" \"I want to disappear\" \"I don't want to be here\" \"I can't deal with any more problems\"    Images: none    Thinking Processes: racing thoughts and highly critical and negative thoughts: no specific recall     Mood: worsening depression, hopelessness, intense anger and intense worry-anxiety    Behaviors: eating sugary, fatty foods, slamming " "doors    Situations: Household dynamic with caregiving/brothers  When house is dirty/needs cleaning, caregiving exhaustion  Step 2: Coping strategies - Things I can do to take my mind off of my problems without contacting another person (relaxation technique, physical activity):    Distress Tolerance Strategies:  listen to positive and upbeat music: ,, watch a funny movie: Family movies, [place] movies, change body temperature (ice pack/cold water)  and paced breathing/progressive muscle relaxation,     Physical Activities: yoga, meditation, deep breathing and stretching     Focus on helpful thoughts:  \"This is temporary\", \"I will get through this\" and self-compassion statements: I am doing difficult things.   Step 3: People and social settings that provide distraction:   Name:  Abbey (friend)    movie theater, work and shopping malls, [place]   Step 4: Remind myself of people and things that are important to me and worth living for:  Travel, future relationship  Step 5: When I am in crisis, I can ask these people to help me use my safety plan:   Name: Abbey    Step 6: Making the environment safe:     be around others  Step 7: Professionals or agencies I can contact during a crisis:    Suicide Prevention Lifeline: 1-342-042-TALK (9586)    Crisis Text Line Service (available 24 hours a day, 7 days a week): Text [place] to 269296  Local Crisis Services: [facility] Crisis  Call [phone]  24 hours phone and face to face    Call 931 or go to my nearest emergency department.   I helped develop this safety plan and agree to use it when needed.  I have been given a copy of this plan.    Client signature _________________________________________________________________  Today s date:  11/30/2021  Completed by Provider Name/ Credentials:  Darya LOPES LGSW  November 30, 2021  Adapted from Safety Plan Template 2008 Jovita Gonzalez and Jose Acosta is reprinted with the express permission of the authors.  No portion " of the Safety Plan Template may be reproduced without the express, written permission.  You can contact the authors at [email or web address] or [email or web address].  Answers for HPI/ROS submitted by the patient on 4/26/2022  If you checked off any problems, how difficult have these problems made it for you to do your work, take care of things at home, or get along with other people?: Very difficult  PHQ9 TOTAL SCORE: 6

## 2022-05-06 ENCOUNTER — TELEPHONE (OUTPATIENT)
Dept: UROLOGY | Facility: CLINIC | Age: 42
End: 2022-05-06
Payer: COMMERCIAL

## 2022-05-06 DIAGNOSIS — N20.0 KIDNEY STONE: Primary | ICD-10-CM

## 2022-05-06 NOTE — TELEPHONE ENCOUNTER
[facility] Call Center    Phone Message    May a detailed message be left on voicemail: yes     Reason for Call: Order(s): Other:   Reason for requested: KUB  Date needed: 9/1/2022  Provider name: Dr. Gomez    Patient called to schedule 1 year f/u for in October and will need KUB prior. Patient knows its early but let her know when orders have been placed so she remembers to schedule.     Patient also has questions about a litholink she received. Thank you      Action Taken: Message routed to:  [facility] ([facility] Neph    Travel Screening: Not Applicable

## 2022-05-11 ENCOUNTER — VIRTUAL VISIT (OUTPATIENT)
Dept: PSYCHOLOGY | Facility: CLINIC | Age: 42
End: 2022-05-11
Payer: COMMERCIAL

## 2022-05-11 DIAGNOSIS — F88 DEVELOPMENTAL MENTAL DISORDER: Primary | ICD-10-CM

## 2022-05-11 DIAGNOSIS — F34.1 PERSISTENT DEPRESSIVE DISORDER: ICD-10-CM

## 2022-05-11 DIAGNOSIS — F41.1 GENERALIZED ANXIETY DISORDER: ICD-10-CM

## 2022-05-11 PROCEDURE — 90834 PSYTX W PT 45 MINUTES: CPT | Mod: 95 | Performed by: PSYCHOLOGIST

## 2022-05-11 ASSESSMENT — ANXIETY QUESTIONNAIRES
5. BEING SO RESTLESS THAT IT IS HARD TO SIT STILL: NOT AT ALL
2. NOT BEING ABLE TO STOP OR CONTROL WORRYING: MORE THAN HALF THE DAYS
1. FEELING NERVOUS, ANXIOUS, OR ON EDGE: MORE THAN HALF THE DAYS
3. WORRYING TOO MUCH ABOUT DIFFERENT THINGS: NEARLY EVERY DAY
6. BECOMING EASILY ANNOYED OR IRRITABLE: NEARLY EVERY DAY
7. FEELING AFRAID AS IF SOMETHING AWFUL MIGHT HAPPEN: SEVERAL DAYS
GAD7 TOTAL SCORE: 12

## 2022-05-11 ASSESSMENT — PATIENT HEALTH QUESTIONNAIRE - PHQ9
5. POOR APPETITE OR OVEREATING: SEVERAL DAYS
SUM OF ALL RESPONSES TO PHQ QUESTIONS 1-9: 5
10. IF YOU CHECKED OFF ANY PROBLEMS, HOW DIFFICULT HAVE THESE PROBLEMS MADE IT FOR YOU TO DO YOUR WORK, TAKE CARE OF THINGS AT HOME, OR GET ALONG WITH OTHER PEOPLE: VERY DIFFICULT
SUM OF ALL RESPONSES TO PHQ QUESTIONS 1-9: 5

## 2022-05-11 NOTE — PROGRESS NOTES
[facility]   Mental Health & Addiction Services     Progress Note - Initial Visit    Client Name:  Tresa Chin Date: May 11, 2022         Service Type: Individual     Visit Start Time: 9:00am  Visit End Time: 9:39am    Visit #: 1    Attendees: Client attended alone    Service Modality:  Video Visit:      Provider verified identity through the following two step process.  Patient provided:  Patient photo and Patient     Telemedicine Visit: The patient's condition can be safely assessed and treated via synchronous audio and visual telemedicine encounter.      Reason for Telemedicine Visit: Services only offered telehealth    Originating Site (Patient Location): Patient's place of employment    Distant Site (Provider Location): Provider Remote Setting- Home Office    Consent:  The patient/guardian has verbally consented to: the potential risks and benefits of telemedicine (video visit) versus in person care; bill my insurance or make self-payment for services provided; and responsibility for payment of non-covered services.     Patient would like the video invitation sent by:  Send to e-mail at: [email or web address]    Mode of Communication:  Video Conference via [facility]    As the provider I attest to compliance with applicable laws and regulations related to telemedicine.       DATA:  Extended Session (53+ minutes): No  Interactive Complexity: No   Crisis: No     Presenting Concerns/Current Stressors:   Patient presented to session to initiate the ADHD evaluation process.       PHQ 2022   PHQ-9 Total Score 19 6 5   Q9: Thoughts of better off dead/self-harm past 2 weeks Not at all Not at all Not at all   F/U: Thoughts of suicide or self-harm - - -   F/U: Safety concerns - - -        NETO-7 SCORE 11/10/2021 2021 2022   Total Score 21 (severe anxiety) 14 (moderate anxiety) -   Total Score 21 14 12       ASSESSMENT:  Mental Status  Assessment:  Appearance:   Appropriate   Eye Contact:   Good   Psychomotor Behavior: Normal   Attitude:   Cooperative   Orientation:   All  Speech   Rate / Production: Normal/ Responsive   Volume:  Normal   Mood:    Depressed   Affect:    Tearful  Thought Content:  Clear   Thought Form:  Logical   Insight:    Good       Safety Issues and Plan for Safety and Risk Management:     [place] Suicide Severity Rating Scale (Lifetime/Recent)  [place] Suicide Severity Rating (Lifetime/Recent) 2/17/2019 2/17/2019 2/17/2019 11/10/2021   Wish to be Dead (Lifetime) - - - Yes   Non-Specific Active Suicidal Thoughts (Lifetime) - - - No   Q1 Wished to be Dead (Past Month) no no no -   Q2 Suicidal Thoughts (Past Month) no no no -   Q6 Suicide Behavior (Lifetime) no no no -   RETIRED: 1. Wish to be Dead (Recent) - - - No   RETIRED: 2. Non-Specific Active Suicidal Thoughts (Recent) - - - No   3. Active Suicidal Ideation with any Methods (Not Plan) Without Intent to Act (Lifetime) - - - No   RETIRED: 3. Active Suicidal Ideation with any Methods (Not Plan) Without Intent to Act (Recent) - - - No   RETIRE: 4. Active Suicidal Ideation with Some Intent to Act, Without Specific Plan (Lifetime) - - - No   4. Active Suicidal Ideation with Some Intent to Act, Without Specific Plan (Recent) - - - No   RETIRE: 5. Active Suicidal Ideation with Specific Plan and Intent (Lifetime) - - - No   RETIRED: 5. Active Suicidal Ideation with Specific Plan and Intent (Recent) - - - No   Actual Attempt (Lifetime) - - - No   Actual Attempt (Past 3 Months) - - - No   Has subject engaged in non-suicidal self-injurious behavior? (Lifetime) - - - No   Has subject engaged in non-suicidal self-injurious behavior? (Past 3 Months) - - - No   Interrupted Attempts (Lifetime) - - - No   Interrupted Attempts (Past 3 Months) - - - No   Aborted or Self-Interrupted Attempt (Lifetime) - - - No   Aborted or Self-Interrupted Attempt (Past 3 Months) - - - No   Preparatory Acts  or Behavior (Lifetime) - - - No   Preparatory Acts or Behavior (Past 3 Months) - - - No   Most Recent Attempt Actual Lethality Code - - - NA   Most Lethal Attempt Actual Lethality Code - - - NA   Initial/First Attempt Actual Lethality Code - - - NA     Patient denies current fears or concerns for personal safety.  Patient denies current or recent suicidal ideation or behaviors.  Patient denies current or recent homicidal ideation or behaviors.  Patient denies current or recent self injurious behavior or ideation.  Patient denies other safety concerns.  Recommended that patient call 911 or go to the local ED should there be a change in any of these risk factors.   Patient reports there are no firearms in the house.    Diagnostic Criteria:  F88:  A. A persistent pattern of inattention and/or hyperactivity-impulsivity that interferes with functioning or development, as characterized by (1) and/or (2):   1. Six or more inattention symptoms that have persisted for at least 6 months to a degree that is inconsistent with developmental level and that negatively impacts directly on social and academic/occupational activities.   2. Six or more hyperactivity and impulsivity symptoms that have persisted for at least 6 months to a degree that is inconsistent with developmental level and that negatively impacts directly on social and academic/occupational activities.  B. Several symptoms (inattentive or hyperactive/impulsive) were present before the age of 12 years.  C. Several symptoms (inattentive or hyperactive/impulsive) present in ?2 settings (eg, at home, school, or work; with friends or relatives; in other activities).  D. There is clear evidence that the symptoms interfere with or reduce the quality of social, academic, or occupational functioning.  E. Symptoms do not occur exclusively during the course of schizophrenia or another psychotic disorder, and are not better explained by another mental disorder (eg, mood  disorder, anxiety disorder, dissociative disorder, personality disorder, substance intoxication, or withdrawal).    F41.1:  A. Excessive anxiety and worry, occurring more days than not for at least 6 months about a number of events or activities.   B. The individual finds it difficult to control the worry.  C. The anxiety and worry are associated with 3 or more of 6 symptoms.  D. The anxiety, worry, or physical symptoms cause clinically significant distress or impairment in social, occupational, or other important areas of functioning.  E. The disturbance is not attributable to the physiological effects of a substance (e.g., a drug of abuse, a medication) or another medical condition (e.g., hyperthyroidism).  F. The disturbance is not better explained by another mental disorder (e.g., anxiety or worry about having panic attacks in panic disorder, negative evaluation in social anxiety disorder [social phobia], contamination or other obsessions in obsessive-compulsive disorder, separation from attachment figures in separation anxiety disorder, reminders of traumatic events in posttraumatic stress disorder, gaining weight in anorexia nervosa, physical complaints in somatic symptom disorder, perceived appearance flaws in body dysmorphic disorder, having a serious illness in illness anxiety disorder, or the content of delusional beliefs in schizophrenia or delusional disorder).    F34.1:  A. Depressed mood for most of the day, for more days than not, as indicated by either subjective account or observation by others, for at least 2 years.  B. Presence, while depressed, of two (or more) symptoms:  1. Poor appetite or overeating.  2. Insomnia or hypersomnia.  3. Low energy or fatigue.  4. Low self-esteem.  5. Poor concentration or difficulty making decisions.  6. Feelings of hopelessness.  C. During the 2-year period of the disturbance, the individual has never been without the symptoms in Criteria A and B for more than 2  months at a time.  D. Criteria for a major depressive disorder may be continuously present for 2 years.  E. There has never been a manic episode or a hypomanic episode, and criteria have never been met for cyclothymic disorder.  F. The disturbance is not better explained by a persistent schizoaffective disorder, schizophrenia, delusional disorder, or other specified or unspecified schizophrenia spectrum and other psychotic disorder.  G. The symptoms are not attributable to the physiological effects of a substance (e.g., a drug of abuse, a medication) or another medical condition (e.g., hypothyroidism).  H. The symptoms cause clinically significant distress or impairment in social, occupational, or other important areas of functioning.    DSM5 Diagnoses: (Sustained by DSM5 Criteria Listed Above)  Diagnoses:   1. Developmental mental disorder    2. Generalized anxiety disorder    3. Persistent depressive disorder    Rule out trauma disorder  Psychosocial & Contextual Factors: social support, employed, working in individual psychotherapy   WHODAS 2.0 (12 item):   WHODAS 2.0 Total Score 11/23/2021 5/11/2022   Total Score 35 24   Total Score [facility] 24       PROMIS-10 Scores  Global Mental Health Score: (P) 9  Global Physical Health Score: (P) 13   PROMIS TOTAL - SUBSCORES: (P) 22      Intervention:              Reviewed symptoms and history of presenting concern. Patient endorsed symptoms consistent with depression , anxiety , trauma and ADHD. Trauma symptoms need further assessment and consultation. Patient denied symptoms associated with ben, panic, OCD and perceptual difficulties. Unable to complete diagnostic intake, will be completed in next session. DA will be updated for the purposes of providing a different service than psychotherapy (psychological testing).  CBT: socratic questioning, positive reinforcement  EFT: empathetic attunement, emotion checking  MI: open ended questions, affirmations, reflections         Attendance Agreement:  Client has not signed the attendance agreement. Discussed expectations at beginning of this first session and patient agreed.       PLAN:  Provider will continue Diagnostic Assessment in next session. Patient will complete [facility] questionnaires prior to next session (5/18/2022).    Patient meets the following risk assessment and triage: Patient denied any current/recent/lifetime history of suicidal ideation and/or behaviors.  No safety plan indicated at this time.     Medical necessity criteria is warranted in order to: Measure a psychological disorder and its severity and functional impairment to determine psychiatric diagnosis when a mental illness is suspected, or to achieve a differential diagnosis from a range of medical/psychological disorders that present with similar constellations of symptoms (e.g., determination and measurement of anxiety severity and impact in the presence of ongoing asthma or heart disease), Perform symptom measurement to objectively measure treatment effectiveness and/or determine the need to refer for pharmacological treatment or other medical evaluation (e.g., based on severity and chronicity of symptoms) and Evaluate primary symptoms of impaired attention and concentration that can occur in many neurological and psychiatric conditions    Medical necessity for psychological assessment is warranted as a result of the following: (1) A specific clinical question is posed that relates to the condition/symptoms being addressed (2) The question cannot be adequately addressed by clinical interview and/or behavioral observation (3) Results of psychological testing are reasonably expected to provide an answer to the query (4) It is reasonably expected that the testing will provide information leading to a clearer diagnosis and/or guide treatment planning with an expectation of improved clinical outcome.    I acknowledge that, based upon current clinical  information, the patient and I have reviewed and discussed issues pertaining to the purpose of therapy/testing, potential therapeutic goals, procedures, risks and benefits, and estimated duration of therapy/testing. Issues pertaining to fees/insurance and confidentiality were also addressed with the patient, who indicated understanding and elected to continue with appointments. I will not be providing any experimental procedures and, if we agree that a change in clinical procedure would be more beneficial, I will obtain specific consent for that procedure or refer you to another provider who has expertise in that area.       Brigitte Gibson PsyD,   Clinical Psychologist

## 2022-05-12 ASSESSMENT — PATIENT HEALTH QUESTIONNAIRE - PHQ9: SUM OF ALL RESPONSES TO PHQ QUESTIONS 1-9: 5

## 2022-05-12 ASSESSMENT — ANXIETY QUESTIONNAIRES: GAD7 TOTAL SCORE: 12

## 2022-05-18 ENCOUNTER — VIRTUAL VISIT (OUTPATIENT)
Dept: PSYCHOLOGY | Facility: CLINIC | Age: 42
End: 2022-05-18
Payer: COMMERCIAL

## 2022-05-18 DIAGNOSIS — F88 DEVELOPMENTAL MENTAL DISORDER: Primary | ICD-10-CM

## 2022-05-18 DIAGNOSIS — F34.1 PERSISTENT DEPRESSIVE DISORDER: ICD-10-CM

## 2022-05-18 DIAGNOSIS — F41.1 GENERALIZED ANXIETY DISORDER: ICD-10-CM

## 2022-05-18 PROCEDURE — 90791 PSYCH DIAGNOSTIC EVALUATION: CPT | Mod: 95 | Performed by: PSYCHOLOGIST
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
## 2022-05-24 ENCOUNTER — OFFICE VISIT (OUTPATIENT)
Dept: FAMILY MEDICINE | Facility: CLINIC | Age: 42
End: 2022-05-24
Payer: COMMERCIAL

## 2022-05-24 DIAGNOSIS — Z12.4 CERVICAL CANCER SCREENING: ICD-10-CM

## 2022-05-24 DIAGNOSIS — E11.9 TYPE 2 DIABETES MELLITUS WITHOUT RETINOPATHY (H): ICD-10-CM

## 2022-05-24 DIAGNOSIS — E66.01 MORBID OBESITY (H): ICD-10-CM

## 2022-05-24 ASSESSMENT — PATIENT HEALTH QUESTIONNAIRE - PHQ9
10. IF YOU CHECKED OFF ANY PROBLEMS, HOW DIFFICULT HAVE THESE PROBLEMS MADE IT FOR YOU TO DO YOUR WORK, TAKE CARE OF THINGS AT HOME, OR GET ALONG WITH OTHER PEOPLE: SOMEWHAT DIFFICULT
SUM OF ALL RESPONSES TO PHQ QUESTIONS 1-9: 3
SUM OF ALL RESPONSES TO PHQ QUESTIONS 1-9: 3

## 2022-05-24 ASSESSMENT — ANXIETY QUESTIONNAIRES
6. BECOMING EASILY ANNOYED OR IRRITABLE: SEVERAL DAYS
GAD7 TOTAL SCORE: 4
5. BEING SO RESTLESS THAT IT IS HARD TO SIT STILL: NOT AT ALL
GAD7 TOTAL SCORE: 4
3. WORRYING TOO MUCH ABOUT DIFFERENT THINGS: SEVERAL DAYS
4. TROUBLE RELAXING: NOT AT ALL
8. IF YOU CHECKED OFF ANY PROBLEMS, HOW DIFFICULT HAVE THESE MADE IT FOR YOU TO DO YOUR WORK, TAKE CARE OF THINGS AT HOME, OR GET ALONG WITH OTHER PEOPLE?: SOMEWHAT DIFFICULT
7. FEELING AFRAID AS IF SOMETHING AWFUL MIGHT HAPPEN: NOT AT ALL
7. FEELING AFRAID AS IF SOMETHING AWFUL MIGHT HAPPEN: NOT AT ALL
1. FEELING NERVOUS, ANXIOUS, OR ON EDGE: SEVERAL DAYS
GAD7 TOTAL SCORE: 4
2. NOT BEING ABLE TO STOP OR CONTROL WORRYING: SEVERAL DAYS

## 2022-05-24 NOTE — PROGRESS NOTES
{PROVIDER CHARTING PREFERENCE:767743}    Reny Gtz is a 41 year old who presents for the following health issues {ACCOMPANIED BY STATEMENT (Optional):466119}    History of Present Illness       Diabetes:   She presents for follow up of diabetes.  She is not checking blood glucose. She is concerned about other. She is not experiencing numbness or burning in feet, excessive thirst, blurry vision, weight changes or redness, sores or blisters on feet.          Today's PHQ-9         PHQ-9 Total Score: 3    PHQ-9 Q9 Thoughts of better off dead/self-harm past 2 weeks :   Not at all    How difficult have these problems made it for you to do your work, take care of things at home, or get along with other people: Somewhat difficult  Today's NETO-7 Score: 4     {SUPERLIST (Optional):781984}  {additonal problems for provider to add (Optional):948594}    Review of Systems   {ROS COMP (Optional):092813}      Objective    There were no vitals taken for this visit.  There is no height or weight on file to calculate BMI.  Physical Exam   {Exam List (Optional):435736}    {Diagnostic Test Results (Optional):277742}    {AMBULATORY ATTESTATION (Optional):487721}

## 2022-05-31 ENCOUNTER — NURSE TRIAGE (OUTPATIENT)
Dept: NURSING | Facility: CLINIC | Age: 42
End: 2022-05-31
Payer: COMMERCIAL

## 2022-05-31 NOTE — TELEPHONE ENCOUNTER
Patient calls with concerns regarding a recent appointment that did not get completed due to a clinic emergency. Patient was scheduled for a general visit and was also wanting to discuss right knee pain. Patient has not been called to rescheduled.  Patient reports that pain in the knee has been present for some time. [facility] Was given a cortisone shot and advised on physical therapy. Patient did not go to physical therapy. She denies any knee swelling. Patient reports that knee hurts to bend and walk, but she is able to walk at this time.     See office within 3 days per protocol. Patient verbalizes understanding and is transferred to scheduling to set up subsequent visit after last one was cancelled.    Sarah Cabrera RN  [facility]          Reason for Disposition    MODERATE pain (e.g., symptoms interfere with work or school, limping) and present > 3 days    Additional Information    Negative: Sounds like a life-threatening emergency to the triager    Negative: Followed a knee injury    Negative: Swollen knee joint and fever    Negative: Thigh or calf pain and only 1 side and present > 1 hour    Negative: Thigh or calf swelling and only 1 side    Negative: Patient sounds very sick or weak to the triager    Negative: Can't move swollen joint at all    Negative: SEVERE pain (e.g., excruciating, unable to walk)    Negative: Very swollen joint    Negative: Painful rash with multiple small blisters grouped together (i.e., dermatomal distribution or 'band' or 'stripe')    Negative: Looks like a boil, infected sore, deep ulcer, or other infected rash (spreading redness, pus)    Protocols used: KNEE PAIN-A-OH    COVID 19 Nurse Triage Plan/Patient Instructions    Please be aware that novel coronavirus (COVID-19) may be circulating in the community. If you develop symptoms such as fever, cough, or SOB or if you have concerns about the presence of another  infection including coronavirus (COVID-19), please contact your health care provider or visit [email or web address].     Disposition/Instructions    In-Person Visit with provider recommended. Reference Visit Selection Guide.    Thank you for taking steps to prevent the spread of this virus.  o Limit your contact with others.  o Wear a simple mask to cover your cough.  o Wash your hands well and often.    Resources    [facility]: About COVID-19: [email or web address]    CDC: What to Do If You're Sick: www.cdc.gov/coronavirus/2019-ncov/about/steps-when-sick.html    CDC: Ending Home Isolation: www.cdc.gov/coronavirus/2019-ncov/hcp/disposition-in-home-patients.html     CDC: Caring for Someone: www.cdc.gov/coronavirus/2019-ncov/if-you-are-sick/care-for-someone.html     [facility]: Interim Guidance for Hospital Discharge to Home: www.[facility].Critical access hospital.[place]./diseases/coronavirus/hcp/hospdischarge.pdf    [facility] clinical trials (COVID-19 research studies): clinicalaffairs.[facility]-clinical-trials     Below are the COVID-19 hotlines at the [place] [facility] ([facility]). Interpreters are available.   o For health questions: Call 860-259-3811 or 1-987.323.8103 (7 a.m. to 7 p.m.)  o For questions about schools and childcare: Call 561-574-6045 or 1-160.189.1636 (7 a.m. to 7 p.m.)

## 2022-06-06 ENCOUNTER — VIRTUAL VISIT (OUTPATIENT)
Dept: PSYCHOLOGY | Facility: CLINIC | Age: 42
End: 2022-06-06
Payer: COMMERCIAL

## 2022-06-06 DIAGNOSIS — F41.1 GENERALIZED ANXIETY DISORDER: Primary | ICD-10-CM

## 2022-06-06 DIAGNOSIS — F32.1 CURRENT MODERATE EPISODE OF MAJOR DEPRESSIVE DISORDER WITHOUT PRIOR EPISODE (H): ICD-10-CM

## 2022-06-06 PROCEDURE — 90834 PSYTX W PT 45 MINUTES: CPT | Mod: 95

## 2022-06-06 ASSESSMENT — PATIENT HEALTH QUESTIONNAIRE - PHQ9
10. IF YOU CHECKED OFF ANY PROBLEMS, HOW DIFFICULT HAVE THESE PROBLEMS MADE IT FOR YOU TO DO YOUR WORK, TAKE CARE OF THINGS AT HOME, OR GET ALONG WITH OTHER PEOPLE: VERY DIFFICULT
SUM OF ALL RESPONSES TO PHQ QUESTIONS 1-9: 5
SUM OF ALL RESPONSES TO PHQ QUESTIONS 1-9: 5

## 2022-06-06 NOTE — PROGRESS NOTES
[facility]                                     Progress Note    Patient Name: Tresa Chin  Date: 6/6/22         Service Type: Individual      Session Start Time: 4:30 pm  Session End Time: 5:15 pm     Session Length: 45 min    Session #: 9    Attendees: Client attended alone    Service Modality:  Video Visit:      Provider verified identity through the following two step process.  Patient provided:  Patient is known previously to provider    Telemedicine Visit: The patient's condition can be safely assessed and treated via synchronous audio and visual telemedicine encounter.      Reason for Telemedicine Visit: Patient has requested telehealth visit    Originating Site (Patient Location): Patient's other vehicle    Distant Site (Provider Location): Provider Remote Setting- Home Office    Consent:  The patient/guardian has verbally consented to: the potential risks and benefits of telemedicine (video visit) versus in person care; bill my insurance or make self-payment for services provided; and responsibility for payment of non-covered services.     Patient would like the video invitation sent by:  My Chart    Mode of Communication:  Video Conference via [employer]    As the provider I attest to compliance with applicable laws and regulations related to telemedicine.    DATA  Interactive Complexity: No  Crisis: No        Progress Since Last Session (Related to Symptoms / Goals / Homework):   Symptoms: increasing anxiety, low mood and mood    Homework: Partially completed effort regarding boundaries and communication with brother      Episode of Care Goals: Minimal progress - PREPARATION (Decided to change - considering how); Intervened by negotiating a change plan and determining options / strategies for behavior change, identifying triggers, exploring social supports, and working towards setting a date to begin behavior change     Current / Ongoing Stressors and Concerns:  Dreading an  upcoming wedding that will require meeting a lot of new people and buying a new dress for the event (provoking anxiety/lack of confidence).. Considering additional income options in order to rent an apartment alone. Recognizing need to lose weight but not currently cooking, eating convenience food. Mom fell and broke her hip, is in rehab/senior. Client's current back and knee problems make giving care; working with  to explore options for long term senior care.        Treatment Objective(s) Addressed in This Session:   Decrease frequency and intensity of feeling down, depressed, hopeless     Intervention:    Supportive therapy:  Provided active listening and validation. Surfaced need to move away from negative thinking and challenge cognitive distortions. Reviewed GLAD gratitude journaling practice. Identified confidence in talking with people as a growth opportunity. Suggested exposure ideas for practice, book option to consider.  Shared [employer] resource.    Motivational Interviewing  Target Behavior: disease management/lifestyle changes increase exercise, manage calorie intake    Stage of Change: RELAPSE (Returned to unhealthy behavior)    MI Intervention: Expressed Empathy/Understanding, Supported Autonomy, Collaboration, Evocation, Permission to raise concern or advise, Open-ended questions, Reflections: simple and complex and Change talk (evoked)     Change Talk Expressed by the Patient: Reasons to change Need to change Activation    Provider Response to Change Talk: E - Evoked more info from patient about behavior change, A - Affirmed patient's thoughts, decisions, or attempts at behavior change, R - Reflected patient's change talk and S - Summarized patient's change talk statements      Assessments completed prior to visit:  The following assessments were completed by patient for this visit:  PHQ9:   PHQ-9 SCORE 11/23/2021 1/6/2022 2/23/2022 4/26/2022 5/11/2022 5/24/2022 6/6/2022   PHQ-9 Total  Score MyChart 18 (Moderately severe depression) 6 (Mild depression) 19 (Moderately severe depression) 6 (Mild depression) 5 (Mild depression) 3 (Minimal depression) 5 (Mild depression)   PHQ-9 Total Score 18 6 19 6 5 3 5     GAD7:   NETO-7 SCORE 2/18/2021 9/22/2021 10/21/2021 11/10/2021 12/22/2021 5/11/2022 5/24/2022   Total Score 16 (severe anxiety) 6 (mild anxiety) 12 (moderate anxiety) 21 (severe anxiety) 14 (moderate anxiety) - 4 (minimal anxiety)   Total Score 16 6 12 21 14 12 4     PROMIS 10-Global Health (only subscores and total score):   PROMIS-10 Scores Only 1/6/2022 4/26/2022 4/26/2022 5/11/2022 5/11/2022 6/6/2022 6/6/2022   Global Mental Health Score 9 10 10 9 9 9 9   Global Physical Health Score 11 11 11 13 13 9 9   PROMIS TOTAL - SUBSCORES 20 21 21 22 22 18 18         ASSESSMENT: Current Emotional / Mental Status (status of significant symptoms):   Risk status (Self / Other harm or suicidal ideation)   Patient denies current fears or concerns for personal safety.   Patient denies current or recent suicidal ideation or behaviors.   Patient denies current or recent homicidal ideation or behaviors.   Patient denies current or recent self injurious behavior or ideation.   Patient denies other safety concerns.   Patient reports there has been no change in risk factors since their last session.     Patient reports there has been no change in protective factors since their last session.     Recommended that patient call 911 or go to the local ED should there be a change in any of these risk factors.     Appearance:   Appropriate    Eye Contact:   Good    Psychomotor Behavior: Normal    Attitude:   Cooperative  Pleasant   Orientation:   All   Speech    Rate / Production: Slow  Normal     Volume:  Normal    Mood:    Depressed    Affect:    Appropriate    Thought Content:  Clear    Thought Form:  Coherent  Logical    Insight:    Poor      Medication Review:   No changes to current psychiatric  medication(s)     Medication Compliance:   Yes     Changes in Health Issues:   None reported     Chemical Use Review:   Substance Use: Chemical use reviewed, no active concerns identified      Tobacco Use: No current tobacco use.      Diagnosis:  No diagnosis found.    Collateral Reports Completed:   Not Applicable    PLAN: (Patient Tasks / Therapist Tasks / Other)  Continue with boundaries and focus on nutrition. Work to let go of guilt.  Explore [employer], [employer] options.         Darya Jurado, MSW LICSW 6/6/22                                                         ______________________________________________________________________    Individual Treatment Plan    Patient's Name: Tresa Chin  YOB: 1980    Date of Creation: 12/15/21  Date Treatment Plan Last Reviewed/Revised: 4/26/22      DSM-V Diagnoses: 296.22 (F32.1)  Major Depressive Disorder, Single Episode, Moderate With anxious distress or 300.02 (F41.1) Generalized Anxiety Disorder  Psychosocial / Contextual Factors: caregiver for mother, living with brothers, cultural expectations, social isolation  WHODAS: 35   PROMIS (reviewed every 90 days): 21  Due 7/27/22    Referral / Collaboration:  The following referral(s) was/were discussed but patient declines follow up at this time. medicatio eval PCP.    Anticipated number of session for this episode of care: 20  Anticipation frequency of session: Every other week  Anticipated Duration of each session: 38-52 minutes  Treatment plan will be reviewed in 90 days or when goals have been changed.     MeasurableTreatment Goal(s) related to diagnosis / functional impairment(s)  Goal 1: Client will report an improvement in functioning and have the capacity to enjoy her life evidenced by self report, observation, and NETO and PHQ9 scores of 5 or less.    I will know I've met my goal when I'm not feeling so down all the time and more able to focus .      Objective #A (Client  "Action)    Client will Discuss motivation / ambivalence about taking anti-depressant / mood stabilizer medication(s).  Status: 4/26/22    Intervention(s)  Therapist will provide psychoeducation about medication.    Objective #B  Client will use skills and strategies to support anxiety reduction. .  Status: 4/26/22    Intervention(s)  Therapist will teach emotion regulation, boundary and proactive communication skills..    Objective #C  Client will Increase interest, engagement, and pleasure in doing things  Decrease frequency and intensity of feeling down, depressed, hopeless  Improve quantity and quality of night time sleep / decrease daytime naps  Feel less tired and more energy during the day   Improve diet, appetite, mindful eating, and / or meal planning  Identify negative self-talk and behaviors: challenge core beliefs, myths, and actions  Improve concentration, focus, and mindfulness in daily activities   Feel less fidgety, restless or slow in daily activities / interpersonal interactions  Decrease thoughts that you'd be better off dead or of suicide / self-harm.  Status: 4/26/22     Intervention(s)  Therapist will teach teach CBT and DBT skills and sleep hygiene an dassign homework.    Patient has reviewed and agreed to the above plan.    Darya Jurado, [facility]  April 26, 2022              [facility]                                       Tresa Chin     SAFETY PLAN:  Step 1: Warning signs / cues (Thoughts, images, mood, situation, behavior) that a crisis may be developing:    Thoughts: \"I can't do this anymore\" \"I want to disappear\" \"I don't want to be here\" \"I can't deal with any more problems\"    Images: none    Thinking Processes: racing thoughts and highly critical and negative thoughts: no specific recall     Mood: worsening depression, hopelessness, intense anger and intense worry-anxiety    Behaviors: eating sugary, fatty foods, slamming doors    Situations: Household dynamic " "with caregiving/brothers  When house is dirty/needs cleaning, caregiving exhaustion  Step 2: Coping strategies - Things I can do to take my mind off of my problems without contacting another person (relaxation technique, physical activity):    Distress Tolerance Strategies:  listen to positive and upbeat music: ,, watch a funny movie: Family movies, [place] movies, change body temperature (ice pack/cold water)  and paced breathing/progressive muscle relaxation,     Physical Activities: yoga, meditation, deep breathing and stretching     Focus on helpful thoughts:  \"This is temporary\", \"I will get through this\" and self-compassion statements: I am doing difficult things.   Step 3: People and social settings that provide distraction:   Name:  Daalexandre (friend)    movie theater, work and shopping malls, [place]   Step 4: Remind myself of people and things that are important to me and worth living for:  Travel, future relationship  Step 5: When I am in crisis, I can ask these people to help me use my safety plan:   Name: Abbey    Step 6: Making the environment safe:     be around others  Step 7: Professionals or agencies I can contact during a crisis:    Suicide Prevention Lifeline: 5-897-905-TALK (8203)    Crisis Text Line Service (available 24 hours a day, 7 days a week): Text [place] to 528155  Local Crisis Services: [facility]  Call [phone]  24 hours phone and face to face    Call 976 or go to my nearest emergency department.   I helped develop this safety plan and agree to use it when needed.  I have been given a copy of this plan.    Client signature _________________________________________________________________  Today s date:  11/30/2021  Completed by Provider Name/ Credentials:  Darya LOPES LGSW  November 30, 2021  Adapted from Safety Plan Template 2008 Jovita Gonzalez and Jose Acosta is reprinted with the express permission of the authors.  No portion of the Safety Plan Template may be " reproduced without the express, written permission.  You can contact the authors at [email or web address] or [email or web address].  Answers for HPI/ROS submitted by the patient on 4/26/2022  If you checked off any problems, how difficult have these problems made it for you to do your work, take care of things at home, or get along with other people?: Very difficult  PHQ9 TOTAL SCORE: 6

## 2022-06-07 ENCOUNTER — OFFICE VISIT (OUTPATIENT)
Dept: FAMILY MEDICINE | Facility: CLINIC | Age: 42
End: 2022-06-07
Payer: COMMERCIAL

## 2022-06-07 VITALS
TEMPERATURE: 99 F | WEIGHT: 267 LBS | OXYGEN SATURATION: 96 % | DIASTOLIC BLOOD PRESSURE: 86 MMHG | BODY MASS INDEX: 50.41 KG/M2 | SYSTOLIC BLOOD PRESSURE: 130 MMHG | HEIGHT: 61 IN | HEART RATE: 92 BPM | RESPIRATION RATE: 14 BRPM

## 2022-06-07 DIAGNOSIS — M17.11 OSTEOARTHRITIS OF RIGHT KNEE, UNSPECIFIED OSTEOARTHRITIS TYPE: ICD-10-CM

## 2022-06-07 DIAGNOSIS — E66.01 MORBID OBESITY (H): Primary | ICD-10-CM

## 2022-06-07 DIAGNOSIS — R73.03 PREDIABETES: ICD-10-CM

## 2022-06-07 DIAGNOSIS — F33.1 MODERATE EPISODE OF RECURRENT MAJOR DEPRESSIVE DISORDER (H): ICD-10-CM

## 2022-06-07 DIAGNOSIS — M54.50 LUMBAR PAIN: ICD-10-CM

## 2022-06-07 LAB
ALBUMIN SERPL-MCNC: 3.7 G/DL (ref 3.4–5)
ALP SERPL-CCNC: 59 U/L (ref 40–150)
ALT SERPL W P-5'-P-CCNC: 74 U/L (ref 0–50)
ANION GAP SERPL CALCULATED.3IONS-SCNC: 6 MMOL/L (ref 3–14)
AST SERPL W P-5'-P-CCNC: 39 U/L (ref 0–45)
BILIRUB SERPL-MCNC: 0.5 MG/DL (ref 0.2–1.3)
BUN SERPL-MCNC: 14 MG/DL (ref 7–30)
CALCIUM SERPL-MCNC: 8.8 MG/DL (ref 8.5–10.1)
CHLORIDE BLD-SCNC: 109 MMOL/L (ref 94–109)
CO2 SERPL-SCNC: 27 MMOL/L (ref 20–32)
CREAT SERPL-MCNC: 0.5 MG/DL (ref 0.52–1.04)
CREAT UR-MCNC: 246 MG/DL
GFR SERPL CREATININE-BSD FRML MDRD: >90 ML/MIN/1.73M2
GLUCOSE BLD-MCNC: 146 MG/DL (ref 70–99)
HBA1C MFR BLD: 6.8 % (ref 0–5.6)
MICROALBUMIN UR-MCNC: 60 MG/L
MICROALBUMIN/CREAT UR: 24.39 MG/G CR (ref 0–25)
POTASSIUM BLD-SCNC: 4 MMOL/L (ref 3.4–5.3)
PROT SERPL-MCNC: 7.7 G/DL (ref 6.8–8.8)
SODIUM SERPL-SCNC: 142 MMOL/L (ref 133–144)

## 2022-06-07 PROCEDURE — 99215 OFFICE O/P EST HI 40 MIN: CPT | Performed by: FAMILY MEDICINE

## 2022-06-07 PROCEDURE — 36415 COLL VENOUS BLD VENIPUNCTURE: CPT | Performed by: FAMILY MEDICINE

## 2022-06-07 PROCEDURE — 80053 COMPREHEN METABOLIC PANEL: CPT | Performed by: FAMILY MEDICINE

## 2022-06-07 PROCEDURE — 82043 UR ALBUMIN QUANTITATIVE: CPT | Performed by: FAMILY MEDICINE

## 2022-06-07 PROCEDURE — 83036 HEMOGLOBIN GLYCOSYLATED A1C: CPT | Performed by: FAMILY MEDICINE

## 2022-06-07 ASSESSMENT — PATIENT HEALTH QUESTIONNAIRE - PHQ9
SUM OF ALL RESPONSES TO PHQ QUESTIONS 1-9: 5
SUM OF ALL RESPONSES TO PHQ QUESTIONS 1-9: 5
10. IF YOU CHECKED OFF ANY PROBLEMS, HOW DIFFICULT HAVE THESE PROBLEMS MADE IT FOR YOU TO DO YOUR WORK, TAKE CARE OF THINGS AT HOME, OR GET ALONG WITH OTHER PEOPLE: VERY DIFFICULT

## 2022-06-07 ASSESSMENT — ANXIETY QUESTIONNAIRES
2. NOT BEING ABLE TO STOP OR CONTROL WORRYING: SEVERAL DAYS
7. FEELING AFRAID AS IF SOMETHING AWFUL MIGHT HAPPEN: NOT AT ALL
GAD7 TOTAL SCORE: 4
5. BEING SO RESTLESS THAT IT IS HARD TO SIT STILL: NOT AT ALL
GAD7 TOTAL SCORE: 4
8. IF YOU CHECKED OFF ANY PROBLEMS, HOW DIFFICULT HAVE THESE MADE IT FOR YOU TO DO YOUR WORK, TAKE CARE OF THINGS AT HOME, OR GET ALONG WITH OTHER PEOPLE?: SOMEWHAT DIFFICULT
6. BECOMING EASILY ANNOYED OR IRRITABLE: SEVERAL DAYS
1. FEELING NERVOUS, ANXIOUS, OR ON EDGE: SEVERAL DAYS
3. WORRYING TOO MUCH ABOUT DIFFERENT THINGS: SEVERAL DAYS
7. FEELING AFRAID AS IF SOMETHING AWFUL MIGHT HAPPEN: NOT AT ALL
4. TROUBLE RELAXING: NOT AT ALL
GAD7 TOTAL SCORE: 4

## 2022-06-07 NOTE — PATIENT INSTRUCTIONS
[facility] for water aerobics         [email or web address]

## 2022-06-07 NOTE — PROGRESS NOTES
Assessment & Plan     Morbid obesity (H)  - long discussion with patient regarding diet and lifestyle changes. She is committed to making changes and will start with eliminating soda and sugary coffee drinks. For exercise she will enroll in water aerobics at the [facility]. She met with [facility] last year for medication management and prescribed topamax but never took it.      Prediabetes  - recheck A1c today     Lumbar pain  - follow up with physical therapy for further evaluation and treatment   - Physical Therapy Referral; Future    Osteoarthritis of right knee, unspecified osteoarthritis type  - prefers physical therapy at this time   - Physical Therapy Referral; Future    Moderate episode of recurrent major depressive disorder (H)  - continue to follow up with therapist. Does not want medication at this time.         55 minutes spent on the date of the encounter doing chart review, history and exam, documentation and further activities per the note       See Patient Instructions    Return in about 3 months (around 9/7/2022) for medication recheck, in person, with your primary care physician.    Kalyn Miller MD  [facility]    Reny Gtz is a 41 year old who presents for the following health issues     History of Present Illness       Back Pain:  She presents for follow up of back pain. Patient's back pain is a chronic problem.  Location of back pain:  Right lower back, left buttock and left hip  Description of back pain: sharp and stabbing  Back pain spreads: right buttocks, right thigh and right knee    Since patient first noticed back pain, pain is: unchanged  Does back pain interfere with her job:  No      Hypertension: She presents for follow up of hypertension.  She does not check blood pressure  regularly outside of the clinic. Outside blood pressures have been over 140/90. She does not follow a low salt diet.      Today's PHQ-9         PHQ-9  "Total Score: 5    PHQ-9 Q9 Thoughts of better off dead/self-harm past 2 weeks :   Not at all    How difficult have these problems made it for you to do your work, take care of things at home, or get along with other people: Very difficult  Today's NETO-7 Score: 4       Patient here today for routine follow up for her back and knee pain. She was being seen at [facility] until a recent trip out of the country. Prior to her trip she was advised to start physical therapy however she was unable to do this. She also had steroid injection in December. Prefers to go through [facility] for physical therapy at this time.     Diet/Eating habits- admits this is not the best as she does not really have time to cook. Almost all of her meals are fastfood. For breakfast usually goes to [employer] or [employer], lunch and dinner are whatever she finds.   She was walking consistently but has had to stop due to knee pain.       Wt Readings from Last 4 Encounters:   06/07/22 121.1 kg (267 lb)   02/23/22 120.6 kg (265 lb 14.4 oz)   01/10/22 117 kg (258 lb)   10/21/21 117 kg (258 lb)         Review of Systems   Constitutional, HEENT, cardiovascular, pulmonary, GI, , musculoskeletal, neuro, skin, endocrine and psych systems are negative, except as otherwise noted.      Objective    /86 (BP Location: Left arm, Patient Position: Chair, Cuff Size: Adult Large)   Pulse 92   Temp 99  F (37.2  C) (Oral)   Resp 14   Ht 1.549 m (5' 1\")   Wt 121.1 kg (267 lb)   SpO2 96%   BMI 50.45 kg/m    Body mass index is 50.45 kg/m .  Physical Exam   GENERAL: healthy, alert and no distress  RESP: lungs clear to auscultation - no rales, rhonchi or wheezes  CV: regular rate and rhythm, normal S1 S2  MS: no edema and right knee pain   PSYCH: mentation appears normal and tearful            "

## 2022-06-15 ENCOUNTER — VIRTUAL VISIT (OUTPATIENT)
Dept: PSYCHOLOGY | Facility: CLINIC | Age: 42
End: 2022-06-15
Payer: COMMERCIAL

## 2022-06-15 DIAGNOSIS — F34.1 PERSISTENT DEPRESSIVE DISORDER: Primary | ICD-10-CM

## 2022-06-15 DIAGNOSIS — F41.1 GENERALIZED ANXIETY DISORDER: ICD-10-CM

## 2022-06-15 PROCEDURE — 96131 PSYCL TST EVAL PHYS/QHP EA: CPT | Mod: 95 | Performed by: PSYCHOLOGIST

## 2022-06-15 PROCEDURE — 96130 PSYCL TST EVAL PHYS/QHP 1ST: CPT | Mod: 95 | Performed by: PSYCHOLOGIST

## 2022-06-15 NOTE — PROGRESS NOTES
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 interactive activities such as coloring.    5. Develop a \"coping skills jar/box.\" This entails designating a certain container to hold slips of paper with distraction technique ideas written on each slip of paper. Distraction techniques may include listening to a certain type of music, playing on game on your phone, doing a breathing exercise, spending time with a pet, calling a certain individual, looking at a magazine, working on a puzzle, etc. When feeling distressed, choose a slip of paper from the container and engage in that activity rather than focusing on the problem.      Brigitte Gibson PsyD,   Clinical Psychologist  "

## 2022-06-15 NOTE — PROGRESS NOTES
[facility]   Mental Health & Addiction Services     Progress Note - ADHD Feedback Session     Patient Name: Tresa Chin  Date: Mishel 15, 2022       Service Type:  Individual       Session Start Time: 8:00am  Session End Time:  8:33am     Session Length: 33 minutes    Session #: 3    Attendees: Patient attended alone    Service Modality: Video Visit:      Provider verified identity through the following two step process.  Patient provided:  Patient  and Patient address    Telemedicine Visit: The patient's condition can be safely assessed and treated via synchronous audio and visual telemedicine encounter.      Reason for Telemedicine Visit: Services only offered telehealth    Originating Site (Patient Location): Patient's place of employment    Distant Site (Provider Location): Provider Remote Setting- Home Office    Consent:  The patient/guardian has verbally consented to: the potential risks and benefits of telemedicine (video visit) versus in person care; bill my insurance or make self-payment for services provided; and responsibility for payment of non-covered services.     Patient would like the video invitation sent by:  Send to e-mail at: [email or web address]    Mode of Communication:  Video Conference via well    As the provider I attest to compliance with applicable laws and regulations related to telemedicine.      PHQ 2022   PHQ-9 Total Score 3 5 5   Q9: Thoughts of better off dead/self-harm past 2 weeks Not at all Not at all Not at all   F/U: Thoughts of suicide or self-harm - - -   F/U: Safety concerns - - -       NETO-7 SCORE 2022   Total Score - 4 (minimal anxiety) 4 (minimal anxiety)   Total Score 12 4 4         DATA      Progress Since Last Session (Related to Symptoms / Goals / Homework):   Symptoms: Stable.    Homework: Completed.      Treatment Objective(s) Addressed in This  Session:   Provided feedback on ADHD evaluation. Reviewed test results in depth. Plan of care and recommendations were discussed based on testing data. See full report attached on secondary note in this encounter.     Intervention:   Provided feedback to patient regarding testing results, diagnoses, and treatment recommendations. Test results are not consistent with an ADHD diagnosis. Symptoms are better explained by depression and anxiety disorders. Personalized suggestions regarding symptoms were offered. Patient had the opportunity to ask questions; she expressed understanding.        ASSESSMENT: Current Emotional / Mental Status (status of significant symptoms):   Risk status (Self / Other harm or suicidal ideation)   Patient denies current fears or concerns for personal safety.   Patient denies current or recent suicidal ideation or behaviors.   Patient denies current or recent homicidal ideation or behaviors.   Patient denies current or recent self injurious behavior or ideation.   Patient denies other safety concerns.   Patient reports there has been no change in risk factors since their last session.     Patient reports there has been no change in protective factors since their last session.     Recommended that patient call 911 or go to the local ED should there be a change in any of these risk factors.     Appearance:   Appropriate    Eye Contact:   Good    Psychomotor Behavior: Normal    Attitude:   Cooperative    Orientation:   All   Speech    Rate / Production: Normal     Volume:  Normal    Mood:    Normal   Affect:    Appropriate    Thought Content:  Clear    Thought Form:  Coherent  Logical    Insight:    Good      Medication Review:   No current psychiatric medications prescribed     Medication Compliance:   NA     Changes in Health Issues:   None reported     Chemical Use Review:   Substance Use: Chemical use reviewed, no active concerns identified      Nicotine Use: No current tobacco use.       Diagnosis:  1. Persistent depressive disorder    2. Generalized anxiety disorder        PLAN:   Recommendations are outlined in full evaluation report (attached to this encounter).   Patient indicated understanding and will contact the clinic if there are further questions.    Parts of this documentation may have been completed using dictation software. Potential errors may result and are unintentional.       Brigitte Gibson PsyD, LP  Clinical Psychologist         Psychological Testing Services Summary       Testing Evaluation Services Base: 06280  (first 60 mins) Add-on: 82793  (each addtl 60 mins)   Record Review and Clarify Referral Question   8:50am-9:00am on 5/11/2022 10 minutes   Clinical Decision Making/Battery Modification   7:45am-8:00am on 5/18/2022 15 minutes   Integration/Report Generation   7:30am-8:30am on 6/13/2022 ([facility])  12:00pm-12:45pm on 6/13/2022 ([facility]-2)  12:45pm-1:45pm on 6/13/2022 (Final Report)   60 minutes  45 minutes  60 minutes   Interactive Feedback Session   8:00am-8:33am on 6/15/2022 33 minutes   Post-Service Work   8:33am-8:48am on 6/15/2022 15 minutes   Total Time: 238 minutes   Total Units: 1 3       Diagnoses:   F34.1 Persistent depressive disorder  F41.1 Generalized Anxiety Disorder

## 2022-06-16 ENCOUNTER — TELEPHONE (OUTPATIENT)
Dept: FAMILY MEDICINE | Facility: CLINIC | Age: 42
End: 2022-06-16
Payer: COMMERCIAL

## 2022-06-16 NOTE — TELEPHONE ENCOUNTER
Patient Quality Outreach    Patient is due for the following:   Depression  -  PHQ-9 Needed    NEXT STEPS:   No follow up needed at this time.    Type of outreach:    Sent Bikmo message.    Next Steps:  Reach out within 90 days via Letter.    Max number of attempts reached: No. Will try again in 90 days if patient still on fail list.    Questions for provider review:    None     Pura Pate, [facility]

## 2022-06-20 ENCOUNTER — VIRTUAL VISIT (OUTPATIENT)
Dept: PSYCHOLOGY | Facility: CLINIC | Age: 42
End: 2022-06-20
Payer: COMMERCIAL

## 2022-06-20 DIAGNOSIS — F32.1 CURRENT MODERATE EPISODE OF MAJOR DEPRESSIVE DISORDER WITHOUT PRIOR EPISODE (H): Primary | ICD-10-CM

## 2022-06-20 PROCEDURE — 90834 PSYTX W PT 45 MINUTES: CPT | Mod: 95

## 2022-06-20 NOTE — PROGRESS NOTES
[facility]                                     Progress Note    Patient Name: Tresa Chin  Date: 6/20/22         Service Type: Individual      Session Start Time: 4:30 pm  Session End Time: 5:15 pm     Session Length: 45 min    Session #: 10    Attendees: Client attended alone    Service Modality:  Video Visit:      Provider verified identity through the following two step process.  Patient provided:  Patient is known previously to provider    Telemedicine Visit: The patient's condition can be safely assessed and treated via synchronous audio and visual telemedicine encounter.      Reason for Telemedicine Visit: Patient has requested telehealth visit    Originating Site (Patient Location): Patient's other vehicle    Distant Site (Provider Location): Provider Remote Setting- Home Office    Consent:  The patient/guardian has verbally consented to: the potential risks and benefits of telemedicine (video visit) versus in person care; bill my insurance or make self-payment for services provided; and responsibility for payment of non-covered services.     Patient would like the video invitation sent by:  My Chart    Mode of Communication:  Video Conference via Amwell    As the provider I attest to compliance with applicable laws and regulations related to telemedicine.    DATA  Interactive Complexity: No  Crisis: No        Progress Since Last Session (Related to Symptoms / Goals / Homework):   Symptoms: increasing anxiety, low mood and mood    Homework: Partially completed effort regarding boundaries and communication with brother      Episode of Care Goals: Minimal progress - PREPARATION (Decided to change - considering how); Intervened by negotiating a change plan and determining options / strategies for behavior change, identifying triggers, exploring social supports, and working towards setting a date to begin behavior change     Current / Ongoing Stressors and Concerns:  Diagnosed with  Diabetes, struggle with dressing, can't walk-knee problems, emotional eating patterns. Recognizing need to lose weight but not currently cooking, eating convenience food.   Ongoing: Considering additional income options in order to rent an apartment alone.  Mom fell and broke her hip, is in rehab/senior.        Treatment Objective(s) Addressed in This Session:   Decrease frequency and intensity of feeling down, depressed, hopeless     Intervention:    Supportive therapy:  Provided active listening and validation. Surfaced need to move away from negative thinking and challenge cognitive distortions. Reviewed GLAD gratitude journaling practice. Identified confidence in talking with people as a growth opportunity. Suggested exposure ideas for practice, book option to consider.  Shared ALZ Assn resource.    Motivational Interviewing  Target Behavior: disease management/lifestyle changes increase exercise, manage calorie intake    Stage of Change: CONTEMPLATION (Considering change and yet undecided)    MI Intervention: Expressed Empathy/Understanding, Supported Autonomy, Collaboration, Evocation, Permission to raise concern or advise, Open-ended questions, Reflections: simple and complex and Change talk (evoked)     Change Talk Expressed by the Patient: Desire to change Reasons to change Need to change    Provider Response to Change Talk: E - Evoked more info from patient about behavior change, A - Affirmed patient's thoughts, decisions, or attempts at behavior change, R - Reflected patient's change talk and S - Summarized patient's change talk statements    CBT: Provided psychoeducation on emotional vs physical hunger. Identified patterns and circumstances impacting emotional eating. Surfaced dysfunctional relational dynamics impacted by past relationships. Taught mindful communication and practiced in session. Validated efforts at behavior change that have improved wellbeing.    Assessments completed prior to  visit:  The following assessments were completed by patient for this visit:  PHQ9:   PHQ-9 SCORE 2/23/2022 4/26/2022 5/11/2022 5/24/2022 6/6/2022 6/7/2022 6/19/2022   PHQ-9 Total Score MyChart 19 (Moderately severe depression) 6 (Mild depression) 5 (Mild depression) 3 (Minimal depression) 5 (Mild depression) 5 (Mild depression) 5 (Mild depression)   PHQ-9 Total Score 19 6 5 3 5 5 5     GAD7:   NETO-7 SCORE 9/22/2021 10/21/2021 11/10/2021 12/22/2021 5/11/2022 5/24/2022 6/7/2022   Total Score 6 (mild anxiety) 12 (moderate anxiety) 21 (severe anxiety) 14 (moderate anxiety) - 4 (minimal anxiety) 4 (minimal anxiety)   Total Score 6 12 21 14 12 4 4     PROMIS 10-Global Health (only subscores and total score):   PROMIS-10 Scores Only 1/6/2022 4/26/2022 4/26/2022 5/11/2022 5/11/2022 6/6/2022 6/6/2022   Global Mental Health Score 9 10 10 9 9 9 9   Global Physical Health Score 11 11 11 13 13 9 9   PROMIS TOTAL - SUBSCORES 20 21 21 22 22 18 18         ASSESSMENT: Current Emotional / Mental Status (status of significant symptoms):   Risk status (Self / Other harm or suicidal ideation)   Patient denies current fears or concerns for personal safety.   Patient denies current or recent suicidal ideation or behaviors.   Patient denies current or recent homicidal ideation or behaviors.   Patient denies current or recent self injurious behavior or ideation.   Patient denies other safety concerns.   Patient reports there has been no change in risk factors since their last session.     Patient reports there has been no change in protective factors since their last session.     Recommended that patient call 911 or go to the local ED should there be a change in any of these risk factors.     Appearance:   Appropriate    Eye Contact:   Good    Psychomotor Behavior: Normal    Attitude:   Cooperative  Pleasant   Orientation:   All   Speech    Rate / Production: Slow  Normal     Volume:  Normal    Mood:    Anxious  Depressed     Affect:    Appropriate    Thought Content:  Clear    Thought Form:  Coherent  Logical    Insight:    Poor      Medication Review:   No changes to current psychiatric medication(s)     Medication Compliance:   Yes     Changes in Health Issues:   None reported     Chemical Use Review:   Substance Use: Chemical use reviewed, no active concerns identified      Tobacco Use: No current tobacco use.      Diagnosis:  1. Current moderate episode of major depressive disorder without prior episode (H)        Collateral Reports Completed:   Not Applicable    PLAN: (Patient Tasks / Therapist Tasks / Other)  Continue with boundaries and focus on nutrition. Use distraction to move away from emotional eating.        MARIANNE Scherer LICSW 6/20/22                                                         ______________________________________________________________________    Individual Treatment Plan    Patient's Name: Tresa Chin  YOB: 1980    Date of Creation: 12/15/21  Date Treatment Plan Last Reviewed/Revised: 4/26/22      DSM-V Diagnoses: 296.22 (F32.1)  Major Depressive Disorder, Single Episode, Moderate With anxious distress or 300.02 (F41.1) Generalized Anxiety Disorder  Psychosocial / Contextual Factors: caregiver for mother, living with brothers, cultural expectations, social isolation  WHODAS: 35   PROMIS (reviewed every 90 days): 21  Due 7/27/22    Referral / Collaboration:  The following referral(s) was/were discussed but patient declines follow up at this time. medicatio eval PCP.    Anticipated number of session for this episode of care: 20  Anticipation frequency of session: Every other week  Anticipated Duration of each session: 38-52 minutes  Treatment plan will be reviewed in 90 days or when goals have been changed.     MeasurableTreatment Goal(s) related to diagnosis / functional impairment(s)  Goal 1: Client will report an improvement in functioning and have the capacity to enjoy her  "life evidenced by self report, observation, and NETO and PHQ9 scores of 5 or less.    I will know I've met my goal when I'm not feeling so down all the time and more able to focus .      Objective #A (Client Action)    Client will Discuss motivation / ambivalence about taking anti-depressant / mood stabilizer medication(s).  Status: 4/26/22    Intervention(s)  Therapist will provide psychoeducation about medication.    Objective #B  Client will use skills and strategies to support anxiety reduction. .  Status: 4/26/22    Intervention(s)  Therapist will teach emotion regulation, boundary and proactive communication skills..    Objective #C  Client will Increase interest, engagement, and pleasure in doing things  Decrease frequency and intensity of feeling down, depressed, hopeless  Improve quantity and quality of night time sleep / decrease daytime naps  Feel less tired and more energy during the day   Improve diet, appetite, mindful eating, and / or meal planning  Identify negative self-talk and behaviors: challenge core beliefs, myths, and actions  Improve concentration, focus, and mindfulness in daily activities   Feel less fidgety, restless or slow in daily activities / interpersonal interactions  Decrease thoughts that you'd be better off dead or of suicide / self-harm.  Status: 4/26/22     Intervention(s)  Therapist will teach teach CBT and DBT skills and sleep hygiene an dassign homework.    Patient has reviewed and agreed to the above plan.    Darya Jurado, [facility]  April 26, 2022              [facility]                                       Tresa Chin     SAFETY PLAN:  Step 1: Warning signs / cues (Thoughts, images, mood, situation, behavior) that a crisis may be developing:    Thoughts: \"I can't do this anymore\" \"I want to disappear\" \"I don't want to be here\" \"I can't deal with any more problems\"    Images: none    Thinking Processes: racing thoughts and highly critical and negative " "thoughts: no specific recall     Mood: worsening depression, hopelessness, intense anger and intense worry-anxiety    Behaviors: eating sugary, fatty foods, slamming doors    Situations: Household dynamic with caregiving/brothers  When house is dirty/needs cleaning, caregiving exhaustion  Step 2: Coping strategies - Things I can do to take my mind off of my problems without contacting another person (relaxation technique, physical activity):    Distress Tolerance Strategies:  listen to positive and upbeat music: ,, watch a funny movie: Family movies, [place] movies, change body temperature (ice pack/cold water)  and paced breathing/progressive muscle relaxation,     Physical Activities: yoga, meditation, deep breathing and stretching     Focus on helpful thoughts:  \"This is temporary\", \"I will get through this\" and self-compassion statements: I am doing difficult things.   Step 3: People and social settings that provide distraction:   Name:  Abbey (friend)    movie theater, work and shopping malls, [place]   Step 4: Remind myself of people and things that are important to me and worth living for:  Travel, future relationship  Step 5: When I am in crisis, I can ask these people to help me use my safety plan:   Name: Abbey    Step 6: Making the environment safe:     be around others  Step 7: Professionals or agencies I can contact during a crisis:    Suicide Prevention Lifeline: 2-998-681-MXMQ (1500)    Crisis Text Line Service (available 24 hours a day, 7 days a week): Text [place] to 373997  Local Crisis Services: [facility]  Call [phone]  24 hours phone and face to face    Call 071 or go to my nearest emergency department.   I helped develop this safety plan and agree to use it when needed.  I have been given a copy of this plan.    Client signature _________________________________________________________________  Today s date:  11/30/2021  Completed by Provider Name/ Credentials:  Darya LOPES " [facility]  November 30, 2021  Adapted from Safety Plan Template 2008 Jovita Gonzalez and Jose Acosta is reprinted with the express permission of the authors.  No portion of the Safety Plan Template may be reproduced without the express, written permission.  You can contact the authors at [email or web address] or [email or web address].  Answers for HPI/ROS submitted by the patient on 4/26/2022  If you checked off any problems, how difficult have these problems made it for you to do your work, take care of things at home, or get along with other people?: Very difficult  PHQ9 TOTAL SCORE: 6  Answers for HPI/ROS submitted by the patient on 6/19/2022  If you checked off any problems, how difficult have these problems made it for you to do your work, take care of things at home, or get along with other people?: Very difficult  PHQ9 TOTAL SCORE: 5

## 2022-06-21 ENCOUNTER — THERAPY VISIT (OUTPATIENT)
Dept: PHYSICAL THERAPY | Facility: CLINIC | Age: 42
End: 2022-06-21
Attending: FAMILY MEDICINE
Payer: COMMERCIAL

## 2022-06-21 DIAGNOSIS — M54.50 LUMBAR PAIN: ICD-10-CM

## 2022-06-21 DIAGNOSIS — M25.561 RIGHT KNEE PAIN: ICD-10-CM

## 2022-06-21 DIAGNOSIS — M17.11 OSTEOARTHRITIS OF RIGHT KNEE, UNSPECIFIED OSTEOARTHRITIS TYPE: ICD-10-CM

## 2022-06-21 DIAGNOSIS — M54.16 LUMBAR RADICULOPATHY: ICD-10-CM

## 2022-06-21 PROCEDURE — 97162 PT EVAL MOD COMPLEX 30 MIN: CPT | Mod: GP | Performed by: PHYSICAL THERAPIST

## 2022-06-21 PROCEDURE — 97530 THERAPEUTIC ACTIVITIES: CPT | Mod: GP | Performed by: PHYSICAL THERAPIST

## 2022-06-21 PROCEDURE — 97110 THERAPEUTIC EXERCISES: CPT | Mod: GP | Performed by: PHYSICAL THERAPIST

## 2022-06-21 ASSESSMENT — ACTIVITIES OF DAILY LIVING (ADL)
HOW_WOULD_YOU_RATE_THE_CURRENT_FUNCTION_OF_YOUR_KNEE_DURING_YOUR_USUAL_DAILY_ACTIVITIES_ON_A_SCALE_FROM_0_TO_100_WITH_100_BEING_YOUR_LEVEL_OF_KNEE_FUNCTION_PRIOR_TO_YOUR_INJURY_AND_0_BEING_THE_INABILITY_TO_PERFORM_ANY_OF_YOUR_USUAL_DAILY_ACTIVITIES?: 70
SIT WITH YOUR KNEE BENT: I AM UNABLE TO DO THE ACTIVITY
GO DOWN STAIRS: I AM UNABLE TO DO THE ACTIVITY
SQUAT: I AM UNABLE TO DO THE ACTIVITY
AS_A_RESULT_OF_YOUR_KNEE_INJURY,_HOW_WOULD_YOU_RATE_YOUR_CURRENT_LEVEL_OF_DAILY_ACTIVITY?: SEVERELY ABNORMAL
WALK: ACTIVITY IS SOMEWHAT DIFFICULT
PAIN: THE SYMPTOM AFFECTS MY ACTIVITY SEVERELY
GO UP STAIRS: I AM UNABLE TO DO THE ACTIVITY
STIFFNESS: THE SYMPTOM AFFECTS MY ACTIVITY SEVERELY
SWELLING: THE SYMPTOM PREVENTS ME FROM ALL DAILY ACTIVITIES
STAND: ACTIVITY IS SOMEWHAT DIFFICULT
WEAKNESS: THE SYMPTOM AFFECTS MY ACTIVITY SEVERELY
RISE FROM A CHAIR: I AM UNABLE TO DO THE ACTIVITY
HOW_WOULD_YOU_RATE_THE_OVERALL_FUNCTION_OF_YOUR_KNEE_DURING_YOUR_USUAL_DAILY_ACTIVITIES?: SEVERELY ABNORMAL
GIVING WAY, BUCKLING OR SHIFTING OF KNEE: THE SYMPTOM AFFECTS MY ACTIVITY SEVERELY
KNEEL ON THE FRONT OF YOUR KNEE: I AM UNABLE TO DO THE ACTIVITY

## 2022-06-21 NOTE — PROGRESS NOTES
[facility] Initial Evaluation -- Lumbar    Date: June 21, 2022  Tresa Chin is a 42 year old female with a lumbar condition.   Referral: Dr. Pozo  Work mechanical stresses:  Computer/office  Employment status:  Full time  Leisure mechanical stresses: not a formal exerciser  Functional disability score (SPARKLE/STarT Back):  See flow sheet  VAS score (0-10): 4/10  Patient goals/expectations:  Pain relief    HISTORY:    Present symptoms: R low back stiffness, R knee (posterior)  Pain quality (sharp/shooting/stabbing/aching/burning/cramping):  Shooting knee, back stiff   Paresthesia (yes/no):  no    Present since (onset date): January 2022, flared back up March 2022     Symptoms (improving/unchanging/worsening):  worsening.     Symptoms commenced as a result of: chronic   Condition occurred in the following environment:   home     Symptoms at onset (back/thigh/leg): R knee>back  Constant symptoms (back/thigh/leg): R knee,   Intermittent symptoms (back/thigh/leg): back    Symptoms are made worse with the following: Always Bending, Always Sitting, Always Rising, Always Lying (if knee is bent), Time of day - Always PM  Symptoms are made better with the following: Sometimes Walking, Sometimes On the move and Other - injection    Disturbed sleep (yes/no):  yes Sleeping postures (prone/sup/side R/L): sides    Previous episodes (0/1-5/6-10/11+): chronic back issues over several years Year of first episode:     Previous history: January 2022 had severe pain, went to [facility] and MRI of R knee (torn cartilage?)  Previous treatments: injection R knee ([facility])      Specific Questions:  Cough/Sneeze/Strain (pos/neg): neg  Bowel/Bladder (normal/abnormal): normal  Gait (normal/abnormal): abnormal  Medications (nil/NSAIDS/analg/steroids/anticoag/other):  NSAIDS  Medical allergies:  See chart  General health (excellent/good/fair/poor):  fair  Pertinent medical history:  Depression, Overweight and Sleep  disorder/apnea  Imaging (None/Xray/MRI/Other):  Has had MRIs done of lumbar/knee at [facility] (pt. unsure of results)  Recent or major surgery (yes/no):  no  Night pain (yes/no): yes  Accidents (yes/no): no  Unexplained weight loss (yes/no): none  Barriers at home: none  Other red flags: none    EXAMINATION    Posture:   Sitting (good/fair/poor): fair  Standing (good/fair/poor):fair  Lordosis (red/acc/normal): normal  Correction of posture (better/worse/no effect): worse, P R buttocks    Lateral Shift (right/left/nil): nil  Relevant (yes/no):  no  Other Observations: none    Neurological:    Motor deficit:  n/a  Reflexes:  n/a  Sensory deficit:  n/a  Dural signs:  +Slump bilat     Movement Loss:   Jeferson Mod Min Nil Pain   Flexion x x   Pdm, hands at upper thigh   Extension   x     Side Gliding R  x x  pdm >R   Side Gliding L  x x       Test Movements:   During: produces, abolishes, increases, decreases, no effect, centralizing, peripheralizing   After: better, worse, no better, no worse, no effect, centralized, peripheralized    Pretest symptoms standing: not tested   Symptoms During Symptoms After ROM increased ROM decreased No Effect   FIS        Rep FIS        EIS        Rep EIS          Pretest symptoms lying: R LBP    Symptoms During Symptoms After ROM increased ROM decreased No Effect   MONICA        Rep MONICA        EIL Increases    Worse         Rep EIL Increases    Worse     x      If required, pretest symptoms: R LBP, R post knee   Symptoms During Symptoms After ROM increased ROM decreased No Effect   SGIS - R Decreases    No Better         Rep SGIS - R Decreases    Better (abolished post R knee pain, anterior knee pain present)    X,      SGIS - L        Rep SGIS - L          Static Tests:  Sitting slouched:     Sitting erect:    Standing slouched:   Standing erect:    Lying prone in extension:   Long sitting:      Other Tests:     Provisional Classification:  Derangement - Asymmetrical, unilateral, symptoms above  knee    Principle of Management:  Education:  DP, therapeutic dose of exercise   Equipment provided:    Mechanical therapy (Y/N):  Y   Extension principle:    Lateral Principle:  Rep R SGIS x 10/2 hrs  Flexion principle:    Other:      ASSESSMENT/PLAN:    Patient arrived 10' late for appointment so unable to fully explore R knee contribution; will address at next visit.   Patient is a 42 year old female with lumbar and right side knee complaints.    Patient has the following significant findings with corresponding treatment plan.                Diagnosis 1:  Lumbar radiculopathy  Pain -  manual therapy, self management, education, directional preference exercise and home program  Decreased ROM/flexibility - manual therapy and therapeutic exercise  Decreased joint mobility - manual therapy and therapeutic exercise  Decreased strength - therapeutic exercise and therapeutic activities  Edema - cold therapy  Decreased function - therapeutic activities  Impaired posture - neuro re-education  Diagnosis 2:  R knee pain   Pain -  hot/cold therapy, manual therapy, self management, education, directional preference exercise and home program  Decreased ROM/flexibility - manual therapy and therapeutic exercise  Decreased strength - therapeutic exercise and therapeutic activities  Edema - vasopneumatics  Decreased function - therapeutic activities    Therapy Evaluation Codes:   1) History comprised of:   Personal factors that impact the plan of care:      None.    Comorbidity factors that impact the plan of care are:      Depression, Overweight and Sleep disorder/apnea.     Medications impacting care: Anti-inflammatory.  2) Examination of Body Systems comprised of:   Body structures and functions that impact the plan of care:      Knee and Lumbar spine.   Activity limitations that impact the plan of care are:      Bending, Driving, Sitting, Squatting/kneeling, Stairs, Walking, Working and Sleeping.  3) Clinical presentation  characteristics are:   Evolving/Changing.  4) Decision-Making    Moderate complexity using standardized patient assessment instrument and/or measureable assessment of functional outcome.  Cumulative Therapy Evaluation is: Moderate complexity.    Previous and current functional limitations:  (See Goal Flow Sheet for this information)    Short term and Long term goals: (See Goal Flow Sheet for this information)     Communication ability:  Patient appears to be able to clearly communicate and understand verbal and written communication and follow directions correctly.  Treatment Explanation - The following has been discussed with the patient:   RX ordered/plan of care  Anticipated outcomes  Possible risks and side effects  This patient would benefit from PT intervention to resume normal activities.   Rehab potential is good.    Frequency:  1 X week, once daily  Duration:  for 8 weeks  Discharge Plan:  Achieve all LTG.  Independent in home treatment program.  Reach maximal therapeutic benefit.    Please refer to the daily flowsheet for treatment today, total treatment time and time spent performing 1:1 timed codes.

## 2022-06-22 PROBLEM — M54.16 LUMBAR RADICULOPATHY: Status: ACTIVE | Noted: 2022-06-22

## 2022-06-22 PROBLEM — M25.561 RIGHT KNEE PAIN: Status: ACTIVE | Noted: 2022-06-22

## 2022-06-27 ENCOUNTER — THERAPY VISIT (OUTPATIENT)
Dept: PHYSICAL THERAPY | Facility: CLINIC | Age: 42
End: 2022-06-27
Payer: COMMERCIAL

## 2022-06-27 DIAGNOSIS — M25.561 RIGHT KNEE PAIN: ICD-10-CM

## 2022-06-27 DIAGNOSIS — M54.16 LUMBAR RADICULOPATHY: Primary | ICD-10-CM

## 2022-06-27 PROCEDURE — 97110 THERAPEUTIC EXERCISES: CPT | Mod: GP

## 2022-06-27 PROCEDURE — 97530 THERAPEUTIC ACTIVITIES: CPT | Mod: GP

## 2022-08-05 ENCOUNTER — APPOINTMENT (OUTPATIENT)
Dept: GENERAL RADIOLOGY | Facility: CLINIC | Age: 42
End: 2022-08-05
Attending: EMERGENCY MEDICINE
Payer: COMMERCIAL

## 2022-08-05 ENCOUNTER — HOSPITAL ENCOUNTER (EMERGENCY)
Facility: CLINIC | Age: 42
Discharge: HOME | End: 2022-08-05
Attending: EMERGENCY MEDICINE | Admitting: EMERGENCY MEDICINE
Payer: COMMERCIAL

## 2022-08-05 VITALS
SYSTOLIC BLOOD PRESSURE: 150 MMHG | HEART RATE: 93 BPM | BODY MASS INDEX: 53.19 KG/M2 | TEMPERATURE: 99.3 F | OXYGEN SATURATION: 98 % | DIASTOLIC BLOOD PRESSURE: 106 MMHG | WEIGHT: 281.75 LBS | RESPIRATION RATE: 20 BRPM | HEIGHT: 61 IN

## 2022-08-05 DIAGNOSIS — I10 HYPERTENSION, UNSPECIFIED TYPE: ICD-10-CM

## 2022-08-05 DIAGNOSIS — R07.9 CHEST PAIN, UNSPECIFIED TYPE: ICD-10-CM

## 2022-08-05 LAB
ANION GAP SERPL CALCULATED.3IONS-SCNC: 11 MMOL/L (ref 7–15)
BASE EXCESS BLDV CALC-SCNC: 2.4 MMOL/L (ref -7.7–1.9)
BASOPHILS # BLD AUTO: 0.1 10E3/UL (ref 0–0.2)
BASOPHILS NFR BLD AUTO: 1 %
BUN SERPL-MCNC: 8.8 MG/DL (ref 6–20)
CALCIUM SERPL-MCNC: 8.8 MG/DL (ref 8.6–10)
CHLORIDE SERPL-SCNC: 100 MMOL/L (ref 98–107)
CREAT SERPL-MCNC: 0.41 MG/DL (ref 0.51–0.95)
D DIMER PPP FEU-MCNC: 0.31 UG/ML FEU (ref 0–0.5)
DEPRECATED HCO3 PLAS-SCNC: 27 MMOL/L (ref 22–29)
EOSINOPHIL # BLD AUTO: 0.2 10E3/UL (ref 0–0.7)
EOSINOPHIL NFR BLD AUTO: 2 %
ERYTHROCYTE [DISTWIDTH] IN BLOOD BY AUTOMATED COUNT: 14.3 % (ref 10–15)
FLUAV RNA SPEC QL NAA+PROBE: NEGATIVE
FLUBV RNA RESP QL NAA+PROBE: NEGATIVE
GFR SERPL CREATININE-BSD FRML MDRD: >90 ML/MIN/1.73M2
GLUCOSE SERPL-MCNC: 139 MG/DL (ref 70–99)
HCG SER QL IA.RAPID: NEGATIVE
HCO3 BLDV-SCNC: 29 MMOL/L (ref 21–28)
HCT VFR BLD AUTO: 43.3 % (ref 35–47)
HGB BLD-MCNC: 13.6 G/DL (ref 11.7–15.7)
IMM GRANULOCYTES # BLD: 0.1 10E3/UL
IMM GRANULOCYTES NFR BLD: 1 %
INR PPP: 1.05 (ref 0.85–1.15)
LYMPHOCYTES # BLD AUTO: 2.3 10E3/UL (ref 0.8–5.3)
LYMPHOCYTES NFR BLD AUTO: 22 %
MCH RBC QN AUTO: 27 PG (ref 26.5–33)
MCHC RBC AUTO-ENTMCNC: 31.4 G/DL (ref 31.5–36.5)
MCV RBC AUTO: 86 FL (ref 78–100)
MONOCYTES # BLD AUTO: 0.6 10E3/UL (ref 0–1.3)
MONOCYTES NFR BLD AUTO: 6 %
NEUTROPHILS # BLD AUTO: 7.2 10E3/UL (ref 1.6–8.3)
NEUTROPHILS NFR BLD AUTO: 68 %
NRBC # BLD AUTO: 0 10E3/UL
NRBC BLD AUTO-RTO: 0 /100
NT-PROBNP SERPL-MCNC: 9 PG/ML (ref 0–450)
O2/TOTAL GAS SETTING VFR VENT: 0 %
PCO2 BLDV: 49 MM HG (ref 40–50)
PH BLDV: 7.38 [PH] (ref 7.32–7.43)
PLATELET # BLD AUTO: 365 10E3/UL (ref 150–450)
PO2 BLDV: 29 MM HG (ref 25–47)
POTASSIUM SERPL-SCNC: 3.7 MMOL/L (ref 3.4–5.3)
RBC # BLD AUTO: 5.04 10E6/UL (ref 3.8–5.2)
RSV RNA SPEC NAA+PROBE: NEGATIVE
SARS-COV-2 RNA RESP QL NAA+PROBE: NEGATIVE
SODIUM SERPL-SCNC: 138 MMOL/L (ref 136–145)
TROPONIN T SERPL HS-MCNC: <6 NG/L
WBC # BLD AUTO: 10.4 10E3/UL (ref 4–11)

## 2022-08-05 PROCEDURE — 71045 X-RAY EXAM CHEST 1 VIEW: CPT

## 2022-08-05 PROCEDURE — 84702 CHORIONIC GONADOTROPIN TEST: CPT

## 2022-08-05 PROCEDURE — 94640 AIRWAY INHALATION TREATMENT: CPT

## 2022-08-05 PROCEDURE — 258N000003 HC RX IP 258 OP 636: Performed by: EMERGENCY MEDICINE

## 2022-08-05 PROCEDURE — C9803 HOPD COVID-19 SPEC COLLECT: HCPCS

## 2022-08-05 PROCEDURE — 93005 ELECTROCARDIOGRAM TRACING: CPT

## 2022-08-05 PROCEDURE — 36415 COLL VENOUS BLD VENIPUNCTURE: CPT | Performed by: EMERGENCY MEDICINE

## 2022-08-05 PROCEDURE — 87040 BLOOD CULTURE FOR BACTERIA: CPT | Performed by: EMERGENCY MEDICINE

## 2022-08-05 PROCEDURE — 83880 ASSAY OF NATRIURETIC PEPTIDE: CPT | Performed by: EMERGENCY MEDICINE

## 2022-08-05 PROCEDURE — 82803 BLOOD GASES ANY COMBINATION: CPT | Performed by: EMERGENCY MEDICINE

## 2022-08-05 PROCEDURE — 99285 EMERGENCY DEPT VISIT HI MDM: CPT | Mod: CS,25

## 2022-08-05 PROCEDURE — 96360 HYDRATION IV INFUSION INIT: CPT

## 2022-08-05 PROCEDURE — 84484 ASSAY OF TROPONIN QUANT: CPT | Performed by: EMERGENCY MEDICINE

## 2022-08-05 PROCEDURE — 96361 HYDRATE IV INFUSION ADD-ON: CPT

## 2022-08-05 PROCEDURE — 85379 FIBRIN DEGRADATION QUANT: CPT | Performed by: EMERGENCY MEDICINE

## 2022-08-05 PROCEDURE — 80048 BASIC METABOLIC PNL TOTAL CA: CPT | Performed by: EMERGENCY MEDICINE

## 2022-08-05 PROCEDURE — 85610 PROTHROMBIN TIME: CPT | Performed by: EMERGENCY MEDICINE

## 2022-08-05 PROCEDURE — 87637 SARSCOV2&INF A&B&RSV AMP PRB: CPT | Performed by: EMERGENCY MEDICINE

## 2022-08-05 PROCEDURE — 250N000013 HC RX MED GY IP 250 OP 250 PS 637: Performed by: EMERGENCY MEDICINE

## 2022-08-05 PROCEDURE — 85014 HEMATOCRIT: CPT | Performed by: EMERGENCY MEDICINE

## 2022-08-05 RX ORDER — ALBUTEROL SULFATE 90 UG/1
6 AEROSOL, METERED RESPIRATORY (INHALATION) ONCE
Status: COMPLETED | OUTPATIENT
Start: 2022-08-05 | End: 2022-08-05

## 2022-08-05 RX ADMIN — ALBUTEROL SULFATE 6 PUFF: 90 AEROSOL, METERED RESPIRATORY (INHALATION) at 14:53

## 2022-08-05 RX ADMIN — SODIUM CHLORIDE 1000 ML: 9 INJECTION, SOLUTION INTRAVENOUS at 14:52

## 2022-08-05 ASSESSMENT — ENCOUNTER SYMPTOMS
HEADACHES: 1
CHEST TIGHTNESS: 1
LIGHT-HEADEDNESS: 1
SHORTNESS OF BREATH: 1
COUGH: 1

## 2022-08-05 NOTE — ED NOTES
Pt reports LUNA after up to bathroom. Pt visibly winded. Pt placed on pulse ox upon return to room and saturation at 98% on room air, pt tachycardic in the 120's initially but HR improved to 105-110 after a couple minutes of rest.

## 2022-08-05 NOTE — ED PROVIDER NOTES
History     Chief Complaint:  Shortness of Breath      HPI   Tresa Chin is a 42 year old female who presents with a history of hypertension.  She has had chronic cough for the last 3 months with negative COVID swabs no travel.  She says over the last 2 days she developed a frontal headache that is quite mild the cough has not changed significantly she is noted some episodes of shortness of breath lasting hours worse at night.  She also notes some chest heaviness starting last night its been constant.  Nothing makes the symptoms better or worse she has had no travel trauma or sick contacts.  She is also had some lightheadedness.    Review of Systems   Respiratory: Positive for cough, chest tightness (chest heaviness) and shortness of breath.    Neurological: Positive for light-headedness and headaches.   All other systems reviewed and are negative.    Allergies:  Latex    Medications:    amlodipine  olopatadine   omeprazole   tizanidine   albuterol    Past Medical History:    Depressive disorder  Hypertension  Sleep apnea  Ureterolithiasis  NSAID induced gastritis  Morbid obesity  NAFLD  hyperlipidemia    Past Surgical History:    Cystoscopy insert stent ureter(s)  Cystoscopy  EGD  Genitourinary surgery    Family History:    Mother: dementia, diabetes, hypertension, hyperlipidemia  Father: lung cancer, hypertension,hyperlipidemia  Sister: diabetes, hypertension  Brother: diabetes    Social History:  Presents alone    Physical Exam     Patient Vitals for the past 24 hrs:   BP Temp Temp src Pulse Resp SpO2 Height Weight   08/05/22 1630 (!) 150/106 -- -- 93 -- 98 % -- --   08/05/22 1615 (!) 151/100 -- -- 96 -- 100 % -- --   08/05/22 1600 (!) 136/95 -- -- 93 -- 98 % -- --   08/05/22 1545 -- -- -- 101 -- 96 % -- --   08/05/22 1515 (!) 144/90 -- -- 102 -- 100 % -- --   08/05/22 1505 -- -- -- 92 -- 98 % -- --   08/05/22 1500 (!) 143/86 -- -- -- -- -- -- --   08/05/22 1248 (!) 160/102 99.3  F (37.4  C) Oral 96 20 99  "% 1.549 m (5' 1\") 127.8 kg (281 lb 12 oz)       Physical Exam  General: The patient is alert, in no respiratory distress.    HENT: Mucous membranes moist.  No lip or tongue swelling    Cardiovascular: Regular rate and rhythm. Good pulses in all four extremities. Normal capillary refill and skin turgor.     Respiratory: Lungs are clear. No nasal flaring. No retractions. No wheezing, no crackles.  Speaks in full sentences    Gastrointestinal: Abdomen soft. No guarding, no rebound. No palpable hernias.     Musculoskeletal: No gross deformity.     Skin: No rashes or petechiae.     Neurologic: The patient is alert and oriented x3. GCS 15. No testable cranial nerve deficit. Follows commands with clear and appropriate speech. Gives appropriate answers. Good strength in all extremities. No gross neurologic deficit. Gross sensation intact. Pupils are round and reactive. No meningismus.     Lymphatic: No cervical adenopathy.  Nonpitting lower extremity edema    Psychiatric: The patient is non-tearful.    Emergency Department Course   ECG:  ECG taken at 1252, ECG read at 1413  Normal sinus rhythm  Normal ECG   Rate 88 bpm. RI interval 150 ms. QRS duration 82 ms. QT/QTc 372/450 ms. P-R-T axes 42 25 36.     Imaging:  XR Chest Port 1 View   Final Result   IMPRESSION: Heart size is normal. No pleural effusion, pneumothorax,   or abnormal area of consolidation.      STEPHANIE SOLORZANO MD            SYSTEM ID:  [id number]        Report per radiology    Laboratory:  Labs Ordered and Resulted from Time of ED Arrival to Time of ED Departure   BASIC METABOLIC PANEL - Abnormal       Result Value    Creatinine 0.41 (*)     Sodium 138      Potassium 3.7      Urea Nitrogen 8.8      Chloride 100      Carbon Dioxide (CO2) 27      Anion Gap 11      Glucose 139 (*)     GFR Estimate >90      Calcium 8.8     BLOOD GAS VENOUS - Abnormal    pH Venous 7.38      pCO2 Venous 49      pO2 Venous 29      Bicarbonate Venous 29 (*)     Base Excess/Deficit (+/-) " 2.4 (*)     FIO2 0     CBC WITH PLATELETS AND DIFFERENTIAL - Abnormal    WBC Count 10.4      RBC Count 5.04      Hemoglobin 13.6      Hematocrit 43.3      MCV 86      MCH 27.0      MCHC 31.4 (*)     RDW 14.3      Platelet Count 365      % Neutrophils 68      % Lymphocytes 22      % Monocytes 6      % Eosinophils 2      % Basophils 1      % Immature Granulocytes 1      NRBCs per 100 WBC 0      Absolute Neutrophils 7.2      Absolute Lymphocytes 2.3      Absolute Monocytes 0.6      Absolute Eosinophils 0.2      Absolute Basophils 0.1      Absolute Immature Granulocytes 0.1      Absolute NRBCs 0.0     INFLUENZA A/B & SARS-COV2 PCR MULTIPLEX - Normal    Influenza A PCR Negative      Influenza B PCR Negative      RSV PCR Negative      SARS CoV2 PCR Negative     D DIMER QUANTITATIVE - Normal    D-Dimer Quantitative 0.31     INR - Normal    INR 1.05     TROPONIN T, HIGH SENSITIVITY - Normal    Troponin T, High Sensitivity <6     NT PROBNP INPATIENT - Normal    N terminal Pro BNP Inpatient 9     ISTAT HCG QUALITATIVE PREGNANCY POCT - Normal    HCG Qualitative POCT Negative     BLOOD CULTURE   BLOOD CULTURE       Emergency Department Course:      Reviewed:  I reviewed nursing notes, vitals, past medical history, Care Everywhere and [facility]    Assessments:  1400 I obtained history and examined the patient as noted above.   1611 I rechecked the patient and explained findings.       Interventions:  1452 NS 1000 mL IV  1453 Albuterol 6 puffs inhalation    Disposition:  The patient was discharged to home.     Impression & Plan      Medical Decision Making:  The patient has had episodes of cough shortness of breath in the past.  I did discuss that because her symptoms been present approximately 1 day her negative COVID swab does not rule out COVID.  I did consider PE CHF ACS pneumonia amongst other conditions.  Thankfully her D-dimer was negative her screening troponin was low.  Her chest x-ray did not show signs of fluid or  infiltrate nor pneumothorax.  I think this is more likely related to bronchitis with chest wall type discomfort.  The patient is otherwise stable not hypoxic and was discharged to close outpatient follow-up.  Symptoms to return for discussed she will need further outpatient work-up and does have elevated blood pressure but no signs of CHF.      Covid-19  Tresa Chin was evaluated during a global COVID-19 pandemic, which necessitated consideration that the patient might be at risk for infection with the SARS-CoV-2 virus that causes COVID-19.   Applicable protocols for evaluation were followed during the patient's care.   COVID-19 was considered as part of the patient's evaluation. Test result is negative.    Diagnosis:    ICD-10-CM    1. Chest pain, unspecified type  R07.9    2. Hypertension, unspecified type  I10        Discharge Medications:  New Prescriptions    No medications on file         Scribe Disclosure:  Cipriano RODRIGUEZ MD, MD, am serving as a scribe at 2:09 PM on 8/5/2022 to document services personally performed by Cipriano Lobato MD based on my observations and the provider's statements to me.      Cipriano Lobato MD  08/05/22 1285

## 2022-08-05 NOTE — ED TRIAGE NOTES
"Pt arrives to the ED due to having increased SOB since last night along with feeling dizzy/lightheaded. Pt states that this feeling of being lightheaded and SOB has been going on for \"awhile\" but that it will go away. States came back last night worse than normal. Stats some pain on left side.       "

## 2022-08-08 LAB
ATRIAL RATE - MUSE: 88 BPM
DIASTOLIC BLOOD PRESSURE - MUSE: NORMAL MMHG
INTERPRETATION ECG - MUSE: NORMAL
P AXIS - MUSE: 42 DEGREES
PR INTERVAL - MUSE: 150 MS
QRS DURATION - MUSE: 82 MS
QT - MUSE: 372 MS
QTC - MUSE: 450 MS
R AXIS - MUSE: 25 DEGREES
SYSTOLIC BLOOD PRESSURE - MUSE: NORMAL MMHG
T AXIS - MUSE: 36 DEGREES
VENTRICULAR RATE- MUSE: 88 BPM

## 2022-08-10 LAB
BACTERIA BLD CULT: NO GROWTH
BACTERIA BLD CULT: NO GROWTH

## 2022-08-30 PROBLEM — M25.561 RIGHT KNEE PAIN: Status: RESOLVED | Noted: 2022-06-22 | Resolved: 2022-08-30

## 2022-08-30 PROBLEM — M54.16 LUMBAR RADICULOPATHY: Status: RESOLVED | Noted: 2022-06-22 | Resolved: 2022-08-30

## 2022-08-30 NOTE — PROGRESS NOTES
Patient seen for two physical therapy visits  Patient did not return for further treatment and current status is unknown.  Please see initial evaluation for further information.

## 2022-09-06 ENCOUNTER — OFFICE VISIT (OUTPATIENT)
Dept: FAMILY MEDICINE | Facility: CLINIC | Age: 42
End: 2022-09-06
Payer: COMMERCIAL

## 2022-09-06 VITALS
DIASTOLIC BLOOD PRESSURE: 83 MMHG | TEMPERATURE: 98 F | SYSTOLIC BLOOD PRESSURE: 127 MMHG | BODY MASS INDEX: 52.19 KG/M2 | RESPIRATION RATE: 16 BRPM | OXYGEN SATURATION: 97 % | WEIGHT: 276.2 LBS | HEART RATE: 86 BPM

## 2022-09-06 DIAGNOSIS — E66.01 MORBID OBESITY (H): ICD-10-CM

## 2022-09-06 DIAGNOSIS — E11.9 TYPE 2 DIABETES MELLITUS WITHOUT RETINOPATHY (H): Primary | ICD-10-CM

## 2022-09-06 DIAGNOSIS — E11.65 TYPE 2 DIABETES MELLITUS WITH HYPERGLYCEMIA, WITHOUT LONG-TERM CURRENT USE OF INSULIN (H): Primary | ICD-10-CM

## 2022-09-06 LAB — HBA1C MFR BLD: 8.1 % (ref 0–5.6)

## 2022-09-06 PROCEDURE — 83036 HEMOGLOBIN GLYCOSYLATED A1C: CPT | Performed by: FAMILY MEDICINE

## 2022-09-06 PROCEDURE — 36415 COLL VENOUS BLD VENIPUNCTURE: CPT | Performed by: FAMILY MEDICINE

## 2022-09-06 PROCEDURE — 99214 OFFICE O/P EST MOD 30 MIN: CPT | Performed by: FAMILY MEDICINE

## 2022-09-06 ASSESSMENT — PATIENT HEALTH QUESTIONNAIRE - PHQ9
10. IF YOU CHECKED OFF ANY PROBLEMS, HOW DIFFICULT HAVE THESE PROBLEMS MADE IT FOR YOU TO DO YOUR WORK, TAKE CARE OF THINGS AT HOME, OR GET ALONG WITH OTHER PEOPLE: VERY DIFFICULT
SUM OF ALL RESPONSES TO PHQ QUESTIONS 1-9: 11
SUM OF ALL RESPONSES TO PHQ QUESTIONS 1-9: 11

## 2022-09-06 ASSESSMENT — ANXIETY QUESTIONNAIRES
2. NOT BEING ABLE TO STOP OR CONTROL WORRYING: NEARLY EVERY DAY
GAD7 TOTAL SCORE: 12
4. TROUBLE RELAXING: NOT AT ALL
8. IF YOU CHECKED OFF ANY PROBLEMS, HOW DIFFICULT HAVE THESE MADE IT FOR YOU TO DO YOUR WORK, TAKE CARE OF THINGS AT HOME, OR GET ALONG WITH OTHER PEOPLE?: VERY DIFFICULT
GAD7 TOTAL SCORE: 12
IF YOU CHECKED OFF ANY PROBLEMS ON THIS QUESTIONNAIRE, HOW DIFFICULT HAVE THESE PROBLEMS MADE IT FOR YOU TO DO YOUR WORK, TAKE CARE OF THINGS AT HOME, OR GET ALONG WITH OTHER PEOPLE: VERY DIFFICULT
7. FEELING AFRAID AS IF SOMETHING AWFUL MIGHT HAPPEN: NOT AT ALL
GAD7 TOTAL SCORE: 12
5. BEING SO RESTLESS THAT IT IS HARD TO SIT STILL: NOT AT ALL
3. WORRYING TOO MUCH ABOUT DIFFERENT THINGS: NEARLY EVERY DAY
1. FEELING NERVOUS, ANXIOUS, OR ON EDGE: NEARLY EVERY DAY
6. BECOMING EASILY ANNOYED OR IRRITABLE: NEARLY EVERY DAY
7. FEELING AFRAID AS IF SOMETHING AWFUL MIGHT HAPPEN: NOT AT ALL

## 2022-09-06 NOTE — PROGRESS NOTES
"  Assessment & Plan     Type 2 diabetes mellitus with hyperglycemia, without long-term current use of insulin (H)  - A1c up today to 8.1. discussed options for medication as she has not been in favor of medication in the past. Will start on trial of rybelsus. Coupon has been given and she has also been advised to register on rybelsus website for other coupons. If cost is prohibitive will start on metformin for diabetes and phentermine or topamax for weight loss .   - Hemoglobin A1c  - STATIN NOT PRESCRIBED (INTENTIONAL); Please choose reason not prescribed from choices below.  - Semaglutide 3 MG TABS; Take 3 mg by mouth daily  - Med Therapy Management Referral    Morbid obesity (H)  - rybelsus should cover for weight loss as well. If cost an issue with prescribe other agents.   - Semaglutide 3 MG TABS; Take 3 mg by mouth daily  - Med Therapy Management Referral           BMI:   Estimated body mass index is 52.19 kg/m  as calculated from the following:    Height as of 8/5/22: 1.549 m (5' 1\").    Weight as of this encounter: 125.3 kg (276 lb 3.2 oz).   Weight management plan: Discussed healthy diet and exercise guidelines    See Patient Instructions    No follow-ups on file.   Follow-up Visit   Expected date:  Dec 06, 2022 (Approximate)      Follow Up Appointment Details:     Follow-up with whom?: Me    Follow-Up for what?: Chronic Disease f/u    Chronic Disease f/u:  Diabetes  Hypertension       How?: In Person                    Kalyn Miller MD  [facility]    Reny Gtz is a 42 year old, presenting for the following health issues:  Diabetes (Pt is having burning in feet) and Hypertension      History of Present Illness       Diabetes:   She presents for follow up of diabetes.  She is not checking blood glucose. She is concerned about other. She is having burning in feet and excessive thirst.         Hypertension: She presents for follow up of hypertension.  She " does not check blood pressure  regularly outside of the clinic. Outside blood pressures have been over 140/90. She does not follow a low salt diet.      Today's PHQ-9         PHQ-9 Total Score: 11    PHQ-9 Q9 Thoughts of better off dead/self-harm past 2 weeks :   Not at all    How difficult have these problems made it for you to do your work, take care of things at home, or get along with other people: Very difficult  Today's NETO-7 Score: 12     Per patient she since her last visit she has made some dietary changes  Breakfast- whole grain toast with cheese w/ 1 cup whole milk   Lunch- fast food   Dinner- sandwich - ham/cheese     Beverages- juice- pineapple or orange, water          Wt Readings from Last 4 Encounters:   09/06/22 125.3 kg (276 lb 3.2 oz)   08/05/22 127.8 kg (281 lb 12 oz)   06/07/22 121.1 kg (267 lb)   02/23/22 120.6 kg (265 lb 14.4 oz)         Review of Systems   Constitutional, HEENT, cardiovascular, pulmonary, GI, , musculoskeletal, neuro, skin, endocrine and psych systems are negative, except as otherwise noted.      Objective    /83 (BP Location: Right arm, Patient Position: Chair, Cuff Size: Adult Large)   Pulse 86   Temp 98  F (36.7  C) (Oral)   Resp 16   Wt 125.3 kg (276 lb 3.2 oz)   SpO2 97%   BMI 52.19 kg/m    Body mass index is 52.19 kg/m .  Physical Exam   GENERAL: healthy, alert and no distress  RESP: lungs clear to auscultation - no rales, rhonchi or wheezes  CV: regular rate and rhythm, normal S1 S2, no S3 or S4, no murmur, click or rub, no peripheral edema and peripheral pulses strong  MS: no gross musculoskeletal defects noted, no edema  PSYCH: mentation appears normal, affect normal/bright  Diabetic foot exam: normal DP and PT pulses, no trophic changes or ulcerative lesions and normal sensory exam    Results for orders placed or performed in visit on 09/06/22   Hemoglobin A1c     Status: Abnormal   Result Value Ref Range    Hemoglobin A1C 8.1 (H) 0.0 - 5.6 %                  @[email or web address]@  @[facility]@

## 2022-09-06 NOTE — PATIENT INSTRUCTIONS
Sign up on [email or web address]  [email or web address]        [email or web address]

## 2022-09-08 ENCOUNTER — TELEPHONE (OUTPATIENT)
Dept: FAMILY MEDICINE | Facility: CLINIC | Age: 42
End: 2022-09-08

## 2022-09-08 NOTE — TELEPHONE ENCOUNTER
MTM appointment canceled, we made one more attempt to reschedule.     Routing back to referring provider and MTM pharmacist.     Mejia Verdin [facility]

## 2022-09-15 ENCOUNTER — OFFICE VISIT (OUTPATIENT)
Dept: PHARMACY | Facility: CLINIC | Age: 42
End: 2022-09-15
Payer: COMMERCIAL

## 2022-09-15 VITALS — BODY MASS INDEX: 51.22 KG/M2 | SYSTOLIC BLOOD PRESSURE: 132 MMHG | DIASTOLIC BLOOD PRESSURE: 80 MMHG | WEIGHT: 271.1 LBS

## 2022-09-15 DIAGNOSIS — M54.9 CHRONIC BACK PAIN, UNSPECIFIED BACK LOCATION, UNSPECIFIED BACK PAIN LATERALITY: ICD-10-CM

## 2022-09-15 DIAGNOSIS — Z13.220 SCREENING FOR HYPERLIPIDEMIA: ICD-10-CM

## 2022-09-15 DIAGNOSIS — E11.65 TYPE 2 DIABETES MELLITUS WITH HYPERGLYCEMIA, WITHOUT LONG-TERM CURRENT USE OF INSULIN (H): ICD-10-CM

## 2022-09-15 DIAGNOSIS — I10 ESSENTIAL HYPERTENSION: ICD-10-CM

## 2022-09-15 DIAGNOSIS — E66.01 MORBID OBESITY (H): Primary | ICD-10-CM

## 2022-09-15 DIAGNOSIS — H10.13 ALLERGIC CONJUNCTIVITIS, BILATERAL: ICD-10-CM

## 2022-09-15 DIAGNOSIS — G89.29 CHRONIC BACK PAIN, UNSPECIFIED BACK LOCATION, UNSPECIFIED BACK PAIN LATERALITY: ICD-10-CM

## 2022-09-15 DIAGNOSIS — K21.9 GASTROESOPHAGEAL REFLUX DISEASE WITHOUT ESOPHAGITIS: ICD-10-CM

## 2022-09-15 PROCEDURE — 99605 MTMS BY PHARM NP 15 MIN: CPT | Performed by: PHARMACIST

## 2022-09-15 PROCEDURE — 99607 MTMS BY PHARM ADDL 15 MIN: CPT | Performed by: PHARMACIST

## 2022-09-15 RX ORDER — ORAL SEMAGLUTIDE 7 MG/1
7 TABLET ORAL DAILY
Qty: 30 TABLET | Refills: 1 | Status: SHIPPED | OUTPATIENT
Start: 2022-09-15 | End: 2022-09-15

## 2022-09-15 RX ORDER — ACETAMINOPHEN 500 MG
500-1000 TABLET ORAL EVERY 8 HOURS PRN
COMMUNITY

## 2022-09-15 RX ORDER — ORAL SEMAGLUTIDE 7 MG/1
7 TABLET ORAL DAILY
Qty: 30 TABLET | Refills: 1 | Status: SHIPPED | OUTPATIENT
Start: 2022-09-15 | End: 2022-10-21 | Stop reason: SINTOL

## 2022-09-15 NOTE — PROGRESS NOTES
Didn't sleep well  Still pain - consider taking some ibuprofen  BP is in the pre-HTN   Medication Therapy Management (MTM) Encounter    ASSESSMENT:                            Medication Adherence/Access: See below for considerations    Type 2 Diabetes: Patient is not meeting A1c goal of < 7%. Patient would benefit from taking Rybelsus on its own. Will continue to titrate dose.    Weight loss: will continue to work on titration of Rybelsus. Patient is not interested in injectable GLP-1 RA.    Hyperlipidemia: no changes today but we discussed we will want to consider starting for preventive therapy even if fasting lipid panel improved. fasting lipid panel may help convince patient to start statin but also determine if patient should be on low or higher end of moderate intensity statin. Given NAFLD is chronic and no active disease, she would still be a candidate for statin.    Hypertension: Patient is nearly meeting blood pressure goal of < 130/80mmHg. No change.    Allergic rhinitis: Stable.    GERD: Stable.    Lumbar pain: no change.    PLAN:                            Take Rybelsus 3 mg by mouth first thing in the morning on its own (not with omeprazole). After 1 month increase 7 mg only if no stomach. Call the pharmacy sooner so they can order and you do not have a gap then.    Before eating check blood sugar goal is  mg/dL    Great job so far with your weight loss with dietary changes.     Try to focus on protein foods to help you stay angelo longer.     Follow-up: 730am in clinic on 10/13 at [facility]    SUBJECTIVE/OBJECTIVE:                          Tresa Chin is a 42 year old female coming in for a follow-up visit. Patient was referred by Dr. Miller. Today is a follow-up from last MTM visit on 4/23/2021 with Modesta Montaño, PharmD.     Reason for visit: rybelsus follow-up.    Allergies/ADRs: Reviewed in chart  Past Medical History: Reviewed in chart  Tobacco: She reports that she has never smoked. She has never used smokeless tobacco.  Alcohol: not currently  "using    Medication Adherence/Access: no issues reported    Type 2 Diabetes:  Currently taking semaglutide PO 3mg daily on empty stomach but with omeprazole. started on 2022. Side effects: will have stomach upset intermittently. First few days were dizzy. Stomach ache but mild. Diarrhea and constipation, fluctuating lately.   Blood sugar monitorin-150's.   Eye exam: new diagnosis   Foot exam: new diagnosis   Aspirin: Not taking due to age  Statin: No   ACEi/ARB: No.   Urine Albumin:   Lab Results   Component Value Date    UMALCR 24.39 2022      Lab Results   Component Value Date    A1C 8.1 2022    A1C 6.8 2022    A1C 6.2 10/21/2021    A1C 6.2 2021    A1C 6.2 2021    A1C 6.0 2021    A1C 6.1 2020    A1C 5.8 2020       Obesity:   Rybelsus 3mg daily.    Watching her diet and getting out carbs such as bread. Eating just one larger meal in a day in the morning.Eating mostly chicken and veggies from [employer] - chooses grilled or steam options.  She does feel more hungry which is challenging. She did not like the other weight loss medications she was on.   Her pain is limiting her exercise. Work is sedentary, desk work.  Weight trend:   Wt Readings from Last 4 Encounters:   09/15/22 271 lb 1.6 oz (123 kg)   22 276 lb 3.2 oz (125.3 kg)   22 281 lb 12 oz (127.8 kg)   22 267 lb (121.1 kg)     Estimated body mass index is 51.22 kg/m  as calculated from the following:    Height as of 22: 5' 1\" (1.549 m).    Weight as of this encounter: 271 lb 1.6 oz (123 kg).    Hyperlipidemia: history of NAFLD. On none, new diabetes diagnosis.  Recent Labs   Lab Test 10/21/21  0814 21  0810   CHOL 220* 234*   HDL 59 68   * 148*   TRIG 83 92     The 10-year ASCVD risk score (Alexa ROBIN Jr., et al., 2013) is: 1.7%    Values used to calculate the score:      Age: 42 years      Sex: Female      Is Non- : No      Diabetic: Yes      Tobacco " smoker: No      Systolic Blood Pressure: 132 mmHg      Is BP treated: Yes      HDL Cholesterol: 59 mg/dL      Total Cholesterol: 220 mg/dL    Liver Function Studies - Recent Labs   Lab Test 06/07/22  0829   PROTTOTAL 7.7   ALBUMIN 3.7   BILITOTAL 0.5   ALKPHOS 59   AST 39   ALT 74*       Hypertension: Current medications include amlodipine 5mg daily.  Patient is not self-monitoring blood pressure. Patient reports no current medication side effects.  BP Readings from Last 3 Encounters:   09/15/22 132/80   09/06/22 127/83   08/05/22 (!) 150/106       GERD: Current medications include: Prilosec (omeprazole) 20mg once daily. Pt reports no current symptoms.  Patient feels that current regimen is effective.   History of NSAID induced GERD.    Lumbar pain: taking tizanidine as needed only. She may use acetaminophen as needed 1-2 tablets.  Pain she reports all over in her back. No specific cause for the pain.    Allergic Rhinitis: Current medications include ophthalmic drops - Patanol as needed, not using now. No symptoms.     Today's Vitals: /80   Wt 271 lb 1.6 oz (123 kg)   BMI 51.22 kg/m    ----------------      I spent 45 minutes with this patient today. All changes were made via collaborative practice agreement with Kalyn Rey MD. A copy of the visit note was provided to the patient's provider(s).    The patient was given a summary of these recommendations.     Hawa Beckham, PharmD  Medication Therapy Management Pharmacist       Medication Therapy Recommendations  Diabetes mellitus, type 2 (H)    Current Medication: Semaglutide (RYBELSUS) 7 MG TABS   Rationale: Dose too low - Dosage too low - Effectiveness   Recommendation: Increase Dose   Status: Accepted per CPA

## 2022-09-15 NOTE — PATIENT INSTRUCTIONS
"Recommendations from today's MTM visit:                                                      Take Rybelsus 3 mg by mouth first thing in the morning on its own (not with omeprazole). After 1 month increase 7 mg only if no stomach. Call the pharmacy sooner so they can order and you do not have a gap then.    Before eating check blood sugar goal is  mg/dL    Great job so far with your weight loss with dietary changes.     Try to focus on protein foods to help you stay angelo longer.     Follow-up: 730am in clinic on 10/13 at [place]    It was great speaking with you today.  I value your experience and would be very thankful for your time in providing feedback in our clinic survey. In the next few days, you may receive an email or text message from [employer] with a link to a survey related to your  clinical pharmacist.\"     To schedule another MTM appointment, please call the clinic directly or you may call the MTM scheduling line at [phone] or toll-free at [phone].     My Clinical Pharmacist's contact information:                                                      Please feel free to contact me with any questions or concerns you have.      Hawa Beckham, PharmD  Medication Therapy Management Pharmacist  [phone]     "

## 2022-10-10 ENCOUNTER — TRANSFERRED RECORDS (OUTPATIENT)
Dept: HEALTH INFORMATION MANAGEMENT | Facility: CLINIC | Age: 42
End: 2022-10-10

## 2022-10-13 ENCOUNTER — OFFICE VISIT (OUTPATIENT)
Dept: PHARMACY | Facility: CLINIC | Age: 42
End: 2022-10-13
Payer: COMMERCIAL

## 2022-10-13 VITALS
OXYGEN SATURATION: 95 % | HEART RATE: 82 BPM | SYSTOLIC BLOOD PRESSURE: 126 MMHG | BODY MASS INDEX: 49.71 KG/M2 | WEIGHT: 263.1 LBS | DIASTOLIC BLOOD PRESSURE: 86 MMHG

## 2022-10-13 DIAGNOSIS — E11.65 TYPE 2 DIABETES MELLITUS WITH HYPERGLYCEMIA, WITHOUT LONG-TERM CURRENT USE OF INSULIN (H): ICD-10-CM

## 2022-10-13 DIAGNOSIS — E66.01 MORBID OBESITY (H): Primary | ICD-10-CM

## 2022-10-13 DIAGNOSIS — I10 ESSENTIAL HYPERTENSION: ICD-10-CM

## 2022-10-13 DIAGNOSIS — Z71.85 VACCINE COUNSELING: ICD-10-CM

## 2022-10-13 DIAGNOSIS — Z13.220 SCREENING FOR HYPERLIPIDEMIA: ICD-10-CM

## 2022-10-13 PROCEDURE — 99607 MTMS BY PHARM ADDL 15 MIN: CPT | Performed by: PHARMACIST

## 2022-10-13 PROCEDURE — 99606 MTMS BY PHARM EST 15 MIN: CPT | Performed by: PHARMACIST

## 2022-10-13 RX ORDER — ORAL SEMAGLUTIDE 7 MG/1
TABLET ORAL
Status: CANCELLED | OUTPATIENT
Start: 2022-10-13

## 2022-10-13 RX ORDER — AMLODIPINE BESYLATE 5 MG/1
5 TABLET ORAL DAILY
Qty: 90 TABLET | Refills: 3 | Status: ON HOLD | OUTPATIENT
Start: 2022-10-13 | End: 2024-06-07

## 2022-10-13 RX ORDER — ROSUVASTATIN CALCIUM 5 MG/1
5 TABLET, COATED ORAL DAILY
Qty: 90 TABLET | Refills: 3 | Status: SHIPPED | OUTPATIENT
Start: 2022-10-13 | End: 2022-11-17 | Stop reason: DRUGHIGH

## 2022-10-13 NOTE — PROGRESS NOTES
"Medication Therapy Management (MTM) Encounter    ASSESSMENT:                            Medication Adherence/Access: No issues identified    Type 2 Diabetes/Obesity: Patient is losing weight, would advise she remain on Rybelsus 7 mg until our next follow-up as she is experiencing diarrhea. Would like for her to work on increasing activity, water intake and increasing fiber food to help regulating her bowels. If that is ineffective then she should start over-the-counter fiber.  She will be due for labs in a few months such as a1c recheck.    Hyperlipidemia: Given NAFLD is chronic and no active disease, she would still be a candidate for statin based on her history of diabetes consider moderate intensity statin. Suggest we start rosuvastatin 5mg which is preferred given renally cleared not hepatic. Suggest repeat FLP for efficacy and LFT for safety in 2-3 months.    Hypertension: Patient is nearly meeting blood pressure goal of < 130/80mmHg. No change. Encourage she check blood pressure when feeling dizzy.    Vaccines: she declines covid and flu booster but willing to consider pneumonia.    PLAN:                            At least 4 \"water bottle\" a day, if you can get 5 bottles a day would be great.     Increase food high in fiber.    Increase activity, try to walk 15 mins a day at work or at home.    If the above does not work, then add over-the-counter fiber such as Metamucil powder (or capsule or gummy). Separate from other medications.     Schedule nurse only or at pharmacy to get Prevnar 20 which protect your from pneumonia.     Check you blood pressure when you are dizzy    Start rosuvastatin 5 mg daily this is to protect you from having a heart attack or stroke.     Follow-up: Return in about 5 weeks (around 11/17/2022) for Medication Therapy Management Visit.    SUBJECTIVE/OBJECTIVE:                          Tresa Chin is a 42 year old female coming in for a follow-up visit.  Today's visit is a follow-up " "MTM visit from 9/15/2022.     Reason for visit: follow-up on Rybelsus.    Allergies/ADRs: Reviewed in chart  Past Medical History: Reviewed in chart  Tobacco: She reports that she has never smoked. She has never used smokeless tobacco.  Alcohol: not currently using    Medication Adherence/Access: no issues reported    Type 2 Diabetes/Obesity:  Currently taking semaglutide PO 7mg daily on empty stomach but with omeprazole. Started Rybelsus 3 mg on 9/6/2022 increase after a month.   She still has intermittent dizziness sensation but does not think it is from the medications. She feels she is not drinking enough water. Drink about 3 bottles of 16 fl oz bottles.   In the morning notices her stomach feels full/nausea and then goes away as the day goes on. She feels it is mild. She notes the diarrhea at night time or may go a day without bowel movement which is new since starting Rybelsus. The stools are loose but not watery.  She reports cutting out bread and bad snacks.  Diet: carrots with dip, cheese, grilled chicken, chicken salad with cashews, 2 hot dogs without bun with salad. She still get urges to eat more but is getting somewhat easier to control that. Would like more relief.  Exercise: remains very sedentary  Eye exam: new diagnosis   Foot exam: new diagnosis   Aspirin: Not taking due to age  Statin: No   ACEi/ARB: No.   Urine Albumin:   Lab Results   Component Value Date    UMALCR 24.39 06/07/2022      Lab Results   Component Value Date    A1C 8.1 09/06/2022    A1C 6.8 06/07/2022    A1C 6.2 10/21/2021    A1C 6.2 09/14/2021    A1C 6.2 07/29/2021    A1C 6.0 02/18/2021    A1C 6.1 09/11/2020    A1C 5.8 02/19/2020     Wt Readings from Last 4 Encounters:   10/13/22 263 lb 1.6 oz (119.3 kg)   09/15/22 271 lb 1.6 oz (123 kg)   09/06/22 276 lb 3.2 oz (125.3 kg)   08/05/22 281 lb 12 oz (127.8 kg)     Estimated body mass index is 49.71 kg/m  as calculated from the following:    Height as of 8/5/22: 5' 1\" (1.549 m).    " Weight as of this encounter: 263 lb 1.6 oz (119.3 kg).    Hyperlipidemia: history of NAFLD. On none, new diabetes diagnosis. She reports history of taking statin but the was able to discontinue.  Recent Labs   Lab Test 10/21/21  0814 02/18/21  0810   CHOL 220* 234*   HDL 59 68   * 148*   TRIG 83 92     The 10-year ASCVD risk score (Vonda HOFFMANN, et al., 2019) is: 1.5%    Values used to calculate the score:      Age: 42 years      Sex: Female      Is Non- : No      Diabetic: Yes      Tobacco smoker: No      Systolic Blood Pressure: 126 mmHg      Is BP treated: Yes      HDL Cholesterol: 59 mg/dL      Total Cholesterol: 220 mg/dL    Liver Function Studies - Recent Labs   Lab Test 06/07/22  0829   PROTTOTAL 7.7   ALBUMIN 3.7   BILITOTAL 0.5   ALKPHOS 59   AST 39   ALT 74*       Hypertension: Current medications include amlodipine 5mg daily.  Patient is not self-monitoring blood pressure. Patient reports no current medication side effects.  BP Readings from Last 3 Encounters:   10/13/22 126/86   09/15/22 132/80   09/06/22 127/83     Vaccines: she reports getting very sick from the flu and covid boosters.  Most Recent Immunizations   Administered Date(s) Administered     COVID-19,PF,Moderna 05/01/2021     Hep B, Peds or Adolescent 01/16/2001     HepB 01/16/2001     HepB-Adult 07/18/2000     Influenza Vaccine IM > 6 months Valent IIV4 (Alfuria,Fluzone) 09/21/2015     Influenza Vaccine IM Ages 6-35 Months 4 Valent (PF) 10/21/2010     MMR 07/18/2000     TD (ADULT, 7+) 06/20/2000     TDAP Vaccine (Adacel) 10/13/2020         Today's Vitals: /86   Pulse 82   Wt 263 lb 1.6 oz (119.3 kg)   SpO2 95%   BMI 49.71 kg/m    ----------------      I spent 30 minutes with this patient today. All changes were made via collaborative practice agreement with Kalyn Miller MD. A copy of the visit note was provided to the patient's provider(s).    The patient was given a summary of these  recommendations.     Hawa Beckham, PharmD  Medication Therapy Management Pharmacist       Medication Therapy Recommendations  Diabetes mellitus, type 2 (H)    Current Medication: rosuvastatin (CRESTOR) 5 MG tablet   Rationale: Preventive therapy - Needs additional medication therapy - Indication   Recommendation: Start Medication   Status: Accepted per CPA         Vaccine counseling    Rationale: Preventive therapy - Needs additional medication therapy - Indication   Recommendation: Provide Education - PREVNAR 20 IM   Status: Patient Agreed - Adherence/Education

## 2022-10-28 ENCOUNTER — HOSPITAL ENCOUNTER (OUTPATIENT)
Dept: GENERAL RADIOLOGY | Facility: CLINIC | Age: 42
Discharge: HOME | End: 2022-10-28
Attending: STUDENT IN AN ORGANIZED HEALTH CARE EDUCATION/TRAINING PROGRAM | Admitting: STUDENT IN AN ORGANIZED HEALTH CARE EDUCATION/TRAINING PROGRAM
Payer: COMMERCIAL

## 2022-10-28 DIAGNOSIS — N20.0 KIDNEY STONE: ICD-10-CM

## 2022-10-28 PROCEDURE — 74018 RADEX ABDOMEN 1 VIEW: CPT

## 2022-11-03 ENCOUNTER — OFFICE VISIT (OUTPATIENT)
Dept: UROLOGY | Facility: CLINIC | Age: 42
End: 2022-11-03
Payer: COMMERCIAL

## 2022-11-03 VITALS
SYSTOLIC BLOOD PRESSURE: 127 MMHG | HEIGHT: 61 IN | WEIGHT: 263 LBS | BODY MASS INDEX: 49.65 KG/M2 | DIASTOLIC BLOOD PRESSURE: 87 MMHG | HEART RATE: 81 BPM

## 2022-11-03 DIAGNOSIS — R82.993 HYPERURICOSURIA: Primary | ICD-10-CM

## 2022-11-03 DIAGNOSIS — N20.0 KIDNEY STONE: ICD-10-CM

## 2022-11-03 DIAGNOSIS — R82.994 HYPERCALCIURIA: ICD-10-CM

## 2022-11-03 PROCEDURE — 99214 OFFICE O/P EST MOD 30 MIN: CPT | Performed by: STUDENT IN AN ORGANIZED HEALTH CARE EDUCATION/TRAINING PROGRAM

## 2022-11-03 ASSESSMENT — PAIN SCALES - GENERAL: PAINLEVEL: NO PAIN (0)

## 2022-11-03 NOTE — Clinical Note
Dr. George Thank you for seeing this patient last year. 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 I suggested that she continues to follow up with you  Thanks  Anurag Gomez MD  [facility] [phone] clinic phone [phone] (mobile call or text)

## 2022-11-03 NOTE — NURSING NOTE
Chief Complaint   Patient presents with     Kidney stone     Pt here to review KUB and litholink     Pt does not have symptoms today, doing well    Jennifer Gastelum, CMA

## 2022-11-03 NOTE — PROGRESS NOTES
"CHIEF COMPLAINT   Tresa Chin who is a 42 year old female returns today for follow-up of nephrolithiasis.      HPI   Tresa Chin is a 42-year-old female with history of morbid obesity, hypertension, hyperlipidemia, nephrolithiasis in the past who was found to have an obstructing 6 mm left ureteral stone now s/p ureteroscopy 7/22/2020, with caox and caphos stones. At last visit was seen to have hypercalciuria and hyperuricosuria. Referred to Doris in nephrology who she saw once but hasn't seen since then    Denies any further stone episodes    PHYSICAL EXAM  Patient is a 42 year old  female   Vitals: Blood pressure 127/87, pulse 81, height 1.549 m (5' 1\"), weight 119.3 kg (263 lb), not currently breastfeeding.  Body mass index is 49.69 kg/m .  General Appearance Adult:   Alert, no acute distress, oriented  HENT: throat/mouth:normal, good dentition  Lungs: no respiratory distress, or pursed lip breathing  Heart: No obvious jugular venous distension present  Abdomen: nondistended  Musculoskeltal: extremities normal, no peripheral edema  Skin: no suspicious lesions or rashes  Neuro: Alert, oriented, speech and mentation normal  Psych: affect and mood normal  Gait: Normal  : deferred    All pertinent imaging reviewed:    kub 10/28/2022    No obvious large stones        ASSESSMENT and PLAN  42-year-old female with history of morbid obesity, hypertension, hyperlipidemia, nephrolithiasis in the past who was found to have an obstructing 6 mm left ureteral stone now s/p ureteroscopy 7/22/2020, with caox and caphos stones.    KUB without any obvious stones    Litholink reviewed. Her parameters are much improved from prior (albert the hypercalciuria and the hyperuricosuria) mainly with dietary changes    Patient should continue to follow with nephrology for medical mangement of stones (Doris). Most recent litholink had improvement in her urine  KUB reviewed no large stones seen.   Return 1 year with CT abd/pelvis " w/o contrast        Anurag Gomez MD   [facility]  [facility] Phone: [phone]

## 2022-11-03 NOTE — LETTER
"11/3/2022       RE: Tresa Chin  [address] [place] [id number]     Dear Colleague,    Thank you for referring your patient, Tresa Chin, to the [facility] [place] [facility]. Please see a copy of my visit note below.    CHIEF COMPLAINT   Tresa Chin who is a 42 year old female returns today for follow-up of nephrolithiasis.      HPI   Tresa Chin is a 42-year-old female with history of morbid obesity, hypertension, hyperlipidemia, nephrolithiasis in the past who was found to have an obstructing 6 mm left ureteral stone now s/p ureteroscopy 7/22/2020, with caox and caphos stones. At last visit was seen to have hypercalciuria and hyperuricosuria. Referred to Elamaya in nephrology who she saw once but hasn't seen since then    Denies any further stone episodes    PHYSICAL EXAM  Patient is a 42 year old  female   Vitals: Blood pressure 127/87, pulse 81, height 1.549 m (5' 1\"), weight 119.3 kg (263 lb), not currently breastfeeding.  Body mass index is 49.69 kg/m .  General Appearance Adult:   Alert, no acute distress, oriented  HENT: throat/mouth:normal, good dentition  Lungs: no respiratory distress, or pursed lip breathing  Heart: No obvious jugular venous distension present  Abdomen: nondistended  Musculoskeltal: extremities normal, no peripheral edema  Skin: no suspicious lesions or rashes  Neuro: Alert, oriented, speech and mentation normal  Psych: affect and mood normal  Gait: Normal  : deferred    All pertinent imaging reviewed:    kub 10/28/2022    No obvious large stones        ASSESSMENT and PLAN  42-year-old female with history of morbid obesity, hypertension, hyperlipidemia, nephrolithiasis in the past who was found to have an obstructing 6 mm left ureteral stone now s/p ureteroscopy 7/22/2020, with caox and caphos stones.    KUB without any obvious stones    Litholink reviewed. Her " parameters are much improved from prior (albert the hypercalciuria and the hyperuricosuria) mainly with dietary changes    Patient should continue to follow with nephrology for medical mangement of stones ([place]). Most recent litholink had improvement in her urine  KUB reviewed no large stones seen.   Return 1 year with CT abd/pelvis w/o contrast        Anurag Gomez MD   [facility]  [facility] Phone: [phone]

## 2022-11-17 ENCOUNTER — OFFICE VISIT (OUTPATIENT)
Dept: PHARMACY | Facility: CLINIC | Age: 42
End: 2022-11-17
Payer: COMMERCIAL

## 2022-11-17 VITALS — DIASTOLIC BLOOD PRESSURE: 88 MMHG | SYSTOLIC BLOOD PRESSURE: 130 MMHG

## 2022-11-17 DIAGNOSIS — Z13.220 SCREENING FOR HYPERLIPIDEMIA: ICD-10-CM

## 2022-11-17 DIAGNOSIS — E66.01 MORBID OBESITY (H): ICD-10-CM

## 2022-11-17 DIAGNOSIS — E11.65 TYPE 2 DIABETES MELLITUS WITH HYPERGLYCEMIA, WITHOUT LONG-TERM CURRENT USE OF INSULIN (H): Primary | ICD-10-CM

## 2022-11-17 PROCEDURE — 99607 MTMS BY PHARM ADDL 15 MIN: CPT | Performed by: PHARMACIST

## 2022-11-17 PROCEDURE — 99606 MTMS BY PHARM EST 15 MIN: CPT | Performed by: PHARMACIST

## 2022-11-17 RX ORDER — ROSUVASTATIN CALCIUM 10 MG/1
10 TABLET, COATED ORAL DAILY
Qty: 90 TABLET | Refills: 3 | Status: SHIPPED | OUTPATIENT
Start: 2022-11-17 | End: 2024-05-09

## 2022-11-17 RX ORDER — TIRZEPATIDE 2.5 MG/.5ML
2.5 INJECTION, SOLUTION SUBCUTANEOUS WEEKLY
Qty: 2 ML | Refills: 1 | Status: SHIPPED | OUTPATIENT
Start: 2022-11-17 | End: 2023-01-25 | Stop reason: DRUGHIGH

## 2022-11-17 RX ORDER — METFORMIN HCL 500 MG
1000 TABLET, EXTENDED RELEASE 24 HR ORAL
Qty: 180 TABLET | Refills: 0 | Status: SHIPPED | OUTPATIENT
Start: 2022-11-17 | End: 2022-12-23

## 2022-11-17 NOTE — PATIENT INSTRUCTIONS
"Recommendations from today's MTM visit:                                                      Start checking blood sugars once a day your fasting goal is  mg/dL.    Start metformin  mg once daily with food for 3-4 days if no stomach issues then increase to 2 tablets daily (1000 mg).     Start rosuvastatin 10 mg once daily for clot prevention.     Also start Mounjaro 2.5 mg weekly for at least 4 weeks.      Follow-up: 1 month    It was great speaking with you today.  I value your experience and would be very thankful for your time in providing feedback in our clinic survey. In the next few days, you may receive an email or text message from [employer] with a link to a survey related to your  clinical pharmacist.\"     To schedule another MTM appointment, please call the clinic directly or you may call the MTM scheduling line at [phone] or toll-free at [phone].     My Clinical Pharmacist's contact information:                                                      Please feel free to contact me with any questions or concerns you have.      Hawa Beckham, PharmD  Medication Therapy Management Pharmacist     "

## 2022-11-20 ENCOUNTER — HEALTH MAINTENANCE LETTER (OUTPATIENT)
Age: 42
End: 2022-11-20

## 2022-12-15 ENCOUNTER — OFFICE VISIT (OUTPATIENT)
Dept: PHARMACY | Facility: CLINIC | Age: 42
End: 2022-12-15
Payer: COMMERCIAL

## 2022-12-15 ENCOUNTER — LAB (OUTPATIENT)
Dept: LAB | Facility: CLINIC | Age: 42
End: 2022-12-15
Payer: COMMERCIAL

## 2022-12-15 VITALS — SYSTOLIC BLOOD PRESSURE: 118 MMHG | DIASTOLIC BLOOD PRESSURE: 84 MMHG

## 2022-12-15 DIAGNOSIS — Z13.220 SCREENING FOR HYPERLIPIDEMIA: ICD-10-CM

## 2022-12-15 DIAGNOSIS — E66.01 MORBID OBESITY (H): ICD-10-CM

## 2022-12-15 DIAGNOSIS — I10 ESSENTIAL HYPERTENSION: ICD-10-CM

## 2022-12-15 DIAGNOSIS — F32.1 CURRENT MODERATE EPISODE OF MAJOR DEPRESSIVE DISORDER WITHOUT PRIOR EPISODE (H): ICD-10-CM

## 2022-12-15 DIAGNOSIS — E11.65 TYPE 2 DIABETES MELLITUS WITH HYPERGLYCEMIA, WITHOUT LONG-TERM CURRENT USE OF INSULIN (H): ICD-10-CM

## 2022-12-15 DIAGNOSIS — E11.65 TYPE 2 DIABETES MELLITUS WITH HYPERGLYCEMIA, WITHOUT LONG-TERM CURRENT USE OF INSULIN (H): Primary | ICD-10-CM

## 2022-12-15 LAB
ALBUMIN SERPL BCG-MCNC: 4.1 G/DL (ref 3.5–5.2)
ALP SERPL-CCNC: 60 U/L (ref 35–104)
ALT SERPL W P-5'-P-CCNC: 57 U/L (ref 10–35)
ANION GAP SERPL CALCULATED.3IONS-SCNC: 12 MMOL/L (ref 7–15)
AST SERPL W P-5'-P-CCNC: 38 U/L (ref 10–35)
BILIRUB SERPL-MCNC: 0.4 MG/DL
BUN SERPL-MCNC: 16.8 MG/DL (ref 6–20)
CALCIUM SERPL-MCNC: 9.2 MG/DL (ref 8.6–10)
CHLORIDE SERPL-SCNC: 106 MMOL/L (ref 98–107)
CHOLEST SERPL-MCNC: 144 MG/DL
CREAT SERPL-MCNC: 0.53 MG/DL (ref 0.51–0.95)
DEPRECATED CALCIDIOL+CALCIFEROL SERPL-MC: 13 UG/L (ref 20–75)
DEPRECATED HCO3 PLAS-SCNC: 25 MMOL/L (ref 22–29)
GFR SERPL CREATININE-BSD FRML MDRD: >90 ML/MIN/1.73M2
GLUCOSE SERPL-MCNC: 113 MG/DL (ref 70–99)
HBA1C MFR BLD: 6.6 % (ref 0–5.6)
HDLC SERPL-MCNC: 52 MG/DL
LDLC SERPL CALC-MCNC: 75 MG/DL
NONHDLC SERPL-MCNC: 92 MG/DL
POTASSIUM SERPL-SCNC: 4.2 MMOL/L (ref 3.4–5.3)
PROT SERPL-MCNC: 7.2 G/DL (ref 6.4–8.3)
SODIUM SERPL-SCNC: 143 MMOL/L (ref 136–145)
TRIGL SERPL-MCNC: 83 MG/DL

## 2022-12-15 PROCEDURE — 80053 COMPREHEN METABOLIC PANEL: CPT

## 2022-12-15 PROCEDURE — 36415 COLL VENOUS BLD VENIPUNCTURE: CPT

## 2022-12-15 PROCEDURE — 82306 VITAMIN D 25 HYDROXY: CPT

## 2022-12-15 PROCEDURE — 99606 MTMS BY PHARM EST 15 MIN: CPT | Performed by: PHARMACIST

## 2022-12-15 PROCEDURE — 83036 HEMOGLOBIN GLYCOSYLATED A1C: CPT

## 2022-12-15 PROCEDURE — 99607 MTMS BY PHARM ADDL 15 MIN: CPT | Performed by: PHARMACIST

## 2022-12-15 PROCEDURE — 80061 LIPID PANEL: CPT

## 2022-12-15 RX ORDER — TIRZEPATIDE 5 MG/.5ML
5 INJECTION, SOLUTION SUBCUTANEOUS WEEKLY
Qty: 6 ML | Refills: 0 | Status: SHIPPED | OUTPATIENT
Start: 2022-12-15 | End: 2023-01-25 | Stop reason: DRUGHIGH

## 2022-12-15 NOTE — PROGRESS NOTES
Medication Therapy Management (MTM) Encounter    ASSESSMENT:                            Medication Adherence/Access: No issues identified    Type 2 Diabetes/Obesity: Patient is meeting A1c goal of < 7% after recheck after visit today. Patient to continue to titrate Mounjaro for great weight loss benefit. Low risk for hypoglycemia.   May consider vitamin D screening in setting of obesity management (and she has a history of depression).    Hyperlipidemia: check LFTs given history of elevated levels and recent statin start for safety.    PLAN:                            Stay on Mounjaro 2.5mg weekly on Wednedsay.    Try to fill the Mounjaro 5 mg Rx for 3 month supply before the end of the year. If you can fill this, then finish the the 2.5 mg dose at home first and then transition to the 5 mg.     Labs today --> vitamin D low, advise patient start vit D2 50,000 units weekly for 12 weeks, then start over-the-counter vitamin d3 1000 units daily thereafter.    Follow-up: 12/23 with Dr. Miller; Return in about 13 weeks (around 3/16/2023) for Medication Therapy Management Visit.    SUBJECTIVE/OBJECTIVE:                          Tresa Chin is a 42 year old female coming in for a follow-up visit.  Today's visit is a follow-up MTM visit from 11/17/2022. Referred to me from Dr. Abbie Pozo.     Reason for visit: follow-up on Mounjaro start.     Allergies/ADRs: Reviewed in chart  Past Medical History: Reviewed in chart  Tobacco: She reports that she has never smoked. She has never used smokeless tobacco.  Alcohol: not currently using    Medication Adherence/Access: no issues reported    Type 2 Diabetes/Obesity:  Patient had to stop semaglutide PO 7mg daily due to abdominal discomfort that was not improving. She was then started on Mounjaro 2.5 mg weekly, she just refilled the current dose. She denies any abdominal pain. Has notice mild nausea that occurs 1-2 days after injection and then does go away completely.  "She thinks it may be getting better with more time. She is also taking metformin  mg twice daily for blood sugar control.  self monitor blood glucose: has a meter but not checking  Exercise: remains sedentary. Patient is inquiring about rechecking vitamin D. She reports history of low. Not on any supplement now.   Eye exam: new diagnosis   Foot exam: new diagnosis   Aspirin: Not taking due to age  Statin: No   ACEi/ARB: No.   Urine Albumin:   Lab Results   Component Value Date    UMALCR 24.39 06/07/2022      Lab Results   Component Value Date    A1C 6.6 12/15/2022    A1C 8.1 09/06/2022    A1C 6.8 06/07/2022    A1C 6.2 10/21/2021     Wt Readings from Last 4 Encounters:   11/03/22 263 lb (119.3 kg)   10/13/22 263 lb 1.6 oz (119.3 kg)   09/15/22 271 lb 1.6 oz (123 kg)   09/06/22 276 lb 3.2 oz (125.3 kg)     Estimated body mass index is 49.69 kg/m  as calculated from the following:    Height as of 11/3/22: 5' 1\" (1.549 m).    Weight as of 11/3/22: 263 lb (119.3 kg).    Lab Results   Component Value Date    VITDT 13 (L) 12/15/2022    VITDT 20 09/11/2020       Hyperlipidemia: history of NAFLD. On none, new diabetes diagnosis. She reports history of taking statin but then was told she was able to discontinue. Last visit we restart rosuvastatin 10mg daily. Patient denies any side effects.  Recent Labs   Lab Test 10/21/21  0814 02/18/21  0810   CHOL 220* 234*   HDL 59 68   * 148*   TRIG 83 92     The 10-year ASCVD risk score (Vonda HOFFMANN, et al., 2019) is: 0.7%    Values used to calculate the score:      Age: 42 years      Sex: Female      Is Non- : No      Diabetic: Yes      Tobacco smoker: No      Systolic Blood Pressure: 118 mmHg      Is BP treated: Yes      HDL Cholesterol: 52 mg/dL      Total Cholesterol: 144 mg/dL    Liver Function Studies - Recent Labs   Lab Test 06/07/22  0829   PROTTOTAL 7.7   ALBUMIN 3.7   BILITOTAL 0.5   ALKPHOS 59   AST 39   ALT 74*       Today's Vitals: BP " 118/84   ----------------      I spent 30 minutes with this patient today. All changes were made via collaborative practice agreement with Kalyn Miller MD. A copy of the visit note was provided to the patient's provider(s).    A summary of these recommendations was given to the patient.    Hawa Beckham, PharmD  Medication Therapy Management Pharmacist     Medication Therapy Recommendations  Morbid obesity (H)    Current Medication: Tirzepatide (MOUNJARO) 5 MG/0.5ML SOPN   Rationale: Dose too low - Dosage too low - Effectiveness   Recommendation: Increase Dose   Status: Accepted per CPA          Current Medication: vitamin D2 (ERGOCALCIFEROL) 56146 units (1250 mcg) capsule   Rationale: Untreated condition - Needs additional medication therapy - Indication   Recommendation: Start Medication   Status: Accepted per CPA

## 2022-12-15 NOTE — PATIENT INSTRUCTIONS
"Recommendations from today's MTM visit:                                                      Stay on Mounjaro 2.5mg weekly on Wednedsay.    Try to fill the Mounjaro 5 mg Rx for 3 month supply before the end of the year. If you can fill this, then finish the the 2.5 mg dose at home first and then transition to the 5 mg.     Labs today    Follow-up: 3 month    It was great speaking with you today.  I value your experience and would be very thankful for your time in providing feedback in our clinic survey. In the next few days, you may receive an email or text message from [employer] with a link to a survey related to your  clinical pharmacist.\"     To schedule another MTM appointment, please call the clinic directly or you may call the MTM scheduling line at [phone] or toll-free at [phone].     My Clinical Pharmacist's contact information:                                                      Please feel free to contact me with any questions or concerns you have.      Hawa Beckham, PharmD  Medication Therapy Management Pharmacist     "

## 2022-12-16 RX ORDER — ERGOCALCIFEROL 1.25 MG/1
50000 CAPSULE, LIQUID FILLED ORAL WEEKLY
Qty: 12 CAPSULE | Refills: 0 | Status: ON HOLD | OUTPATIENT
Start: 2022-12-16 | End: 2024-06-07

## 2022-12-21 SDOH — ECONOMIC STABILITY: FOOD INSECURITY: WITHIN THE PAST 12 MONTHS, YOU WORRIED THAT YOUR FOOD WOULD RUN OUT BEFORE YOU GOT MONEY TO BUY MORE.: OFTEN TRUE

## 2022-12-21 SDOH — ECONOMIC STABILITY: INCOME INSECURITY: IN THE LAST 12 MONTHS, WAS THERE A TIME WHEN YOU WERE NOT ABLE TO PAY THE MORTGAGE OR RENT ON TIME?: YES

## 2022-12-21 SDOH — HEALTH STABILITY: PHYSICAL HEALTH: ON AVERAGE, HOW MANY DAYS PER WEEK DO YOU ENGAGE IN MODERATE TO STRENUOUS EXERCISE (LIKE A BRISK WALK)?: 0 DAYS

## 2022-12-21 SDOH — HEALTH STABILITY: PHYSICAL HEALTH: ON AVERAGE, HOW MANY MINUTES DO YOU ENGAGE IN EXERCISE AT THIS LEVEL?: 0 MIN

## 2022-12-21 SDOH — ECONOMIC STABILITY: TRANSPORTATION INSECURITY
IN THE PAST 12 MONTHS, HAS LACK OF TRANSPORTATION KEPT YOU FROM MEETINGS, WORK, OR FROM GETTING THINGS NEEDED FOR DAILY LIVING?: NO

## 2022-12-21 SDOH — ECONOMIC STABILITY: TRANSPORTATION INSECURITY
IN THE PAST 12 MONTHS, HAS THE LACK OF TRANSPORTATION KEPT YOU FROM MEDICAL APPOINTMENTS OR FROM GETTING MEDICATIONS?: NO

## 2022-12-21 SDOH — ECONOMIC STABILITY: FOOD INSECURITY: WITHIN THE PAST 12 MONTHS, THE FOOD YOU BOUGHT JUST DIDN'T LAST AND YOU DIDN'T HAVE MONEY TO GET MORE.: OFTEN TRUE

## 2022-12-21 SDOH — ECONOMIC STABILITY: INCOME INSECURITY: HOW HARD IS IT FOR YOU TO PAY FOR THE VERY BASICS LIKE FOOD, HOUSING, MEDICAL CARE, AND HEATING?: HARD

## 2022-12-21 ASSESSMENT — LIFESTYLE VARIABLES
HOW MANY STANDARD DRINKS CONTAINING ALCOHOL DO YOU HAVE ON A TYPICAL DAY: 1 OR 2
SKIP TO QUESTIONS 9-10: 0
HOW OFTEN DO YOU HAVE SIX OR MORE DRINKS ON ONE OCCASION: LESS THAN MONTHLY
AUDIT-C TOTAL SCORE: 2
HOW OFTEN DO YOU HAVE A DRINK CONTAINING ALCOHOL: MONTHLY OR LESS

## 2022-12-21 ASSESSMENT — SOCIAL DETERMINANTS OF HEALTH (SDOH)
HOW OFTEN DO YOU GET TOGETHER WITH FRIENDS OR RELATIVES?: ONCE A WEEK
DO YOU BELONG TO ANY CLUBS OR ORGANIZATIONS SUCH AS CHURCH GROUPS UNIONS, FRATERNAL OR ATHLETIC GROUPS, OR SCHOOL GROUPS?: NO
IN A TYPICAL WEEK, HOW MANY TIMES DO YOU TALK ON THE PHONE WITH FAMILY, FRIENDS, OR NEIGHBORS?: MORE THAN THREE TIMES A WEEK
HOW OFTEN DO YOU ATTEND CHURCH OR RELIGIOUS SERVICES?: NEVER

## 2022-12-23 ENCOUNTER — OFFICE VISIT (OUTPATIENT)
Dept: FAMILY MEDICINE | Facility: CLINIC | Age: 42
End: 2022-12-23
Payer: COMMERCIAL

## 2022-12-23 VITALS
DIASTOLIC BLOOD PRESSURE: 88 MMHG | WEIGHT: 268.2 LBS | HEIGHT: 61 IN | OXYGEN SATURATION: 98 % | SYSTOLIC BLOOD PRESSURE: 128 MMHG | HEART RATE: 86 BPM | BODY MASS INDEX: 50.63 KG/M2

## 2022-12-23 DIAGNOSIS — E66.01 MORBID OBESITY (H): ICD-10-CM

## 2022-12-23 DIAGNOSIS — B37.49 YEAST UTI: ICD-10-CM

## 2022-12-23 DIAGNOSIS — E11.65 TYPE 2 DIABETES MELLITUS WITH HYPERGLYCEMIA, WITHOUT LONG-TERM CURRENT USE OF INSULIN (H): Primary | ICD-10-CM

## 2022-12-23 DIAGNOSIS — R30.0 DYSURIA: ICD-10-CM

## 2022-12-23 LAB
ALBUMIN UR-MCNC: NEGATIVE MG/DL
APPEARANCE UR: CLEAR
BACTERIA #/AREA URNS HPF: ABNORMAL /HPF
BILIRUB UR QL STRIP: NEGATIVE
COLOR UR AUTO: YELLOW
GLUCOSE UR STRIP-MCNC: NEGATIVE MG/DL
HGB UR QL STRIP: NEGATIVE
KETONES UR STRIP-MCNC: NEGATIVE MG/DL
LEUKOCYTE ESTERASE UR QL STRIP: ABNORMAL
MUCOUS THREADS #/AREA URNS LPF: PRESENT /LPF
NITRATE UR QL: NEGATIVE
PH UR STRIP: 6.5 [PH] (ref 5–7)
RBC #/AREA URNS AUTO: ABNORMAL /HPF
SP GR UR STRIP: 1.02 (ref 1–1.03)
SQUAMOUS #/AREA URNS AUTO: ABNORMAL /LPF
UROBILINOGEN UR STRIP-ACNC: 0.2 E.U./DL
WBC #/AREA URNS AUTO: ABNORMAL /HPF
YEAST #/AREA URNS HPF: ABNORMAL /HPF

## 2022-12-23 PROCEDURE — 81001 URINALYSIS AUTO W/SCOPE: CPT | Performed by: FAMILY MEDICINE

## 2022-12-23 PROCEDURE — 99214 OFFICE O/P EST MOD 30 MIN: CPT | Performed by: FAMILY MEDICINE

## 2022-12-23 RX ORDER — METFORMIN HCL 500 MG
1000 TABLET, EXTENDED RELEASE 24 HR ORAL
Qty: 180 TABLET | Refills: 1 | Status: ON HOLD | OUTPATIENT
Start: 2022-12-23 | End: 2024-06-07

## 2022-12-23 RX ORDER — FLUCONAZOLE 200 MG/1
200 TABLET ORAL DAILY
Qty: 14 TABLET | Refills: 0 | Status: SHIPPED | OUTPATIENT
Start: 2022-12-23 | End: 2023-01-06

## 2022-12-23 ASSESSMENT — ANXIETY QUESTIONNAIRES
5. BEING SO RESTLESS THAT IT IS HARD TO SIT STILL: NOT AT ALL
2. NOT BEING ABLE TO STOP OR CONTROL WORRYING: SEVERAL DAYS
7. FEELING AFRAID AS IF SOMETHING AWFUL MIGHT HAPPEN: NOT AT ALL
GAD7 TOTAL SCORE: 4
4. TROUBLE RELAXING: NOT AT ALL
6. BECOMING EASILY ANNOYED OR IRRITABLE: SEVERAL DAYS
8. IF YOU CHECKED OFF ANY PROBLEMS, HOW DIFFICULT HAVE THESE MADE IT FOR YOU TO DO YOUR WORK, TAKE CARE OF THINGS AT HOME, OR GET ALONG WITH OTHER PEOPLE?: SOMEWHAT DIFFICULT
GAD7 TOTAL SCORE: 4
IF YOU CHECKED OFF ANY PROBLEMS ON THIS QUESTIONNAIRE, HOW DIFFICULT HAVE THESE PROBLEMS MADE IT FOR YOU TO DO YOUR WORK, TAKE CARE OF THINGS AT HOME, OR GET ALONG WITH OTHER PEOPLE: SOMEWHAT DIFFICULT
3. WORRYING TOO MUCH ABOUT DIFFERENT THINGS: SEVERAL DAYS
2. NOT BEING ABLE TO STOP OR CONTROL WORRYING: SEVERAL DAYS
5. BEING SO RESTLESS THAT IT IS HARD TO SIT STILL: NOT AT ALL
4. TROUBLE RELAXING: NOT AT ALL
3. WORRYING TOO MUCH ABOUT DIFFERENT THINGS: SEVERAL DAYS
7. FEELING AFRAID AS IF SOMETHING AWFUL MIGHT HAPPEN: NOT AT ALL
GAD7 TOTAL SCORE: 4
1. FEELING NERVOUS, ANXIOUS, OR ON EDGE: SEVERAL DAYS
7. FEELING AFRAID AS IF SOMETHING AWFUL MIGHT HAPPEN: NOT AT ALL
1. FEELING NERVOUS, ANXIOUS, OR ON EDGE: SEVERAL DAYS
GAD7 TOTAL SCORE: 4
IF YOU CHECKED OFF ANY PROBLEMS ON THIS QUESTIONNAIRE, HOW DIFFICULT HAVE THESE PROBLEMS MADE IT FOR YOU TO DO YOUR WORK, TAKE CARE OF THINGS AT HOME, OR GET ALONG WITH OTHER PEOPLE: SOMEWHAT DIFFICULT
6. BECOMING EASILY ANNOYED OR IRRITABLE: SEVERAL DAYS

## 2022-12-23 ASSESSMENT — ENCOUNTER SYMPTOMS
MYALGIAS: 1
EYE PAIN: 0
HEARTBURN: 1
FEVER: 0
CHILLS: 0
NERVOUS/ANXIOUS: 0
HEMATURIA: 0
NAUSEA: 1
SHORTNESS OF BREATH: 0
WEAKNESS: 0
JOINT SWELLING: 0
DIARRHEA: 0
DYSURIA: 1
PARESTHESIAS: 0
CONSTIPATION: 0
BREAST MASS: 0
HEMATOCHEZIA: 0
HEADACHES: 0
COUGH: 0
ARTHRALGIAS: 1
ABDOMINAL PAIN: 0
SORE THROAT: 0
PALPITATIONS: 0
FREQUENCY: 0
DIZZINESS: 0

## 2022-12-23 ASSESSMENT — PATIENT HEALTH QUESTIONNAIRE - PHQ9
SUM OF ALL RESPONSES TO PHQ QUESTIONS 1-9: 4
SUM OF ALL RESPONSES TO PHQ QUESTIONS 1-9: 4
10. IF YOU CHECKED OFF ANY PROBLEMS, HOW DIFFICULT HAVE THESE PROBLEMS MADE IT FOR YOU TO DO YOUR WORK, TAKE CARE OF THINGS AT HOME, OR GET ALONG WITH OTHER PEOPLE: VERY DIFFICULT

## 2022-12-23 NOTE — PROGRESS NOTES
Assessment & Plan     Type 2 diabetes mellitus with hyperglycemia, without long-term current use of insulin (H)  - A1c at 6.6 down from 8.1. unfortunately she is unable to continue with mounjaro in the coming year due to cost. Will continue on metformin  - metFORMIN (GLUCOPHAGE XR) 500 MG 24 hr tablet; Take 2 tablets (1,000 mg) by mouth daily (with dinner)    Morbid obesity (H)  - continue with diet and lifestyle changes. This will help keep diabetes under better control.     Dysuria  - UA Macro with Reflex to Micro and Culture - lab collect; Future  - UA Macro with Reflex to Micro and Culture - lab collect  - Urine Microscopic    Yeast UTI  - UA noted yeast. Will start on diflucan.   - fluconazole (DIFLUCAN) 200 MG tablet; Take 1 tablet (200 mg) by mouth daily for 14 days             No follow-ups on file.    Kalyn Miller MD  [facility]    Reny Gtz is a 42 year old, presenting for the following health issues:  No chief complaint on file.      HPI     Diabetes Follow-up    How often are you checking your blood sugar? A few times a week  What time of day are you checking your blood sugars (select all that apply)?  Before meals  Have you had any blood sugars above 200?  No  Have you had any blood sugars below 70?  No    What symptoms do you notice when your blood sugar is low?  Dizzy and Weak    What concerns do you have today about your diabetes? None     Do you have any of these symptoms? (Select all that apply)  Excessive thirst    Have you had a diabetic eye exam in the last 12 months? No    Eye exam- 3/2022 in [place]   Blood sugars 110-180's.             Hyperlipidemia Follow-Up      Are you regularly taking any medication or supplement to lower your cholesterol?   Yes- Crestor    Are you having muscle aches or other side effects that you think could be caused by your cholesterol lowering medication?  No    Hypertension Follow-up      Do you check your blood  pressure regularly outside of the clinic? No     Are you following a low salt diet? No    Are your blood pressures ever more than 140 on the top number (systolic) OR more   than 90 on the bottom number (diastolic), for example 140/90? No    BP Readings from Last 2 Encounters:   12/23/22 128/88   12/15/22 118/84     Hemoglobin A1C (%)   Date Value   12/15/2022 6.6 (H)   09/06/2022 8.1 (H)   02/18/2021 6.0 (H)   09/11/2020 6.1 (H)     LDL Cholesterol Calculated (mg/dL)   Date Value   12/15/2022 75   10/21/2021 144 (H)   02/18/2021 148 (H)   09/11/2020 194 (H)         How many servings of fruits and vegetables do you eat daily?  0-1    On average, how many sweetened beverages do you drink each day (Examples: soda, juice, sweet tea, etc.  Do NOT count diet or artificially sweetened beverages)?   0    How many days per week do you exercise enough to make your heart beat faster? 3 or less    How many minutes a day do you exercise enough to make your heart beat faster? 9 or less    How many days per week do you miss taking your medication? 0    Breakfast- eggs, fat free milk   Lunch- grilled chicken with salad  Dinner-  Store bought- chinese or from [employer].         Genitourinary - Female  Onset/Duration: on and off for a couple weeks   Description:   Painful urination (Dysuria): No           Frequency: YES  Blood in urine (Hematuria): No  Delay in urine (Hesitency): YES  Intensity: mild  Progression of Symptoms:  same and intermittent  Accompanying Signs & Symptoms:  Fever/chills: No  Flank pain: No  Nausea and vomiting: YES  Vaginal symptoms: odor and burning   Abdominal/Pelvic Pain: No  History:   History of frequent UTI s: YES  History of kidney stones: YES  Sexually Active: No  Possibility of pregnancy: No  Precipitating or alleviating factors: None  Therapies tried and outcome:  None       Wt Readings from Last 4 Encounters:   12/23/22 121.7 kg (268 lb 3.2 oz)   11/03/22 119.3 kg (263 lb)   10/13/22 119.3 kg (263 lb  "1.6 oz)   09/15/22 123 kg (271 lb 1.6 oz)         Review of Systems   Constitutional, HEENT, cardiovascular, pulmonary, GI, , musculoskeletal, neuro, skin, endocrine and psych systems are negative, except as otherwise noted.      Objective    /88 (BP Location: Right arm, Patient Position: Sitting, Cuff Size: Adult Large)   Pulse 86   Ht 1.549 m (5' 1\")   Wt 121.7 kg (268 lb 3.2 oz)   LMP  (LMP Unknown)   SpO2 98%   BMI 50.68 kg/m    Body mass index is 50.68 kg/m .  Physical Exam   GENERAL: healthy, alert and no distress  EYES: Eyes grossly normal to inspection, PERRL and conjunctivae and sclerae normal  NECK: no adenopathy, no asymmetry, masses, or scars and thyroid normal to palpation  RESP: lungs clear to auscultation - no rales, rhonchi or wheezes  CV: regular rate and rhythm, normal S1 S2, no S3 or S4, no murmur, click or rub, no peripheral edema and peripheral pulses strong  ABDOMEN: soft, nontender, no hepatosplenomegaly, no masses and bowel sounds normal  MS: no gross musculoskeletal defects noted, no edema  PSYCH: mentation appears normal, affect normal/bright      Hemoglobin A1C   Date Value Ref Range Status   12/15/2022 6.6 (H) 0.0 - 5.6 % Final     Comment:     Normal <5.7%   Prediabetes 5.7-6.4%    Diabetes 6.5% or higher     Note: Adopted from ADA consensus guidelines.   09/06/2022 8.1 (H) 0.0 - 5.6 % Final     Comment:     Normal <5.7%   Prediabetes 5.7-6.4%    Diabetes 6.5% or higher     Note: Adopted from ADA consensus guidelines.   06/07/2022 6.8 (H) 0.0 - 5.6 % Final     Comment:     Normal <5.7%   Prediabetes 5.7-6.4%    Diabetes 6.5% or higher     Note: Adopted from ADA consensus guidelines.   02/18/2021 6.0 (H) 0 - 5.6 % Final     Comment:     Normal <5.7% Prediabetes 5.7-6.4%  Diabetes 6.5% or higher - adopted from ADA   consensus guidelines.     09/11/2020 6.1 (H) 0 - 5.6 % Final     Comment:     Normal <5.7% Prediabetes 5.7-6.4%  Diabetes 6.5% or higher - adopted from ADA "   consensus guidelines.     02/19/2020 5.8 (H) 0 - 5.6 % Final     Comment:     Normal <5.7% Prediabetes 5.7-6.4%  Diabetes 6.5% or higher - adopted from ADA   consensus guidelines.                   Answers for HPI/ROS submitted by the patient on 12/23/2022  If you checked off any problems, how difficult have these problems made it for you to do your work, take care of things at home, or get along with other people?: Very difficult  PHQ9 TOTAL SCORE: 4  NETO 7 TOTAL SCORE: 4

## 2022-12-26 DIAGNOSIS — G47.33 OSA (OBSTRUCTIVE SLEEP APNEA): Primary | ICD-10-CM

## 2023-01-09 ENCOUNTER — TELEPHONE (OUTPATIENT)
Dept: SLEEP MEDICINE | Facility: CLINIC | Age: 43
End: 2023-01-09

## 2023-01-09 NOTE — TELEPHONE ENCOUNTER
Reason for Call:  Other prescription    Detailed comments: Would like prescription for cpap supplies prior to appointment.    Phone Number Patient can be reached at: Cell number on file:    Telephone Information:   Mobile [phone]       Best Time: anytime    Can we leave a detailed message on this number? YES    Call taken on 1/9/2023 at 1:55 PM by Lydia Barahona

## 2023-01-16 NOTE — TELEPHONE ENCOUNTER
Writer left a detail message for patient stating Dr. Bird has already placed an order for CPAP supplies back on 1/6/2023. Stated to call patient's closest [employer] [facility] store.     Upon call back please notify patient of above message.

## 2023-01-31 ENCOUNTER — TELEPHONE (OUTPATIENT)
Dept: FAMILY MEDICINE | Facility: CLINIC | Age: 43
End: 2023-01-31
Payer: COMMERCIAL

## 2023-01-31 DIAGNOSIS — E11.65 TYPE 2 DIABETES MELLITUS WITH HYPERGLYCEMIA, WITHOUT LONG-TERM CURRENT USE OF INSULIN (H): Primary | ICD-10-CM

## 2023-01-31 NOTE — TELEPHONE ENCOUNTER
Central Prior Authorization Team - Phone: [phone]     PA Initiation    Medication: Tirzepatide (MOUNJARO) 7.5 MG/0.5ML pen- PA INITIATED  Insurance Company:    Pharmacy Filling the Rx: [facility] [address] [place] [address]  Filling Pharmacy Phone: [phone]  Filling Pharmacy Fax:    Start Date: 1/31/2023

## 2023-01-31 NOTE — TELEPHONE ENCOUNTER
Central Prior Authorization Team - Phone: [phone]     PRIOR AUTHORIZATION DENIED    Medication: Tirzepatide (MOUNJARO) 7.5 MG/0.5ML pen- PA DENIED    Denial Date: 1/31/2023    Denial Rational:                                                       Appeal Information:If the provider would like to appeal, please provide a letter of medical necessity and route back to the team. Otherwise you can close the encounter. Thank you, Central PA Team

## 2023-01-31 NOTE — TELEPHONE ENCOUNTER
Hawa Beckham, [facility] 48 minutes ago (11:02 AM)     TC  Can you please start a PA encounter for Mounjaro? Patient will be going out of town soon.      Hawa Ramon, [facility] 2 hours ago (9:39 AM)     Good morning   I was trying to refill my prescription but my new insurance needs prior authorization to be able to cover it 100% could you help me with that I need to refill it before I go out of town on Saturday   Thank you

## 2023-02-01 ENCOUNTER — TELEPHONE (OUTPATIENT)
Dept: PHARMACY | Facility: CLINIC | Age: 43
End: 2023-02-01
Payer: COMMERCIAL

## 2023-02-01 RX ORDER — DULAGLUTIDE 0.75 MG/.5ML
0.75 INJECTION, SOLUTION SUBCUTANEOUS
Qty: 2 ML | Refills: 1 | Status: SHIPPED | OUTPATIENT
Start: 2023-02-01 | End: 2023-02-01

## 2023-02-01 RX ORDER — TIRZEPATIDE 7.5 MG/.5ML
7.5 INJECTION, SOLUTION SUBCUTANEOUS
Qty: 2 ML | Refills: 0 | Status: ON HOLD | OUTPATIENT
Start: 2023-02-01 | End: 2024-06-07

## 2023-02-01 NOTE — TELEPHONE ENCOUNTER
"Patient is reports [facility] pharmacy informed patient that rosuvastatin is $400. She called her insurance who states she needs \"clearance\" from PCP for approval.     Routing to PA team of help.   "

## 2023-02-01 NOTE — TELEPHONE ENCOUNTER
Change to Trulicity per cpa and per PA denial.    Mychart sent to patient.     Hawa Beckham, PharmD  Medication Therapy Management Pharmacist

## 2023-02-01 NOTE — TELEPHONE ENCOUNTER
Patient reports no issues with Mounjaro refill. So I will resend it to the pharmacy per patient request.     Hawa Beckham, PharmD  Medication Therapy Management Pharmacist

## 2023-02-04 ENCOUNTER — TELEPHONE (OUTPATIENT)
Dept: FAMILY MEDICINE | Facility: CLINIC | Age: 43
End: 2023-02-04
Payer: COMMERCIAL

## 2023-02-04 NOTE — TELEPHONE ENCOUNTER
This medication is already covered by plan and no pa is needed. Per call to Count includes the [facility], pt's plan does not allow a tier exception. Patient has a high copayment due to deductible.

## 2023-03-06 NOTE — Clinical Note
Problem: Device-Related Complication Risk (Hemodialysis)  Goal: Safe, Effective Therapy Delivery  Outcome: Ongoing, Progressing     Problem: Hemodynamic Instability (Hemodialysis)  Goal: Effective Tissue Perfusion  Outcome: Ongoing, Progressing     Problem: Infection (Hemodialysis)  Goal: Absence of Infection Signs and Symptoms  Outcome: Ongoing, Progressing     Problem: Adult Inpatient Plan of Care  Goal: Plan of Care Review  Outcome: Ongoing, Progressing  Goal: Patient-Specific Goal (Individualized)  Outcome: Ongoing, Progressing  Goal: Readiness for Transition of Care  Outcome: Ongoing, Progressing     Problem: Bariatric Environmental Safety  Goal: Safety Maintained with Care  Outcome: Ongoing, Progressing     Problem: Impaired Wound Healing  Goal: Optimal Wound Healing  Outcome: Ongoing, Progressing     Problem: Skin Injury Risk Increased  Goal: Skin Health and Integrity  Outcome: Ongoing, Progressing     Patient HR, rhythm, and vitals stable this shift. Patient rested well through night. 1 BM noted at start of shift (pt. refused PM stool softener). Multiple sites with breakdown to patient's posterior including thighs and buttocks cleaned and barrier cream was applied. Patient turned q2hr with assistance. Patient's granddaughter remained at bedside throughout night. Bed alarm active, patient and family instructed to call RN as needed for assistance.    Second session . Completed safety plan.

## 2023-04-12 ENCOUNTER — PATIENT OUTREACH (OUTPATIENT)
Dept: FAMILY MEDICINE | Facility: CLINIC | Age: 43
End: 2023-04-12
Payer: COMMERCIAL

## 2023-04-12 NOTE — TELEPHONE ENCOUNTER
Patient Quality Outreach    Patient is due for the following:   Depression  -  PHQ-9 needed  Physical Preventive Adult Physical    Next Steps:   Schedule a Adult Preventative    Type of outreach:    Sent [employer] Medical message.      Questions for provider review:    None     Ana Patterson

## 2023-04-15 ENCOUNTER — HEALTH MAINTENANCE LETTER (OUTPATIENT)
Age: 43
End: 2023-04-15

## 2023-04-27 NOTE — PROGRESS NOTES
"Tresa Chin is a 39 year old female who is being evaluated via a billable telephone visit.      The patient has been notified of following:     \"This telephone visit will be conducted via a call between you and your physician/provider. We have found that certain health care needs can be provided without the need for a physical exam.  This service lets us provide the care you need with a short phone conversation.  If a prescription is necessary we can send it directly to your pharmacy.  If lab work is needed we can place an order for that and you can then stop by our lab to have the test done at a later time.    If during the course of the call the physician/provider feels a telephone visit is not appropriate, you will not be charged for this service.\"       Tresa Chin complains of    Chief Complaint   Patient presents with     Telephone       I have reviewed and updated the patient's Past Medical History, Social History, Family History and Medication List.    ALLERGIES  Latex    Pura Preez MA     (MA signature)    Additional provider notes: X2 days. Patient does not have any shortness of breath. Patient is wheeze and chest hurts. Sinus pressure. Patient states that her snot is clear. Patient has a tickle in her throat.   Pt started to have cough for the last 2 days, also feeling chest pain when she coughs, at the beginning it started with headache, when she went to the ER for it.  She feels hot when she moves, along with dry throat and runny nose and itching in the throat.  She feels wheezing during the night.  Her brother has similar symptoms and has been tested for COVID-19 with pending results.      Assessment/Plan:  (J40) Bronchitis  (primary encounter diagnosis)  Comment: brother is getting tested for COVID-19, talked about quarantine for 1 week after symptoms started and until  symptoms improve significantly.   Call if brother is positive.  Plan: albuterol (PROAIR HFA/PROVENTIL HFA/VENTOLIN "         HFA) 108 (90 Base) MCG/ACT inhaler, benzonatate        (TESSALON) 200 MG capsule              I have reviewed the note as documented above.  This accurately captures the substance of my conversation with the patient.      Phone call contact time  Call Started at 7:20  Call Ended at 7:36    Melissa Franco MD     03-May-2023

## 2023-06-25 ENCOUNTER — APPOINTMENT (OUTPATIENT)
Dept: CT IMAGING | Facility: CLINIC | Age: 43
End: 2023-06-25
Payer: COMMERCIAL

## 2023-06-25 ENCOUNTER — HOSPITAL ENCOUNTER (EMERGENCY)
Facility: CLINIC | Age: 43
Discharge: HOME | End: 2023-06-25
Attending: EMERGENCY MEDICINE | Admitting: EMERGENCY MEDICINE
Payer: COMMERCIAL

## 2023-06-25 VITALS
HEART RATE: 80 BPM | DIASTOLIC BLOOD PRESSURE: 76 MMHG | RESPIRATION RATE: 16 BRPM | OXYGEN SATURATION: 98 % | TEMPERATURE: 98 F | SYSTOLIC BLOOD PRESSURE: 126 MMHG

## 2023-06-25 DIAGNOSIS — N39.0 UTI (URINARY TRACT INFECTION), UNCOMPLICATED: Primary | ICD-10-CM

## 2023-06-25 LAB
ALBUMIN UR-MCNC: 10 MG/DL
ANION GAP SERPL CALCULATED.3IONS-SCNC: 10 MMOL/L (ref 7–15)
APPEARANCE UR: ABNORMAL
BACTERIA #/AREA URNS HPF: ABNORMAL /HPF
BASOPHILS # BLD AUTO: 0.1 10E3/UL (ref 0–0.2)
BASOPHILS NFR BLD AUTO: 1 %
BILIRUB UR QL STRIP: NEGATIVE
BUN SERPL-MCNC: 12.6 MG/DL (ref 6–20)
CALCIUM SERPL-MCNC: 8.7 MG/DL (ref 8.6–10)
CHLORIDE SERPL-SCNC: 101 MMOL/L (ref 98–107)
COLOR UR AUTO: YELLOW
CREAT SERPL-MCNC: 0.51 MG/DL (ref 0.51–0.95)
DEPRECATED HCO3 PLAS-SCNC: 22 MMOL/L (ref 22–29)
EOSINOPHIL # BLD AUTO: 0.1 10E3/UL (ref 0–0.7)
EOSINOPHIL NFR BLD AUTO: 1 %
ERYTHROCYTE [DISTWIDTH] IN BLOOD BY AUTOMATED COUNT: 15.2 % (ref 10–15)
GFR SERPL CREATININE-BSD FRML MDRD: >90 ML/MIN/1.73M2
GLUCOSE SERPL-MCNC: 161 MG/DL (ref 70–99)
GLUCOSE UR STRIP-MCNC: NEGATIVE MG/DL
HCG UR QL: NEGATIVE
HCT VFR BLD AUTO: 42.7 % (ref 35–47)
HGB BLD-MCNC: 13.3 G/DL (ref 11.7–15.7)
HGB UR QL STRIP: NEGATIVE
IMM GRANULOCYTES # BLD: 0 10E3/UL
IMM GRANULOCYTES NFR BLD: 0 %
KETONES UR STRIP-MCNC: NEGATIVE MG/DL
LEUKOCYTE ESTERASE UR QL STRIP: ABNORMAL
LYMPHOCYTES # BLD AUTO: 2.3 10E3/UL (ref 0.8–5.3)
LYMPHOCYTES NFR BLD AUTO: 22 %
MCH RBC QN AUTO: 26.9 PG (ref 26.5–33)
MCHC RBC AUTO-ENTMCNC: 31.1 G/DL (ref 31.5–36.5)
MCV RBC AUTO: 86 FL (ref 78–100)
MONOCYTES # BLD AUTO: 0.6 10E3/UL (ref 0–1.3)
MONOCYTES NFR BLD AUTO: 5 %
MUCOUS THREADS #/AREA URNS LPF: PRESENT /LPF
NEUTROPHILS # BLD AUTO: 7.3 10E3/UL (ref 1.6–8.3)
NEUTROPHILS NFR BLD AUTO: 71 %
NITRATE UR QL: NEGATIVE
NRBC # BLD AUTO: 0 10E3/UL
NRBC BLD AUTO-RTO: 0 /100
PH UR STRIP: 5.5 [PH] (ref 5–7)
PLATELET # BLD AUTO: 343 10E3/UL (ref 150–450)
POTASSIUM SERPL-SCNC: 4.5 MMOL/L (ref 3.4–5.3)
RBC # BLD AUTO: 4.95 10E6/UL (ref 3.8–5.2)
RBC URINE: 4 /HPF
SODIUM SERPL-SCNC: 133 MMOL/L (ref 136–145)
SP GR UR STRIP: 1.02 (ref 1–1.03)
SQUAMOUS EPITHELIAL: 16 /HPF
UROBILINOGEN UR STRIP-MCNC: NORMAL MG/DL
WBC # BLD AUTO: 10.3 10E3/UL (ref 4–11)
WBC URINE: 36 /HPF

## 2023-06-25 PROCEDURE — 250N000011 HC RX IP 250 OP 636: Mod: JZ

## 2023-06-25 PROCEDURE — 81001 URINALYSIS AUTO W/SCOPE: CPT | Performed by: EMERGENCY MEDICINE

## 2023-06-25 PROCEDURE — 74176 CT ABD & PELVIS W/O CONTRAST: CPT

## 2023-06-25 PROCEDURE — 96375 TX/PRO/DX INJ NEW DRUG ADDON: CPT

## 2023-06-25 PROCEDURE — 80048 BASIC METABOLIC PNL TOTAL CA: CPT | Performed by: EMERGENCY MEDICINE

## 2023-06-25 PROCEDURE — 85025 COMPLETE CBC W/AUTO DIFF WBC: CPT | Performed by: EMERGENCY MEDICINE

## 2023-06-25 PROCEDURE — 87086 URINE CULTURE/COLONY COUNT: CPT | Performed by: EMERGENCY MEDICINE

## 2023-06-25 PROCEDURE — 96365 THER/PROPH/DIAG IV INF INIT: CPT

## 2023-06-25 PROCEDURE — 81025 URINE PREGNANCY TEST: CPT | Performed by: EMERGENCY MEDICINE

## 2023-06-25 PROCEDURE — 258N000003 HC RX IP 258 OP 636

## 2023-06-25 PROCEDURE — 99285 EMERGENCY DEPT VISIT HI MDM: CPT | Mod: 25

## 2023-06-25 PROCEDURE — 36415 COLL VENOUS BLD VENIPUNCTURE: CPT | Performed by: EMERGENCY MEDICINE

## 2023-06-25 RX ORDER — ONDANSETRON 4 MG/1
4 TABLET, ORALLY DISINTEGRATING ORAL ONCE
Status: COMPLETED | OUTPATIENT
Start: 2023-06-25 | End: 2023-06-25

## 2023-06-25 RX ORDER — ONDANSETRON 4 MG/1
4 TABLET, ORALLY DISINTEGRATING ORAL EVERY 8 HOURS PRN
Qty: 15 TABLET | Refills: 0 | Status: CANCELLED | OUTPATIENT
Start: 2023-06-25

## 2023-06-25 RX ORDER — KETOROLAC TROMETHAMINE 30 MG/ML
30 INJECTION, SOLUTION INTRAMUSCULAR; INTRAVENOUS EVERY 6 HOURS PRN
Status: DISCONTINUED | OUTPATIENT
Start: 2023-06-25 | End: 2023-06-25 | Stop reason: HOSPADM

## 2023-06-25 RX ORDER — ONDANSETRON 4 MG/1
4 TABLET, ORALLY DISINTEGRATING ORAL EVERY 8 HOURS PRN
Qty: 10 TABLET | Refills: 0 | Status: SHIPPED | OUTPATIENT
Start: 2023-06-25 | End: 2023-06-28

## 2023-06-25 RX ORDER — CEFDINIR 300 MG/1
300 CAPSULE ORAL 2 TIMES DAILY
Qty: 20 CAPSULE | Refills: 0 | Status: SHIPPED | OUTPATIENT
Start: 2023-06-25 | End: 2023-07-05

## 2023-06-25 RX ORDER — CEFTRIAXONE 2 G/1
2 INJECTION, POWDER, FOR SOLUTION INTRAMUSCULAR; INTRAVENOUS ONCE
Status: COMPLETED | OUTPATIENT
Start: 2023-06-25 | End: 2023-06-25

## 2023-06-25 RX ORDER — CEFDINIR 300 MG/1
300 CAPSULE ORAL 2 TIMES DAILY
Qty: 20 CAPSULE | Refills: 0 | Status: CANCELLED | OUTPATIENT
Start: 2023-06-26

## 2023-06-25 RX ADMIN — ONDANSETRON 4 MG: 4 TABLET, ORALLY DISINTEGRATING ORAL at 10:06

## 2023-06-25 RX ADMIN — SODIUM CHLORIDE 1000 ML: 9 INJECTION, SOLUTION INTRAVENOUS at 10:05

## 2023-06-25 RX ADMIN — CEFTRIAXONE 2 G: 2 INJECTION, POWDER, FOR SOLUTION INTRAMUSCULAR; INTRAVENOUS at 10:08

## 2023-06-25 RX ADMIN — KETOROLAC TROMETHAMINE 30 MG: 30 INJECTION, SOLUTION INTRAMUSCULAR at 10:05

## 2023-06-25 ASSESSMENT — ACTIVITIES OF DAILY LIVING (ADL)
ADLS_ACUITY_SCORE: 35
ADLS_ACUITY_SCORE: 35

## 2023-06-25 NOTE — ED PROVIDER NOTES
History     Chief Complaint:  Flank Pain     The history is provided by the patient.      Tresa Chin is a 43 year old female with a history of type 2 diabetes mellitus, hypertension, hyperlipidemia, morbid obesity, and nephrolithiasis who presents with persisting left flank and lower back pain that began suddenly 2 days ago. The patient reports that with this, she has felt dizzy and nauseous, having 2 episodes of emesis earlier this morning, and decided to present to the ED for evaluation. Presently, she endorses having a slight exacerbation of pain when taking deep breaths and denies any recent falls or traumas. She also denies any fevers, diarrhea, chest pain, or shortness of breath and has had no medication changes or abnormal activity. Of note, she does have a history of nephrolithiasis and recurrent UTI's.    Review of External Notes:   Outpatient note reviewed from December 23, 2022 and the patient was seen for dysuria.    Medications:    Norvasc  Metformin  Prilosec  Crestor  Mounjaro  Lisinopril   Vitamin D2    Past Medical History:    Depression  Type 2 diabetes mellitus w/ microalbuminuria  Hypertension  JONATHAN  Morbid obesity  Hyperlipidemia  Anxiety  NAFLD  Panic attacks  NSAID induced gastritis  GERD  Ureterolithiasis  Nephrolithiasis  PTSD  Vitamin D deficiency   TB lung, latent  UTI    Past Surgical History:    Combined cystoscopy, insert stent ureters, left  Combined cystoscopy, retrogrades, ureteroscopy, laser holmium lithotripsy ureters, insert stent, left  Cystoscopy  EGD  Eye surgery  Ankle surgery     Physical Exam     Patient Vitals for the past 24 hrs:   BP Temp Temp src Pulse Resp SpO2   06/25/23 1013 126/76 98  F (36.7  C) Oral 80 16 98 %   06/25/23 0746 120/77 97.6  F (36.4  C) Temporal 76 16 98 %      Physical Exam  Constitutional:       General: She is not in acute distress.     Appearance: Normal appearance. She is not diaphoretic.   HENT:      Head: Atraumatic.      Mouth/Throat:       Mouth: Mucous membranes are moist.   Eyes:      General: No scleral icterus.     Conjunctiva/sclera: Conjunctivae normal.   Cardiovascular:      Rate and Rhythm: Normal rate and regular rhythm.      Heart sounds: Normal heart sounds.   Pulmonary:      Effort: No respiratory distress.      Breath sounds: Normal breath sounds.   Abdominal:      General: Abdomen is flat.      Comments: Mild left mid abdominal tenderness without guarding or rebound.   Genitourinary:     Comments: Mild left CVA tenderness.  No right CVA tenderness.  No rash over the back.  Musculoskeletal:      Cervical back: Neck supple.   Skin:     General: Skin is warm.      Capillary Refill: Capillary refill takes less than 2 seconds.      Findings: No rash.   Neurological:      General: No focal deficit present.      Mental Status: She is alert and oriented to person, place, and time.   Psychiatric:         Mood and Affect: Mood normal.         Behavior: Behavior normal.       Emergency Department Course     Imaging:  CT Abdomen Pelvis w/o Contrast   Final Result   IMPRESSION:    1.  No acute findings in the abdomen or pelvis.      2.  Hepatic steatosis.            Report per radiology    Laboratory:  Labs Ordered and Resulted from Time of ED Arrival to Time of ED Departure   BASIC METABOLIC PANEL - Abnormal       Result Value    Sodium 133 (*)     Potassium 4.5      Chloride 101      Carbon Dioxide (CO2) 22      Anion Gap 10      Urea Nitrogen 12.6      Creatinine 0.51      Calcium 8.7      Glucose 161 (*)     GFR Estimate >90     ROUTINE UA WITH MICROSCOPIC REFLEX TO CULTURE - Abnormal    Color Urine Yellow      Appearance Urine Slightly Cloudy (*)     Glucose Urine Negative      Bilirubin Urine Negative      Ketones Urine Negative      Specific Gravity Urine 1.025      Blood Urine Negative      pH Urine 5.5      Protein Albumin Urine 10 (*)     Urobilinogen Urine Normal      Nitrite Urine Negative      Leukocyte Esterase Urine Large (*)      Bacteria Urine Moderate (*)     Mucus Urine Present (*)     RBC Urine 4 (*)     WBC Urine 36 (*)     Squamous Epithelials Urine 16 (*)    CBC WITH PLATELETS AND DIFFERENTIAL - Abnormal    WBC Count 10.3      RBC Count 4.95      Hemoglobin 13.3      Hematocrit 42.7      MCV 86      MCH 26.9      MCHC 31.1 (*)     RDW 15.2 (*)     Platelet Count 343      % Neutrophils 71      % Lymphocytes 22      % Monocytes 5      % Eosinophils 1      % Basophils 1      % Immature Granulocytes 0      NRBCs per 100 WBC 0      Absolute Neutrophils 7.3      Absolute Lymphocytes 2.3      Absolute Monocytes 0.6      Absolute Eosinophils 0.1      Absolute Basophils 0.1      Absolute Immature Granulocytes 0.0      Absolute NRBCs 0.0     HCG QUALITATIVE URINE - Normal    hCG Urine Qualitative Negative     URINE CULTURE      Emergency Department Course & Assessments:  Interventions:  Medications   ketorolac (TORADOL) injection 30 mg (30 mg Intravenous $Given 6/25/23 1005)   cefTRIAXone (ROCEPHIN) 2 g vial to attach to  ml bag for ADULTS or NS 50 ml bag for PEDS (0 g Intravenous Stopped 6/25/23 1101)   0.9% sodium chloride BOLUS (0 mLs Intravenous Stopped 6/25/23 1109)   ondansetron (ZOFRAN ODT) ODT tab 4 mg (4 mg Oral $Given 6/25/23 1006)      Assessments:  0932: I performed an exam of the patient and obtained history, as documented above.     Disposition:  The patient was discharged to home.     Impression & Plan      Medical Decision Making:  This patient is a 43-year-old with a history of diabetes who presents to the ED with left flank pain.  She does have a history of kidney stones.  Imaging negative for stone.  Creatinine is normal.  She is afebrile and her white count is normal.  She does appear to have pyuria with symptoms most consistent with early pyelonephritis given the flank pain and nausea.    The patient is overall well-appearing and appropriate for trial of outpatient management.  Her pain was treated and she was  hydrated.  She was given ceftriaxone and will be discharged with Omnicef.  We discussed close return precautions and outpatient follow-up.    Diagnosis:    ICD-10-CM    1. UTI (urinary tract infection), uncomplicated  N39.0          Discharge Medications:  New Prescriptions    CEFDINIR (OMNICEF) 300 MG CAPSULE    Take 1 capsule (300 mg) by mouth 2 times daily for 10 days    ONDANSETRON (ZOFRAN ODT) 4 MG ODT TAB    Take 1 tablet (4 mg) by mouth every 8 hours as needed for nausea      Scribe Disclosure:  Kristina RODRIGUEZ, am serving as a scribe at 8:49 AM on 6/25/2023 to document services personally performed by Reza Sequeira MD based on my observations and the provider's statements to me.      6/25/2023   Reza Sequeira MD McRoberts, Sean Edward, MD  06/25/23 1121

## 2023-06-26 LAB — BACTERIA UR CULT: NORMAL

## 2023-07-08 ENCOUNTER — HEALTH MAINTENANCE LETTER (OUTPATIENT)
Age: 43
End: 2023-07-08

## 2023-08-14 ENCOUNTER — HOSPITAL ENCOUNTER (EMERGENCY)
Facility: CLINIC | Age: 43
Discharge: HOME | End: 2023-08-15
Attending: EMERGENCY MEDICINE | Admitting: EMERGENCY MEDICINE

## 2023-08-14 ENCOUNTER — APPOINTMENT (OUTPATIENT)
Dept: CT IMAGING | Facility: CLINIC | Age: 43
End: 2023-08-14
Attending: EMERGENCY MEDICINE

## 2023-08-14 DIAGNOSIS — D35.02 ADENOMA OF LEFT ADRENAL GLAND: ICD-10-CM

## 2023-08-14 DIAGNOSIS — S39.012A LUMBAR STRAIN, INITIAL ENCOUNTER: ICD-10-CM

## 2023-08-14 PROCEDURE — 250N000009 HC RX 250: Performed by: EMERGENCY MEDICINE

## 2023-08-14 PROCEDURE — 250N000011 HC RX IP 250 OP 636: Performed by: EMERGENCY MEDICINE

## 2023-08-14 PROCEDURE — 72128 CT CHEST SPINE W/O DYE: CPT

## 2023-08-14 PROCEDURE — 72131 CT LUMBAR SPINE W/O DYE: CPT

## 2023-08-14 PROCEDURE — 99285 EMERGENCY DEPT VISIT HI MDM: CPT | Mod: 25

## 2023-08-14 PROCEDURE — 74177 CT ABD & PELVIS W/CONTRAST: CPT

## 2023-08-14 PROCEDURE — 250N000013 HC RX MED GY IP 250 OP 250 PS 637: Performed by: EMERGENCY MEDICINE

## 2023-08-14 RX ORDER — ACETAMINOPHEN 500 MG
1000 TABLET ORAL ONCE
Status: COMPLETED | OUTPATIENT
Start: 2023-08-14 | End: 2023-08-14

## 2023-08-14 RX ORDER — IOPAMIDOL 755 MG/ML
500 INJECTION, SOLUTION INTRAVASCULAR ONCE
Status: COMPLETED | OUTPATIENT
Start: 2023-08-14 | End: 2023-08-14

## 2023-08-14 RX ORDER — OXYCODONE HYDROCHLORIDE 5 MG/1
10 TABLET ORAL ONCE
Status: COMPLETED | OUTPATIENT
Start: 2023-08-14 | End: 2023-08-14

## 2023-08-14 RX ADMIN — OXYCODONE HYDROCHLORIDE 10 MG: 5 TABLET ORAL at 23:12

## 2023-08-14 RX ADMIN — ACETAMINOPHEN 1000 MG: 500 TABLET, FILM COATED ORAL at 23:13

## 2023-08-14 RX ADMIN — SODIUM CHLORIDE 65 ML: 9 INJECTION, SOLUTION INTRAVENOUS at 23:37

## 2023-08-14 RX ADMIN — IOPAMIDOL 100 ML: 755 INJECTION, SOLUTION INTRAVENOUS at 23:36

## 2023-08-14 ASSESSMENT — ACTIVITIES OF DAILY LIVING (ADL): ADLS_ACUITY_SCORE: 35

## 2023-08-15 VITALS
DIASTOLIC BLOOD PRESSURE: 94 MMHG | SYSTOLIC BLOOD PRESSURE: 123 MMHG | HEART RATE: 96 BPM | RESPIRATION RATE: 18 BRPM | OXYGEN SATURATION: 100 %

## 2023-08-15 RX ORDER — LIDOCAINE 4 G/G
1 PATCH TOPICAL DAILY PRN
Qty: 15 PATCH | Refills: 0 | Status: ON HOLD | OUTPATIENT
Start: 2023-08-15 | End: 2024-06-07

## 2023-08-15 RX ORDER — IBUPROFEN 600 MG/1
600 TABLET, FILM COATED ORAL EVERY 6 HOURS PRN
Qty: 30 TABLET | Refills: 0 | Status: SHIPPED | OUTPATIENT
Start: 2023-08-15 | End: 2023-09-14

## 2023-08-15 ASSESSMENT — ACTIVITIES OF DAILY LIVING (ADL): ADLS_ACUITY_SCORE: 35

## 2023-08-15 NOTE — DISCHARGE INSTRUCTIONS
A small nodule was seen on your left adrenal gland. This is an incidental finding and is likely benign and nothing to worry about. Your primary care doctor should know about this and may want to follow it over time to make sure it is not getting bigger.    Discharge Instructions  Back Pain  You were seen today for back pain. Back pain can have many causes, but most will get better without surgery or other specific treatment. Sometimes there is a herniated ( slipped ) disc. We do not usually do MRI scans to look for these right away, since most herniated discs will get better on their own with time.  Today, we did not find any evidence that your back pain was caused by a serious condition. However, sometimes symptoms develop over time and cannot be found during an emergency visit, so it is very important that you follow up with your primary provider.  Generally, every Emergency Department visit should have a follow-up clinic visit with either a primary or a specialty clinic/provider. Please follow-up as instructed by your emergency provider today.    Return to the Emergency Department if:  You develop a fever with your back pain.   You have weakness or change in sensation in one or both legs.  You lose control of your bowels or bladder, or cannot empty your bladder (cannot pee).  Your pain gets much worse.     Follow-up with your provider:  Unless your pain has completely gone away, please make an appointment with your provider within one week. Most of the routine care for back pain is available in a clinic and not the Emergency Department. You may need further management of your back pain, such as more pain medication, imaging such as an X-ray or MRI, or physical therapy.    What can I do to help myself?  Remain Active -- People are often afraid that they will hurt their back further or delay recovery by remaining active, but this is one of the best things you can do for your back. In fact, staying in bed for a long  time to rest is not recommended. Studies have shown that people with low back pain recover faster when they remain active. Movement helps to bring blood flow to the muscles and relieve muscle spasms as well as preventing loss of muscle strength.  Heat -- Using a heating pad can help with low back pain during the first few weeks. Do not sleep with a heating pad, as you can be burned.   Pain medications - You may take a pain medication such as Tylenol  (acetaminophen), Advil , Motrin  (ibuprofen) or Aleve  (naproxen).  If you were given a prescription for medicine here today, be sure to read all of the information (including the package insert) that comes with your prescription.  This will include important information about the medicine, its side effects, and any warnings that you need to know about.  The pharmacist who fills the prescription can provide more information and answer questions you may have about the medicine.  If you have questions or concerns that the pharmacist cannot address, please call or return to the Emergency Department.   Remember that you can always come back to the Emergency Department if you are not able to see your regular provider in the amount of time listed above, if you get any new symptoms, or if there is anything that worries you.
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
## 2023-08-15 NOTE — ED NOTES
Bed: [facility]  Expected date:   Expected time:   Means of arrival:   Comments:  BV2 43F evaluate for triage
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
## 2023-10-28 ENCOUNTER — HOSPITAL ENCOUNTER (EMERGENCY)
Facility: CLINIC | Age: 43
Discharge: HOME | End: 2023-10-28
Attending: PHYSICIAN ASSISTANT | Admitting: PHYSICIAN ASSISTANT
Payer: COMMERCIAL

## 2023-10-28 ENCOUNTER — APPOINTMENT (OUTPATIENT)
Dept: GENERAL RADIOLOGY | Facility: CLINIC | Age: 43
End: 2023-10-28
Attending: EMERGENCY MEDICINE
Payer: COMMERCIAL

## 2023-10-28 VITALS
RESPIRATION RATE: 20 BRPM | WEIGHT: 280 LBS | DIASTOLIC BLOOD PRESSURE: 107 MMHG | BODY MASS INDEX: 51.53 KG/M2 | OXYGEN SATURATION: 98 % | HEART RATE: 85 BPM | TEMPERATURE: 97.5 F | HEIGHT: 62 IN | SYSTOLIC BLOOD PRESSURE: 137 MMHG

## 2023-10-28 DIAGNOSIS — J20.9 ACUTE BRONCHITIS, UNSPECIFIED ORGANISM: ICD-10-CM

## 2023-10-28 LAB
FLUAV RNA SPEC QL NAA+PROBE: NEGATIVE
FLUBV RNA RESP QL NAA+PROBE: NEGATIVE
RSV RNA SPEC NAA+PROBE: NEGATIVE
SARS-COV-2 RNA RESP QL NAA+PROBE: NEGATIVE

## 2023-10-28 PROCEDURE — 87637 SARSCOV2&INF A&B&RSV AMP PRB: CPT | Performed by: EMERGENCY MEDICINE

## 2023-10-28 PROCEDURE — 99284 EMERGENCY DEPT VISIT MOD MDM: CPT | Mod: 25

## 2023-10-28 PROCEDURE — 71046 X-RAY EXAM CHEST 2 VIEWS: CPT

## 2023-10-28 PROCEDURE — 87637 SARSCOV2&INF A&B&RSV AMP PRB: CPT | Performed by: PHYSICIAN ASSISTANT

## 2023-10-28 RX ORDER — INHALER, ASSIST DEVICES
SPACER (EA) MISCELLANEOUS
Qty: 1 EACH | Refills: 0 | Status: ON HOLD | OUTPATIENT
Start: 2023-10-28 | End: 2024-06-07

## 2023-10-28 RX ORDER — ALBUTEROL SULFATE 90 UG/1
2 AEROSOL, METERED RESPIRATORY (INHALATION) EVERY 6 HOURS PRN
Qty: 18 G | Refills: 0 | Status: SHIPPED | OUTPATIENT
Start: 2023-10-28

## 2023-10-28 RX ORDER — BENZONATATE 100 MG/1
100 CAPSULE ORAL 3 TIMES DAILY PRN
Qty: 21 CAPSULE | Refills: 0 | Status: SHIPPED | OUTPATIENT
Start: 2023-10-28 | End: 2023-11-04

## 2023-10-28 NOTE — ED TRIAGE NOTES
Triage Assessment (Adult)       Row Name 10/28/23 1020 10/28/23 0738       Triage Assessment    Airway WDL WDL WDL       Respiratory WDL    Respiratory WDL -- WDL       Cardiac WDL    Cardiac WDL WDL WDL       Cognitive/Neuro/Behavioral WDL    Cognitive/Neuro/Behavioral WDL -- WDL       [place] Coma Scale    Best Eye Response 4-->(E4) spontaneous --    Best Motor Response 6-->(M6) obeys commands --    Best Verbal Response 5-->(V5) oriented --    [place] Coma Scale Score 15 --

## 2023-10-28 NOTE — ED PROVIDER NOTES
History     Chief Complaint:  Flu Symptoms     The history is provided by the patient.      Tresa Chin is a 43 year old female who presents with a cough. Patient reports she has had a cough for about one week, along with a runny nose and chills. She adds she has wheezing at night, which is accompanied by shortness of breath. Otherwise denies fever, sore throat, or congestion. She was tested for COVID at urgent care in [place]. She notes she does take blood pressure medication, but has not taken them this morning.     Independent Historian:   None - Patient Only    Review of External Notes:      Medications:    Acetaminophen  Amlodipine  Metformin  Omeprazole  Rosuvastatin  Tirzepatide   Albuterol  Benzonatate  Cyclobenzaprine  Dulaglutide  Fluconazole  Ipratropium bromide  Lisinopril  Losartan   Methylprednisolone  Naproxen    Past Medical History:    Depressive disorder  Type 2 diabetes  Hypertension  Sleep apnea  Adrenal adenoma L  Chronic cough  Vitamin D deficiency  NETO  Hyperlipidemia  PTSD  Kidney stone  Chronic L sided low back pain  Elevated BMI  NAFLD  NSAID induced gastritis  Panic attack  Ureterolithiasis     Past Surgical History:    Cystoscopy with L ureteral stent insertion  Cytoscopy, removal of L ureteral stent, L ureteroscopy with holmium laser lithotripsy, stone basketing and placement of L ureteral stent   EGD  Eye surgery  Genitourinary surgery      Physical Exam   Patient Vitals for the past 24 hrs:   BP Temp Temp src Pulse Resp SpO2 Weight   10/28/23 0921 -- -- -- -- -- 100 % --   10/28/23 0920 -- -- -- -- -- 100 % --   10/28/23 0919 (!) 154/110 -- -- 85 -- 100 % --   10/28/23 0738 133/88 97.5  F (36.4  C) Oral 95 20 100 % 123 kg (271 lb 2.7 oz)      Physical Exam  Vitals and nursing note reviewed.   Constitutional:       Appearance: She is not diaphoretic.   HENT:      Mouth/Throat:      Mouth: Mucous membranes are moist.      Pharynx: Oropharynx is clear. No oropharyngeal exudate or  posterior oropharyngeal erythema.   Eyes:      General: No scleral icterus.     Conjunctiva/sclera: Conjunctivae normal.   Cardiovascular:      Rate and Rhythm: Normal rate and regular rhythm.      Heart sounds: Normal heart sounds. No murmur heard.     No friction rub. No gallop.   Pulmonary:      Effort: Pulmonary effort is normal. No respiratory distress.      Breath sounds: Normal breath sounds. No stridor. No wheezing, rhonchi or rales.   Musculoskeletal:      Cervical back: Neck supple. No tenderness.   Lymphadenopathy:      Cervical: No cervical adenopathy.   Skin:     General: Skin is warm.      Capillary Refill: Capillary refill takes less than 2 seconds.      Coloration: Skin is not pale.   Neurological:      Mental Status: She is alert and oriented to person, place, and time. Mental status is at baseline.   Psychiatric:         Mood and Affect: Mood normal.         Behavior: Behavior normal.         Thought Content: Thought content normal.           Emergency Department Course   Imaging:  XR Chest 2 Views   Final Result   IMPRESSION: Negative chest.         Laboratory:  Labs Ordered and Resulted from Time of ED Arrival to Time of ED Departure   INFLUENZA A/B, RSV, & SARS-COV2 PCR - Normal       Result Value    Influenza A PCR Negative      Influenza B PCR Negative      RSV PCR Negative      SARS CoV2 PCR Negative        Emergency Department Course & Assessments:  Interventions:  Medications - No data to display     Independent Interpretation (X-rays, CTs, rhythm strip):  CXR: negative for pneumonia, pneumothorax or effusion.    Assessments/Consultations/Discussion of Management or Tests:   ED Course as of 10/28/23 0955   Sat Oct 28, 2023   0950 I evaluated the patient and obtained history.        Social Determinants of Health affecting care:   None    Disposition:  The patient was discharged to home.     Impression & Plan    Medical Decision Making:    This is a very pleasant and well appearing 43 year  old female who presents with history and exam consistent with acute upper respiratory infection.   There is no evidence of acute otitis media.  There is no significant tachypnea, increased work of breathing, focal exam findings, or persistent symptoms to suggest pneumonia; I do not believe imaging is indicated at this time.  The patient is well-hydrated, well-appearing, and I believe is safe for discharge with plan for supportive care.  As there is no bacterial infection identified, no antibiotic will be prescribed at this time.  The patient may take Tylenol or ibuprofen for pain and fever, and I discussed supportive measures such as decongestants.  Return if increasing pain, fever, difficulty breathing, vomiting, or any other concerns.  Follow-up with primary physician in 3-5 days.      Diagnosis:    ICD-10-CM    1. Acute bronchitis, unspecified organism  J20.9          Discharge Medications:  New Prescriptions    ALBUTEROL (PROAIR HFA/PROVENTIL HFA/VENTOLIN HFA) 108 (90 BASE) MCG/ACT INHALER    Inhale 2 puffs into the lungs every 6 hours as needed for shortness of breath, wheezing or cough    BENZONATATE (TESSALON) 100 MG CAPSULE    Take 1 capsule (100 mg) by mouth 3 times daily as needed for cough    SPACER (OPTICHAMBER TATA) HOLDING CHAMBER    Use with albuterol      Scribe Disclosure:  I, Bonnie Mancilla, am serving as a scribe at 9:17 AM on 10/28/2023 to document services personally performed by Ronald Simeon PA-C based on my observations and the provider's statements to me.    10/28/2023   Ronald Simeon PA-C Kruger, Jacob C, PA-C  10/28/23 1816

## 2023-10-28 NOTE — ED TRIAGE NOTES
Pt arrives with one week of cough. States she was tested for covid last Sunday but it came back negative- notes she had an influenza exposure and would like to be tested for that as well. ABCs intact.     /88   Pulse 95   Temp 97.5  F (36.4  C) (Oral)   Resp 20   Wt 123 kg (271 lb 2.7 oz)   SpO2 100%   BMI 51.24 kg/m         Triage Assessment (Adult)       Row Name 10/28/23 0738          Triage Assessment    Airway WDL WDL        Respiratory WDL    Respiratory WDL WDL        Cardiac WDL    Cardiac WDL WDL        Cognitive/Neuro/Behavioral WDL    Cognitive/Neuro/Behavioral WDL WDL

## 2024-02-03 ENCOUNTER — HEALTH MAINTENANCE LETTER (OUTPATIENT)
Age: 44
End: 2024-02-03

## 2024-05-09 DIAGNOSIS — E11.65 TYPE 2 DIABETES MELLITUS WITH HYPERGLYCEMIA, WITHOUT LONG-TERM CURRENT USE OF INSULIN (H): ICD-10-CM

## 2024-05-09 RX ORDER — ROSUVASTATIN CALCIUM 10 MG/1
10 TABLET, COATED ORAL DAILY
Qty: 270 TABLET | Refills: 0 | Status: SHIPPED | OUTPATIENT
Start: 2024-05-09

## 2024-05-09 NOTE — TELEPHONE ENCOUNTER
Please call, pt needs to be seen.  Melissa Franco MD  [facility]  [phone]

## 2024-06-07 ENCOUNTER — APPOINTMENT (OUTPATIENT)
Dept: MRI IMAGING | Facility: CLINIC | Age: 44
End: 2024-06-07
Attending: EMERGENCY MEDICINE
Payer: COMMERCIAL

## 2024-06-07 ENCOUNTER — HOSPITAL ENCOUNTER (OUTPATIENT)
Facility: CLINIC | Age: 44
Setting detail: OBSERVATION
Discharge: HOME | End: 2024-06-08
Attending: EMERGENCY MEDICINE | Admitting: INTERNAL MEDICINE
Payer: COMMERCIAL

## 2024-06-07 ENCOUNTER — APPOINTMENT (OUTPATIENT)
Dept: GENERAL RADIOLOGY | Facility: CLINIC | Age: 44
End: 2024-06-07
Attending: EMERGENCY MEDICINE
Payer: COMMERCIAL

## 2024-06-07 DIAGNOSIS — R51.9 NONINTRACTABLE HEADACHE, UNSPECIFIED CHRONICITY PATTERN, UNSPECIFIED HEADACHE TYPE: ICD-10-CM

## 2024-06-07 DIAGNOSIS — G45.9 TIA (TRANSIENT ISCHEMIC ATTACK): Primary | ICD-10-CM

## 2024-06-07 DIAGNOSIS — R20.2 PARESTHESIA: ICD-10-CM

## 2024-06-07 DIAGNOSIS — R00.2 PALPITATIONS: ICD-10-CM

## 2024-06-07 DIAGNOSIS — R06.02 SHORTNESS OF BREATH: ICD-10-CM

## 2024-06-07 DIAGNOSIS — D32.9 MENINGIOMA (H): ICD-10-CM

## 2024-06-07 LAB
ANION GAP SERPL CALCULATED.3IONS-SCNC: 15 MMOL/L (ref 7–15)
BASOPHILS # BLD AUTO: 0.1 10E3/UL (ref 0–0.2)
BASOPHILS NFR BLD AUTO: 1 %
BUN SERPL-MCNC: 13.8 MG/DL (ref 6–20)
CALCIUM SERPL-MCNC: 9.4 MG/DL (ref 8.6–10)
CHLORIDE SERPL-SCNC: 102 MMOL/L (ref 98–107)
CREAT SERPL-MCNC: 0.51 MG/DL (ref 0.51–0.95)
DEPRECATED HCO3 PLAS-SCNC: 23 MMOL/L (ref 22–29)
EGFRCR SERPLBLD CKD-EPI 2021: >90 ML/MIN/1.73M2
EOSINOPHIL # BLD AUTO: 0.2 10E3/UL (ref 0–0.7)
EOSINOPHIL NFR BLD AUTO: 2 %
ERYTHROCYTE [DISTWIDTH] IN BLOOD BY AUTOMATED COUNT: 14.9 % (ref 10–15)
GLUCOSE BLDC GLUCOMTR-MCNC: 137 MG/DL (ref 70–99)
GLUCOSE BLDC GLUCOMTR-MCNC: 166 MG/DL (ref 70–99)
GLUCOSE SERPL-MCNC: 145 MG/DL (ref 70–99)
HBA1C MFR BLD: 8.1 %
HCG SERPL QL: NEGATIVE
HCT VFR BLD AUTO: 40.8 % (ref 35–47)
HGB BLD-MCNC: 12.7 G/DL (ref 11.7–15.7)
HOLD SPECIMEN: NORMAL
IMM GRANULOCYTES # BLD: 0 10E3/UL
IMM GRANULOCYTES NFR BLD: 0 %
LYMPHOCYTES # BLD AUTO: 2.5 10E3/UL (ref 0.8–5.3)
LYMPHOCYTES NFR BLD AUTO: 25 %
MAGNESIUM SERPL-MCNC: 1.9 MG/DL (ref 1.7–2.3)
MCH RBC QN AUTO: 26 PG (ref 26.5–33)
MCHC RBC AUTO-ENTMCNC: 31.1 G/DL (ref 31.5–36.5)
MCV RBC AUTO: 83 FL (ref 78–100)
MONOCYTES # BLD AUTO: 0.6 10E3/UL (ref 0–1.3)
MONOCYTES NFR BLD AUTO: 6 %
NEUTROPHILS # BLD AUTO: 6.7 10E3/UL (ref 1.6–8.3)
NEUTROPHILS NFR BLD AUTO: 66 %
NRBC # BLD AUTO: 0 10E3/UL
NRBC BLD AUTO-RTO: 0 /100
PLATELET # BLD AUTO: 369 10E3/UL (ref 150–450)
POTASSIUM SERPL-SCNC: 4.2 MMOL/L (ref 3.4–5.3)
RBC # BLD AUTO: 4.89 10E6/UL (ref 3.8–5.2)
SODIUM SERPL-SCNC: 140 MMOL/L (ref 135–145)
TROPONIN T SERPL HS-MCNC: <6 NG/L
TSH SERPL DL<=0.005 MIU/L-ACNC: 1.52 UIU/ML (ref 0.3–4.2)
WBC # BLD AUTO: 10.2 10E3/UL (ref 4–11)

## 2024-06-07 PROCEDURE — 258N000003 HC RX IP 258 OP 636: Mod: JZ | Performed by: EMERGENCY MEDICINE

## 2024-06-07 PROCEDURE — 83036 HEMOGLOBIN GLYCOSYLATED A1C: CPT | Performed by: INTERNAL MEDICINE

## 2024-06-07 PROCEDURE — 70544 MR ANGIOGRAPHY HEAD W/O DYE: CPT

## 2024-06-07 PROCEDURE — 71046 X-RAY EXAM CHEST 2 VIEWS: CPT

## 2024-06-07 PROCEDURE — 96361 HYDRATE IV INFUSION ADD-ON: CPT

## 2024-06-07 PROCEDURE — 999N000157 HC STATISTIC RCP TIME EA 10 MIN

## 2024-06-07 PROCEDURE — G0378 HOSPITAL OBSERVATION PER HR: HCPCS

## 2024-06-07 PROCEDURE — 93005 ELECTROCARDIOGRAM TRACING: CPT

## 2024-06-07 PROCEDURE — A9585 GADOBUTROL INJECTION: HCPCS | Performed by: EMERGENCY MEDICINE

## 2024-06-07 PROCEDURE — 99222 1ST HOSP IP/OBS MODERATE 55: CPT | Performed by: INTERNAL MEDICINE

## 2024-06-07 PROCEDURE — 84443 ASSAY THYROID STIM HORMONE: CPT | Performed by: EMERGENCY MEDICINE

## 2024-06-07 PROCEDURE — 84703 CHORIONIC GONADOTROPIN ASSAY: CPT | Performed by: EMERGENCY MEDICINE

## 2024-06-07 PROCEDURE — 250N000013 HC RX MED GY IP 250 OP 250 PS 637: Performed by: EMERGENCY MEDICINE

## 2024-06-07 PROCEDURE — 70549 MR ANGIOGRAPH NECK W/O&W/DYE: CPT

## 2024-06-07 PROCEDURE — 80048 BASIC METABOLIC PNL TOTAL CA: CPT | Performed by: STUDENT IN AN ORGANIZED HEALTH CARE EDUCATION/TRAINING PROGRAM

## 2024-06-07 PROCEDURE — 99285 EMERGENCY DEPT VISIT HI MDM: CPT | Mod: 25

## 2024-06-07 PROCEDURE — 82962 GLUCOSE BLOOD TEST: CPT

## 2024-06-07 PROCEDURE — 83735 ASSAY OF MAGNESIUM: CPT | Performed by: INTERNAL MEDICINE

## 2024-06-07 PROCEDURE — 84484 ASSAY OF TROPONIN QUANT: CPT | Performed by: EMERGENCY MEDICINE

## 2024-06-07 PROCEDURE — 255N000002 HC RX 255 OP 636: Performed by: EMERGENCY MEDICINE

## 2024-06-07 PROCEDURE — 94660 CPAP INITIATION&MGMT: CPT

## 2024-06-07 PROCEDURE — 250N000011 HC RX IP 250 OP 636: Mod: JZ | Performed by: EMERGENCY MEDICINE

## 2024-06-07 PROCEDURE — 85049 AUTOMATED PLATELET COUNT: CPT | Performed by: STUDENT IN AN ORGANIZED HEALTH CARE EDUCATION/TRAINING PROGRAM

## 2024-06-07 PROCEDURE — 36415 COLL VENOUS BLD VENIPUNCTURE: CPT | Performed by: EMERGENCY MEDICINE

## 2024-06-07 PROCEDURE — 96374 THER/PROPH/DIAG INJ IV PUSH: CPT | Mod: 59

## 2024-06-07 PROCEDURE — 70553 MRI BRAIN STEM W/O & W/DYE: CPT

## 2024-06-07 PROCEDURE — 250N000013 HC RX MED GY IP 250 OP 250 PS 637: Performed by: INTERNAL MEDICINE

## 2024-06-07 RX ORDER — PANTOPRAZOLE SODIUM 40 MG/1
40 TABLET, DELAYED RELEASE ORAL DAILY
Status: DISCONTINUED | OUTPATIENT
Start: 2024-06-08 | End: 2024-06-08 | Stop reason: HOSPADM

## 2024-06-07 RX ORDER — ONDANSETRON 2 MG/ML
4 INJECTION INTRAMUSCULAR; INTRAVENOUS EVERY 6 HOURS PRN
Status: DISCONTINUED | OUTPATIENT
Start: 2024-06-07 | End: 2024-06-08 | Stop reason: HOSPADM

## 2024-06-07 RX ORDER — NICOTINE POLACRILEX 4 MG
15-30 LOZENGE BUCCAL
Status: DISCONTINUED | OUTPATIENT
Start: 2024-06-07 | End: 2024-06-08 | Stop reason: HOSPADM

## 2024-06-07 RX ORDER — CLOPIDOGREL BISULFATE 75 MG/1
75 TABLET ORAL DAILY
Status: DISCONTINUED | OUTPATIENT
Start: 2024-06-08 | End: 2024-06-08 | Stop reason: HOSPADM

## 2024-06-07 RX ORDER — HYDRALAZINE HYDROCHLORIDE 20 MG/ML
10 INJECTION INTRAMUSCULAR; INTRAVENOUS EVERY 4 HOURS PRN
Status: DISCONTINUED | OUTPATIENT
Start: 2024-06-07 | End: 2024-06-08 | Stop reason: HOSPADM

## 2024-06-07 RX ORDER — GLIPIZIDE 10 MG/1
10 TABLET, FILM COATED, EXTENDED RELEASE ORAL DAILY
COMMUNITY
Start: 2024-06-06 | End: 2025-06-06

## 2024-06-07 RX ORDER — ASPIRIN 81 MG/1
81 TABLET ORAL DAILY
Status: DISCONTINUED | OUTPATIENT
Start: 2024-06-07 | End: 2024-06-08 | Stop reason: HOSPADM

## 2024-06-07 RX ORDER — NORTRIPTYLINE HCL 10 MG
CAPSULE ORAL
COMMUNITY
Start: 2024-06-06

## 2024-06-07 RX ORDER — LOSARTAN POTASSIUM 100 MG/1
100 TABLET ORAL DAILY
Status: DISCONTINUED | OUTPATIENT
Start: 2024-06-08 | End: 2024-06-08 | Stop reason: HOSPADM

## 2024-06-07 RX ORDER — LOSARTAN POTASSIUM 100 MG/1
100 TABLET ORAL DAILY
COMMUNITY
Start: 2023-08-10

## 2024-06-07 RX ORDER — GADOBUTROL 604.72 MG/ML
10 INJECTION INTRAVENOUS ONCE
Status: COMPLETED | OUTPATIENT
Start: 2024-06-07 | End: 2024-06-07

## 2024-06-07 RX ORDER — AMOXICILLIN 250 MG
2 CAPSULE ORAL 2 TIMES DAILY PRN
Status: DISCONTINUED | OUTPATIENT
Start: 2024-06-07 | End: 2024-06-08 | Stop reason: HOSPADM

## 2024-06-07 RX ORDER — ACETAMINOPHEN 650 MG/1
650 SUPPOSITORY RECTAL EVERY 4 HOURS PRN
Status: DISCONTINUED | OUTPATIENT
Start: 2024-06-07 | End: 2024-06-08 | Stop reason: HOSPADM

## 2024-06-07 RX ORDER — ALBUTEROL SULFATE 90 UG/1
2 AEROSOL, METERED RESPIRATORY (INHALATION) EVERY 6 HOURS PRN
Status: DISCONTINUED | OUTPATIENT
Start: 2024-06-07 | End: 2024-06-08 | Stop reason: HOSPADM

## 2024-06-07 RX ORDER — ACETAMINOPHEN 325 MG/1
650 TABLET ORAL EVERY 4 HOURS PRN
Status: DISCONTINUED | OUTPATIENT
Start: 2024-06-07 | End: 2024-06-08 | Stop reason: HOSPADM

## 2024-06-07 RX ORDER — DEXTROSE MONOHYDRATE 25 G/50ML
25-50 INJECTION, SOLUTION INTRAVENOUS
Status: DISCONTINUED | OUTPATIENT
Start: 2024-06-07 | End: 2024-06-08 | Stop reason: HOSPADM

## 2024-06-07 RX ORDER — KETOROLAC TROMETHAMINE 15 MG/ML
15 INJECTION, SOLUTION INTRAMUSCULAR; INTRAVENOUS ONCE
Status: COMPLETED | OUTPATIENT
Start: 2024-06-07 | End: 2024-06-07

## 2024-06-07 RX ORDER — AMOXICILLIN 250 MG
1 CAPSULE ORAL 2 TIMES DAILY PRN
Status: DISCONTINUED | OUTPATIENT
Start: 2024-06-07 | End: 2024-06-08 | Stop reason: HOSPADM

## 2024-06-07 RX ORDER — ROSUVASTATIN CALCIUM 10 MG/1
10 TABLET, COATED ORAL DAILY
Status: DISCONTINUED | OUTPATIENT
Start: 2024-06-07 | End: 2024-06-08 | Stop reason: HOSPADM

## 2024-06-07 RX ORDER — ONDANSETRON 4 MG/1
4 TABLET, ORALLY DISINTEGRATING ORAL EVERY 6 HOURS PRN
Status: DISCONTINUED | OUTPATIENT
Start: 2024-06-07 | End: 2024-06-08 | Stop reason: HOSPADM

## 2024-06-07 RX ORDER — GLIPIZIDE 10 MG/1
10 TABLET, FILM COATED, EXTENDED RELEASE ORAL DAILY
Status: DISCONTINUED | OUTPATIENT
Start: 2024-06-08 | End: 2024-06-08 | Stop reason: HOSPADM

## 2024-06-07 RX ORDER — AMLODIPINE BESYLATE 5 MG/1
5 TABLET ORAL DAILY
Status: DISCONTINUED | OUTPATIENT
Start: 2024-06-08 | End: 2024-06-07

## 2024-06-07 RX ORDER — CLOPIDOGREL BISULFATE 75 MG/1
300 TABLET ORAL ONCE
Status: COMPLETED | OUTPATIENT
Start: 2024-06-07 | End: 2024-06-07

## 2024-06-07 RX ORDER — NORTRIPTYLINE HCL 10 MG
10 CAPSULE ORAL AT BEDTIME
Status: DISCONTINUED | OUTPATIENT
Start: 2024-06-07 | End: 2024-06-08 | Stop reason: HOSPADM

## 2024-06-07 RX ADMIN — SODIUM CHLORIDE 1000 ML: 9 INJECTION, SOLUTION INTRAVENOUS at 15:19

## 2024-06-07 RX ADMIN — GADOBUTROL 10 ML: 604.72 INJECTION INTRAVENOUS at 16:57

## 2024-06-07 RX ADMIN — METFORMIN HYDROCHLORIDE 1000 MG: 500 TABLET ORAL at 23:52

## 2024-06-07 RX ADMIN — KETOROLAC TROMETHAMINE 15 MG: 15 INJECTION, SOLUTION INTRAMUSCULAR; INTRAVENOUS at 15:36

## 2024-06-07 RX ADMIN — CLOPIDOGREL BISULFATE 300 MG: 75 TABLET ORAL at 19:29

## 2024-06-07 RX ADMIN — ROSUVASTATIN 10 MG: 10 TABLET, FILM COATED ORAL at 23:01

## 2024-06-07 RX ADMIN — ACETAMINOPHEN 650 MG: 325 TABLET, FILM COATED ORAL at 23:01

## 2024-06-07 RX ADMIN — ASPIRIN 81 MG: 81 TABLET, COATED ORAL at 19:31

## 2024-06-07 ASSESSMENT — ACTIVITIES OF DAILY LIVING (ADL)
ADLS_ACUITY_SCORE: 37
ADLS_ACUITY_SCORE: 41
ADLS_ACUITY_SCORE: 37
ADLS_ACUITY_SCORE: 35
ADLS_ACUITY_SCORE: 37
ADLS_ACUITY_SCORE: 37

## 2024-06-07 ASSESSMENT — COLUMBIA-SUICIDE SEVERITY RATING SCALE - C-SSRS
1. IN THE PAST MONTH, HAVE YOU WISHED YOU WERE DEAD OR WISHED YOU COULD GO TO SLEEP AND NOT WAKE UP?: NO
2. HAVE YOU ACTUALLY HAD ANY THOUGHTS OF KILLING YOURSELF IN THE PAST MONTH?: NO
6. HAVE YOU EVER DONE ANYTHING, STARTED TO DO ANYTHING, OR PREPARED TO DO ANYTHING TO END YOUR LIFE?: NO

## 2024-06-07 NOTE — ED TRIAGE NOTES
Pt states she was at work had sudden feeling of heart racing and numbness down her left arm.  She said that this went on for a few minutes.  It has now stopped but she feels weak.  Pt states this is like a panic attack she had a few years ago     Triage Assessment (Adult)       Row Name 06/07/24 1434          Triage Assessment    Airway WDL WDL        Respiratory WDL    Respiratory WDL WDL        Skin Circulation/Temperature WDL    Skin Circulation/Temperature WDL WDL        Cardiac WDL    Cardiac WDL WDL        Peripheral/Neurovascular WDL    Peripheral Neurovascular WDL WDL        Cognitive/Neuro/Behavioral WDL    Cognitive/Neuro/Behavioral WDL WDL

## 2024-06-07 NOTE — CONSULTS
"  [facility]    Stroke Telephone Note    I was called by Emeka Reis on 06/07/24 regarding patient Tresa Chin. The patient is a 43 year old woman with hypertension, T2 DM, NETO, diabetes, around noon today while sitting at her desk at work, when she began to have palpitations, tunnel vision, shortness of breath, and then left upper extremity and left face paresthesias and numbness. States that the palpitations, tunnel vision, and shortness of breath lasted for about ten minutes, but the paresthesias lasted for about one hour. No headache.    Vitals  BP: (!) 158/115   Pulse: 90   Resp: 18   Temp: 98.2  F (36.8  C)   Weight: 121.1 kg (267 lb)    Imaging Findings  MR Brain: No ischemia/hemorrhage. Small right frontal convexity meningioma without mass effect or adjacent parenchymal edema   MRA head/neck: No LVO/critical stenoses    Impression  Transient left arm and face numbness and paresthesia    Concern for possible TIA    Recommendations  - Use orderset: \"Ischemic Stroke Routine Admission\" or \"Ischemic Stroke No Thrombolytics/No Thrombectomy ICU Admission\"  - Place Neurology IP Stroke Consult order   - Neurochecks and Vital Signs every 4 hours   - Permissive HTN; goal SBP < 220 mmHg  - Daily aspirin 325 mg for secondary stroke prevention  - Plavix (clopidogrel) 300 mg PO loading dose x 1  - Plavix (clopidogrel) 75 mg PO Daily  - Statin: per lipid profile resulst (LDL goal < 70)  - TTE (with Bubble Study if age 60 yrs or less)  - Telemetry, EKG  - Bedside Glucose Monitoring  - A1c, Lipid Panel, Troponin x 3  - PT/OT/SLP  - Stroke Education  - Euthermia, Euglycemia        My recommendations are based on the information provided over the phone by Tresa Chin's in-person providers. They are not intended to replace the clinical judgment of her in-person providers. I was not requested to personally see or examine the patient at this time.     The Stroke Staff is Dr. Ojeda.    Jeremy " "MD [facility]  Vascular Neurology Fellow    To page me or covering stroke neurology team member, click here: AMCOM  Choose \"On Call\" tab at top, then select \"NEUROLOGY/ALL SITES\" from middle drop-down box, press Enter, then look for \"stroke\" or \"telestroke\" for your site.   "

## 2024-06-07 NOTE — ED PROVIDER NOTES
Emergency Department Note      History of Present Illness     Chief Complaint  Shortness of Breath and Numbness    HPI  Tresa Chin is a 43 year old female with history of hypertension, type II diabetes, GERD, and NETO who presents for evaluation of shortness of breath and paresthesias. The patient reports that around noon today while sitting at her desk at work, she began to have cardiac palpitations, tunnel vision, shortness of breath, and then left upper extremity and left face paresthesias. States that the palpitations, tunnel vision, and shortness of breath lasted for about ten minutes, but the paresthesias lasted for about one hour. Notes that in her left arm, she had the numb sensation along the posterior forearm and palmar aspect of her hand and middle finger. She also had left facial numbness.  Adds that all of her symptoms have resolved but she is feeling weak. Of note, she has had panic attacks in the past that feel similar to her episode today but the paresthesias were new. Reports that she has also had a headache for 1.5 weeks which is unusual for her and was prescribed a ten day course of Augmentin for sinusitis which she has not started yet. She denies chest pain, vision loss, double vision, unilateral extremity weakness, and gait abnormality.     Independent Historian  None    Review of External Notes  None  Past Medical History   Medical History and Problem List  Depression  Type II diabetes  Hypertension   Sleep apnea  Nonalcoholic fatty liver disease  Obesity   NETO  NSAID induced gastritis  Kidney stone  Ureterolithiasis  GERD    Medications  Albuterol  Amlodipine  Metformin  Omeprazole  Rosuvastatin  Tirzepatide    Surgical History   Cystoscopy with stent insertion, left  Ankle surgery     Physical Exam   Patient Vitals for the past 24 hrs:   BP Temp Temp src Pulse Resp SpO2 Height Weight   06/07/24 1920 (!) 176/125 -- -- 94 -- 98 % -- --   06/07/24 1545 (!) 158/115 -- -- -- 18 100 % -- --  "  06/07/24 1437 (!) 183/113 98.2  F (36.8  C) Temporal 90 18 100 % 1.549 m (5' 1\") 121.1 kg (267 lb)     Physical Exam  VS: Reviewed per above  HENT: Mucous membranes moist, no nuchal rigidity  EYES: sclera anicteric  CV: Rate as noted,  regular rhythm.   RESP: Effort normal. Breath sounds are normal bilaterally.  GI: no tenderness/rebound/guarding, not distended.  NEURO: GCS 15, cranial nerves II through XII are intact, 5 out of 5 strength in all 4 extremities, sensation is intact light touch in all 4 extremities  MSK: No deformity of the extremities  SKIN: Warm and dry    Diagnostics   Lab Results   Labs Ordered and Resulted from Time of ED Arrival to Time of ED Departure   BASIC METABOLIC PANEL - Abnormal       Result Value    Sodium 140      Potassium 4.2      Chloride 102      Carbon Dioxide (CO2) 23      Anion Gap 15      Urea Nitrogen 13.8      Creatinine 0.51      GFR Estimate >90      Calcium 9.4      Glucose 145 (*)    CBC WITH PLATELETS AND DIFFERENTIAL - Abnormal    WBC Count 10.2      RBC Count 4.89      Hemoglobin 12.7      Hematocrit 40.8      MCV 83      MCH 26.0 (*)     MCHC 31.1 (*)     RDW 14.9      Platelet Count 369      % Neutrophils 66      % Lymphocytes 25      % Monocytes 6      % Eosinophils 2      % Basophils 1      % Immature Granulocytes 0      NRBCs per 100 WBC 0      Absolute Neutrophils 6.7      Absolute Lymphocytes 2.5      Absolute Monocytes 0.6      Absolute Eosinophils 0.2      Absolute Basophils 0.1      Absolute Immature Granulocytes 0.0      Absolute NRBCs 0.0     TROPONIN T, HIGH SENSITIVITY - Normal    Troponin T, High Sensitivity <6     TSH WITH FREE T4 REFLEX - Normal    TSH 1.52     HCG QUALITATIVE PREGNANCY - Normal    hCG Serum Qualitative Negative         Imaging  MR Brain w/o & w Contrast   Final Result   IMPRESSION:   HEAD MRI:    1.  No acute intracranial abnormality.   2.  Small right frontal convexity meningioma without mass effect or adjacent parenchymal edema.    "   HEAD MRA:    1.  No aneurysm, high flow AVM or significant stenosis identified.      NECK MRA:   1.  No large vessel occlusion or hemodynamically significant stenosis.      MRA Angiogram Head w/o Contrast   Final Result   IMPRESSION:   HEAD MRI:    1.  No acute intracranial abnormality.   2.  Small right frontal convexity meningioma without mass effect or adjacent parenchymal edema.      HEAD MRA:    1.  No aneurysm, high flow AVM or significant stenosis identified.      NECK MRA:   1.  No large vessel occlusion or hemodynamically significant stenosis.      MRA Angiogram Neck w/o & w Contrast   Final Result   IMPRESSION:   HEAD MRI:    1.  No acute intracranial abnormality.   2.  Small right frontal convexity meningioma without mass effect or adjacent parenchymal edema.      HEAD MRA:    1.  No aneurysm, high flow AVM or significant stenosis identified.      NECK MRA:   1.  No large vessel occlusion or hemodynamically significant stenosis.      XR Chest 2 Views   Preliminary Result   IMPRESSION: No acute cardiopulmonary disease.        EKG   ECG taken at 1429, ECG read at 1442  Sinus rhythm with sinus arrhythmia    Rate 81 bpm. ID interval 154 ms. QRS duration 78 ms. QT/QTc 380/441 ms. P-R-T axes 37 17 32.    Independent Interpretation  None  ED Course    Medications Administered  Medications   aspirin EC tablet 81 mg (81 mg Oral $Given 6/7/24 1931)   sodium chloride 0.9% BOLUS 1,000 mL (0 mLs Intravenous Stopped 6/7/24 1929)   ketorolac (TORADOL) injection 15 mg (15 mg Intravenous $Given 6/7/24 1536)   gadobutrol (GADAVIST) injection 10 mL (10 mLs Intravenous $Given 6/7/24 1657)   sodium chloride (PF) 0.9% PF flush 60 mL (100 mLs Intravenous $Given 6/7/24 1657)   clopidogrel (PLAVIX) tablet 300 mg (300 mg Oral $Given 6/7/24 1929)     Procedures  Procedures     Discussion of Management  Admitting Hospitalist, Francoise  [facility]    Social Determinants of Health adding to complexity of care  None    ED  Course  ED Course as of 06/07/24 1948 Fri Jun 07, 2024   1440 I obtained history and examined the patient as noted above.    1850 I spoke with Dr. Sam from stroke neurology.   1912 I rechecked and updated the patient.    1947 I spoke with Dr. Owusu of the hospitalist service regarding admission for TIA workup.      Medical Decision Making / Diagnosis   CMS Diagnoses: None    MIPS     None    MDM  Tresa Chin is a 43 year old female who presents to the ER for evaluation of episode of palpitations, shortness of breath, tunnel vision, left face and left forearm/hand paresthesias.  On arrival she was hypertensive.  Here in the ER she is no longer having symptoms.  She is neurologically intact.  MRI imaging did not show acute intracranial pathology or vascular disease.  Labs are also reassuring.  In discussion with stroke neurology, it was recommended she be admitted for further TIA evaluation.  They recommended aspirin and Plavix load.  She was admitted to hospitalist service for further cares.    Disposition  The patient was admitted to the hospital.     ICD-10 Codes:    ICD-10-CM    1. Meningioma (H)  D32.9       2. Paresthesia  R20.2       3. Palpitations  R00.2       4. Nonintractable headache, unspecified chronicity pattern, unspecified headache type  R51.9       5. Shortness of breath  R06.02     transient         Scribe Disclosure:  I, Jo-Ann Mariscal, am serving as a scribe at 3:53 PM on 6/7/2024 to document services personally performed by Emeka Reis MD based on my observations and the provider's statements to me.        Emeka Reis MD  06/07/24 8124

## 2024-06-08 ENCOUNTER — ORDERS ONLY (AUTO-RELEASED) (OUTPATIENT)
Dept: ORTHOPEDICS | Facility: CLINIC | Age: 44
End: 2024-06-08
Payer: COMMERCIAL

## 2024-06-08 VITALS
OXYGEN SATURATION: 95 % | BODY MASS INDEX: 50.41 KG/M2 | RESPIRATION RATE: 18 BRPM | DIASTOLIC BLOOD PRESSURE: 92 MMHG | WEIGHT: 267 LBS | HEART RATE: 103 BPM | SYSTOLIC BLOOD PRESSURE: 156 MMHG | HEIGHT: 61 IN | TEMPERATURE: 97.6 F

## 2024-06-08 DIAGNOSIS — G45.9 TIA (TRANSIENT ISCHEMIC ATTACK): ICD-10-CM

## 2024-06-08 LAB
CHOLEST SERPL-MCNC: 171 MG/DL
FASTING STATUS PATIENT QL REPORTED: YES
FASTING STATUS PATIENT QL REPORTED: YES
GLUCOSE BLDC GLUCOMTR-MCNC: 111 MG/DL (ref 70–99)
GLUCOSE BLDC GLUCOMTR-MCNC: 156 MG/DL (ref 70–99)
GLUCOSE BLDC GLUCOMTR-MCNC: 179 MG/DL (ref 70–99)
GLUCOSE SERPL-MCNC: 169 MG/DL (ref 70–99)
HDLC SERPL-MCNC: 52 MG/DL
LDLC SERPL CALC-MCNC: 101 MG/DL
NONHDLC SERPL-MCNC: 119 MG/DL
TRIGL SERPL-MCNC: 91 MG/DL

## 2024-06-08 PROCEDURE — 258N000003 HC RX IP 258 OP 636: Mod: JZ | Performed by: NURSE PRACTITIONER

## 2024-06-08 PROCEDURE — 36415 COLL VENOUS BLD VENIPUNCTURE: CPT | Performed by: INTERNAL MEDICINE

## 2024-06-08 PROCEDURE — 82947 ASSAY GLUCOSE BLOOD QUANT: CPT | Mod: XU | Performed by: INTERNAL MEDICINE

## 2024-06-08 PROCEDURE — 82962 GLUCOSE BLOOD TEST: CPT

## 2024-06-08 PROCEDURE — 250N000013 HC RX MED GY IP 250 OP 250 PS 637: Performed by: INTERNAL MEDICINE

## 2024-06-08 PROCEDURE — 250N000009 HC RX 250: Performed by: NURSE PRACTITIONER

## 2024-06-08 PROCEDURE — 250N000013 HC RX MED GY IP 250 OP 250 PS 637: Performed by: EMERGENCY MEDICINE

## 2024-06-08 PROCEDURE — 36416 COLLJ CAPILLARY BLOOD SPEC: CPT | Performed by: INTERNAL MEDICINE

## 2024-06-08 PROCEDURE — G0378 HOSPITAL OBSERVATION PER HR: HCPCS

## 2024-06-08 PROCEDURE — 250N000011 HC RX IP 250 OP 636: Mod: JZ | Performed by: NURSE PRACTITIONER

## 2024-06-08 PROCEDURE — G0426 INPT/ED TELECONSULT50: HCPCS | Mod: G0 | Performed by: NURSE PRACTITIONER

## 2024-06-08 PROCEDURE — 99239 HOSP IP/OBS DSCHRG MGMT >30: CPT | Performed by: STUDENT IN AN ORGANIZED HEALTH CARE EDUCATION/TRAINING PROGRAM

## 2024-06-08 PROCEDURE — 80061 LIPID PANEL: CPT | Performed by: INTERNAL MEDICINE

## 2024-06-08 PROCEDURE — 96375 TX/PRO/DX INJ NEW DRUG ADDON: CPT

## 2024-06-08 RX ORDER — ASPIRIN 81 MG/1
81 TABLET ORAL DAILY
Qty: 21 TABLET | Refills: 0 | Status: SHIPPED | OUTPATIENT
Start: 2024-06-09 | End: 2024-06-30

## 2024-06-08 RX ORDER — MAGNESIUM SULFATE HEPTAHYDRATE 40 MG/ML
2 INJECTION, SOLUTION INTRAVENOUS ONCE
Status: COMPLETED | OUTPATIENT
Start: 2024-06-08 | End: 2024-06-08

## 2024-06-08 RX ORDER — CLOPIDOGREL BISULFATE 75 MG/1
75 TABLET ORAL DAILY
Qty: 21 TABLET | Refills: 0 | Status: SHIPPED | OUTPATIENT
Start: 2024-06-09 | End: 2024-06-30

## 2024-06-08 RX ORDER — ASPIRIN 325 MG
325 TABLET ORAL DAILY
Qty: 90 TABLET | Refills: 3 | Status: SHIPPED | OUTPATIENT
Start: 2024-07-01

## 2024-06-08 RX ADMIN — SODIUM CHLORIDE 1000 ML: 9 INJECTION, SOLUTION INTRAVENOUS at 10:53

## 2024-06-08 RX ADMIN — METFORMIN HYDROCHLORIDE 1000 MG: 500 TABLET ORAL at 09:37

## 2024-06-08 RX ADMIN — CLOPIDOGREL BISULFATE 75 MG: 75 TABLET ORAL at 09:37

## 2024-06-08 RX ADMIN — PANTOPRAZOLE SODIUM 40 MG: 40 TABLET, DELAYED RELEASE ORAL at 09:37

## 2024-06-08 RX ADMIN — LOSARTAN POTASSIUM 100 MG: 100 TABLET, FILM COATED ORAL at 09:38

## 2024-06-08 RX ADMIN — GLIPIZIDE 10 MG: 10 TABLET, EXTENDED RELEASE ORAL at 09:37

## 2024-06-08 RX ADMIN — ASPIRIN 81 MG: 81 TABLET, COATED ORAL at 09:37

## 2024-06-08 RX ADMIN — VALPROATE SODIUM 1000 MG: 100 INJECTION, SOLUTION INTRAVENOUS at 11:11

## 2024-06-08 RX ADMIN — MAGNESIUM SULFATE HEPTAHYDRATE 2 G: 40 INJECTION, SOLUTION INTRAVENOUS at 12:10

## 2024-06-08 ASSESSMENT — ACTIVITIES OF DAILY LIVING (ADL)
ADLS_ACUITY_SCORE: 28

## 2024-06-08 NOTE — H&P
Hospitalist Admission   History and Physical    CC: Paresthesias, shortness of breath, headache    HPI:  43 year old female with history of hypertension, type II diabetes, GERD, and NETO who presents for evaluation of shortness of breath and paresthesias. The patient reports that around noon today while sitting at her desk at work, she began to have cardiac palpitations, tunnel vision, shortness of breath, and then left upper extremity and left face paresthesias. States that the palpitations, tunnel vision, and shortness of breath lasted for about ten minutes, but the paresthesias lasted for about one hour. Notes that in her left arm, she had the numb sensation along the posterior forearm and palmar aspect of her hand and middle finger. Adds that all of her symptoms have resolved but she is feeling weak. Of note, she has had panic attacks in the past that feel similar to her episode today but the paresthesias were new. Reports that she has also had a headache for 1.5 weeks which is unusual for her and was prescribed a ten day course of Augmentin for sinusitis which she has not started yet.      On presentation to the ER, vital signs notable for systolic blood pressure near 180, no fever, heart rate near 90 bpm, and saturation 100% on room air    Labs included CBC, basic metabolic panel, troponin, TSH, and pregnancy test that were all unremarkable    Imaging included brain MRI showing no acute findings.  MRA angiogram of the head and neck showed no significant stenoses.  Chest x-ray showed no acute finding    ER provider spoke with stroke neurology.    PMH:  Past Medical History:   Diagnosis Date    Back pain     Depressive disorder     Diabetes mellitus, type 2 (H) 9/6/2022    Hypertension     Sleep apnea         Medications:  Current Facility-Administered Medications   Medication Dose Route Frequency Provider Last Rate Last Admin    aspirin EC tablet 81 mg  81 mg Oral Daily Emeka Reis MD   81 mg at 06/07/24  "1931     Current Outpatient Medications   Medication Sig Dispense Refill    acetaminophen (TYLENOL) 500 MG tablet Take 500-1,000 mg by mouth every 8 hours as needed for mild pain Max 2-3 g per day      albuterol (PROAIR HFA/PROVENTIL HFA/VENTOLIN HFA) 108 (90 Base) MCG/ACT inhaler Inhale 2 puffs into the lungs every 6 hours as needed for shortness of breath, wheezing or cough 18 g 0    amLODIPine (NORVASC) 5 MG tablet Take 1 tablet (5 mg) by mouth daily 90 tablet 3    Lidocaine (LIDOCARE) 4 % Patch Place 1 patch onto the skin daily as needed for moderate pain (musculoskeletal pain) Remove patch after 12 hours. To prevent lidocaine toxicity, patient should be patch free for 12 hrs daily. 15 patch 0    metFORMIN (GLUCOPHAGE XR) 500 MG 24 hr tablet Take 2 tablets (1,000 mg) by mouth daily (with dinner) 180 tablet 1    omeprazole (PRILOSEC) 20 MG DR capsule Take 1 capsule by mouth once daily 90 capsule 2    rosuvastatin (CRESTOR) 10 MG tablet TAKE 1 TABLET BY MOUTH DAILY 270 tablet 0    spacer (OPTICHAMBER TATA) holding chamber Use with albuterol 1 each 0    Tirzepatide (MOUNJARO) 7.5 MG/0.5ML pen Inject 7.5 mg Subcutaneous every 7 days 2 mL 0    vitamin D2 (ERGOCALCIFEROL) 93640 units (1250 mcg) capsule Take 1 capsule (50,000 Units) by mouth once a week Then start over-the-counter vit D3 1000 unit(s) daily 12 capsule 0        Allergies:     Allergies   Allergen Reactions    Latex      PN: Converted from LW Latex Sensitivity Flag        Family History:  noncontributory    Social History:      Review of Systems: Comprehensive greater than 10 point review systems otherwise negative besides that detailed above    Physical exam:  BP (!) 176/125   Pulse 94   Temp 98.2  F (36.8  C) (Temporal)   Resp 18   Ht 1.549 m (5' 1\")   Wt 121.1 kg (267 lb)   SpO2 98%   BMI 50.45 kg/m     PSYCH: pleasant, oriented, No acute distress.  HEART:  Normal S1, S2 with no edema.  LUNGS:  Clear to auscultation, normal Respiratory " effort.  ABDOMEN:  Soft, no hepatosplenomegaly, normal bowel sounds.  SKIN:  Dry to touch, No rash.   NEURO: Cranial nerves II through intact.  Normal  strength bilaterally.  Normal dorsi and plantar flexion strength bilaterally.  Sensation intact in the upper and lower extremities bilaterally.  Finger-to-nose testing normal bilaterally.  Non focal exam    Pertinent laboratory and imaging data reviewed above in HPI         Impression:  43 year old female with history of hypertension, type II diabetes, GERD, and NETO who presents for evaluation of shortness of breath and paresthesias. The patient reports that around noon today while sitting at her desk at work, she began to have cardiac palpitations, tunnel vision, shortness of breath, and then left upper extremity and left face paresthesias. States that the palpitations, tunnel vision, and shortness of breath lasted for about ten minutes, but the paresthesias lasted for about one hour. Notes that in her left arm, she had the numb sensation along the posterior forearm and palmar aspect of her hand and middle finger. Adds that all of her symptoms have resolved but she is feeling weak. Of note, she has had panic attacks in the past that feel similar to her episode today but the paresthesias were new. Reports that she has also had a headache for 1.5 weeks which is unusual for her and was prescribed a ten day course of Augmentin for sinusitis which she has not started yet.      On presentation to the ER, vital signs notable for systolic blood pressure near 180, no fever, heart rate near 90 bpm, and saturation 100% on room air    Labs included CBC, basic metabolic panel, troponin, TSH, and pregnancy test that were all unremarkable    Imaging included brain MRI showing no acute findings.  MRA angiogram of the head and neck showed no significant stenoses.  Chest x-ray showed no acute finding    1.  Transient episode of shortness of breath, palpitations, tunnel vision,  left upper extremity and facial paresthesias  -Symptoms sound most consistent with panic attack, which the patient has experienced previously  - Ddx includes transient cardiac arhythmia  - consider hypoglycemic event given DM hx, though above sx would be atypical for hypoglycemia.  Pt did not check her BG during her sx  - Brain MRI/MRA neg for CVA/stenosis  -Neurology was contacted by the ER and is concerned about TIA; recommends admission for TIA work up.  Will order TTE, lipid panel, asa/Plavix  -Monitor on telemetry.    - check mag level    2.  Diabetes mellitus type 2  - check A1c level  - takes Metformin  - did not check her BG during events above    3.  Anxiety disorder  - reports having panic attacks approx once a year; last episode 2-3 months ago    4.  JONATHAN  - resume nocturnal CPAP (uses at home)    5.  Hyperlipidemia  - on statin    6.  Morbid obesity    7.  HTN hx  - BP elevated in ER, likely anxiety related    Observation admission      CODE STATUS: Full code

## 2024-06-08 NOTE — PLAN OF CARE
"Goal Outcome Evaluation: Elevated SBP, pain in lower back. Tylenol administered.            Pt is A&Ox4. Up with x1 assist, GB, walker to the bathroom. Voided clear yellow urine. Denied numbness, tingling to face, BUE & BLE. Denied N/V.     PRIMARY DIAGNOSIS: \"GENERIC\" NURSING  OUTPATIENT/OBSERVATION GOALS TO BE MET BEFORE DISCHARGE:  ADLs back to baseline: yes    Activity and level of assistance: Up with x1 assist, GB, walker    Pain status: Improved-controlled with oral pain medications.    Return to near baseline physical activity: No, generalized weakness.     Discharge Planner Nurse   Safe discharge environment identified: No, to be determined.   Barriers to discharge: Yes       Entered by: Zina Irving RN 06/07/2024 11:44 PM     Please review provider order for any additional goals.   Nurse to notify provider when observation goals have been met and patient is ready for discharge.           "

## 2024-06-08 NOTE — PROGRESS NOTES
"PRIMARY DIAGNOSIS: \"GENERIC\" NURSING  OUTPATIENT/OBSERVATION GOALS TO BE MET BEFORE DISCHARGE:  ADLs back to baseline: No    Activity and level of assistance: Up with standby assistance.    Pain status: Improved with use of alternative comfort measures i.e.: positioning    Return to near baseline physical activity: Yes     Discharge Planner Nurse   Safe discharge environment identified: Yes  Barriers to discharge: Yes       Entered by: Lee Ann Riggs RN 06/08/2024 8:20 AM     Please review provider order for any additional goals.   Nurse to notify provider when observation goals have been met and patient is ready for discharge.  "

## 2024-06-08 NOTE — PLAN OF CARE
"Goal Outcome Evaluation:      Plan of Care Reviewed With: patient    Overall Patient Progress: no changeOverall Patient Progress: no change         PRIMARY DIAGNOSIS: GENERALIZED WEAKNESS    OUTPATIENT/OBSERVATION GOALS TO BE MET BEFORE DISCHARGE  1. Orthostatic performed: N/A    2. Tolerating PO medications: Yes    3. Return to near baseline physical activity: Yes    4. Cleared for discharge by consultants (if involved): N/A    Discharge Planner Nurse   Safe discharge environment identified: Yes  Barriers to discharge: No       Entered by: Anurag Díaz RN 06/08/2024 4:30 AM    No change. Denies palpations. Calm and cooperative. Discharge home today when ready.    Please review provider order for any additional goals.   Nurse to notify provider when observation goals have been met and patient is ready for discharge.        Problem: Adult Inpatient Plan of Care  Goal: Plan of Care Review  Description: The Plan of Care Review/Shift note should be completed every shift.  The Outcome Evaluation is a brief statement about your assessment that the patient is improving, declining, or no change.  This information will be displayed automatically on your shift  note.  Outcome: Progressing  Flowsheets (Taken 6/8/2024 3763)  Plan of Care Reviewed With: patient  Overall Patient Progress: no change  Goal: Patient-Specific Goal (Individualized)  Description: You can add care plan individualizations to a care plan. Examples of Individualization might be:  \"Parent requests to be called daily at 9am for status\", \"I have a hard time hearing out of my right ear\", or \"Do not touch me to wake me up as it startles  me\".  Outcome: Progressing  Goal: Absence of Hospital-Acquired Illness or Injury  Outcome: Progressing  Intervention: Identify and Manage Fall Risk  Recent Flowsheet Documentation  Taken 6/7/2024 6628 by Anurag Díaz, RN  Safety Promotion/Fall Prevention:   activity supervised   clutter free environment maintained   " increased rounding and observation   increase visualization of patient   lighting adjusted   mobility aid in reach   nonskid shoes/slippers when out of bed  Intervention: Prevent Skin Injury  Recent Flowsheet Documentation  Taken 6/7/2024 2354 by Anurag Díaz, RN  Body Position: position changed independently  Goal: Optimal Comfort and Wellbeing  Outcome: Progressing  Intervention: Monitor Pain and Promote Comfort  Recent Flowsheet Documentation  Taken 6/7/2024 2354 by Anurag Díaz, RN  Pain Management Interventions: declines  Goal: Readiness for Transition of Care  Outcome: Progressing  Intervention: Mutually Develop Transition Plan  Recent Flowsheet Documentation  Taken 6/7/2024 2358 by Anurag Díaz, RN  Equipment Currently Used at Home: none

## 2024-06-08 NOTE — DISCHARGE SUMMARY
"[facility]  Hospitalist Discharge Summary      Date of Admission:  6/7/2024  Date of Discharge:  6/8/2024  Discharging Provider: Rodriguez Watson MD  Discharge Service: Hospitalist Service    Discharge Diagnoses     Probable TIA  Migraine  Diabetes mellitus type 2  Anxiety disorder  Sleep apnea  Hyperlipidemia  Morbid obesity  Anxiety related to elevated blood pressure      Clinically Significant Risk Factors     # DMII: A1C = 8.1 % (Ref range: <5.7 %) within past 6 months  # Severe Obesity: Estimated body mass index is 50.45 kg/m  as calculated from the following:    Height as of this encounter: 1.549 m (5' 1\").    Weight as of this encounter: 121.1 kg (267 lb).       Follow-ups Needed After Discharge   Follow-up Appointments     Follow-up and recommended labs and tests       Follow up with primary care provider, [facility],   within 7 days for hospital follow- up.  No follow up labs or test are   needed.    Follow-up with stroke neurology in 6 weeks.    Follow-up with general neurology as previously planned for migraine.            Unresulted Labs Ordered in the Past 30 Days of this Admission       Date and Time Order Name Status Description    6/8/2024  5:00 AM Hemoglobin A1c In process         These results will be followed up by hospitalist team    Discharge Disposition   Discharged to home  Condition at discharge: Stable    Hospital Course   43-year-old female with history of hypertension, type 2 diabetes, GERD and NETO who developed palpitations, tunnel vision, shortness of breath and then left upper extremity and left face paresthesia.  Palpitations, tunnel vision and shortness of breath lasted about 10 minutes but paresthesia lasted about 1 hour.    In the ER she had systolic blood pressure near 180 but rest of the vitals were stable.  MRI brain did not show any acute findings.  MRA of head and neck showed no significant stenosis and chest x-ray was without acute " findings.      Possible TIA.  Seen by stroke neurology and empirically started on 3-week course of aspirin 81 mg daily and 75 mg of Plavix daily followed by aspirin 325 mg daily.  Zio patch was ordered for discharge.  Echo to be done outpatient, could not be done inpatient.  Patient was given Depakote 1 g IV, magnesium 2 g IV and NS bolus for headache, likely migraine.  Patient has an outpatient general neurology follow-up scheduled.    Diabetes mellitus type 2.  HbA1c 8.1, takes metformin and glipizide at home .  Defer medication adjustment to primary doctor.  She had probably get Ozempic.    Anxiety disorder.  Reports panic attacks approximately once a year, it is possible her current presentation was due to anxiety attack.    Sleep apnea.  Use CPAP at home.    Hyperlipidemia.  Continue statins, lipid panel pending.    Morbid obesity.  Complicates all aspects of care.    Elevated blood pressure.  Likely due to anxiety, normal on recheck.      Consultations This Hospital Stay   NEUROLOGY IP STROKE CONSULT    Code Status   Full Code    Time Spent on this Encounter   I, Rodriguez Watson MD, personally saw the patient today and spent greater than 30 minutes discharging this patient.       Rodriguez Watson MD  [facility]  [address] [place] [address]  Phone: [phone]  Fax: [phone]  ______________________________________________________________________    Physical Exam   Vital Signs: Temp: 97.4  F (36.3  C) Temp src: Temporal BP: 132/81 Pulse: 87   Resp: 18 SpO2: 99 % O2 Device: None (Room air)    Weight: 267 lbs 0 oz  General Appearance: Alert awake and oriented x 3.  Still has some headache.  Respiratory: Clear to auscultation.  Cardiovascular: S1-S2 normal.  GI: Soft and nontender.  Skin: No rash  Other: No edema       Primary Care Physician   [facility]    Discharge Orders      Reason for your hospital stay    Transient ischemic attack     Activity     Your activity upon discharge: activity as tolerated     Follow-up and recommended labs and tests     Follow up with primary care provider, [facility], within 7 days for hospital follow- up.  No follow up labs or test are needed.    Follow-up with stroke neurology in 6 weeks.    Follow-up with general neurology as previously planned for migraine.     Echocardiogram Complete    Administration of IV contrast will be tailored to this examination per the appropriate written protocol listed in the Echocardiography department Protocol Book, or by the supervising Cardiologist. This may result in an order change.    Use of contrast is at the discretion of the supervising Cardiologist.     Diet    Follow this diet upon discharge: Orders Placed This Encounter      Regular Diet Adult     Stroke Hospital Follow Up (for neurologist use only)    [facility] will call you to coordinate care as prescribed by your provider. If you don t hear from a representative within 2 business days, please call [phone].       MAURIO PATCH MAIL OUT       Significant Results and Procedures   Most Recent 3 CBC's:  Recent Labs   Lab Test 06/07/24  1511 06/25/23  0829 08/05/22  1449   WBC 10.2 10.3 10.4   HGB 12.7 13.3 13.6   MCV 83 86 86    343 365     Most Recent 3 BMP's:  Recent Labs   Lab Test 06/08/24  1428 06/08/24  0911 06/08/24  0704 06/07/24  2131 06/07/24  1511 06/25/23  0829 12/15/22  0803   NA  --   --   --   --  140 133* 143   POTASSIUM  --   --   --   --  4.2 4.5 4.2   CHLORIDE  --   --   --   --  102 101 106   CO2  --   --   --   --  23 22 25   BUN  --   --   --   --  13.8 12.6 16.8   CR  --   --   --   --  0.51 0.51 0.53   ANIONGAP  --   --   --   --  15 10 12   NAOMI  --   --   --   --  9.4 8.7 9.2   * 156* 169*   < > 145* 161* 113*    < > = values in this interval not displayed.     Most Recent 2 LFT's:  Recent Labs   Lab Test 12/15/22  0803 06/07/22  0829   AST 38* 39   ALT 57* 74*   ALKPHOS  60 59   BILITOTAL 0.4 0.5   ,   Results for orders placed or performed during the hospital encounter of 06/07/24   MR Brain w/o & w Contrast    Narrative    EXAM: MR BRAIN W/O and W CONTRAST, MRA BRAIN ([place]) W/O CONTRAST, MRA NECK (CAROTIDS) W/O and W CONTRAST  LOCATION: [facility]  DATE/TIME: 6/7/2024 5:53 PM CDT    INDICATION: Recent headaches, episode left face and arm tingling  COMPARISON:  CT head and CTA head and neck 9/14/2021.  CONTRAST: 10 mL Gadavist  TECHNIQUE:   1) Routine multiplanar multisequence head MRI with and without intravenous contrast.  2) 3D time-of-flight head MRA without intravenous contrast.  3) Neck MRA without and with IV contrast. Stenosis measurements made according to NASCET criteria unless otherwise specified.    FINDINGS:  HEAD MRI:  INTRACRANIAL CONTENTS: 9 mm right frontal convexity meningioma with adjacent calvarial hyperostosis. No acute or subacute infarct. No acute hemorrhage or extra-axial fluid collections. Normal brain parenchymal signal. Normal ventricles and sulci. Normal   position of the cerebellar tonsils.    SELLA: No abnormality accounting for technique.    OSSEOUS STRUCTURES/SOFT TISSUES: Normal marrow signal.     ORBITS: Prior bilateral cataract surgery. Visualized portions of the orbits are otherwise unremarkable.     SINUSES/MASTOIDS: No paranasal sinus mucosal disease. No middle ear or mastoid effusion.     HEAD MRA:   ANTERIOR CIRCULATION: No stenosis/occlusion, aneurysm, or high flow vascular malformation. Standard [place] of [place] anatomy.    POSTERIOR CIRCULATION: No stenosis/occlusion, aneurysm, or high flow vascular malformation. Balanced vertebral arteries supply a normal basilar artery.     NECK MRA:   RIGHT CAROTID: No measurable stenosis or dissection.    LEFT CAROTID: No measurable stenosis or dissection.    VERTEBRAL ARTERIES: No focal stenosis or dissection. Balanced vertebral arteries.    AORTIC ARCH: Classic  aortic arch anatomy with no significant stenosis at the origin of the great vessels.      Impression    IMPRESSION:  HEAD MRI:   1.  No acute intracranial abnormality.  2.  Small right frontal convexity meningioma without mass effect or adjacent parenchymal edema.    HEAD MRA:   1.  No aneurysm, high flow AVM or significant stenosis identified.    NECK MRA:  1.  No large vessel occlusion or hemodynamically significant stenosis.   MRA Angiogram Head w/o Contrast    Narrative    EXAM: MR BRAIN W/O and W CONTRAST, MRA BRAIN ([place]) W/O CONTRAST, MRA NECK (CAROTIDS) W/O and W CONTRAST  LOCATION: [facility]  DATE/TIME: 6/7/2024 5:53 PM CDT    INDICATION: Recent headaches, episode left face and arm tingling  COMPARISON:  CT head and CTA head and neck 9/14/2021.  CONTRAST: 10 mL Gadavist  TECHNIQUE:   1) Routine multiplanar multisequence head MRI with and without intravenous contrast.  2) 3D time-of-flight head MRA without intravenous contrast.  3) Neck MRA without and with IV contrast. Stenosis measurements made according to NASCET criteria unless otherwise specified.    FINDINGS:  HEAD MRI:  INTRACRANIAL CONTENTS: 9 mm right frontal convexity meningioma with adjacent calvarial hyperostosis. No acute or subacute infarct. No acute hemorrhage or extra-axial fluid collections. Normal brain parenchymal signal. Normal ventricles and sulci. Normal   position of the cerebellar tonsils.    SELLA: No abnormality accounting for technique.    OSSEOUS STRUCTURES/SOFT TISSUES: Normal marrow signal.     ORBITS: Prior bilateral cataract surgery. Visualized portions of the orbits are otherwise unremarkable.     SINUSES/MASTOIDS: No paranasal sinus mucosal disease. No middle ear or mastoid effusion.     HEAD MRA:   ANTERIOR CIRCULATION: No stenosis/occlusion, aneurysm, or high flow vascular malformation. Standard [place] of [place] anatomy.    POSTERIOR CIRCULATION: No stenosis/occlusion, aneurysm, or high  flow vascular malformation. Balanced vertebral arteries supply a normal basilar artery.     NECK MRA:   RIGHT CAROTID: No measurable stenosis or dissection.    LEFT CAROTID: No measurable stenosis or dissection.    VERTEBRAL ARTERIES: No focal stenosis or dissection. Balanced vertebral arteries.    AORTIC ARCH: Classic aortic arch anatomy with no significant stenosis at the origin of the great vessels.      Impression    IMPRESSION:  HEAD MRI:   1.  No acute intracranial abnormality.  2.  Small right frontal convexity meningioma without mass effect or adjacent parenchymal edema.    HEAD MRA:   1.  No aneurysm, high flow AVM or significant stenosis identified.    NECK MRA:  1.  No large vessel occlusion or hemodynamically significant stenosis.   MRA Angiogram Neck w/o & w Contrast    Narrative    EXAM: MR BRAIN W/O and W CONTRAST, MRA BRAIN ([place] OF [place]) W/O CONTRAST, MRA NECK (CAROTIDS) W/O and W CONTRAST  LOCATION: [facility]  DATE/TIME: 6/7/2024 5:53 PM CDT    INDICATION: Recent headaches, episode left face and arm tingling  COMPARISON:  CT head and CTA head and neck 9/14/2021.  CONTRAST: 10 mL Gadavist  TECHNIQUE:   1) Routine multiplanar multisequence head MRI with and without intravenous contrast.  2) 3D time-of-flight head MRA without intravenous contrast.  3) Neck MRA without and with IV contrast. Stenosis measurements made according to NASCET criteria unless otherwise specified.    FINDINGS:  HEAD MRI:  INTRACRANIAL CONTENTS: 9 mm right frontal convexity meningioma with adjacent calvarial hyperostosis. No acute or subacute infarct. No acute hemorrhage or extra-axial fluid collections. Normal brain parenchymal signal. Normal ventricles and sulci. Normal   position of the cerebellar tonsils.    SELLA: No abnormality accounting for technique.    OSSEOUS STRUCTURES/SOFT TISSUES: Normal marrow signal.     ORBITS: Prior bilateral cataract surgery. Visualized portions of the orbits are  otherwise unremarkable.     SINUSES/MASTOIDS: No paranasal sinus mucosal disease. No middle ear or mastoid effusion.     HEAD MRA:   ANTERIOR CIRCULATION: No stenosis/occlusion, aneurysm, or high flow vascular malformation. Standard [place] of [place] anatomy.    POSTERIOR CIRCULATION: No stenosis/occlusion, aneurysm, or high flow vascular malformation. Balanced vertebral arteries supply a normal basilar artery.     NECK MRA:   RIGHT CAROTID: No measurable stenosis or dissection.    LEFT CAROTID: No measurable stenosis or dissection.    VERTEBRAL ARTERIES: No focal stenosis or dissection. Balanced vertebral arteries.    AORTIC ARCH: Classic aortic arch anatomy with no significant stenosis at the origin of the great vessels.      Impression    IMPRESSION:  HEAD MRI:   1.  No acute intracranial abnormality.  2.  Small right frontal convexity meningioma without mass effect or adjacent parenchymal edema.    HEAD MRA:   1.  No aneurysm, high flow AVM or significant stenosis identified.    NECK MRA:  1.  No large vessel occlusion or hemodynamically significant stenosis.   XR Chest 2 Views    Narrative    CHEST 2 VIEWS   6/7/2024 4:20 PM     HISTORY: Episode short of breath.    COMPARISON: 10/28/2023.      Impression    IMPRESSION: No acute cardiopulmonary disease.    YUNG YAN MD         SYSTEM ID:  [id number]       Discharge Medications   Current Discharge Medication List        START taking these medications    Details   aspirin (ASA) 325 MG tablet Take 1 tablet (325 mg) by mouth daily  Qty: 90 tablet, Refills: 3    Associated Diagnoses: TIA (transient ischemic attack)      aspirin 81 MG EC tablet Take 1 tablet (81 mg) by mouth daily for 21 days  Qty: 21 tablet, Refills: 0    Associated Diagnoses: TIA (transient ischemic attack)      clopidogrel (PLAVIX) 75 MG tablet Take 1 tablet (75 mg) by mouth daily for 21 days  Qty: 21 tablet, Refills: 0    Associated Diagnoses: TIA (transient ischemic attack)           CONTINUE  these medications which have NOT CHANGED    Details   acetaminophen (TYLENOL) 500 MG tablet Take 500-1,000 mg by mouth every 8 hours as needed for mild pain Max 2-3 g per day      albuterol (PROAIR HFA/PROVENTIL HFA/VENTOLIN HFA) 108 (90 Base) MCG/ACT inhaler Inhale 2 puffs into the lungs every 6 hours as needed for shortness of breath, wheezing or cough  Qty: 18 g, Refills: 0    Comments: Pharmacy may dispense brand covered by insurance (Proair, or proventil or ventolin or generic albuterol inhaler)      amoxicillin-clavulanate (AUGMENTIN) 875-125 MG tablet Take 1 Tablet by mouth two times a day for 10 days.      glipiZIDE (GLUCOTROL XL) 10 MG 24 hr tablet Take 10 mg by mouth daily      losartan (COZAAR) 100 MG tablet Take 100 mg by mouth daily      metFORMIN (GLUCOPHAGE) 1000 MG tablet Take 1,000 mg by mouth 2 times daily (with meals)      nortriptyline (PAMELOR) 10 MG capsule Start taking 1 capsule at night for 1 week then increase to 2 capsules at night.      omeprazole (PRILOSEC) 20 MG DR capsule Take 1 capsule by mouth once daily  Qty: 90 capsule, Refills: 2    Associated Diagnoses: Gastroesophageal reflux disease without esophagitis      rosuvastatin (CRESTOR) 10 MG tablet TAKE 1 TABLET BY MOUTH DAILY  Qty: 270 tablet, Refills: 0    Associated Diagnoses: Type 2 diabetes mellitus with hyperglycemia, without long-term current use of insulin (H)           Allergies   Allergies   Allergen Reactions    Latex      PN: Converted from LW Latex Sensitivity Flag

## 2024-06-08 NOTE — PLAN OF CARE
[facility]    ED Boarding Nurse Handoff Addendum Report:    Date/time: 6/7/2024, 9:37 PM    Activity Level: standby    Fall Risk: Yes:  activity supervised    Active Infusions: NA    Current Meds Due: See MAR    Current care needs: Monitor dizziness    Oxygen requirements (liters/min and/or FiO2): NA    Respiratory status: Room air    Vital signs (within last 30 minutes):    Vitals:    06/07/24 1545 06/07/24 1920 06/07/24 2100 06/07/24 2132   BP: (!) 158/115 (!) 176/125 (!) 135/95 (!) 142/88   BP Location:    Right arm   Patient Position:    Semi-Hawthorne's   Cuff Size:    Adult Regular   Pulse:  94  85   Resp: 18   18   Temp:    98.4  F (36.9  C)   TempSrc:    Oral   SpO2: 100% 98% 99% 98%   Weight:       Height:           Focused assessment within last 30 minutes:    AOx4, VSS, denies SOB and denies pain, reports dizziness with ambulation, TELE, neuro's intact,  no insulin coverage given, plan for Echo in AM.      ED Boarding Nurse name: Yahaira Ojeda RN

## 2024-06-08 NOTE — PLAN OF CARE
"Goal Outcome Evaluation:      Plan of Care Reviewed With: patient    Overall Patient Progress: no changeOverall Patient Progress: no change         PRIMARY DIAGNOSIS: GENERALIZED WEAKNESS    OUTPATIENT/OBSERVATION GOALS TO BE MET BEFORE DISCHARGE  1. Orthostatic performed: N/A    2. Tolerating PO medications: Yes    3. Return to near baseline physical activity: Yes    4. Cleared for discharge by consultants (if involved): N/A    Discharge Planner Nurse   Safe discharge environment identified: Yes  Barriers to discharge: No       Entered by: Anurag Díaz RN 06/08/2024 6:38 AM    No change. Denies palpations. Calm and cooperative. Discharge home today when ready.    Please review provider order for any additional goals.   Nurse to notify provider when observation goals have been met and patient is ready for discharge.        Problem: Adult Inpatient Plan of Care  Goal: Plan of Care Review  Description: The Plan of Care Review/Shift note should be completed every shift.  The Outcome Evaluation is a brief statement about your assessment that the patient is improving, declining, or no change.  This information will be displayed automatically on your shift  note.  Outcome: Progressing  Flowsheets (Taken 6/8/2024 4916)  Plan of Care Reviewed With: patient  Overall Patient Progress: no change  Goal: Patient-Specific Goal (Individualized)  Description: You can add care plan individualizations to a care plan. Examples of Individualization might be:  \"Parent requests to be called daily at 9am for status\", \"I have a hard time hearing out of my right ear\", or \"Do not touch me to wake me up as it startles  me\".  Outcome: Progressing  Goal: Absence of Hospital-Acquired Illness or Injury  Outcome: Progressing  Intervention: Identify and Manage Fall Risk  Recent Flowsheet Documentation  Taken 6/7/2024 9139 by Anurag Díaz, RN  Safety Promotion/Fall Prevention:   activity supervised   clutter free environment maintained   " increased rounding and observation   increase visualization of patient   lighting adjusted   mobility aid in reach   nonskid shoes/slippers when out of bed  Intervention: Prevent Skin Injury  Recent Flowsheet Documentation  Taken 6/7/2024 2354 by Anurag Díaz, RN  Body Position: position changed independently  Goal: Optimal Comfort and Wellbeing  Outcome: Progressing  Intervention: Monitor Pain and Promote Comfort  Recent Flowsheet Documentation  Taken 6/7/2024 2354 by Anurag Díaz, RN  Pain Management Interventions: declines  Goal: Readiness for Transition of Care  Outcome: Progressing  Intervention: Mutually Develop Transition Plan  Recent Flowsheet Documentation  Taken 6/7/2024 2358 by Anurag Díaz, RN  Equipment Currently Used at Home: none

## 2024-06-08 NOTE — ED NOTES
"[facility]  ED Nurse Handoff Report    ED Chief complaint: Shortness of Breath and Numbness  . ED Diagnosis:   Final diagnoses:   Meningioma (H)   Paresthesia   Palpitations   Nonintractable headache, unspecified chronicity pattern, unspecified headache type   Shortness of breath - transient       Allergies:   Allergies   Allergen Reactions    Latex      PN: Converted from LW Latex Sensitivity Flag       Code Status: Full Code    Activity level - Baseline/Home:  independent.  Activity Level - Current:   standby and independent. - pt is dizzy   Lift room needed: No.   Bariatric: No   Needed: No   Isolation: No.   Infection: Not Applicable.     Respiratory status: Room air    Vital Signs (within 30 minutes):   Vitals:    06/07/24 1437 06/07/24 1545 06/07/24 1920   BP: (!) 183/113 (!) 158/115 (!) 176/125   Pulse: 90  94   Resp: 18 18    Temp: 98.2  F (36.8  C)     TempSrc: Temporal     SpO2: 100% 100% 98%   Weight: 121.1 kg (267 lb)     Height: 1.549 m (5' 1\")         Cardiac Rhythm:  ,      Pain level:    Patient confused: No.   Patient Falls Risk: activity supervised.   Elimination Status: Has voided     Patient Report - Initial Complaint: Heart racing, numbness to left arm, feeling weak and dizzy .   Focused Assessment: Tresa Chin is a 43 year old female with history of hypertension, type II diabetes, GERD, and NETO who presents for evaluation of shortness of breath and paresthesias. The patient reports that around noon today while sitting at her desk at work, she began to have cardiac palpitations, tunnel vision, shortness of breath, and then left upper extremity and left face paresthesias. States that the palpitations, tunnel vision, and shortness of breath lasted for about ten minutes, but the paresthesias lasted for about one hour. Notes that in her left arm, she had the numb sensation along the posterior forearm and palmar aspect of her hand and middle finger. Adds that " all of her symptoms have resolved but she is feeling weak. Of note, she has had panic attacks in the past that feel similar to her episode today but the paresthesias were new. Reports that she has also had a headache for 1.5 weeks which is unusual for her and was prescribed a ten day course of Augmentin for sinusitis which she has not started yet. She denies chest pain, vision loss, double vision, unilateral extremity weakness, and gait abnormality.      Abnormal Results:   Labs Ordered and Resulted from Time of ED Arrival to Time of ED Departure   BASIC METABOLIC PANEL - Abnormal       Result Value    Sodium 140      Potassium 4.2      Chloride 102      Carbon Dioxide (CO2) 23      Anion Gap 15      Urea Nitrogen 13.8      Creatinine 0.51      GFR Estimate >90      Calcium 9.4      Glucose 145 (*)    CBC WITH PLATELETS AND DIFFERENTIAL - Abnormal    WBC Count 10.2      RBC Count 4.89      Hemoglobin 12.7      Hematocrit 40.8      MCV 83      MCH 26.0 (*)     MCHC 31.1 (*)     RDW 14.9      Platelet Count 369      % Neutrophils 66      % Lymphocytes 25      % Monocytes 6      % Eosinophils 2      % Basophils 1      % Immature Granulocytes 0      NRBCs per 100 WBC 0      Absolute Neutrophils 6.7      Absolute Lymphocytes 2.5      Absolute Monocytes 0.6      Absolute Eosinophils 0.2      Absolute Basophils 0.1      Absolute Immature Granulocytes 0.0      Absolute NRBCs 0.0     TROPONIN T, HIGH SENSITIVITY - Normal    Troponin T, High Sensitivity <6     TSH WITH FREE T4 REFLEX - Normal    TSH 1.52     HCG QUALITATIVE PREGNANCY - Normal    hCG Serum Qualitative Negative          MR Brain w/o & w Contrast   Final Result   IMPRESSION:   HEAD MRI:    1.  No acute intracranial abnormality.   2.  Small right frontal convexity meningioma without mass effect or adjacent parenchymal edema.      HEAD MRA:    1.  No aneurysm, high flow AVM or significant stenosis identified.      NECK MRA:   1.  No large vessel occlusion or  hemodynamically significant stenosis.      MRA Angiogram Head w/o Contrast   Final Result   IMPRESSION:   HEAD MRI:    1.  No acute intracranial abnormality.   2.  Small right frontal convexity meningioma without mass effect or adjacent parenchymal edema.      HEAD MRA:    1.  No aneurysm, high flow AVM or significant stenosis identified.      NECK MRA:   1.  No large vessel occlusion or hemodynamically significant stenosis.      MRA Angiogram Neck w/o & w Contrast   Final Result   IMPRESSION:   HEAD MRI:    1.  No acute intracranial abnormality.   2.  Small right frontal convexity meningioma without mass effect or adjacent parenchymal edema.      HEAD MRA:    1.  No aneurysm, high flow AVM or significant stenosis identified.      NECK MRA:   1.  No large vessel occlusion or hemodynamically significant stenosis.      XR Chest 2 Views   Preliminary Result   IMPRESSION: No acute cardiopulmonary disease.          Treatments provided: See MAR   Family Comments: NA   OBS brochure/video discussed/provided to patient:  Yes  ED Medications:   Medications   aspirin EC tablet 81 mg (81 mg Oral $Given 6/7/24 1931)   sodium chloride 0.9% BOLUS 1,000 mL (0 mLs Intravenous Stopped 6/7/24 1929)   ketorolac (TORADOL) injection 15 mg (15 mg Intravenous $Given 6/7/24 1536)   gadobutrol (GADAVIST) injection 10 mL (10 mLs Intravenous $Given 6/7/24 1657)   sodium chloride (PF) 0.9% PF flush 60 mL (100 mLs Intravenous $Given 6/7/24 1657)   clopidogrel (PLAVIX) tablet 300 mg (300 mg Oral $Given 6/7/24 1929)       Drips infusing:  No  For the majority of the shift this patient was Green.   Interventions performed were NA .    Sepsis treatment initiated: No    Cares/treatment/interventions/medications to be completed following ED care: neurocck    ED Nurse Name: Pedro Jin RN  7:59 PM   RECEIVING UNIT ED HANDOFF REVIEW    Above ED Nurse Handoff Report was reviewed: Yes  Reviewed by: Zina Irving RN on June 7, 2024 at 9:19 PM   MICHAEL Morales  called the ED to inform them the note was read: No

## 2024-06-08 NOTE — PROGRESS NOTES
Patient placed on auto CPAP 5-15, no oxygen bled in, for JONATHAN therapy. Patients skin was checked with no areas of concern. Patient is able to use hospital machine ind. RT to continue to follow at bedtime.    RT Ashok on 6/7/2024 at 10:53 PM

## 2024-06-08 NOTE — CONSULTS
[facility]    Stroke Consult Note    Reason for Consult:  transient SOB, tunnel vision, parasthesias.  ? panic attack vs TIA     Chief Complaint: Shortness of Breath and Numbness       HPI  Tresa Chin is a 43 year old female with PMHx significant for adrenal adenoma, JONATHAN, hearing loss, DM2, HLD, HTN, NAFLD. She is not on antiplatelet therapy; she is on rosuvastatin 10 mg PTA. She presented to the ED 6/7 after an episode of palpitations, shortness of breath (hyperventilation), tunnel vision lasting ~ 15 minutes; this was associated with left sided facial and left distal arm paresthesias that lasted ~ 1 hour. She also notes 2 weeks of headache. Headache starts at the top of her head and radiates down to her shoulders; it is throbbing. Tylenol hasn't been especially helpful.     TIA Evaluation Summarized    MRI and/or Head CT Right frontal convexity meningioma, no acute pathology    Intracranial Vasculature No LVO, no high grade stenosis   Cervical Vasculature No high grade stenosis, no dissection      Echocardiogram Pending    EKG/Telemetry SR    Other Testing Not Applicable      LDL No lab value available in past 30 days   A1C 6/7/2024: 8.1 %       ABCD2 Patients Score   Age ? 60 years 1 point 0   Blood Pressure    SBP ? 140 or DBP ?  90    1 point 1   Clinical Features    - Unilateral weakness    - Speech disturbance w/o weakness    - Other    2 points  1 point    0 points 2   Duration of symptoms    ? 60 minutes    10-59 minutes    < 10 minutes   2 points  1 point  0 points 2   Diabetes  1 point 1   Patient s ABCD2 Score (0-7) = 6       Impression  Transient ischemic attack vs atypical migraine vs acute stress response; will treat for TIA out of an abundance of caution given risk factors     Recommendations   - Neurochecks and Vital Signs every 4 hours   - aspirin 81 mg daily + plavix 75 mg daily x 21 days followed by aspirin 325 mg daily indefinitely   - Statin: continue PTA  "rosuvastatin 10 mg daily; LDL goal 40-70  - TTE (with Bubble Study if age 60 yrs or less)  - 24-hour Telemetry  - ziopatch at discharge (ordered)  - Bedside Glucose Monitoring  - Nutrition: Mediterranean diet recommended  - A1c goal < 7; PCP follow up   - Lipid Panel in process   - PT/OT/SLP  - Stroke Education  - Depression Screen  - known JONATHAN, uses CPAP consistently   - Euthermia, Euglycemia  - for headache, depakote 1 gm IV x 1, magnesium 2 gm IV x 1, 1L NS bolus; follow up with OP neurology as planned     Patient Follow-up    - in the next 1-2 week(s) with PCP  - in 6-8 weeks with general neurology or stroke LORENE ([phone]) (ordered)    Thank you for this consult. No further stroke evaluation is recommended, so we will sign off. Please contact us with any additional questions. If TTE is normal, okay to discharge today from a stroke perspective.     Jeannette Donahue NP  Vascular Neurology    To page me or covering stroke neurology team member, click here: AMCOM  Choose \"On Call\" tab at top, then select \"NEUROLOGY/ALL SITES\" from middle drop-down box, press Enter, then look for \"stroke\" or \"telestroke\" for your site.  _____________________________________________________    Clinically Significant Risk Factors Present on Admission                  # Hypertension: Noted on problem list            # DMII: A1C = 8.1 % (Ref range: <5.7 %) within past 6 months    # Severe Obesity: Estimated body mass index is 50.45 kg/m  as calculated from the following:    Height as of this encounter: 1.549 m (5' 1\").    Weight as of this encounter: 121.1 kg (267 lb).              Past Medical History    Past Medical History:   Diagnosis Date    Back pain     Depressive disorder     Diabetes mellitus, type 2 (H) 09/06/2022    Hypertension     Palpitations 06/07/2024    Sleep apnea     TIA (transient ischemic attack) 06/08/2024     Medications   Home Meds  Prior to Admission medications    Medication Sig Start Date End Date Taking? " Authorizing Provider   acetaminophen (TYLENOL) 500 MG tablet Take 500-1,000 mg by mouth every 8 hours as needed for mild pain Max 2-3 g per day   Yes Reported, Patient   albuterol (PROAIR HFA/PROVENTIL HFA/VENTOLIN HFA) 108 (90 Base) MCG/ACT inhaler Inhale 2 puffs into the lungs every 6 hours as needed for shortness of breath, wheezing or cough 10/28/23  Yes Ronald Simeon, ANN   amoxicillin-clavulanate (AUGMENTIN) 875-125 MG tablet Take 1 Tablet by mouth two times a day for 10 days. 6/6/24 6/16/24 Yes Unknown, Entered By History   glipiZIDE (GLUCOTROL XL) 10 MG 24 hr tablet Take 10 mg by mouth daily 6/6/24 6/6/25 Yes Unknown, Entered By History   losartan (COZAAR) 100 MG tablet Take 100 mg by mouth daily 8/10/23  Yes Unknown, Entered By History   metFORMIN (GLUCOPHAGE) 1000 MG tablet Take 1,000 mg by mouth 2 times daily (with meals) 5/10/24  Yes Unknown, Entered By History   nortriptyline (PAMELOR) 10 MG capsule Start taking 1 capsule at night for 1 week then increase to 2 capsules at night. 6/6/24  Yes Unknown, Entered By History   omeprazole (PRILOSEC) 20 MG DR capsule Take 1 capsule by mouth once daily 12/15/21  Yes Kalyn Miller MD   rosuvastatin (CRESTOR) 10 MG tablet TAKE 1 TABLET BY MOUTH DAILY 5/9/24  Yes Melissa Franco MD       Scheduled Meds  Current Facility-Administered Medications   Medication Dose Route Frequency Provider Last Rate Last Admin    aspirin EC tablet 81 mg  81 mg Oral Daily Emeka Reis MD   81 mg at 06/08/24 0937    clopidogrel (PLAVIX) tablet 75 mg  75 mg Oral Daily Montrell Owusu MD   75 mg at 06/08/24 0937    glipiZIDE (GLUCOTROL XL) 24 hr tablet 10 mg  10 mg Oral Daily Montrell Owusu MD   10 mg at 06/08/24 0937    insulin aspart (NovoLOG) injection (RAPID ACTING)  1-7 Units Subcutaneous TID AC Montrell Owusu MD        insulin aspart (NovoLOG) injection (RAPID ACTING)  1-5 Units Subcutaneous At Bedtime Montrell Owusu MD        losartan  (COZAAR) tablet 100 mg  100 mg Oral Daily Montrell Owusu MD   100 mg at 06/08/24 0938    magnesium sulfate 2 g in 50 mL sterile water intermittent infusion  2 g Intravenous Once Jeannette Donahue NP        metFORMIN (GLUCOPHAGE) tablet 1,000 mg  1,000 mg Oral BID w/meals Montrell Owusu MD   1,000 mg at 06/08/24 0937    nortriptyline (PAMELOR) capsule 10 mg  10 mg Oral At Bedtime Montrell Owusu MD        pantoprazole (PROTONIX) EC tablet 40 mg  40 mg Oral Daily Montrell Owusu MD   40 mg at 06/08/24 0937    rosuvastatin (CRESTOR) tablet 10 mg  10 mg Oral Daily Montrell Owusu MD   10 mg at 06/07/24 2301    sodium chloride 0.9% BOLUS 1,000 mL  1,000 mL Intravenous Once Jeannette Donahue NP        valproate (DEPACON) 1,000 mg in sodium chloride 0.9 % 50 mL intermittent infusion  1,000 mg Intravenous Once Jeannette Donahue NP           Infusion Meds  Current Facility-Administered Medications   Medication Dose Route Frequency Provider Last Rate Last Admin       Allergies   Allergies   Allergen Reactions    Latex      PN: Converted from LW Latex Sensitivity Flag          PHYSICAL EXAMINATION   Temp:  [96.9  F (36.1  C)-98.4  F (36.9  C)] 97.4  F (36.3  C)  Pulse:  [78-94] 87  Resp:  [18] 18  BP: (115-183)/() 132/81  SpO2:  [98 %-100 %] 99 %    Neuro Exam  Mental Status:  alert, oriented x 3, follows commands, speech clear and fluent, naming and repetition normal  Cranial Nerves:  visual fields intact (tested by nurse), EOMI with normal smooth pursuit, facial sensation intact and symmetric (tested by nurse), facial movements symmetric, hearing not formally tested but intact to conversation, no dysarthria, shoulder shrug equal bilaterally, tongue protrusion midline  Motor:  right hand tremor, able to move all limbs antigravity spontaneously with no signs of hemiparesis observed, no pronator drift, BLE motor limited by low back pain   Reflexes:  unable to test (telestroke)  Sensory:  light  "touch sensation intact and symmetric throughout upper and lower extremities (assessed by nurse), no extinction on double simultaneous stimulation (assessed by nurse)  Coordination:  normal finger-to-nose and heel-to-shin bilaterally without dysmetria  Station/Gait:  unable to test due to telestroke    Stroke Scales    NIHSS  1a. Level of Consciousness 0-->Alert, keenly responsive   1b. LOC Questions 0-->Answers both questions correctly   1c. LOC Commands 0-->Performs both tasks correctly   2.   Best Gaze 0-->Normal   3.   Visual 0-->No visual loss   4.   Facial Palsy 0-->Normal symmetrical movements   5a. Motor Arm, Left 0-->No drift, limb holds 90 (or 45) degrees for full 10 secs   5b. Motor Arm, Right 0-->No drift, limb holds 90 (or 45) degrees for full 10 secs   6a. Motor Leg, Left 0-->No drift, leg holds 30 degree position for full 5 secs   6b. Motor Leg, right 0-->No drift, leg holds 30 degree position for full 5 secs   7.   Limb Ataxia 0-->Absent   8.   Sensory 0-->Normal, no sensory loss   9.   Best Language 0-->No aphasia, normal   10. Dysarthria 0-->Normal   11. Extinction and Inattention  0-->No abnormality   Total 0 (06/08/24 1001)       Imaging  I personally reviewed all imaging; relevant findings per HPI.    Labs Data   CBC  Recent Labs   Lab 06/07/24  1511   WBC 10.2   RBC 4.89   HGB 12.7   HCT 40.8        Basic Metabolic Panel   Recent Labs   Lab 06/08/24  0911 06/08/24  0704 06/08/24  0254 06/07/24  2131 06/07/24  1511   NA  --   --   --   --  140   POTASSIUM  --   --   --   --  4.2   CHLORIDE  --   --   --   --  102   CO2  --   --   --   --  23   BUN  --   --   --   --  13.8   CR  --   --   --   --  0.51   * 169* 179*   < > 145*   NAOMI  --   --   --   --  9.4    < > = values in this interval not displayed.     Liver Panel  No results for input(s): \"PROTTOTAL\", \"ALBUMIN\", \"BILITOTAL\", \"ALKPHOS\", \"AST\", \"ALT\", \"BILIDIRECT\" in the last 168 hours.  INR    Recent Labs   Lab Test " 08/05/22  1449   INR 1.05           Stroke Consult Data Data   Telestroke Service Details  (for non-emergent stroke consult with tele)  Video start time 06/08/24   1000   Video end time 06/08/24   1020   Type of service telemedicine diagnostic assessment of acute neurological changes   Reason telemedicine is appropriate patient requires assessment with a specialist for diagnosis and treatment of neurological symptoms   Mode of transmission secure interactive audio and video communication per Agustin   Originating site (patient location) [facility]    Distant site (provider location) [facility]       I have personally spent a total of 40 minutes providing care today, time spent in reviewing medical records and devising the plan as recorded above.

## 2024-06-08 NOTE — PHARMACY-ADMISSION MEDICATION HISTORY
Pharmacist Admission Medication History    Admission medication history is complete. The information provided in this note is only as accurate as the sources available at the time of the update.    Information Source(s): Patient and CareEverywhere/SureScripts via in-person    Pertinent Information: patient reported taking a second blood pressure med besides losartan but chart review with most recent PCP visit and also dispensing record only show losartan as the BP med that was recently dispensed. Amlodipine was last dispensed in October 2023 and was not mentioned in the most recent PCP visit.    Changes made to PTA medication list:  Added: Augmentin, glipizide XL, losartan, nortriptyline, metformin IR  Deleted: vitamin D2, Mounjaro, spacer, metformin XR, amlodipine, lidocaine patch  Changed: None    Allergies reviewed with patient and updates made in EHR: no    Medication History Completed By: Keith Bailey RP 6/7/2024 10:18 PM    PTA Med List   Medication Sig Last Dose    acetaminophen (TYLENOL) 500 MG tablet Take 500-1,000 mg by mouth every 8 hours as needed for mild pain Max 2-3 g per day Unknown at PRN    albuterol (PROAIR HFA/PROVENTIL HFA/VENTOLIN HFA) 108 (90 Base) MCG/ACT inhaler Inhale 2 puffs into the lungs every 6 hours as needed for shortness of breath, wheezing or cough Unknown at PRN    amoxicillin-clavulanate (AUGMENTIN) 875-125 MG tablet Take 1 Tablet by mouth two times a day for 10 days.  at [facility]; HASN'T STARTED YET    glipiZIDE (GLUCOTROL XL) 10 MG 24 hr tablet Take 10 mg by mouth daily 6/6/2024    losartan (COZAAR) 100 MG tablet Take 100 mg by mouth daily 6/7/2024 at AM    metFORMIN (GLUCOPHAGE) 1000 MG tablet Take 1,000 mg by mouth 2 times daily (with meals) 6/7/2024 at X1    nortriptyline (PAMELOR) 10 MG capsule Start taking 1 capsule at night for 1 week then increase to 2 capsules at night.  at [facility]; HASN'T STARTED YET    omeprazole (PRILOSEC) 20 MG DR capsule Take 1 capsule by mouth once  daily 6/7/2024 at am    rosuvastatin (CRESTOR) 10 MG tablet TAKE 1 TABLET BY MOUTH DAILY 6/6/2024 at hs

## 2024-06-08 NOTE — PLAN OF CARE
"Pt A&Ox4. C/o back to lower back and headache. IVF bolus, magnesium and valproate infusion per neuro stroke.SBA w/walker. C/o dizziness this AM while ambulating but steady. Neuros intact. Regular diet. Discharge TBD.  Stroke neuro following, plan for patient to follow up with neuro outpatient.       Addendum: pt to discharge today. Follow up outpatient with neurology and shannon will be mailed to patient.     PRIMARY DIAGNOSIS: \"GENERIC\" NURSING  OUTPATIENT/OBSERVATION GOALS TO BE MET BEFORE DISCHARGE:  ADLs back to baseline: Yes    Activity and level of assistance: Up with standby assistance.    Pain status: Improved with use of alternative comfort measures i.e.: positioning    Return to near baseline physical activity: Yes     Discharge Planner Nurse   Safe discharge environment identified: Yes  Barriers to discharge: Yes       Entered by: Lee Ann Riggs RN 06/08/2024 12:13 PM     Please review provider order for any additional goals.   Nurse to notify provider when observation goals have been met and patient is ready for discharge.    Goal Outcome Evaluation:      Plan of Care Reviewed With: patient    Overall Patient Progress: improvingOverall Patient Progress: improving    Outcome Evaluation: tele stroke consult, neuros intact. c/o h/a      Problem: Adult Inpatient Plan of Care  Goal: Plan of Care Review  Description: The Plan of Care Review/Shift note should be completed every shift.  The Outcome Evaluation is a brief statement about your assessment that the patient is improving, declining, or no change.  This information will be displayed automatically on your shift  note.  Outcome: Progressing  Flowsheets (Taken 6/8/2024 1152)  Outcome Evaluation: tele stroke consult, neuros intact. c/o h/a  Plan of Care Reviewed With: patient  Overall Patient Progress: improving  Goal: Patient-Specific Goal (Individualized)  Description: You can add care plan individualizations to a care plan. Examples of " "Individualization might be:  \"Parent requests to be called daily at 9am for status\", \"I have a hard time hearing out of my right ear\", or \"Do not touch me to wake me up as it startles  me\".  Outcome: Progressing  Goal: Absence of Hospital-Acquired Illness or Injury  Outcome: Progressing  Intervention: Identify and Manage Fall Risk  Recent Flowsheet Documentation  Taken 6/8/2024 0804 by Lee Ann Riggs RN  Safety Promotion/Fall Prevention: safety round/check completed  Intervention: Prevent and Manage VTE (Venous Thromboembolism) Risk  Recent Flowsheet Documentation  Taken 6/8/2024 0805 by Lee Ann Riggs, RN  VTE Prevention/Management: SCDs (sequential compression devices) off  Goal: Optimal Comfort and Wellbeing  Outcome: Progressing  Intervention: Monitor Pain and Promote Comfort  Recent Flowsheet Documentation  Taken 6/8/2024 1049 by Lee Ann Riggs, RN  Pain Management Interventions: (per neuro, IVF, mag and valproate) medication (see MAR)  Taken 6/8/2024 0805 by Lee Ann Riggs, RN  Pain Management Interventions:   repositioned   medication offered but refused  Goal: Readiness for Transition of Care  Outcome: Progressing     "

## 2024-06-10 LAB
ATRIAL RATE - MUSE: 81 BPM
DIASTOLIC BLOOD PRESSURE - MUSE: NORMAL MMHG
INTERPRETATION ECG - MUSE: NORMAL
P AXIS - MUSE: 37 DEGREES
PR INTERVAL - MUSE: 154 MS
QRS DURATION - MUSE: 78 MS
QT - MUSE: 380 MS
QTC - MUSE: 441 MS
R AXIS - MUSE: 17 DEGREES
SYSTOLIC BLOOD PRESSURE - MUSE: NORMAL MMHG
T AXIS - MUSE: 32 DEGREES
VENTRICULAR RATE- MUSE: 81 BPM

## 2024-06-11 ENCOUNTER — PATIENT OUTREACH (OUTPATIENT)
Dept: CARE COORDINATION | Facility: CLINIC | Age: 44
End: 2024-06-11
Payer: COMMERCIAL

## 2024-06-11 NOTE — PROGRESS NOTES
"Clinic Care Coordination Contact  Transitions of Care Outreach  Chief Complaint   Patient presents with    Clinic Care Coordination - Post Hospital       Most Recent Admission Date: 6/7/2024   Most Recent Admission Diagnosis: Palpitations - R00.2  Shortness of breath - R06.02  Paresthesia - R20.2  Meningioma (H) - D32.9  Nonintractable headache, unspecified chronicity pattern, unspecified headache type - R51.9     Most Recent Discharge Date: 6/8/2024   Most Recent Discharge Diagnosis: Meningioma (H) - D32.9  Paresthesia - R20.2  Palpitations - R00.2  Nonintractable headache, unspecified chronicity pattern, unspecified headache type - R51.9  Shortness of breath - R06.02  TIA (transient ischemic attack) - G45.9     Transitions of Care Assessment    Discharge Assessment  How are you doing now that you are home?: \" Everything is okay just feel tired \"  How are your symptoms? (Red Flag symptoms escalate to triage hotline per guidelines): Improved  Do you know how to contact your clinic care team if you have future questions or changes to your health status? : Yes  Does the patient have their discharge instructions? : Yes  Does the patient have questions regarding their discharge instructions? : No  Were you started on any new medications or were there changes to any of your previous medications? : Yes  Does the patient have all of their medications?: Yes  Do you have questions regarding any of your medications? : No  Do you have all of your needed medical supplies or equipment (DME)?  (i.e. oxygen tank, CPAP, cane, etc.): Yes    Post-op ([facility] CTA Only)  If the patient had a surgery or procedure, do they have any questions for a nurse?: No         [facility] Explained and offered Care Coordination support to eligible patients: No    Patient accepted? No    Follow up Plan     Discharge Follow-Up  Discharge follow up appointment scheduled in alignment with recommended follow up timeframe or Transitions of Risk Category? (Low = " within 30 days; Moderate= within 14 days; High= within 7 days): Yes  Discharge Follow Up Appointment Date: 06/25/24  Discharge Follow Up Appointment Scheduled with?: Specialty Care Provider    Future Appointments   Date Time Provider Department Center   6/25/2024  9:45 AM [id number]       Outpatient Plan as outlined on AVS reviewed with patient.    For any urgent concerns, please contact our 24 hour nurse triage line: [phone] ([phone])       Petty Naylor MA

## 2024-06-25 ENCOUNTER — HOSPITAL ENCOUNTER (OUTPATIENT)
Dept: CARDIOLOGY | Facility: CLINIC | Age: 44
Discharge: HOME | End: 2024-06-25
Attending: STUDENT IN AN ORGANIZED HEALTH CARE EDUCATION/TRAINING PROGRAM | Admitting: STUDENT IN AN ORGANIZED HEALTH CARE EDUCATION/TRAINING PROGRAM
Payer: COMMERCIAL

## 2024-06-25 DIAGNOSIS — G45.9 TIA (TRANSIENT ISCHEMIC ATTACK): ICD-10-CM

## 2024-06-25 PROCEDURE — 93306 TTE W/DOPPLER COMPLETE: CPT | Mod: 26 | Performed by: INTERNAL MEDICINE

## 2024-06-25 PROCEDURE — 255N000002 HC RX 255 OP 636: Performed by: STUDENT IN AN ORGANIZED HEALTH CARE EDUCATION/TRAINING PROGRAM

## 2024-06-25 PROCEDURE — 999N000208 ECHOCARDIOGRAM COMPLETE

## 2024-06-25 PROCEDURE — 258N000001 HC RX 258: Performed by: STUDENT IN AN ORGANIZED HEALTH CARE EDUCATION/TRAINING PROGRAM

## 2024-06-25 RX ORDER — ACYCLOVIR 200 MG/1
30 CAPSULE ORAL ONCE
Status: COMPLETED | OUTPATIENT
Start: 2024-06-25 | End: 2024-06-25

## 2024-06-25 RX ADMIN — SODIUM CHLORIDE 30 ML: 9 INJECTION, SOLUTION INTRAMUSCULAR; INTRAVENOUS; SUBCUTANEOUS at 09:56

## 2024-06-25 RX ADMIN — HUMAN ALBUMIN MICROSPHERES AND PERFLUTREN 3 ML: 10; .22 INJECTION, SOLUTION INTRAVENOUS at 09:57

## 2024-07-05 PROCEDURE — 93248 EXT ECG>7D<15D REV&INTERPJ: CPT | Performed by: INTERNAL MEDICINE

## 2024-07-19 ENCOUNTER — DOCUMENTATION ONLY (OUTPATIENT)
Dept: NEUROLOGY | Facility: CLINIC | Age: 44
End: 2024-07-19
Payer: COMMERCIAL

## 2024-07-19 NOTE — PROGRESS NOTES
Stroke RN Care Coordination - Documentation Only Note    SITUATION     Tresa Chin is a 44 year old female who is receiving support for:  Results (ZioPatch Heart Monitor)    BACKGROUND     Patient recently completed recent ziopatch study as part of stroke workup. She has seen results via [email or web address], but outside PCP note from 7/18 notes they do not have the results available.    ASSESSMENT     Faxed ziopatch summary report to pt's PCP Dr. Gomez and to upcoming neurologist Shi URENA.          PLAN     No further action needed at this time.    Krissy Grimm BS, RN, SCRN  RN Stroke Neurology Care Coordinator  [facility] Neuroscience Service Line

## 2024-11-06 NOTE — PROGRESS NOTES
Cleanse wound with NS, then pack with plain ribbon gauze and cover with bandage.   Subjective     Tresa Chin is a 40 year old female who presents to clinic today for the following health issues:    HPI         Abdominal/Flank Pain  Onset/Duration: X3 WEEKS  Description:   Character: Sharp  Location: left flank  Radiation: None  Intensity: moderate, mild currently 3-5/10 in severity on average  Progression of Symptoms:  Same- intermittent  Accompanying Signs & Symptoms:  Fever/Chills: no  Gas/Bloating: YES- both  Nausea: no  Vomitting: no  Diarrhea: no  Constipation: no  Dysuria or Hematuria: no  History:   Trauma: no  Previous similar pain: YES- Had Kidney stones on July 21, 2020 (6mm stone)  Previous tests done: x-ray and CT  Precipitating factors:   Does the pain change with:     Food: no    Bowel Movement: no    Urination: no   Other factors:  no  Therapies tried and outcome: None    She scot any nausea, vomiting, no changes in bowel movements.    Patient would like to talk about her lab result on 09/11/2020. There was a large amount of blood seen.     Review of Systems   GENERAL:  No fevers  GI: As noted in HPI  MUSCULOSKELETAL: As noted in HPI          Objective    /89 (BP Location: Right arm, Patient Position: Sitting, Cuff Size: Adult Large)   Pulse 84   Temp 98.6  F (37  C) (Oral)   Resp 16   Wt 114.8 kg (253 lb)   SpO2 96%   Breastfeeding No   BMI 47.80 kg/m    Body mass index is 47.8 kg/m .  Physical Exam   GENERAL: No acute distress  HEENT: Normocephalic  GI: Active bowel sounds, abdomen is soft, tenderness along the left side of the abdomen.  : No CVA tenderness bilaterally.  EXTREMITIES: No tenderness over the left lumbar paraspinous muscles.   NEURO: Alert and non-focal      Results for orders placed or performed in visit on 10/13/20 (from the past 24 hour(s))   *UA reflex to Microscopic and Culture ([place] and [facility] (except [place] and [place])    Specimen: Midstream Urine   Result Value Ref Range    Color Urine Yellow     Appearance Urine Clear      Glucose Urine Negative NEG^Negative mg/dL    Bilirubin Urine Negative NEG^Negative    Ketones Urine Negative NEG^Negative mg/dL    Specific Gravity Urine 1.025 1.003 - 1.035    Blood Urine Negative NEG^Negative    pH Urine 6.5 5.0 - 7.0 pH    Protein Albumin Urine Negative NEG^Negative mg/dL    Urobilinogen Urine 0.2 0.2 - 1.0 EU/dL    Nitrite Urine Negative NEG^Negative    Leukocyte Esterase Urine Small (A) NEG^Negative    Source Midstream Urine    Urine Microscopic   Result Value Ref Range    WBC Urine 5-10 (A) OTO5^0 - 5 /HPF    RBC Urine 2-5 (A) OTO2^O - 2 /HPF    Squamous Epithelial /LPF Urine Moderate (A) FEW^Few /LPF    Bacteria Urine Moderate (A) NEG^Negative /HPF    Mucous Urine Present (A) NEG^Negative /LPF           Assessment & Plan     Flank pain  Urinalysis obtained to rule out stone or infection for cause of symptoms.  Urinalysis shows no signs of infection and very little red blood cells.  No CVA tenderness on exam.  She does have tenderness in the left abdomen however denies any abdominal pain on a history.  She denies any changes in bowel movements or history of constipation.  Patient has had 3 CTs in the past 8 months and I do not want to move forward with another CT today.  I recommended she try MiraLAX and fluids.  If symptoms not improving or if worsening we can consider moving forward with CT.  I also discussed with patient that her pain may be musculoskeletal in nature.  If she notices worsening pain with movement, twisting or lifting I would lean towards a physical therapy evaluation and treatment plan.  - *UA reflex to Microscopic and Culture ([place] and [facility] (except [place] and [place])  - TDAP VACCINE    Not up to date with tetanus toxoid immunization  Tetanus immunization updated today.          Patient Instructions   Miralax 1 capful daily for 1 week.    If worsening or not improving we can order CT for further evaluation.      Return if symptoms worsen or fail to  improve.    Liudmila Marvin PA-C  [facility]

## 2024-12-29 ENCOUNTER — HEALTH MAINTENANCE LETTER (OUTPATIENT)
Age: 44
End: 2024-12-29

## 2025-02-18 ENCOUNTER — APPOINTMENT (OUTPATIENT)
Dept: CT IMAGING | Facility: CLINIC | Age: 45
End: 2025-02-18
Attending: EMERGENCY MEDICINE
Payer: COMMERCIAL

## 2025-02-18 ENCOUNTER — HOSPITAL ENCOUNTER (EMERGENCY)
Facility: CLINIC | Age: 45
Discharge: HOME | End: 2025-02-18
Attending: EMERGENCY MEDICINE | Admitting: EMERGENCY MEDICINE
Payer: COMMERCIAL

## 2025-02-18 ENCOUNTER — APPOINTMENT (OUTPATIENT)
Dept: ULTRASOUND IMAGING | Facility: CLINIC | Age: 45
End: 2025-02-18
Attending: EMERGENCY MEDICINE
Payer: COMMERCIAL

## 2025-02-18 VITALS
WEIGHT: 268.96 LBS | OXYGEN SATURATION: 99 % | RESPIRATION RATE: 18 BRPM | BODY MASS INDEX: 50.78 KG/M2 | SYSTOLIC BLOOD PRESSURE: 142 MMHG | HEART RATE: 70 BPM | DIASTOLIC BLOOD PRESSURE: 90 MMHG | HEIGHT: 61 IN | TEMPERATURE: 97 F

## 2025-02-18 DIAGNOSIS — N83.209 HEMORRHAGIC OVARIAN CYST: ICD-10-CM

## 2025-02-18 LAB
ALBUMIN SERPL BCG-MCNC: 4.2 G/DL (ref 3.5–5.2)
ALBUMIN UR-MCNC: NEGATIVE MG/DL
ALP SERPL-CCNC: 79 U/L (ref 40–150)
ALT SERPL W P-5'-P-CCNC: 90 U/L (ref 0–50)
ANION GAP SERPL CALCULATED.3IONS-SCNC: 13 MMOL/L (ref 7–15)
APPEARANCE UR: CLEAR
AST SERPL W P-5'-P-CCNC: 56 U/L (ref 0–45)
BACTERIA #/AREA URNS HPF: ABNORMAL /HPF
BASOPHILS # BLD AUTO: 0.1 10E3/UL (ref 0–0.2)
BASOPHILS NFR BLD AUTO: 1 %
BILIRUB SERPL-MCNC: 0.4 MG/DL
BILIRUB UR QL STRIP: NEGATIVE
BUN SERPL-MCNC: 13.3 MG/DL (ref 6–20)
CALCIUM SERPL-MCNC: 9.4 MG/DL (ref 8.8–10.4)
CHLORIDE SERPL-SCNC: 101 MMOL/L (ref 98–107)
COLOR UR AUTO: ABNORMAL
CREAT SERPL-MCNC: 0.5 MG/DL (ref 0.51–0.95)
EGFRCR SERPLBLD CKD-EPI 2021: >90 ML/MIN/1.73M2
EOSINOPHIL # BLD AUTO: 0.2 10E3/UL (ref 0–0.7)
EOSINOPHIL NFR BLD AUTO: 2 %
ERYTHROCYTE [DISTWIDTH] IN BLOOD BY AUTOMATED COUNT: 15.3 % (ref 10–15)
GLUCOSE SERPL-MCNC: 167 MG/DL (ref 70–99)
GLUCOSE UR STRIP-MCNC: 200 MG/DL
HCG UR QL: NEGATIVE
HCO3 SERPL-SCNC: 25 MMOL/L (ref 22–29)
HCT VFR BLD AUTO: 39 % (ref 35–47)
HGB BLD-MCNC: 12.3 G/DL (ref 11.7–15.7)
HGB UR QL STRIP: NEGATIVE
HOLD SPECIMEN: NORMAL
HOLD SPECIMEN: NORMAL
IMM GRANULOCYTES # BLD: 0 10E3/UL
IMM GRANULOCYTES NFR BLD: 0 %
KETONES UR STRIP-MCNC: NEGATIVE MG/DL
LEUKOCYTE ESTERASE UR QL STRIP: NEGATIVE
LIPASE SERPL-CCNC: 35 U/L (ref 13–60)
LYMPHOCYTES # BLD AUTO: 2.9 10E3/UL (ref 0.8–5.3)
LYMPHOCYTES NFR BLD AUTO: 30 %
MCH RBC QN AUTO: 25.9 PG (ref 26.5–33)
MCHC RBC AUTO-ENTMCNC: 31.5 G/DL (ref 31.5–36.5)
MCV RBC AUTO: 82 FL (ref 78–100)
MONOCYTES # BLD AUTO: 0.6 10E3/UL (ref 0–1.3)
MONOCYTES NFR BLD AUTO: 6 %
MUCOUS THREADS #/AREA URNS LPF: PRESENT /LPF
NEUTROPHILS # BLD AUTO: 6 10E3/UL (ref 1.6–8.3)
NEUTROPHILS NFR BLD AUTO: 61 %
NITRATE UR QL: NEGATIVE
NRBC # BLD AUTO: 0 10E3/UL
NRBC BLD AUTO-RTO: 0 /100
PH UR STRIP: 7 [PH] (ref 5–7)
PLATELET # BLD AUTO: 442 10E3/UL (ref 150–450)
POTASSIUM SERPL-SCNC: 4.3 MMOL/L (ref 3.4–5.3)
PROT SERPL-MCNC: 7.2 G/DL (ref 6.4–8.3)
RBC # BLD AUTO: 4.74 10E6/UL (ref 3.8–5.2)
RBC URINE: 1 /HPF
SODIUM SERPL-SCNC: 139 MMOL/L (ref 135–145)
SP GR UR STRIP: 1.02 (ref 1–1.03)
SQUAMOUS EPITHELIAL: 4 /HPF
UROBILINOGEN UR STRIP-MCNC: NORMAL MG/DL
WBC # BLD AUTO: 9.8 10E3/UL (ref 4–11)
WBC URINE: 1 /HPF

## 2025-02-18 PROCEDURE — 250N000011 HC RX IP 250 OP 636: Performed by: EMERGENCY MEDICINE

## 2025-02-18 PROCEDURE — 76830 TRANSVAGINAL US NON-OB: CPT

## 2025-02-18 PROCEDURE — 82565 ASSAY OF CREATININE: CPT | Performed by: EMERGENCY MEDICINE

## 2025-02-18 PROCEDURE — 83690 ASSAY OF LIPASE: CPT | Performed by: EMERGENCY MEDICINE

## 2025-02-18 PROCEDURE — 81001 URINALYSIS AUTO W/SCOPE: CPT | Performed by: EMERGENCY MEDICINE

## 2025-02-18 PROCEDURE — 36415 COLL VENOUS BLD VENIPUNCTURE: CPT | Performed by: EMERGENCY MEDICINE

## 2025-02-18 PROCEDURE — 96375 TX/PRO/DX INJ NEW DRUG ADDON: CPT

## 2025-02-18 PROCEDURE — 99285 EMERGENCY DEPT VISIT HI MDM: CPT | Mod: 25

## 2025-02-18 PROCEDURE — 85025 COMPLETE CBC W/AUTO DIFF WBC: CPT | Performed by: EMERGENCY MEDICINE

## 2025-02-18 PROCEDURE — 74177 CT ABD & PELVIS W/CONTRAST: CPT

## 2025-02-18 PROCEDURE — 250N000009 HC RX 250: Performed by: EMERGENCY MEDICINE

## 2025-02-18 PROCEDURE — 96374 THER/PROPH/DIAG INJ IV PUSH: CPT | Mod: 59

## 2025-02-18 PROCEDURE — 81025 URINE PREGNANCY TEST: CPT | Performed by: EMERGENCY MEDICINE

## 2025-02-18 RX ORDER — IOPAMIDOL 755 MG/ML
500 INJECTION, SOLUTION INTRAVASCULAR ONCE
Status: COMPLETED | OUTPATIENT
Start: 2025-02-18 | End: 2025-02-18

## 2025-02-18 RX ORDER — OXYCODONE HYDROCHLORIDE 5 MG/1
5 TABLET ORAL EVERY 6 HOURS PRN
Qty: 6 TABLET | Refills: 0 | Status: SHIPPED | OUTPATIENT
Start: 2025-02-18 | End: 2025-02-18

## 2025-02-18 RX ORDER — ONDANSETRON 2 MG/ML
4 INJECTION INTRAMUSCULAR; INTRAVENOUS ONCE
Status: COMPLETED | OUTPATIENT
Start: 2025-02-18 | End: 2025-02-18

## 2025-02-18 RX ORDER — IBUPROFEN 800 MG/1
800 TABLET, FILM COATED ORAL EVERY 8 HOURS PRN
Qty: 30 TABLET | Refills: 0 | Status: SHIPPED | OUTPATIENT
Start: 2025-02-18 | End: 2025-02-18

## 2025-02-18 RX ORDER — OXYCODONE HYDROCHLORIDE 5 MG/1
5 TABLET ORAL EVERY 6 HOURS PRN
Qty: 6 TABLET | Refills: 0 | Status: SHIPPED | OUTPATIENT
Start: 2025-02-18

## 2025-02-18 RX ORDER — KETOROLAC TROMETHAMINE 15 MG/ML
15 INJECTION, SOLUTION INTRAMUSCULAR; INTRAVENOUS ONCE
Status: COMPLETED | OUTPATIENT
Start: 2025-02-18 | End: 2025-02-18

## 2025-02-18 RX ORDER — IBUPROFEN 800 MG/1
800 TABLET, FILM COATED ORAL EVERY 8 HOURS PRN
Qty: 30 TABLET | Refills: 0 | Status: SHIPPED | OUTPATIENT
Start: 2025-02-18

## 2025-02-18 RX ADMIN — IOPAMIDOL 100 ML: 755 INJECTION, SOLUTION INTRAVENOUS at 18:21

## 2025-02-18 RX ADMIN — KETOROLAC TROMETHAMINE 15 MG: 15 INJECTION, SOLUTION INTRAMUSCULAR; INTRAVENOUS at 17:51

## 2025-02-18 RX ADMIN — SODIUM CHLORIDE 65 ML: 9 INJECTION, SOLUTION INTRAVENOUS at 18:21

## 2025-02-18 RX ADMIN — ONDANSETRON 4 MG: 2 INJECTION, SOLUTION INTRAMUSCULAR; INTRAVENOUS at 17:51

## 2025-02-18 ASSESSMENT — COLUMBIA-SUICIDE SEVERITY RATING SCALE - C-SSRS
2. HAVE YOU ACTUALLY HAD ANY THOUGHTS OF KILLING YOURSELF IN THE PAST MONTH?: NO
6. HAVE YOU EVER DONE ANYTHING, STARTED TO DO ANYTHING, OR PREPARED TO DO ANYTHING TO END YOUR LIFE?: NO
1. IN THE PAST MONTH, HAVE YOU WISHED YOU WERE DEAD OR WISHED YOU COULD GO TO SLEEP AND NOT WAKE UP?: NO

## 2025-02-18 ASSESSMENT — ACTIVITIES OF DAILY LIVING (ADL)
ADLS_ACUITY_SCORE: 59

## 2025-02-18 NOTE — ED PROVIDER NOTES
"  Emergency Department Note      History of Present Illness     Chief Complaint   Abdominal Pain    HPI     Tresa Chin is a 44 year old female with a history of type 2 diabetes mellitus, hyperlipidemia, and hypertension who presents to the ED for abdominal pain. For the past couple of days, the patient has been experiencing intermittent lower abdominal pain and pelvic pain. The pain is not localized to either side of the abdomen. Pain is worse with walking. Today, she took some Tylenol with relief. She also noted today that it hurt to sit on the toilet. She denies pain with passing stool. She endorses some urinary retention. No vaginal bleeding or discharge. Nausea no vomiting. She has a history of kidney stones. This pain feels different. No history of diverticulitis or skin issues.    Independent Historian   None    Review of External Notes   None    Past Medical History   Medical History and Problem List   Adrenal adenoma  Migraine  Sleep apnea  Sensorineural hearing loss  Type 2 diabetes mellitus  Vitamin D deficiency  GERD  Generalized anxiety disorder  Hyperlipidemia  Kidney stone  Hypertension  Breast cancer  Depression  Meningioma  Morbid obesity  Nonalcoholic fatty liver disease  Depression  Transient ischemic attack  Ureterolithiasis    Medications   Amlodipine  Benzonatate  Cyclobenzaprine  Dexamethasone  Gabapentin  Lisinopril  Prednisolone  Propranolol  Semaglutide  Albuterol  Glipizide  Clopidogrel  Losartan  Metformin  Nortriptyline  Omeprazole  Rosuvastatin    Surgical History   Cystoscopy  EGD  Genitourinary surgery  Eye surgery    Physical Exam   Patient Vitals for the past 24 hrs:   BP Temp Temp src Pulse Resp SpO2 Height Weight   02/18/25 2036 (!) 142/90 -- -- 70 18 99 % -- --   02/18/25 1635 128/73 97  F (36.1  C) Temporal 85 17 100 % 1.549 m (5' 1\") 122 kg (268 lb 15.4 oz)   02/18/25 1633 -- -- -- -- -- -- 1.549 m (5' 1\") --     Physical Exam      HEENT:    Oropharynx is moist  Eyes: "    Conjunctiva normal  Neck:     Supple, no meningismus.     CV:     Regular rate and rhythm.      No murmurs, rubs or gallops.     No lower extremity edema.  PULM:    Clear to auscultation bilateral.       No respiratory distress.      Good air exchange.     No rales or wheezing.     No stridor.  ABD:    Soft, non-distended.       Mild-moderate tenderness in the midline low abdomen > LLQ.     Bowel sounds normal.     No pulsatile masses.       No rebound, guarding or rigidity.     No CVA tenderness.   MSK:     No gross deformity to all four extremities.   LYMPH:   No cervical lymphadenopathy.  NEURO:   Alert.  Good muscular tone, no atrophy.   Skin:    Warm, dry and intact.    Psych:    Mood is good and affect is appropriate.      Diagnostics   Lab Results   Labs Ordered and Resulted from Time of ED Arrival to Time of ED Departure   ROUTINE UA WITH MICROSCOPIC REFLEX TO CULTURE - Abnormal       Result Value    Color Urine Light Yellow      Appearance Urine Clear      Glucose Urine 200 (*)     Bilirubin Urine Negative      Ketones Urine Negative      Specific Gravity Urine 1.022      Blood Urine Negative      pH Urine 7.0      Protein Albumin Urine Negative      Urobilinogen Urine Normal      Nitrite Urine Negative      Leukocyte Esterase Urine Negative      Bacteria Urine Few (*)     Mucus Urine Present (*)     RBC Urine 1      WBC Urine 1      Squamous Epithelials Urine 4 (*)    COMPREHENSIVE METABOLIC PANEL - Abnormal    Sodium 139      Potassium 4.3      Carbon Dioxide (CO2) 25      Anion Gap 13      Urea Nitrogen 13.3      Creatinine 0.50 (*)     GFR Estimate >90      Calcium 9.4      Chloride 101      Glucose 167 (*)     Alkaline Phosphatase 79      AST 56 (*)     ALT 90 (*)     Protein Total 7.2      Albumin 4.2      Bilirubin Total 0.4     CBC WITH PLATELETS AND DIFFERENTIAL - Abnormal    WBC Count 9.8      RBC Count 4.74      Hemoglobin 12.3      Hematocrit 39.0      MCV 82      MCH 25.9 (*)     MCHC 31.5       RDW 15.3 (*)     Platelet Count 442      % Neutrophils 61      % Lymphocytes 30      % Monocytes 6      % Eosinophils 2      % Basophils 1      % Immature Granulocytes 0      NRBCs per 100 WBC 0      Absolute Neutrophils 6.0      Absolute Lymphocytes 2.9      Absolute Monocytes 0.6      Absolute Eosinophils 0.2      Absolute Basophils 0.1      Absolute Immature Granulocytes 0.0      Absolute NRBCs 0.0     HCG QUALITATIVE URINE - Normal    hCG Urine Qualitative Negative     LIPASE - Normal    Lipase 35       Imaging   US Pelvis Cmplt w Transvag & Doppler LmtPel Duplex Limited   Final Result   IMPRESSION:        1.  No evidence of ovarian torsion.      2.  Hemorrhagic 5.3 cm left ovarian cyst. Recommend a follow-up ultrasound in 8-12 weeks to ensure resolution.      3.  Normal uterus, endometrium, and right ovary.      CT Abdomen Pelvis w Contrast   Final Result   IMPRESSION:    1.  5.4 x 4.7 cm left adnexal cyst. Further evaluation with pelvic ultrasound recommended.   2.  The rectum and perirectal soft tissues are unremarkable.   3.  Hepatomegaly and diffuse fatty infiltration of the liver.      REFERENCE:   Management of Incidental Adnexal Findings on CT and MRI: A White Paper of the ACR Incidental Findings Committee. J Am Jhonathan Radiol 2020; 17(2):248-254.      Premenopausal or <50 if status unkown:      Equal to or <5 cm: No further imaging.   >5 cm on CT: Ultrasound.   >5 cm on MRI: Ultrasound in 6-12 months if not fully characterized. No follow-up if fully characterized.   >7 cm on MRI: Ultrasound in 6-12 months even if fully characterized.                Independent Interpretation   None    ED Course    Medications Administered   Medications   ketorolac (TORADOL) injection 15 mg (15 mg Intravenous $Given 2/18/25 1751)   ondansetron (ZOFRAN) injection 4 mg (4 mg Intravenous $Given 2/18/25 1751)   CT SCAN FLUSH (65 mLs Intravenous $Given 2/18/25 1821)   iopamidol (ISOVUE-370) solution 500 mL (100 mLs  Intravenous $Given 2/18/25 1821)       Procedures   Procedures     Discussion of Management   None    ED Course   ED Course as of 02/18/25 2138   Tue Feb 18, 2025 1738 I obtained history and examined the patient as noted above.       Additional Documentation  None    Medical Decision Making / Diagnosis   CMS Diagnoses: None    MIPS       None    MDM     Tresa Chin is a 44 year old female presents with 2 days of lower abdominal/pelvic pain.  Basic laboratory studies were unrevealing.  She has no historical features to draw concern for vaginitis, cervicitis or PID.  CT scan was performed revealing isolated left ovarian cyst which was further characterized on ultrasound.  She has a 5 cm hemorrhagic ovarian cyst without radiographic signs and no clinical signs of ovarian torsion.  Her symptoms are well-controlled.  She is safe for discharge home.  She will utilize ibuprofen and limited supply of oxycodone for discomfort.  She was instructed to follow-up with gynecology to ensure resolution of this ovarian cyst.  She was instructed to return to the ED for any worsening of her symptoms which could indicate ruptured hemorrhagic ovarian cyst or developing or ovarian torsion    Disposition   The patient was discharged.     Diagnosis     ICD-10-CM    1. Hemorrhagic ovarian cyst  N83.209            Discharge Medications   Discharge Medication List as of 2/18/2025  9:06 PM        Scribe Disclosure:  IDevante, am serving as a scribe at 5:41 PM on 2/18/2025 to document services personally performed by Iain Mcleod MD based on my observations and the provider's statements to me.        Iain Mcleod MD  02/18/25 2134

## 2025-02-18 NOTE — ED TRIAGE NOTES
Pt complains of lower throughout abd pain that radiates to buttock that started two days ago and has been on/off. Pt endorses some nausea. Last BM was today. Pt endorses some frequency w/ urine that started 2-3 days ago.          5

## 2025-02-19 NOTE — ED NOTES
Pt wants oxycodone rx sent to [facility] instead of [facility]. Informed provider. Called [facility] alexander and [facility] to cancel oxycodone RX that was sent electronically.

## 2025-02-19 NOTE — DISCHARGE INSTRUCTIONS
Please make an appointment to follow up with [facility] [phone] in 5-7 days even if entirely better.    Discharge Instructions  Ovarian Cyst    Abdominal (belly) pain can be caused by many things. Your provider today has found that you have a cyst on the ovary. Women in their reproductive years form cysts every month, but only cause pain if they are very large, or if they rupture and release blood or fluid. Fortunately, they rarely require surgery or hospitalization. The pain from a ruptured cyst usually gets gradually better, and should be much better within a few days. If there is a large cyst, it will usually go away within 1-2 months, but needs to be watched to be sure it does go away, since sometimes a large cyst can become a cancer. There can be complications of a cyst, or other problems that cannot be found right away, so it is very important that you follow up as directed.      Generally, every Emergency Department visit should have a follow-up clinic visit with either a primary or a specialty clinic/provider. Please follow-up as instructed by your emergency provider today.    Return to the Emergency Department right away if:  Your pain becomes much worse or constant  You get an oral temperature above 100.4 F or as directed by your provider.  You have frequent vomiting  You faint, or feel very weak.  You have new symptoms or anything that worries you.    What can I do to help myself?  Take any medication prescribed by your provider.  You may use Tylenol  (acetaminophen) or Advil , Motrin  (ibuprofen) for pain. Be sure to read and follow the package directions, and ask your provider if you have questions.  Avoid sex for several days, because it will probably be painful.    If you were given a prescription for medicine here today, be sure to read all of the information (including the package insert) that comes with your prescription.  This will include important information  about the medicine, its side effects, and any warnings that you need to know about.  The pharmacist who fills the prescription can provide more information and answer questions you may have about the medicine.  If you have questions or concerns that the pharmacist cannot address, please call or return to the Emergency Department.     Remember that you can always come back to the Emergency Department if you are not able to see your regular provider in the amount of time listed above, if you get any new symptoms, or if there is anything that worries you.

## 2025-03-06 DIAGNOSIS — E11.65 TYPE 2 DIABETES MELLITUS WITH HYPERGLYCEMIA, WITHOUT LONG-TERM CURRENT USE OF INSULIN (H): ICD-10-CM

## 2025-03-06 NOTE — TELEPHONE ENCOUNTER
Clinic RN: Please investigate patient's chart or contact patient if the information cannot be found because patient should have run out of this medication on 8/8/24. Confirm patient is taking this medication as prescribed. Document findings and route refill encounter to provider for approval or denial. Last visit was 2022 with provider no longer at clinic - needs to establish care.

## 2025-03-07 NOTE — TELEPHONE ENCOUNTER
Message #1 left for patient to return call     Per chart review patient is now being seen by [facility] for primary - pharmacy likely sent to incorrect provider     [place] Registered Nurse  [facility]

## 2025-03-10 NOTE — TELEPHONE ENCOUNTER
Outgoing call to patient - attempt #2. Left  requesting call back to [phone] and ask to speak with a nurse. Awaiting call back at this time.     Sent [employer] message.     Nadja LAWSON RN  [facility]

## 2025-03-11 NOTE — TELEPHONE ENCOUNTER
Attempt x 3. Called pt and left a message to call back to [phone] and to ask to speak to a nurse.     When pt calls back,     Clarify med dosage below.     Jeannette FELIX RN   Clinic RN  [facility]

## 2025-03-12 RX ORDER — ROSUVASTATIN CALCIUM 10 MG/1
10 TABLET, COATED ORAL DAILY
Qty: 270 TABLET | Refills: 0 | OUTPATIENT
Start: 2025-03-12

## 2025-03-12 NOTE — PATIENT INSTRUCTIONS
"Recommendations from today's MTM visit:                                                      At least 4 \"water bottle\" a day, if you can get 5 bottles a day would be great.     Increase food high in fiber.    Increase activity, try to walk 15 mins a day at work or at home.      If the above does not work, then add over-the-counter fiber such as Metamucil powder (or capsule or gummy). Separate from other medications.     Schedule nurse only or at pharmacy to get Prevnar 20 which protect your from pneumonia.     Check you blood pressure when you are dizzy    Start rosuvastatin 5 mg daily this is to protect you from having a heart attack or stroke.     Follow-up: No follow-ups on file.    It was great speaking with you today.  I value your experience and would be very thankful for your time in providing feedback in our clinic survey. In the next few days, you may receive an email or text message from [employer] with a link to a survey related to your  clinical pharmacist.\"     To schedule another MTM appointment, please call the clinic directly or you may call the MTM scheduling line at [phone] or toll-free at [phone].     My Clinical Pharmacist's contact information:                                                      Please feel free to contact me with any questions or concerns you have.      Hawa Beckham, PharmD  Medication Therapy Management Pharmacist    " Received faxed INR result.  Result handed to: Layne Frank

## 2025-03-12 NOTE — TELEPHONE ENCOUNTER
Patient is [facility] patient.  Note to pharmacy that patient no longer [facility].  Brittany Farrell RN

## 2025-07-12 ENCOUNTER — HEALTH MAINTENANCE LETTER (OUTPATIENT)
Age: 45
End: 2025-07-12

## 2025-08-06 ENCOUNTER — HOSPITAL ENCOUNTER (EMERGENCY)
Facility: CLINIC | Age: 45
Discharge: HOME | End: 2025-08-06
Attending: EMERGENCY MEDICINE
Payer: COMMERCIAL

## 2025-08-06 ENCOUNTER — APPOINTMENT (OUTPATIENT)
Dept: GENERAL RADIOLOGY | Facility: CLINIC | Age: 45
End: 2025-08-06
Attending: EMERGENCY MEDICINE
Payer: COMMERCIAL

## 2025-08-06 VITALS
DIASTOLIC BLOOD PRESSURE: 89 MMHG | RESPIRATION RATE: 24 BRPM | BODY MASS INDEX: 50.07 KG/M2 | TEMPERATURE: 97.6 F | SYSTOLIC BLOOD PRESSURE: 124 MMHG | HEART RATE: 80 BPM | WEIGHT: 264.99 LBS | OXYGEN SATURATION: 99 %

## 2025-08-06 DIAGNOSIS — E87.20 LACTIC ACIDOSIS: ICD-10-CM

## 2025-08-06 DIAGNOSIS — R73.9 HYPERGLYCEMIA: ICD-10-CM

## 2025-08-06 DIAGNOSIS — R07.9 CHEST PAIN, UNSPECIFIED TYPE: Primary | ICD-10-CM

## 2025-08-06 LAB
ALBUMIN SERPL BCG-MCNC: 4.5 G/DL (ref 3.5–5.2)
ALP SERPL-CCNC: 61 U/L (ref 40–150)
ALT SERPL W P-5'-P-CCNC: 50 U/L (ref 0–50)
ANION GAP SERPL CALCULATED.3IONS-SCNC: 17 MMOL/L (ref 7–15)
AST SERPL W P-5'-P-CCNC: 39 U/L (ref 0–45)
ATRIAL RATE - MUSE: 80 BPM
B-OH-BUTYR SERPL-SCNC: 0.21 MMOL/L
BASOPHILS # BLD AUTO: 0.1 10E3/UL (ref 0–0.2)
BASOPHILS NFR BLD AUTO: 1 %
BILIRUB SERPL-MCNC: 0.5 MG/DL
BUN SERPL-MCNC: 21.8 MG/DL (ref 6–20)
CALCIUM SERPL-MCNC: 8.9 MG/DL (ref 8.8–10.4)
CHLORIDE SERPL-SCNC: 101 MMOL/L (ref 98–107)
CREAT SERPL-MCNC: 0.56 MG/DL (ref 0.51–0.95)
DIASTOLIC BLOOD PRESSURE - MUSE: NORMAL MMHG
EGFRCR SERPLBLD CKD-EPI 2021: >90 ML/MIN/1.73M2
EOSINOPHIL # BLD AUTO: 0.2 10E3/UL (ref 0–0.7)
EOSINOPHIL NFR BLD AUTO: 2 %
ERYTHROCYTE [DISTWIDTH] IN BLOOD BY AUTOMATED COUNT: 14.6 % (ref 10–15)
FLUAV RNA SPEC QL NAA+PROBE: NEGATIVE
FLUBV RNA RESP QL NAA+PROBE: NEGATIVE
GLUCOSE BLDC GLUCOMTR-MCNC: 122 MG/DL (ref 70–99)
GLUCOSE BLDC GLUCOMTR-MCNC: 182 MG/DL (ref 70–99)
GLUCOSE SERPL-MCNC: 173 MG/DL (ref 70–99)
HCG SERPL QL: NEGATIVE
HCO3 BLDV-SCNC: 23 MMOL/L (ref 21–28)
HCO3 BLDV-SCNC: 23 MMOL/L (ref 21–28)
HCO3 SERPL-SCNC: 23 MMOL/L (ref 22–29)
HCT VFR BLD AUTO: 39.5 % (ref 35–47)
HGB BLD-MCNC: 12.5 G/DL (ref 11.7–15.7)
HOLD SPECIMEN: NORMAL
HOLD SPECIMEN: NORMAL
IMM GRANULOCYTES # BLD: 0 10E3/UL
IMM GRANULOCYTES NFR BLD: 0 %
INTERPRETATION ECG - MUSE: NORMAL
LACTATE BLD-SCNC: 2 MMOL/L (ref 0.7–2)
LACTATE BLD-SCNC: 2.9 MMOL/L (ref 0.7–2)
LACTATE SERPL-SCNC: 2.2 MMOL/L (ref 0.7–2)
LYMPHOCYTES # BLD AUTO: 3.3 10E3/UL (ref 0.8–5.3)
LYMPHOCYTES NFR BLD AUTO: 28 %
MAGNESIUM SERPL-MCNC: 1.7 MG/DL (ref 1.7–2.3)
MCH RBC QN AUTO: 26 PG (ref 26.5–33)
MCHC RBC AUTO-ENTMCNC: 31.6 G/DL (ref 31.5–36.5)
MCV RBC AUTO: 82 FL (ref 78–100)
MONOCYTES # BLD AUTO: 0.8 10E3/UL (ref 0–1.3)
MONOCYTES NFR BLD AUTO: 7 %
NEUTROPHILS # BLD AUTO: 7.2 10E3/UL (ref 1.6–8.3)
NEUTROPHILS NFR BLD AUTO: 63 %
NRBC # BLD AUTO: 0 10E3/UL
NRBC BLD AUTO-RTO: 0 /100
P AXIS - MUSE: 32 DEGREES
PCO2 BLDV: 41 MM HG (ref 40–50)
PCO2 BLDV: 44 MM HG (ref 40–50)
PH BLDV: 7.32 [PH] (ref 7.32–7.43)
PH BLDV: 7.36 [PH] (ref 7.32–7.43)
PLATELET # BLD AUTO: 388 10E3/UL (ref 150–450)
PO2 BLDV: 30 MM HG (ref 25–47)
PO2 BLDV: 38 MM HG (ref 25–47)
POTASSIUM SERPL-SCNC: 4.3 MMOL/L (ref 3.4–5.3)
PR INTERVAL - MUSE: 160 MS
PROT SERPL-MCNC: 7.2 G/DL (ref 6.4–8.3)
QRS DURATION - MUSE: 78 MS
QT - MUSE: 400 MS
QTC - MUSE: 461 MS
R AXIS - MUSE: -4 DEGREES
RBC # BLD AUTO: 4.81 10E6/UL (ref 3.8–5.2)
RSV RNA SPEC NAA+PROBE: NEGATIVE
SAO2 % BLDV: 51 % (ref 70–75)
SAO2 % BLDV: 70 % (ref 70–75)
SARS-COV-2 RNA RESP QL NAA+PROBE: NEGATIVE
SODIUM SERPL-SCNC: 141 MMOL/L (ref 135–145)
SYSTOLIC BLOOD PRESSURE - MUSE: NORMAL MMHG
T AXIS - MUSE: 18 DEGREES
TROPONIN T SERPL HS-MCNC: <6 NG/L
TROPONIN T SERPL HS-MCNC: <6 NG/L
VENTRICULAR RATE- MUSE: 80 BPM
WBC # BLD AUTO: 11.4 10E3/UL (ref 4–11)

## 2025-08-06 PROCEDURE — 96365 THER/PROPH/DIAG IV INF INIT: CPT

## 2025-08-06 PROCEDURE — 93005 ELECTROCARDIOGRAM TRACING: CPT

## 2025-08-06 PROCEDURE — 82962 GLUCOSE BLOOD TEST: CPT

## 2025-08-06 PROCEDURE — 84703 CHORIONIC GONADOTROPIN ASSAY: CPT | Performed by: EMERGENCY MEDICINE

## 2025-08-06 PROCEDURE — 83605 ASSAY OF LACTIC ACID: CPT | Performed by: EMERGENCY MEDICINE

## 2025-08-06 PROCEDURE — 96366 THER/PROPH/DIAG IV INF ADDON: CPT

## 2025-08-06 PROCEDURE — 83735 ASSAY OF MAGNESIUM: CPT | Performed by: EMERGENCY MEDICINE

## 2025-08-06 PROCEDURE — 36415 COLL VENOUS BLD VENIPUNCTURE: CPT | Performed by: EMERGENCY MEDICINE

## 2025-08-06 PROCEDURE — 82803 BLOOD GASES ANY COMBINATION: CPT

## 2025-08-06 PROCEDURE — 71046 X-RAY EXAM CHEST 2 VIEWS: CPT

## 2025-08-06 PROCEDURE — 85014 HEMATOCRIT: CPT | Performed by: EMERGENCY MEDICINE

## 2025-08-06 PROCEDURE — 87637 SARSCOV2&INF A&B&RSV AMP PRB: CPT | Performed by: EMERGENCY MEDICINE

## 2025-08-06 PROCEDURE — 84520 ASSAY OF UREA NITROGEN: CPT | Performed by: EMERGENCY MEDICINE

## 2025-08-06 PROCEDURE — 83605 ASSAY OF LACTIC ACID: CPT

## 2025-08-06 PROCEDURE — 250N000011 HC RX IP 250 OP 636: Performed by: EMERGENCY MEDICINE

## 2025-08-06 PROCEDURE — 258N000003 HC RX IP 258 OP 636: Performed by: EMERGENCY MEDICINE

## 2025-08-06 PROCEDURE — 82010 KETONE BODYS QUAN: CPT | Performed by: EMERGENCY MEDICINE

## 2025-08-06 PROCEDURE — 99285 EMERGENCY DEPT VISIT HI MDM: CPT | Mod: 25 | Performed by: EMERGENCY MEDICINE

## 2025-08-06 PROCEDURE — 84484 ASSAY OF TROPONIN QUANT: CPT | Performed by: EMERGENCY MEDICINE

## 2025-08-06 RX ORDER — MAGNESIUM SULFATE HEPTAHYDRATE 40 MG/ML
2 INJECTION, SOLUTION INTRAVENOUS ONCE
Status: COMPLETED | OUTPATIENT
Start: 2025-08-06 | End: 2025-08-06

## 2025-08-06 RX ADMIN — MAGNESIUM SULFATE HEPTAHYDRATE 2 G: 40 INJECTION, SOLUTION INTRAVENOUS at 04:19

## 2025-08-06 RX ADMIN — SODIUM CHLORIDE 500 ML: 0.9 INJECTION, SOLUTION INTRAVENOUS at 05:29

## 2025-08-06 RX ADMIN — SODIUM CHLORIDE 1000 ML: 9 INJECTION, SOLUTION INTRAVENOUS at 06:10

## 2025-08-06 ASSESSMENT — ACTIVITIES OF DAILY LIVING (ADL)
ADLS_ACUITY_SCORE: 59

## 2025-08-06 ASSESSMENT — COLUMBIA-SUICIDE SEVERITY RATING SCALE - C-SSRS
2. HAVE YOU ACTUALLY HAD ANY THOUGHTS OF KILLING YOURSELF IN THE PAST MONTH?: NO
1. IN THE PAST MONTH, HAVE YOU WISHED YOU WERE DEAD OR WISHED YOU COULD GO TO SLEEP AND NOT WAKE UP?: NO
6. HAVE YOU EVER DONE ANYTHING, STARTED TO DO ANYTHING, OR PREPARED TO DO ANYTHING TO END YOUR LIFE?: NO

## (undated) DEVICE — SOL NACL 0.9% IRRIG 3000ML BAG 2B7477

## (undated) DEVICE — PACK CYSTO CUSTOM RIDGES

## (undated) DEVICE — PAD CHUX UNDERPAD 23X24" 7136

## (undated) DEVICE — CATH URETERAL FLEX TIP TIGERTAIL 06FRX70CM 139006

## (undated) DEVICE — RAD RX ISOVUE 300 (50ML) 61% IOPAMIDOL CHARGE PER ML

## (undated) DEVICE — ENDO BITE BLOCK ADULT OLYMPUS LATEX FREE MAJ-1632

## (undated) DEVICE — BASKET NITINOL TIPLESS HALO  1.5FRX120CM 554120

## (undated) DEVICE — BAG CLEAR TRASH 1.3M 39X33" P4040C

## (undated) DEVICE — TUBING IRRIG TUR Y TYPE 96" LF 6543-01

## (undated) DEVICE — SOL WATER IRRIG 1000ML BOTTLE 2F7114

## (undated) DEVICE — LINEN FULL SHEET 5511

## (undated) DEVICE — PREP TECHNI-CARE CHLOROXYLENOL 3% 4OZ BOTTLE C222-4ZWO

## (undated) DEVICE — LINEN HALF SHEET 5512

## (undated) DEVICE — COVER FOOTSWITCH W/CINCH 20X24" 923267

## (undated) DEVICE — GUIDEWIRE SENSOR DUAL FLEX STR 0.035"X150CM M0066703080

## (undated) DEVICE — GLOVE PROTEXIS POWDER FREE SMT 7.5  2D72PT75X

## (undated) DEVICE — FLEXIVA 200

## (undated) DEVICE — PREP POVIDONE IODINE SOLUTION 10% 120ML

## (undated) DEVICE — KIT ENDO TURNOVER/PROCEDURE W/CLEAN A SCOPE LINERS 103888

## (undated) DEVICE — GUIDEWIRE URO STR STIFF .035"X150CM NITINOL 150NSS35

## (undated) DEVICE — PREP POVIDONE IODINE SCRUB 7.5% 120ML

## (undated) RX ORDER — KETOROLAC TROMETHAMINE 30 MG/ML
INJECTION, SOLUTION INTRAMUSCULAR; INTRAVENOUS
Status: DISPENSED
Start: 2020-07-22

## (undated) RX ORDER — PROPOFOL 10 MG/ML
INJECTION, EMULSION INTRAVENOUS
Status: DISPENSED
Start: 2020-07-22

## (undated) RX ORDER — FENTANYL CITRATE 50 UG/ML
INJECTION, SOLUTION INTRAMUSCULAR; INTRAVENOUS
Status: DISPENSED
Start: 2020-08-04

## (undated) RX ORDER — FENTANYL CITRATE 50 UG/ML
INJECTION, SOLUTION INTRAMUSCULAR; INTRAVENOUS
Status: DISPENSED
Start: 2020-07-22

## (undated) RX ORDER — GLYCOPYRROLATE 0.2 MG/ML
INJECTION INTRAMUSCULAR; INTRAVENOUS
Status: DISPENSED
Start: 2020-07-22

## (undated) RX ORDER — LIDOCAINE HYDROCHLORIDE 10 MG/ML
INJECTION, SOLUTION EPIDURAL; INFILTRATION; INTRACAUDAL; PERINEURAL
Status: DISPENSED
Start: 2020-08-04

## (undated) RX ORDER — GLYCOPYRROLATE 0.2 MG/ML
INJECTION INTRAMUSCULAR; INTRAVENOUS
Status: DISPENSED
Start: 2020-08-04

## (undated) RX ORDER — FENTANYL CITRATE 50 UG/ML
INJECTION, SOLUTION INTRAMUSCULAR; INTRAVENOUS
Status: DISPENSED
Start: 2019-05-31

## (undated) RX ORDER — CEFAZOLIN SODIUM 2 G/100ML
INJECTION, SOLUTION INTRAVENOUS
Status: DISPENSED
Start: 2020-08-04

## (undated) RX ORDER — KETOROLAC TROMETHAMINE 30 MG/ML
INJECTION, SOLUTION INTRAMUSCULAR; INTRAVENOUS
Status: DISPENSED
Start: 2020-08-04

## (undated) RX ORDER — ONDANSETRON 2 MG/ML
INJECTION INTRAMUSCULAR; INTRAVENOUS
Status: DISPENSED
Start: 2020-07-22

## (undated) RX ORDER — ETOMIDATE 2 MG/ML
INJECTION INTRAVENOUS
Status: DISPENSED
Start: 2020-07-22

## (undated) RX ORDER — DEXAMETHASONE SODIUM PHOSPHATE 4 MG/ML
INJECTION, SOLUTION INTRA-ARTICULAR; INTRALESIONAL; INTRAMUSCULAR; INTRAVENOUS; SOFT TISSUE
Status: DISPENSED
Start: 2020-08-04

## (undated) RX ORDER — ACETAMINOPHEN 325 MG/1
TABLET ORAL
Status: DISPENSED
Start: 2020-08-04

## (undated) RX ORDER — ATROPA BELLADONNA AND OPIUM 16.2; 3 MG/1; MG/1
SUPPOSITORY RECTAL
Status: DISPENSED
Start: 2020-08-04

## (undated) RX ORDER — DIPHENHYDRAMINE HYDROCHLORIDE 50 MG/ML
INJECTION INTRAMUSCULAR; INTRAVENOUS
Status: DISPENSED
Start: 2019-05-31

## (undated) RX ORDER — EPHEDRINE SULFATE 50 MG/ML
INJECTION, SOLUTION INTRAMUSCULAR; INTRAVENOUS; SUBCUTANEOUS
Status: DISPENSED
Start: 2020-07-22

## (undated) RX ORDER — NEOSTIGMINE METHYLSULFATE 1 MG/ML
VIAL (ML) INJECTION
Status: DISPENSED
Start: 2020-07-22

## (undated) RX ORDER — FENTANYL CITRATE-0.9 % NACL/PF 10 MCG/ML
PLASTIC BAG, INJECTION (ML) INTRAVENOUS
Status: DISPENSED
Start: 2020-07-22

## (undated) RX ORDER — PROPOFOL 10 MG/ML
INJECTION, EMULSION INTRAVENOUS
Status: DISPENSED
Start: 2020-08-04

## (undated) RX ORDER — DEXAMETHASONE SODIUM PHOSPHATE 4 MG/ML
INJECTION, SOLUTION INTRA-ARTICULAR; INTRALESIONAL; INTRAMUSCULAR; INTRAVENOUS; SOFT TISSUE
Status: DISPENSED
Start: 2020-07-22

## (undated) RX ORDER — ONDANSETRON 2 MG/ML
INJECTION INTRAMUSCULAR; INTRAVENOUS
Status: DISPENSED
Start: 2020-08-04